# Patient Record
Sex: MALE | Race: WHITE | NOT HISPANIC OR LATINO | Employment: OTHER | URBAN - METROPOLITAN AREA
[De-identification: names, ages, dates, MRNs, and addresses within clinical notes are randomized per-mention and may not be internally consistent; named-entity substitution may affect disease eponyms.]

---

## 2017-01-09 ENCOUNTER — TRANSFERRED RECORDS (OUTPATIENT)
Dept: HEALTH INFORMATION MANAGEMENT | Facility: CLINIC | Age: 77
End: 2017-01-09

## 2017-01-09 ENCOUNTER — TELEPHONE (OUTPATIENT)
Dept: CARDIOLOGY | Facility: CLINIC | Age: 77
End: 2017-01-09

## 2017-01-09 NOTE — TELEPHONE ENCOUNTER
He is on a high dose of coreg and I think we can start him on high dose bystolic right away.  Thanks.

## 2017-01-09 NOTE — TELEPHONE ENCOUNTER
Patient has been monitoring his BP readings since 12- 1 hour after taking his BP medications. Range 140's to 130's systolic and Diastolic 40-60's and his pulse resting for a few minutes is 43-55.  He states he is extremely cold every day around 2pm. Has to wear 3 tops and polar fleece and still is cold. Denies any dizziness, but does feel drowsy all the time can sleep 10 hours and take naps during the day.  Will forward to Dr. Perera with above information.

## 2017-01-09 NOTE — TELEPHONE ENCOUNTER
Samples of Bystolic 20mg samples given and left detailed msg. Patient stop Coreg and change to Bystolic 20mg daily and call back in 1-2 weeks to report on symptoms and BP readings.    Patient states that he has noticed an increase in ankle edema and it is possible due to the amlodipine.  Patient will  samples and make an appt in 2 weeks to discuss medications.

## 2017-01-24 ENCOUNTER — OFFICE VISIT (OUTPATIENT)
Dept: CARDIOLOGY | Facility: CLINIC | Age: 77
End: 2017-01-24
Payer: COMMERCIAL

## 2017-01-24 VITALS
WEIGHT: 190.1 LBS | BODY MASS INDEX: 28.81 KG/M2 | HEIGHT: 68 IN | SYSTOLIC BLOOD PRESSURE: 130 MMHG | DIASTOLIC BLOOD PRESSURE: 66 MMHG | HEART RATE: 54 BPM

## 2017-01-24 DIAGNOSIS — Z79.899 ON AMIODARONE THERAPY: ICD-10-CM

## 2017-01-24 DIAGNOSIS — I48.0 PAROXYSMAL ATRIAL FIBRILLATION (H): ICD-10-CM

## 2017-01-24 DIAGNOSIS — I48.91 ATRIAL FIBRILLATION, UNSPECIFIED TYPE (H): Primary | ICD-10-CM

## 2017-01-24 DIAGNOSIS — Z79.899 ON AMIODARONE THERAPY: Primary | ICD-10-CM

## 2017-01-24 DIAGNOSIS — I10 ESSENTIAL HYPERTENSION: ICD-10-CM

## 2017-01-24 LAB
ALBUMIN SERPL-MCNC: 3.8 G/DL (ref 3.4–5)
ALP SERPL-CCNC: 76 U/L (ref 40–150)
ALT SERPL W P-5'-P-CCNC: 49 U/L (ref 0–70)
AST SERPL W P-5'-P-CCNC: 37 U/L (ref 0–45)
BILIRUB DIRECT SERPL-MCNC: 0.1 MG/DL (ref 0–0.2)
BILIRUB SERPL-MCNC: 0.8 MG/DL (ref 0.2–1.3)
PROT SERPL-MCNC: 6.9 G/DL (ref 6.8–8.8)
TSH SERPL DL<=0.05 MIU/L-ACNC: 0.87 MU/L (ref 0.4–4)

## 2017-01-24 PROCEDURE — 99214 OFFICE O/P EST MOD 30 MIN: CPT | Performed by: PHYSICIAN ASSISTANT

## 2017-01-24 PROCEDURE — 36415 COLL VENOUS BLD VENIPUNCTURE: CPT | Performed by: PHYSICIAN ASSISTANT

## 2017-01-24 PROCEDURE — 80076 HEPATIC FUNCTION PANEL: CPT | Performed by: PHYSICIAN ASSISTANT

## 2017-01-24 PROCEDURE — 84443 ASSAY THYROID STIM HORMONE: CPT | Performed by: PHYSICIAN ASSISTANT

## 2017-01-24 RX ORDER — NEBIVOLOL 20 MG/1
20 TABLET ORAL DAILY
Qty: 30 TABLET | Refills: 3 | Status: SHIPPED | OUTPATIENT
Start: 2017-01-24 | End: 2017-02-13

## 2017-01-24 RX ORDER — NEBIVOLOL 20 MG/1
TABLET ORAL DAILY
COMMUNITY
End: 2017-01-24

## 2017-01-24 RX ORDER — NEBIVOLOL 20 MG/1
20 TABLET ORAL DAILY
Qty: 30 TABLET | Refills: 3 | Status: SHIPPED | OUTPATIENT
Start: 2017-01-24 | End: 2017-01-24

## 2017-01-24 NOTE — TELEPHONE ENCOUNTER
rivaroxaban ANTICOAGULANT (XARELTO) 20 MG TABS tablet           Last Written Prescription Date: 7/19/2016  Last Fill Quantity: 30 tablet, # refills: 2    Last Office Visit with [facility] or [facility] prescribing provider:  11/28/2016   Future Office Visit:    Next 5 appointments (look out 90 days)     Jan 24, 2017  1:10 PM   Return Visit with Flores Grimes PA-C   [facility] ([facility])    [address]  [facility] [id number] [phone]            Feb 13, 2017  3:15 PM   Return Visit with Anam Perera MD   [facility] [place] ([facility])    [address]  [facility] [address] [phone]                   WBC     10.5   6/21/2016  RBC     3.92   6/21/2016  HGB     12.9   9/7/2016  HCT     37.8   6/21/2016  No components found with this name: mct  MCV       96   6/21/2016  MCH     29.8   6/21/2016  MCHC     31.0   6/21/2016  RDW     16.4   6/21/2016  PLT      511   6/21/2016  AST       51   5/19/2016  ALT       64   5/19/2016  CREATININE   Date Value Ref Range Status   11/18/2016 1.13 0.70 - 1.30 mg/dL Final   ]
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
## 2017-01-24 NOTE — MR AVS SNAPSHOT
After Visit Summary   1/24/2017    Reji Aguirre    MRN: [id number]           Patient Information     Date Of Birth          1940        Visit Information        Provider Department      1/24/2017 1:10 PM Flores Grimes PA-C [facility] AT [place]        Today's Diagnoses     On amiodarone therapy    -  1     Paroxysmal atrial fibrillation (H)         Essential hypertension           Care Instructions    I do not think there is significant swelling in your legs - and since you are without any symptoms, we will continue the amlodipine for now.     Regarding you feeling cold - I will check the thyroid function and will also get a Holter monitor to evaluate your heart rate.     Otherwise, your blood pressure looks better controlled now with SBP in the 120s-140s instead of mostly in the 150s. For now continue your meds as is.     Follow up scheduled with Dr. Perera is in 3 weeks.         Follow-ups after your visit        Your next 10 appointments already scheduled     Jan 24, 2017  2:15 PM   LAB with RU LAB   [facility] ([facility])    [address]  [facility] [address] [phone]           Patient must bring picture ID.  Patient should be prepared to give a urine specimen  Please do not eat 10-12 hours before your appointment if you are coming in fasting for labs on lipids, cholesterol, or glucose (sugar).  Pregnant women should follow their Care Team instructions. Water with medications is okay. Do not drink coffee or other fluids.   If you have concerns about taking  your medications, please ask at office or if scheduling via [employer], send a message by clicking on Secure Messaging, Message Your Care Team.            Jan 30, 2017  8:15 AM   Holter Monitor with [employer] [facility] [place] ([facility])    [address]  [facility]  [address]   [phone]           LOCATION - [address] [place] [address] **Please check-in at the [facility] Registration Office, [address], in the [facility] building. When you are finished registering, please go to [address] and have a seat.            Feb 13, 2017  3:15 PM   Return Visit with Anam Perera MD   [facility] AT [place] ([facility])    [address]  [facility] [address] [phone]              Future tests that were ordered for you today     Open Future Orders        Priority Expected Expires Ordered    Hepatic panel Routine 1/24/2017 1/24/2019 1/24/2017    Holter Monitor 24 hour - Adult Routine 1/31/2017 1/24/2018 1/24/2017    TSH Routine 1/24/2017 1/24/2019 1/24/2017            Who to contact     If you have questions or need follow up information about today's clinic visit or your schedule please contact [facility] [place] directly at [phone].  Normal or non-critical lab and imaging results will be communicated to you by MyChart, letter or phone within 4 business days after the clinic has received the results. If you do not hear from us within 7 days, please contact the clinic through [employer] or phone. If you have a critical or abnormal lab result, we will notify you by phone as soon as possible.  Submit refill requests through [employer] or call your pharmacy and they will forward the refill request to us. Please allow 3 business days for your refill to be completed.          Additional Information About Your Visit        MyChart Information     [employer] gives you secure access to your electronic health record. If you see a primary care provider, you can also send messages to your care team and make appointments. If you have questions, please call your primary care clinic.  If you do not have a primary care provider, please call [phone] and they will  "assist you.        Care EveryWhere ID     This is your Care EveryWhere ID. This could be used by other organizations to access your [place] medical records  [id number]        Your Vitals Were     Pulse Height BMI (Body Mass Index)             54 1.727 m (5' 8\") 28.91 kg/m2          Blood Pressure from Last 3 Encounters:   01/24/17 130/66   12/12/16 148/62   11/28/16 133/66    Weight from Last 3 Encounters:   01/24/17 86.229 kg (190 lb 1.6 oz)   12/12/16 84.823 kg (187 lb)   11/28/16 87.499 kg (192 lb 14.4 oz)                 Today's Medication Changes          These changes are accurate as of: 1/24/17  2:04 PM.  If you have any questions, ask your nurse or doctor.               These medicines have changed or have updated prescriptions.        Dose/Directions    Nebivolol HCl 20 MG Tabs   Commonly known as:  BYSTOLIC   This may have changed:  how much to take   Used for:  Essential hypertension   Changed by:  Flores Grimes PA-C        Dose:  20 mg   Take 20 mg by mouth daily   Quantity:  30 tablet   Refills:  3         Stop taking these medicines if you haven't already. Please contact your care team if you have questions.     carvedilol 25 MG tablet   Commonly known as:  COREG   Stopped by:  Flores Grimes PA-C                Where to get your medicines      These medications were sent to [facility] [place], [place] - [address] [address]  [address] [address] [address]     Phone:  [phone]    - Nebivolol HCl 20 MG Tabs      These medications were sent to [facility] [address] [place], [place] - [address] AT [facility] [address]  [address], [facility] [address]     Phone:  [phone]    - rivaroxaban ANTICOAGULANT 20 MG Tabs tablet             Primary Care Provider Office Phone # Fax #    Igor Christina [phone]       [facility] [address]  [facility] [address]        Thank you!     " Thank you for choosing [facility] AT [place]  for your care. Our goal is always to provide you with excellent care. Hearing back from our patients is one way we can continue to improve our services. Please take a few minutes to complete the written survey that you may receive in the mail after your visit with us. Thank you!             Your Updated Medication List - Protect others around you: Learn how to safely use, store and throw away your medicines at www.disposemymeds.org.          This list is accurate as of: 1/24/17  2:04 PM.  Always use your most recent med list.                   Brand Name Dispense Instructions for use    allopurinol 300 MG tablet    ZYLOPRIM    90 tablet    Take 1 tablet (300 mg) by mouth daily       amiodarone 200 MG tablet    PACERONE/CODARONE    90 tablet    Take 1 tab daily from Mon through Friday (skip Sat + Sun)       amLODIPine 5 MG tablet    NORVASC    90 tablet    Take 1 tablet (5 mg) by mouth daily       clindamycin 300 MG capsule    CLEOCIN    2 capsule    Take 2 capsules (600 mg) by mouth once as needed TAKE 600 MG ONE HOUR PRIOR TO DENTAL PROCEDURES       FINACEA 15 % gel   Generic drug:  Azelaic Acid      Apply 0.5 inches topically daily       hydrALAZINE 100 MG Tabs tablet    APRESOLINE    270 tablet    Take 1 tablet (100 mg) by mouth 3 times daily       hydrochlorothiazide 12.5 MG capsule    MICROZIDE    180 capsule    Take 2 capsules (25 mg) by mouth daily       losartan 100 MG tablet    COZAAR    90 tablet    Take 1 tablet (100 mg) by mouth daily       Nebivolol HCl 20 MG Tabs    BYSTOLIC    30 tablet    Take 20 mg by mouth daily       potassium chloride SA 20 MEQ CR tablet    K-DUR/KLOR-CON M    180 tablet    Take 1 tablet (20 mEq) by mouth 2 times daily       rivaroxaban ANTICOAGULANT 20 MG Tabs tablet    XARELTO    30 tablet    Take 1 tablet (20 mg) by mouth daily (with dinner)

## 2017-01-24 NOTE — PROGRESS NOTES
HPI and Plan:   See dictation  Conf# [id number]    Orders this Visit:  Orders Placed This Encounter   Procedures     TSH     Hepatic panel     Holter Monitor 24 hour - Adult     Orders Placed This Encounter   Medications     DISCONTD: Nebivolol HCl (BYSTOLIC) 20 MG TABS     Sig: Take by mouth daily     DISCONTD: Nebivolol HCl (BYSTOLIC) 20 MG TABS     Sig: Take 20 mg by mouth daily     Dispense:  30 tablet     Refill:  3     Nebivolol HCl (BYSTOLIC) 20 MG TABS     Sig: Take 20 mg by mouth daily     Dispense:  30 tablet     Refill:  3     Medications Discontinued During This Encounter   Medication Reason     carvedilol (COREG) 25 MG tablet Alternate therapy     order for DME Stopped by Patient     Nebivolol HCl (BYSTOLIC) 20 MG TABS Reorder     Nebivolol HCl (BYSTOLIC) 20 MG TABS Reorder         Encounter Diagnoses   Name Primary?     On amiodarone therapy Yes     Paroxysmal atrial fibrillation (H)      Essential hypertension        CURRENT MEDICATIONS:  Current Outpatient Prescriptions   Medication Sig Dispense Refill     rivaroxaban ANTICOAGULANT (XARELTO) 20 MG TABS tablet Take 1 tablet (20 mg) by mouth daily (with dinner) 30 tablet 5     Nebivolol HCl (BYSTOLIC) 20 MG TABS Take 20 mg by mouth daily 30 tablet 3     amLODIPine (NORVASC) 5 MG tablet Take 1 tablet (5 mg) by mouth daily 90 tablet 3     hydrALAZINE (APRESOLINE) 100 MG TABS Take 1 tablet (100 mg) by mouth 3 times daily 270 tablet 3     amiodarone (PACERONE/CODARONE) 200 MG tablet Take 1 tab daily from Mon through Friday (skip Sat + Sun) 90 tablet 3     potassium chloride SA (K-DUR,KLOR-CON M) 20 MEQ tablet Take 1 tablet (20 mEq) by mouth 2 times daily 180 tablet 3     hydrochlorothiazide (MICROZIDE) 12.5 MG capsule Take 2 capsules (25 mg) by mouth daily 180 capsule 3     clindamycin (CLEOCIN) 300 MG capsule Take 2 capsules (600 mg) by mouth once as needed TAKE 600 MG ONE HOUR PRIOR TO DENTAL PROCEDURES 2 capsule 0     losartan (COZAAR) 100 MG tablet Take 1  tablet (100 mg) by mouth daily 90 tablet 3     allopurinol (ZYLOPRIM) 300 MG tablet Take 1 tablet (300 mg) by mouth daily 90 tablet 3     Azelaic Acid (FINACEA) 15 % gel Apply 0.5 inches topically daily        [DISCONTINUED] Nebivolol HCl (BYSTOLIC) 20 MG TABS Take by mouth daily       [DISCONTINUED] Nebivolol HCl (BYSTOLIC) 20 MG TABS Take 20 mg by mouth daily 30 tablet 3     [DISCONTINUED] rivaroxaban ANTICOAGULANT (XARELTO) 20 MG TABS tablet Take 1 tablet (20 mg) by mouth daily (with dinner) 30 tablet 2       ALLERGIES  Allergies   Allergen Reactions     Amlodipine Besylate      edema     Ampicillin      rash     Metoprolol      Mild fatigue with Toprol; mildly decreased exercise capacity     Spironolactone Nausea and Difficulty breathing       PAST MEDICAL HISTORY:  Past Medical History   Diagnosis Date     Special screening for malignant neoplasms, colon 1995     Unspecified essential hypertension      Gout, unspecified      Rosacea      Diverticulosis of colon (without mention of hemorrhage) 11/16/07     incidental diverticuli noted at colonoscopy     Atrial fibrillation, unspecified type (H) 5/19/16     new onset A fib; nuclear stress test negative     Cardiomyopathy (H)      LBBB (left bundle branch block)      Atrial flutter (H)        PAST SURGICAL HISTORY:  Past Surgical History   Procedure Laterality Date     C nonspecific procedure  5/99     left knee arthroscopy     Hc colonoscopy thru stoma w biopsy/cautery tumor/polyp/lesion  11/16/07     normal colonoscopy except for incidental diverticuli     Orthopedic surgery  04/11     2nd toe Lt foot     Arthroscopy shoulder rotator cuff repair  7/9/08     Left shoulder arthroscopic rotator cuff repair with acromioplasty     Arthroplasty knee Left 6/6/2016     Procedure: ARTHROPLASTY KNEE;  Surgeon: Derek Varma MD;  Location:  OR     Cardioversion  07-       FAMILY HISTORY:  Family History   Problem Relation Age of Onset      "CEREBROVASCULAR DISEASE Father      d:89, dementia     Respiratory Mother      d:94     DIABETES Sister      b. 1938, IDDM since age 10     DIABETES Brother      b. 1943, type II DM       SOCIAL HISTORY:  Social History     Social History     Marital Status:      Spouse Name: N/A     Number of Children: N/A     Years of Education: N/A     Social History Main Topics     Smoking status: Former Smoker     Quit date: 07/25/1963     Smokeless tobacco: Never Used     Alcohol Use: 0.0 oz/week     0 Standard drinks or equivalent per week      Comment: 1 drinks a day     Drug Use: No     Sexual Activity:     Partners: Female     Other Topics Concern     Caffeine Concern No     1 cup daily     Special Diet No     Exercise Yes     skiing     Social History Narrative       Review of Systems:  Skin:  Negative     Eyes:  Positive for glasses  ENT:  Positive for hearing loss  Respiratory:  Negative    Cardiovascular:    Positive for;lightheadedness  Gastroenterology: Negative    Genitourinary:  Negative    Musculoskeletal:  Positive for    Neurologic:  Negative    Psychiatric:  Negative    Heme/Lymph/Imm:  Positive for chills  Endocrine:  Negative        Physical Exam:  Vitals: /66 mmHg  Pulse 54  Ht 1.727 m (5' 8\")  Wt 86.229 kg (190 lb 1.6 oz)  BMI 28.91 kg/m2    Constitutional:  cooperative, alert and oriented, well developed, well nourished, in no acute distress appears anxious      Skin:  warm and dry to the touch, no apparent skin lesions or masses noted        Head:  normocephalic, no masses or lesions        Eyes:  pupils equal and round;conjunctivae and lids unremarkable        ENT:  no pallor or cyanosis, dentition good        Neck:  no carotid bruit;JVP normal        Chest:  normal breath sounds, clear to auscultation, normal A-P diameter, normal symmetry, normal respiratory excursion, no use of accessory muscles        Cardiac: no murmurs, gallops or rubs detected bradycardic distant heart sounds       "        Abdomen:  abdomen soft;BS normoactive;non-tender        Vascular: pulses full and equal                                      Extremities and Back:  no deformities, clubbing, cyanosis, erythema observed        Neurological:  affect appropriate, oriented to time, person and place          Recent Lab Results:  LIPID RESULTS:  Lab Results   Component Value Date    CHOL 133 06/21/2016    HDL 40 06/21/2016    LDL 72 06/21/2016    TRIG 103 06/21/2016    CHOLHDLRATIO 2.6 10/09/2014       LIVER ENZYME RESULTS:  Lab Results   Component Value Date    AST 37 01/24/2017    ALT 49 01/24/2017       CBC RESULTS:  Lab Results   Component Value Date    WBC 10.5 06/21/2016    RBC 3.92* 06/21/2016    HGB 12.9* 09/07/2016    HCT 37.8* 06/21/2016    MCV 96 06/21/2016    MCH 29.8 06/21/2016    MCHC 31.0* 06/21/2016    RDW 16.4* 06/21/2016    * 06/21/2016       BMP RESULTS:  Lab Results   Component Value Date     11/18/2016    POTASSIUM 3.3* 11/18/2016    CHLORIDE 104 11/18/2016    CO2 29 11/18/2016    ANIONGAP 12.3 11/18/2016    GLC 97 11/18/2016    BUN 18 11/18/2016    CR 1.13 11/18/2016    GFRESTIMATED 63 11/18/2016    GFRESTBLACK 77 11/18/2016    HE 9.6 11/18/2016        A1C RESULTS:  No results found for: A1C    INR RESULTS:  No results found for: INR        CC  Igor Christina MD  [facility]  [address] [place] [address]

## 2017-01-24 NOTE — PATIENT INSTRUCTIONS
I do not think there is significant swelling in your legs - and since you are without any symptoms, we will continue the amlodipine for now.     Regarding you feeling cold - I will check the thyroid function and will also get a Holter monitor to evaluate your heart rate.     Otherwise, your blood pressure looks better controlled now with SBP in the 120s-140s instead of mostly in the 150s. For now continue your meds as is.     Follow up scheduled with Dr. Perera is in 3 weeks.
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
## 2017-01-26 ENCOUNTER — TRANSFERRED RECORDS (OUTPATIENT)
Dept: HEALTH INFORMATION MANAGEMENT | Facility: CLINIC | Age: 77
End: 2017-01-26

## 2017-01-30 ENCOUNTER — HOSPITAL ENCOUNTER (OUTPATIENT)
Dept: CARDIOLOGY | Facility: CLINIC | Age: 77
Discharge: HOME | End: 2017-01-30
Attending: PHYSICIAN ASSISTANT | Admitting: PHYSICIAN ASSISTANT
Payer: MEDICARE

## 2017-01-30 DIAGNOSIS — Z79.899 ON AMIODARONE THERAPY: ICD-10-CM

## 2017-01-30 DIAGNOSIS — I48.0 PAROXYSMAL ATRIAL FIBRILLATION (H): ICD-10-CM

## 2017-01-30 PROCEDURE — 93225 XTRNL ECG REC<48 HRS REC: CPT

## 2017-01-30 PROCEDURE — 93227 XTRNL ECG REC<48 HR R&I: CPT | Performed by: INTERNAL MEDICINE

## 2017-02-13 ENCOUNTER — OFFICE VISIT (OUTPATIENT)
Dept: CARDIOLOGY | Facility: CLINIC | Age: 77
End: 2017-02-13
Attending: INTERNAL MEDICINE
Payer: COMMERCIAL

## 2017-02-13 VITALS
OXYGEN SATURATION: 95 % | HEART RATE: 45 BPM | SYSTOLIC BLOOD PRESSURE: 100 MMHG | HEIGHT: 68 IN | BODY MASS INDEX: 28.95 KG/M2 | WEIGHT: 191 LBS | DIASTOLIC BLOOD PRESSURE: 50 MMHG

## 2017-02-13 DIAGNOSIS — I10 BENIGN ESSENTIAL HYPERTENSION: ICD-10-CM

## 2017-02-13 DIAGNOSIS — I48.19 PERSISTENT ATRIAL FIBRILLATION (H): ICD-10-CM

## 2017-02-13 DIAGNOSIS — I48.92 ATRIAL FLUTTER, PAROXYSMAL (H): ICD-10-CM

## 2017-02-13 PROCEDURE — 99214 OFFICE O/P EST MOD 30 MIN: CPT | Performed by: INTERNAL MEDICINE

## 2017-02-13 RX ORDER — NEBIVOLOL 5 MG/1
5 TABLET ORAL DAILY
Qty: 90 TABLET | Refills: 3 | Status: SHIPPED | OUTPATIENT
Start: 2017-02-13 | End: 2018-03-23

## 2017-02-13 NOTE — PROGRESS NOTES
HPI and Plan:   See dictation    Orders Placed This Encounter   Procedures     Follow-Up with Cardiologist       Orders Placed This Encounter   Medications     nebivolol (BYSTOLIC) 5 MG tablet     Sig: Take 1 tablet (5 mg) by mouth daily     Dispense:  90 tablet     Refill:  3       Encounter Diagnoses   Name Primary?     Persistent atrial fibrillation (H)      Atrial flutter, paroxysmal (H)      Benign essential hypertension        CURRENT MEDICATIONS:  Current Outpatient Prescriptions   Medication Sig Dispense Refill     nebivolol (BYSTOLIC) 5 MG tablet Take 1 tablet (5 mg) by mouth daily 90 tablet 3     rivaroxaban ANTICOAGULANT (XARELTO) 20 MG TABS tablet Take 1 tablet (20 mg) by mouth daily (with dinner) 30 tablet 5     amLODIPine (NORVASC) 5 MG tablet Take 1 tablet (5 mg) by mouth daily 90 tablet 3     hydrALAZINE (APRESOLINE) 100 MG TABS Take 1 tablet (100 mg) by mouth 3 times daily 270 tablet 3     amiodarone (PACERONE/CODARONE) 200 MG tablet Take 1 tab daily from Mon through Friday (skip Sat + Sun) 90 tablet 3     potassium chloride SA (K-DUR,KLOR-CON M) 20 MEQ tablet Take 1 tablet (20 mEq) by mouth 2 times daily 180 tablet 3     hydrochlorothiazide (MICROZIDE) 12.5 MG capsule Take 2 capsules (25 mg) by mouth daily 180 capsule 3     clindamycin (CLEOCIN) 300 MG capsule Take 2 capsules (600 mg) by mouth once as needed TAKE 600 MG ONE HOUR PRIOR TO DENTAL PROCEDURES 2 capsule 0     losartan (COZAAR) 100 MG tablet Take 1 tablet (100 mg) by mouth daily 90 tablet 3     allopurinol (ZYLOPRIM) 300 MG tablet Take 1 tablet (300 mg) by mouth daily 90 tablet 3     Azelaic Acid (FINACEA) 15 % gel Apply 0.5 inches topically daily        [DISCONTINUED] Nebivolol HCl (BYSTOLIC) 20 MG TABS Take 20 mg by mouth daily 30 tablet 3       ALLERGIES     Allergies   Allergen Reactions     Amlodipine Besylate      edema     Ampicillin      rash     Metoprolol      Mild fatigue with Toprol; mildly decreased exercise capacity      Spironolactone Nausea and Difficulty breathing       PAST MEDICAL HISTORY:  Past Medical History   Diagnosis Date     Atrial fibrillation, unspecified type (H) 5/19/16     new onset A fib; nuclear stress test negative     Atrial flutter (H)      Cardiomyopathy (H)      Diverticulosis of colon (without mention of hemorrhage) 11/16/07     incidental diverticuli noted at colonoscopy     Gout, unspecified      LBBB (left bundle branch block)      Rosacea      Special screening for malignant neoplasms, colon 1995     Unspecified essential hypertension        PAST SURGICAL HISTORY:  Past Surgical History   Procedure Laterality Date     C nonspecific procedure  5/99     left knee arthroscopy     Hc colonoscopy thru stoma w biopsy/cautery tumor/polyp/lesion  11/16/07     normal colonoscopy except for incidental diverticuli     Orthopedic surgery  04/11     2nd toe Lt foot     Arthroscopy shoulder rotator cuff repair  7/9/08     Left shoulder arthroscopic rotator cuff repair with acromioplasty     Arthroplasty knee Left 6/6/2016     Procedure: ARTHROPLASTY KNEE;  Surgeon: Derek Varma MD;  Location: [facility] OR     Cardioversion  07-       FAMILY HISTORY:  Family History   Problem Relation Age of Onset     CEREBROVASCULAR DISEASE Father      d:89, dementia     Respiratory Mother      d:94     DIABETES Sister      b. 1938, IDDM since age 10     DIABETES Brother      b. 1943, type II DM       SOCIAL HISTORY:  Social History     Social History     Marital status:      Spouse name: N/A     Number of children: N/A     Years of education: N/A     Social History Main Topics     Smoking status: Former Smoker     Quit date: 7/25/1963     Smokeless tobacco: Never Used     Alcohol use 0.0 oz/week     0 Standard drinks or equivalent per week      Comment: 1 drinks a day     Drug use: No     Sexual activity: Yes     Partners: Female     Other Topics Concern     Caffeine Concern No     1 cup daily     Special Diet No  "    Exercise Yes     skiing     Social History Narrative       Review of Systems:  Skin:  Positive for     Eyes:  Positive for glasses  ENT:  Negative    Respiratory:       Cardiovascular:    dizziness;syncope or near-syncope;Positive for;palpitations;edema  Gastroenterology: Negative    Genitourinary:  Positive for urinary frequency;nocturia  Musculoskeletal:  Positive for back pain;arthritis;joint pain  Neurologic:  Negative    Psychiatric:  Negative    Heme/Lymph/Imm:  Negative    Endocrine:  Negative      Physical Exam:  Vitals: /50 (BP Location: Right arm, Patient Position: Chair, Cuff Size: Adult Regular)  Pulse (!) 45  Ht 1.727 m (5' 8\")  Wt 86.6 kg (191 lb)  SpO2 95%  BMI 29.04 kg/m2    Constitutional:  cooperative, alert and oriented, well developed, well nourished, in no acute distress appears anxious      Skin:  warm and dry to the touch, no apparent skin lesions or masses noted        Head:  normocephalic, no masses or lesions        Eyes:  pupils equal and round;conjunctivae and lids unremarkable        ENT:  no pallor or cyanosis, dentition good        Neck:  no carotid bruit;JVP normal        Chest:  normal breath sounds, clear to auscultation, normal A-P diameter, normal symmetry, normal respiratory excursion, no use of accessory muscles        Cardiac: no murmurs, gallops or rubs detected bradycardic distant heart sounds              Abdomen:  abdomen soft;BS normoactive;non-tender        Vascular: pulses full and equal                                      Extremities and Back:  no deformities, clubbing, cyanosis, erythema observed        Neurological:  affect appropriate, oriented to time, person and place          Recent Lab Results:  LIPID RESULTS:  Lab Results   Component Value Date    CHOL 133 06/21/2016    HDL 40 06/21/2016    LDL 72 06/21/2016    TRIG 103 06/21/2016    CHOLHDLRATIO 2.6 10/09/2014       LIVER ENZYME RESULTS:  Lab Results   Component Value Date    AST 37 01/24/2017    ALT " 49 01/24/2017       CBC RESULTS:  Lab Results   Component Value Date    WBC 10.5 06/21/2016    RBC 3.92 (L) 06/21/2016    HGB 12.9 (L) 09/07/2016    HCT 37.8 (L) 06/21/2016    MCV 96 06/21/2016    MCH 29.8 06/21/2016    MCHC 31.0 (L) 06/21/2016    RDW 16.4 (H) 06/21/2016     (H) 06/21/2016       BMP RESULTS:  Lab Results   Component Value Date     11/18/2016    POTASSIUM 3.3 (L) 11/18/2016    CHLORIDE 104 11/18/2016    CO2 29 11/18/2016    ANIONGAP 12.3 11/18/2016    GLC 97 11/18/2016    BUN 18 11/18/2016    CR 1.13 11/18/2016    GFRESTIMATED 63 11/18/2016    GFRESTBLACK 77 11/18/2016    HE 9.6 11/18/2016        A1C RESULTS:  No results found for: A1C    INR RESULTS:  No results found for: INR        CC  Anam Perera MD   [facility]  [address]  [place] [id number]

## 2017-02-13 NOTE — LETTER
2/13/2017    Igor Christina MD  [facility]   [address] [facility] [address]    RE: Reji Aguirre       Dear Colleague,    I had the pleasure of seeing Reji Aguirre in the [facility].    Mr. Aguirre returns for followup of hypertension.  He has a history of persistent atrial fibrillation from which he has been successfully cardioverted.  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  He has been to clinic to see us almost every month since June of last year.  For full narrative, please refer to any of Dr Aj' or my notes.      He had a Holter monitor done at the end of last month.  He tells me he felt like he was in atrial fibrillation.  However, the Holter monitor showed that the rhythm was sinus bradycardia.  He has no shortness of breath.  He works as a  at [employer].  He tells me that on several occasions he felt dizzy for 5-10 seconds.  It is not necessarily related to posture or head movement.  He said he felt very tired and almost fell asleep when he was driving.  However, he had similar episodes when he was a much younger man.      PHYSICAL EXAMINATION:  Cardiovascular system examination today reveals a normal regular heart rate, but he is bradycardic.  Heart sounds are unremarkable.  He has trace bilateral ankle edema, likely from use of amlodipine.  His chest is clear.      He complains of feeling cold at times.  I do note that his blood pressure is 100/50 today in our offices.  He showed me multiple readings from home.  Aside from 1 reading of just over 160 systolic, all of the other readings are between 120-140 systolic, showing much better control.      Outpatient Encounter Prescriptions as of 2/13/2017   Medication Sig Dispense Refill     nebivolol (BYSTOLIC) 5 MG tablet Take 1 tablet (5 mg) by mouth daily 90 tablet 3     rivaroxaban ANTICOAGULANT (XARELTO) 20 MG TABS tablet Take 1  tablet (20 mg) by mouth daily (with dinner) 30 tablet 5     amLODIPine (NORVASC) 5 MG tablet Take 1 tablet (5 mg) by mouth daily 90 tablet 3     hydrALAZINE (APRESOLINE) 100 MG TABS Take 1 tablet (100 mg) by mouth 3 times daily 270 tablet 3     amiodarone (PACERONE/CODARONE) 200 MG tablet Take 1 tab daily from Mon through Friday (skip Sat + Sun) 90 tablet 3     potassium chloride SA (K-DUR,KLOR-CON M) 20 MEQ tablet Take 1 tablet (20 mEq) by mouth 2 times daily 180 tablet 3     hydrochlorothiazide (MICROZIDE) 12.5 MG capsule Take 2 capsules (25 mg) by mouth daily 180 capsule 3     clindamycin (CLEOCIN) 300 MG capsule Take 2 capsules (600 mg) by mouth once as needed TAKE 600 MG ONE HOUR PRIOR TO DENTAL PROCEDURES 2 capsule 0     losartan (COZAAR) 100 MG tablet Take 1 tablet (100 mg) by mouth daily 90 tablet 3     allopurinol (ZYLOPRIM) 300 MG tablet Take 1 tablet (300 mg) by mouth daily 90 tablet 3     Azelaic Acid (FINACEA) 15 % gel Apply 0.5 inches topically daily        [DISCONTINUED] Nebivolol HCl (BYSTOLIC) 20 MG TABS Take 20 mg by mouth daily 30 tablet 3     No facility-administered encounter medications on file as of 2/13/2017.      IMPRESSION:  I think this gentleman is extremely concerned about his cardiac health.  I am not sure what those episodes of feeling faint are, but they are probably not due to cardiac arrhythmia.  His blood pressure is a little on the low side today, though home readings are definitely acceptable.  I do wonder if his sensation of feeling cold could be a side effect of Bystolic.  He was previously on 20.  I would like to try him on 5 mg once daily.  As to his falling asleep/driving, I would like to exclude sleep apnea by requesting a sleep study.  He tells me that he is going to [place] very soon, and he will discuss it with his primary physician.      I reassured him that his cardiac status is likely stable.  He had some repeat labs for amiodarone followup recently, and they are fine.   I will see him again in 4 months' time for further followup.     Again, thank you for allowing me to participate in the care of your patient.      Sincerely,    DR BEATRICE NEWELL MD     [facility]

## 2017-02-13 NOTE — MR AVS SNAPSHOT
After Visit Summary   2/13/2017    Reji Aguirre    MRN: [id number]           Patient Information     Date Of Birth          1940        Visit Information        Provider Department      2/13/2017 3:15 PM Trever, Anam Butterfield MD [facility]        Today's Diagnoses     Persistent atrial fibrillation (H)        Atrial flutter, paroxysmal (H)        Benign essential hypertension           Follow-ups after your visit        Additional Services     Follow-Up with Cardiologist                 Your next 10 appointments already scheduled     Feb 14, 2017  9:20 AM CST   Office Visit with Igor Christina MD   [facility]    [address] [address] [phone]           Bring a current list of meds and any records pertaining to this visit.  For Physicals, please bring immunization records and any forms needing to be filled out.  Please arrive 10 minutes early to complete paperwork.              Future tests that were ordered for you today     Open Future Orders        Priority Expected Expires Ordered    Follow-Up with Cardiologist Routine 6/13/2017 2/13/2018 2/13/2017            Who to contact     If you have questions or need follow up information about today's clinic visit or your schedule please contact [facility] directly at [phone].  Normal or non-critical lab and imaging results will be communicated to you by MyChart, letter or phone within 4 business days after the clinic has received the results. If you do not hear from us within 7 days, please contact the clinic through MyChart or phone. If you have a critical or abnormal lab result, we will notify you by phone as soon as possible.  Submit refill requests through [employer] or call your pharmacy and they will forward the refill request to us. Please allow 3 business  "days for your refill to be completed.          Additional Information About Your Visit        [employer] Information     [employer] gives you secure access to your electronic health record. If you see a primary care provider, you can also send messages to your care team and make appointments. If you have questions, please call your primary care clinic.  If you do not have a primary care provider, please call [phone] and they will assist you.        Care EveryWhere ID     This is your Care EveryWhere ID. This could be used by other organizations to access your [place] medical records  [id number]        Your Vitals Were     Pulse Height Pulse Oximetry BMI (Body Mass Index)          45 1.727 m (5' 8\") 95% 29.04 kg/m2         Blood Pressure from Last 3 Encounters:   02/13/17 100/50   01/24/17 130/66   12/12/16 148/62    Weight from Last 3 Encounters:   02/13/17 86.6 kg (191 lb)   01/24/17 86.2 kg (190 lb 1.6 oz)   12/12/16 84.8 kg (187 lb)              We Performed the Following     Follow-Up with Cardiologist          Today's Medication Changes          These changes are accurate as of: 2/13/17  4:06 PM.  If you have any questions, ask your nurse or doctor.               These medicines have changed or have updated prescriptions.        Dose/Directions    nebivolol 5 MG tablet   Commonly known as:  BYSTOLIC   This may have changed:    - medication strength  - how much to take   Used for:  Benign essential hypertension, Atrial flutter, paroxysmal (H), Persistent atrial fibrillation (H)   Changed by:  Anam Perera MD        Dose:  5 mg   Take 1 tablet (5 mg) by mouth daily   Quantity:  90 tablet   Refills:  3            Where to get your medicines      These medications were sent to [facility] [id number] [place] [address], [place] - [address] AT [facility] [place]  [address], [facility] [address]     Phone:  [phone]     nebivolol 5 MG tablet                Primary Care " Provider Office Phone # Fax #    Igor Manuel Christina [phone]       [facility] [address] [facility] [address]        Thank you!     Thank you for choosing [facility]  for your care. Our goal is always to provide you with excellent care. Hearing back from our patients is one way we can continue to improve our services. Please take a few minutes to complete the written survey that you may receive in the mail after your visit with us. Thank you!             Your Updated Medication List - Protect others around you: Learn how to safely use, store and throw away your medicines at www.disposemymeds.org.          This list is accurate as of: 2/13/17  4:06 PM.  Always use your most recent med list.                   Brand Name Dispense Instructions for use    allopurinol 300 MG tablet    ZYLOPRIM    90 tablet    Take 1 tablet (300 mg) by mouth daily       amiodarone 200 MG tablet    PACERONE/CODARONE    90 tablet    Take 1 tab daily from Mon through Friday (skip Sat + Sun)       amLODIPine 5 MG tablet    NORVASC    90 tablet    Take 1 tablet (5 mg) by mouth daily       clindamycin 300 MG capsule    CLEOCIN    2 capsule    Take 2 capsules (600 mg) by mouth once as needed TAKE 600 MG ONE HOUR PRIOR TO DENTAL PROCEDURES       FINACEA 15 % gel   Generic drug:  Azelaic Acid      Apply 0.5 inches topically daily       hydrALAZINE 100 MG Tabs tablet    APRESOLINE    270 tablet    Take 1 tablet (100 mg) by mouth 3 times daily       hydrochlorothiazide 12.5 MG capsule    MICROZIDE    180 capsule    Take 2 capsules (25 mg) by mouth daily       losartan 100 MG tablet    COZAAR    90 tablet    Take 1 tablet (100 mg) by mouth daily       nebivolol 5 MG tablet    BYSTOLIC    90 tablet    Take 1 tablet (5 mg) by mouth daily       potassium chloride SA 20 MEQ CR tablet    K-DUR/KLOR-CON M    180 tablet    Take 1 tablet (20 mEq) by mouth 2 times daily        rivaroxaban ANTICOAGULANT 20 MG Tabs tablet    XARELTO    30 tablet    Take 1 tablet (20 mg) by mouth daily (with dinner)

## 2017-02-14 ENCOUNTER — OFFICE VISIT (OUTPATIENT)
Dept: INTERNAL MEDICINE | Facility: CLINIC | Age: 77
End: 2017-02-14
Payer: COMMERCIAL

## 2017-02-14 VITALS
DIASTOLIC BLOOD PRESSURE: 50 MMHG | BODY MASS INDEX: 29.57 KG/M2 | OXYGEN SATURATION: 96 % | HEART RATE: 40 BPM | WEIGHT: 194.5 LBS | SYSTOLIC BLOOD PRESSURE: 148 MMHG | TEMPERATURE: 98 F

## 2017-02-14 DIAGNOSIS — I42.9 CARDIOMYOPATHY (H): ICD-10-CM

## 2017-02-14 DIAGNOSIS — I10 BENIGN ESSENTIAL HYPERTENSION: ICD-10-CM

## 2017-02-14 DIAGNOSIS — M62.89 TENSOR FASCIA LATA SYNDROME: Primary | ICD-10-CM

## 2017-02-14 DIAGNOSIS — I48.91 ATRIAL FIBRILLATION, UNSPECIFIED TYPE (H): ICD-10-CM

## 2017-02-14 PROCEDURE — 99214 OFFICE O/P EST MOD 30 MIN: CPT | Performed by: INTERNAL MEDICINE

## 2017-02-14 NOTE — MR AVS SNAPSHOT
After Visit Summary   2/14/2017    Reji Aguirre    MRN: [id number]           Patient Information     Date Of Birth          1940        Visit Information        Provider Department      2/14/2017 9:20 AM Igor Christina MD [facility]        Today's Diagnoses     Tensor fascia elijah syndrome    -  1    Atrial fibrillation, unspecified type (H)        Benign essential hypertension        Cardiomyopathy (H)          Care Instructions    *  Stretching of the connective tissues of the hip and buttock area    *  Hamstring stretches  *  Tensor fascia Burke stretches  *  Piriformis stretches    *  [email or web address]    *  Massage balls    * foam rollers            Follow-ups after your visit        Future tests that were ordered for you today     Open Future Orders        Priority Expected Expires Ordered    Follow-Up with Cardiologist Routine 6/13/2017 2/13/2018 2/13/2017            Who to contact     If you have questions or need follow up information about today's clinic visit or your schedule please contact [facility] directly at [phone].  Normal or non-critical lab and imaging results will be communicated to you by redealizehart, letter or phone within 4 business days after the clinic has received the results. If you do not hear from us within 7 days, please contact the clinic through [employer] or phone. If you have a critical or abnormal lab result, we will notify you by phone as soon as possible.  Submit refill requests through [employer] or call your pharmacy and they will forward the refill request to us. Please allow 3 business days for your refill to be completed.          Additional Information About Your Visit        redealizehart Information     [employer] gives you secure access to your electronic health record. If you see a primary care provider, you can also send messages to your care team and make appointments. If you have questions, please  call your primary care clinic.  If you do not have a primary care provider, please call [phone] and they will assist you.        Care EveryWhere ID     This is your Care EveryWhere ID. This could be used by other organizations to access your [facility] medical records  [id number]        Your Vitals Were     Pulse Temperature Pulse Oximetry BMI (Body Mass Index)          40 98  F (36.7  C) (Oral) 96% 29.57 kg/m2         Blood Pressure from Last 3 Encounters:   02/14/17 148/50   02/13/17 100/50   01/24/17 130/66    Weight from Last 3 Encounters:   02/14/17 194 lb 8 oz (88.2 kg)   02/13/17 191 lb (86.6 kg)   01/24/17 190 lb 1.6 oz (86.2 kg)              Today, you had the following     No orders found for display       Primary Care Provider Office Phone # Fax #    Igor Christina [phone]       [facility] [address] [place] [id number]        Thank you!     Thank you for choosing [facility]  for your care. Our goal is always to provide you with excellent care. Hearing back from our patients is one way we can continue to improve our services. Please take a few minutes to complete the written survey that you may receive in the mail after your visit with us. Thank you!             Your Updated Medication List - Protect others around you: Learn how to safely use, store and throw away your medicines at www.disposemymeds.org.          This list is accurate as of: 2/14/17 10:24 AM.  Always use your most recent med list.                   Brand Name Dispense Instructions for use    allopurinol 300 MG tablet    ZYLOPRIM    90 tablet    Take 1 tablet (300 mg) by mouth daily       amiodarone 200 MG tablet    PACERONE/CODARONE    90 tablet    Take 1 tab daily from Mon through Friday (skip Sat + Sun)       amLODIPine 5 MG tablet    NORVASC    90 tablet    Take 1 tablet (5 mg) by mouth daily       clindamycin 300 MG capsule    CLEOCIN    2 capsule    Take 2  capsules (600 mg) by mouth once as needed TAKE 600 MG ONE HOUR PRIOR TO DENTAL PROCEDURES       FINACEA 15 % gel   Generic drug:  Azelaic Acid      Apply 0.5 inches topically daily       hydrALAZINE 100 MG Tabs tablet    APRESOLINE    270 tablet    Take 1 tablet (100 mg) by mouth 3 times daily       hydrochlorothiazide 12.5 MG capsule    MICROZIDE    180 capsule    Take 2 capsules (25 mg) by mouth daily       losartan 100 MG tablet    COZAAR    90 tablet    Take 1 tablet (100 mg) by mouth daily       nebivolol 5 MG tablet    BYSTOLIC    90 tablet    Take 1 tablet (5 mg) by mouth daily       potassium chloride SA 20 MEQ CR tablet    K-DUR/KLOR-CON M    180 tablet    Take 1 tablet (20 mEq) by mouth 2 times daily       rivaroxaban ANTICOAGULANT 20 MG Tabs tablet    XARELTO    30 tablet    Take 1 tablet (20 mg) by mouth daily (with dinner)

## 2017-02-14 NOTE — PATIENT INSTRUCTIONS
*  Stretching of the connective tissues of the hip and buttock area    *  Hamstring stretches  *  Tensor fascia Manoj stretches  *  Piriformis stretches    *  [email or web address]    *  Massage balls    * foam rollers

## 2017-02-14 NOTE — PROGRESS NOTES
"  SUBJECTIVE:                                                    Reji Aguirre is a 76 year old male who presents to clinic today for the following health issues:      Joint Pain     Onset: \"on and off\" for a few years    Description:   Location: right hip  Character: Sharp and Stabbing    Intensity: severe    Progression of Symptoms: intermittent    Accompanying Signs & Symptoms:  Other symptoms: none   History:   Previous similar pain: YES      Precipitating factors:   Trauma or overuse: YES- pt does not have any accidents but occurs while he is skiing, over use    Alleviating factors:  Improved by: acetaminophen and NSAID - aleve w/ some relief       Therapies Tried and outcome: acetaminophen and NSAID - aleve w/ some relief          Problem list and histories reviewed & adjusted, as indicated.  Additional history: as documented        Past Medical History:  ---------------------------  Past Medical History   Diagnosis Date     Atrial fibrillation, unspecified type (H) 5/19/16     new onset A fib; nuclear stress test negative     Atrial flutter (H)      Cardiomyopathy (H)      Diverticulosis of colon (without mention of hemorrhage) 11/16/07     incidental diverticuli noted at colonoscopy     Gout, unspecified      LBBB (left bundle branch block)      Rosacea      Special screening for malignant neoplasms, colon 1995     Unspecified essential hypertension        Past Surgical History:  ---------------------------  Past Surgical History   Procedure Laterality Date     C nonspecific procedure  5/99     left knee arthroscopy     Hc colonoscopy thru stoma w biopsy/cautery tumor/polyp/lesion  11/16/07     normal colonoscopy except for incidental diverticuli     Orthopedic surgery  04/11     2nd toe Lt foot     Arthroscopy shoulder rotator cuff repair  7/9/08     Left shoulder arthroscopic rotator cuff repair with acromioplasty     Arthroplasty knee Left 6/6/2016     Procedure: ARTHROPLASTY KNEE;  Surgeon: Avani, " Derek Jha MD;  Location: [facility] OR     Cardioversion  07-       Current Medications:  ---------------------------  Current Outpatient Prescriptions   Medication Sig Dispense Refill     nebivolol (BYSTOLIC) 5 MG tablet Take 1 tablet (5 mg) by mouth daily 90 tablet 3     rivaroxaban ANTICOAGULANT (XARELTO) 20 MG TABS tablet Take 1 tablet (20 mg) by mouth daily (with dinner) 30 tablet 5     amLODIPine (NORVASC) 5 MG tablet Take 1 tablet (5 mg) by mouth daily 90 tablet 3     hydrALAZINE (APRESOLINE) 100 MG TABS Take 1 tablet (100 mg) by mouth 3 times daily 270 tablet 3     amiodarone (PACERONE/CODARONE) 200 MG tablet Take 1 tab daily from Mon through Friday (skip Sat + Sun) 90 tablet 3     potassium chloride SA (K-DUR,KLOR-CON M) 20 MEQ tablet Take 1 tablet (20 mEq) by mouth 2 times daily 180 tablet 3     hydrochlorothiazide (MICROZIDE) 12.5 MG capsule Take 2 capsules (25 mg) by mouth daily 180 capsule 3     clindamycin (CLEOCIN) 300 MG capsule Take 2 capsules (600 mg) by mouth once as needed TAKE 600 MG ONE HOUR PRIOR TO DENTAL PROCEDURES 2 capsule 0     losartan (COZAAR) 100 MG tablet Take 1 tablet (100 mg) by mouth daily 90 tablet 3     allopurinol (ZYLOPRIM) 300 MG tablet Take 1 tablet (300 mg) by mouth daily 90 tablet 3     Azelaic Acid (FINACEA) 15 % gel Apply 0.5 inches topically daily          Allergies:  -------------  Allergies   Allergen Reactions     Amlodipine Besylate      edema     Ampicillin      rash     Metoprolol      Mild fatigue with Toprol; mildly decreased exercise capacity     Spironolactone Nausea and Difficulty breathing       Social History:  -------------------  Social History     Social History     Marital status:      Spouse name: N/A     Number of children: N/A     Years of education: N/A     Occupational History     Not on file.     Social History Main Topics     Smoking status: Former Smoker     Quit date: 7/25/1963     Smokeless tobacco: Never Used     Alcohol use 0.0  oz/week     0 Standard drinks or equivalent per week      Comment: 1 drinks a day     Drug use: No     Sexual activity: Yes     Partners: Female     Other Topics Concern     Caffeine Concern No     1 cup daily     Special Diet No     Exercise Yes     skiing     Social History Narrative       Family Medical History:  ------------------------------  Family History   Problem Relation Age of Onset     CEREBROVASCULAR DISEASE Father      d:89, dementia     Respiratory Mother      d:94     DIABETES Sister      b. 1938, IDDM since age 10     DIABETES Brother      b. 1943, type II DM         ROS:  REVIEW OF SYSTEMS:    RESP: negative for cough, dyspnea, wheezing, hemoptysis  CV: negative for chest pain, palpitations, PND, MAGAÑA, orthopnea; reports no changes in their ability to perform physical activity (from cardiovascular standpoint)  GI: negative for dysphagia, N/V, pain, melena, diarrhea and constipation  NEURO: negative for numbness/tingling, paralysis, incoordination, or focal weakness     OBJECTIVE:                                                    /50  Pulse (!) 40  Temp 98  F (36.7  C) (Oral)  Wt 194 lb 8 oz (88.2 kg)  SpO2 96%  BMI 29.57 kg/m2     GENERAL alert and no distress  EYES:  Normal sclera,conjunctiva, EOMI  HENT: oral and posterior pharynx without lesions or erythema, facies symmetric  NECK: Neck supple. No LAD, without thyroidmegaly or JVD., Carotids without bruits.  RESP: Clear to ausculation bilaterally without wheezes or crackles. Normal BS in all fields.  CV: Irregular rhythm ( consistent with known atrial fibrillation), regular rate; normal S1S2 without murmurs, rubs or gallops, normal S1S2 without murmurs, rubs or gallops. PMI normal  LYMPH: no cervical lymph adenopathy appreciated  MS: extremities- no gross deformities of the visible extremities noted, no edema  PSYCH: Alert and oriented times 3; speech- coherent  SKIN:  No obvious significant skin lesions on visible portions of face   HIP:   FROM in hip joint, tendnres in the upper IT band insertion and tensor fascia lattae area.    No pain with hip flexor muscle use.          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 Work with PT at [facility]  Discussed mechanical aspects of this condition.   This is undoubtedly due to his extensive skiing, it has gotten better with laying off skiing, fortunately we are at the end of the season.  With home PT (gave exercises and web sites to check out), then will refer to PT at [facility].   Plan:     (I48.91) Atrial fibrillation, unspecified type (H)  Comment: This condition is currently controlled on the current medical regimen.  Continue current therapy.   Plan:     (I10) Benign essential hypertension  Comment: This condition is currently controlled on the current medical regimen.  Continue current therapy.   Discussed hypertension in detail including JNC VIII guidelines for blood pressure goals.  Discussed indication for treatment and treatment options.  Discussed the importance for aggressive management of HTN to prevent vascular complications later.  Recommended lower fat, lower carbohydrate, and lower sodium (<2000 mg)diet.  Discussed required intervals for follow up on HTN, lab studies, and the need to aggresive management of other cardiac disease risk factors.  Recommened pt. follow their blood pressures outside the clinic to ensure that BPs are remaining within guidelines, and to contact me if the readings are not within guidelines so we can adjust treatment as needed.   Plan:     (I42.9) Cardiomyopathy (H)  Comment: This condition is currently controlled on the current medical regimen.  Continue current therapy.   No signs and symptoms of congestive heart  failure at this time.   Plan:       See Patient Instructions    BAR SALEH M.D., MD  [facility]

## 2017-02-14 NOTE — PROGRESS NOTES
HISTORY OF PRESENT ILLNESS:  Mr. Aguirre returns for followup of hypertension.  He has a history of persistent atrial fibrillation from which he has been successfully cardioverted.  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  He has been to clinic to see us almost every month since June of last year.  For full narrative, please refer to any of Dr Aj' or my notes.      He had a Holter monitor done at the end of last month.  He tells me he felt like he was in atrial fibrillation.  However, the Holter monitor showed that the rhythm was sinus bradycardia.  He has no shortness of breath.  He works as a  at [employer].  He tells me that on several occasions he felt dizzy for 5-10 seconds.  It is not necessarily related to posture or head movement.  He said he felt very tired and almost fell asleep when he was driving.  However, he had similar episodes when he was a much younger man.      PHYSICAL EXAMINATION:  Cardiovascular system examination today reveals a normal regular heart rate, but he is bradycardic.  Heart sounds are unremarkable.  He has trace bilateral ankle edema, likely from use of amlodipine.  His chest is clear.      He complains of feeling cold at times.  I do note that his blood pressure is 100/50 today in our offices.  He showed me multiple readings from home.  Aside from 1 reading of just over 160 systolic, all of the other readings are between 120-140 systolic, showing much better control.      IMPRESSION:  I think this gentleman is extremely concerned about his cardiac health.  I am not sure what those episodes of feeling faint are, but they are probably not due to cardiac arrhythmia.  His blood pressure is a little on the low side today, though home readings are definitely acceptable.  I do wonder if his sensation of feeling cold could be a side effect of Bystolic.  He was previously on 20.  I would like to try him on 5 mg once daily.   As to his falling asleep/driving, I would like to exclude sleep apnea by requesting a sleep study.  He tells me that he is going to [place] very soon, and he will discuss it with his primary physician.      I reassured him that his cardiac status is likely stable.  He had some repeat labs for amiodarone followup recently, and they are fine.  I will see him again in 4 months' time for further followup.         BEATRICE NEWELL MD, [facility]             D: 2017 16:06   T: 2017 23:11   MT: [id number]      Name:     MARCIE CABALLERO   MRN:      [id number]        Account:      [id number]   :      1940           Service Date: 2017      Document: [id number]

## 2017-02-21 DIAGNOSIS — Z79.899 ON AMIODARONE THERAPY: Primary | ICD-10-CM

## 2017-02-23 DIAGNOSIS — I48.91 ATRIAL FIBRILLATION (H): Primary | ICD-10-CM

## 2017-06-03 DIAGNOSIS — M10.9 GOUT, UNSPECIFIED: ICD-10-CM

## 2017-06-05 RX ORDER — ALLOPURINOL 300 MG/1
TABLET ORAL
Qty: 90 TABLET | Refills: 0 | Status: SHIPPED | OUTPATIENT
Start: 2017-06-05 | End: 2017-09-23

## 2017-06-05 NOTE — TELEPHONE ENCOUNTER
allopurinol       Last Written Prescription Date: 6/16/16  Last Fill Quantity: 90, # refills: 3  Last Office Visit with [facility], [facility] or [facility] prescribing provider:  2/14/17   Next 5 appointments (look out 90 days)     Jun 28, 2017 10:45 AM CDT   Return Visit with Anam Perera MD   [facility] ([facility]    [address]  [facility] [id number] [phone]            Jun 30, 2017 10:00 AM CDT   Office Visit with Igor Christina MD   [facility]    [address] [address] [phone]                   Uric Acid   Date Value Ref Range Status   03/18/2016 3.5 3.5 - 7.2 mg/dL Final   ]  Creatinine   Date Value Ref Range Status   11/18/2016 1.13 0.70 - 1.30 mg/dL Final   ]  Lab Results   Component Value Date    WBC 10.5 06/21/2016     Lab Results   Component Value Date    RBC 3.92 06/21/2016     Lab Results   Component Value Date    HGB 12.9 09/07/2016     Lab Results   Component Value Date    HCT 37.8 06/21/2016     No components found for: MCT  Lab Results   Component Value Date    MCV 96 06/21/2016     Lab Results   Component Value Date    MCH 29.8 06/21/2016     Lab Results   Component Value Date    MCHC 31.0 06/21/2016     Lab Results   Component Value Date    RDW 16.4 06/21/2016     Lab Results   Component Value Date     06/21/2016     Lab Results   Component Value Date    AST 37 01/24/2017     Lab Results   Component Value Date    ALT 49 01/24/2017     Prescription approved per [facility] Refill Protocol.  Pt has upcoming appt.

## 2017-06-26 ENCOUNTER — HOSPITAL ENCOUNTER (OUTPATIENT)
Dept: GENERAL RADIOLOGY | Facility: CLINIC | Age: 77
Discharge: HOME | End: 2017-06-26
Attending: INTERNAL MEDICINE | Admitting: INTERNAL MEDICINE
Payer: MEDICARE

## 2017-06-26 DIAGNOSIS — I48.91 ATRIAL FIBRILLATION (H): ICD-10-CM

## 2017-06-26 LAB
ALBUMIN SERPL-MCNC: 3.6 G/DL (ref 3.4–5)
ALP SERPL-CCNC: 70 U/L (ref 40–150)
ALT SERPL W P-5'-P-CCNC: 29 U/L (ref 0–70)
ANION GAP SERPL CALCULATED.3IONS-SCNC: 5 MMOL/L (ref 3–14)
AST SERPL W P-5'-P-CCNC: 20 U/L (ref 0–45)
BILIRUB SERPL-MCNC: 0.8 MG/DL (ref 0.2–1.3)
BUN SERPL-MCNC: 20 MG/DL (ref 7–30)
CALCIUM SERPL-MCNC: 8.7 MG/DL (ref 8.5–10.1)
CHLORIDE SERPL-SCNC: 108 MMOL/L (ref 94–109)
CO2 SERPL-SCNC: 31 MMOL/L (ref 20–32)
CREAT SERPL-MCNC: 0.97 MG/DL (ref 0.66–1.25)
GFR SERPL CREATININE-BSD FRML MDRD: 75 ML/MIN/1.7M2
GLUCOSE SERPL-MCNC: 110 MG/DL (ref 70–99)
POTASSIUM SERPL-SCNC: 3.3 MMOL/L (ref 3.4–5.3)
PROT SERPL-MCNC: 6.6 G/DL (ref 6.8–8.8)
SODIUM SERPL-SCNC: 144 MMOL/L (ref 133–144)
TSH SERPL DL<=0.05 MIU/L-ACNC: 0.81 MU/L (ref 0.4–4)

## 2017-06-26 PROCEDURE — 84443 ASSAY THYROID STIM HORMONE: CPT | Performed by: INTERNAL MEDICINE

## 2017-06-26 PROCEDURE — 71020 XR CHEST 2 VW: CPT

## 2017-06-26 PROCEDURE — 80053 COMPREHEN METABOLIC PANEL: CPT | Performed by: INTERNAL MEDICINE

## 2017-06-26 PROCEDURE — 36415 COLL VENOUS BLD VENIPUNCTURE: CPT | Performed by: INTERNAL MEDICINE

## 2017-06-27 ENCOUNTER — TELEPHONE (OUTPATIENT)
Dept: CARDIOLOGY | Facility: CLINIC | Age: 77
End: 2017-06-27

## 2017-06-27 DIAGNOSIS — E87.6 HYPOKALEMIA: Primary | ICD-10-CM

## 2017-06-27 DIAGNOSIS — I10 BENIGN ESSENTIAL HYPERTENSION: ICD-10-CM

## 2017-06-27 NOTE — TELEPHONE ENCOUNTER
Contacted patient with lab results and Dr Aj recommendation. Noted that he was already on Potassium. I confirmed he was indeed taking this. Yes he is take 20 Meq twice daily faithfully. I will re address with Dr Aj. ANU Mendieta    Amio labs: TSH and LFTs OK.  But he is (chronically hypokalemic.  Please add K-dur 20 mEq po daily.     Can you please call him and send in a script?   Repeat BMP in 2 weeks.   LETICIA Harris

## 2017-06-28 ENCOUNTER — OFFICE VISIT (OUTPATIENT)
Dept: CARDIOLOGY | Facility: CLINIC | Age: 77
End: 2017-06-28
Attending: INTERNAL MEDICINE
Payer: COMMERCIAL

## 2017-06-28 ENCOUNTER — OFFICE VISIT (OUTPATIENT)
Dept: INTERNAL MEDICINE | Facility: CLINIC | Age: 77
End: 2017-06-28
Payer: COMMERCIAL

## 2017-06-28 VITALS
WEIGHT: 193.8 LBS | BODY MASS INDEX: 29.37 KG/M2 | HEART RATE: 48 BPM | DIASTOLIC BLOOD PRESSURE: 76 MMHG | HEIGHT: 68 IN | SYSTOLIC BLOOD PRESSURE: 132 MMHG

## 2017-06-28 VITALS
OXYGEN SATURATION: 94 % | DIASTOLIC BLOOD PRESSURE: 64 MMHG | SYSTOLIC BLOOD PRESSURE: 126 MMHG | BODY MASS INDEX: 29.19 KG/M2 | TEMPERATURE: 98.4 F | WEIGHT: 192 LBS | HEART RATE: 43 BPM

## 2017-06-28 DIAGNOSIS — I48.91 ATRIAL FIBRILLATION, UNSPECIFIED TYPE (H): Primary | ICD-10-CM

## 2017-06-28 DIAGNOSIS — I10 BENIGN ESSENTIAL HYPERTENSION: ICD-10-CM

## 2017-06-28 DIAGNOSIS — I48.19 PERSISTENT ATRIAL FIBRILLATION (H): ICD-10-CM

## 2017-06-28 DIAGNOSIS — I48.92 ATRIAL FLUTTER, PAROXYSMAL (H): ICD-10-CM

## 2017-06-28 DIAGNOSIS — Z79.2 PROPHYLACTIC ANTIBIOTIC: ICD-10-CM

## 2017-06-28 DIAGNOSIS — Z96.652 STATUS POST TOTAL LEFT KNEE REPLACEMENT: ICD-10-CM

## 2017-06-28 DIAGNOSIS — Z23 NEED FOR VACCINATION: ICD-10-CM

## 2017-06-28 DIAGNOSIS — I42.9 CARDIOMYOPATHY, UNSPECIFIED TYPE (H): ICD-10-CM

## 2017-06-28 PROCEDURE — G0009 ADMIN PNEUMOCOCCAL VACCINE: HCPCS | Performed by: INTERNAL MEDICINE

## 2017-06-28 PROCEDURE — 99214 OFFICE O/P EST MOD 30 MIN: CPT | Performed by: INTERNAL MEDICINE

## 2017-06-28 PROCEDURE — 99214 OFFICE O/P EST MOD 30 MIN: CPT | Mod: 25 | Performed by: INTERNAL MEDICINE

## 2017-06-28 PROCEDURE — 90670 PCV13 VACCINE IM: CPT | Performed by: INTERNAL MEDICINE

## 2017-06-28 RX ORDER — CLINDAMYCIN HCL 300 MG
600 CAPSULE ORAL
Qty: 2 CAPSULE | Refills: 0 | Status: CANCELLED | OUTPATIENT
Start: 2017-06-28

## 2017-06-28 RX ORDER — POTASSIUM CHLORIDE 1500 MG/1
TABLET, EXTENDED RELEASE ORAL
Qty: 270 TABLET | Refills: 2 | Status: ON HOLD | OUTPATIENT
Start: 2017-06-28 | End: 2017-08-17

## 2017-06-28 NOTE — NURSING NOTE
"Chief Complaint   Patient presents with     Hypertension     f/u       Initial /64  Pulse (!) 43  Temp 98.4  F (36.9  C) (Oral)  Wt 192 lb (87.1 kg)  SpO2 94%  BMI 29.19 kg/m2 Estimated body mass index is 29.19 kg/(m^2) as calculated from the following:    Height as of an earlier encounter on 6/28/17: 5' 8\" (1.727 m).    Weight as of this encounter: 192 lb (87.1 kg).  Medication Reconciliation: complete   Amy Todd CMA    Screening Questionnaire for Adult Immunization    Are you sick today?   No   Do you have allergies to medications, food, a vaccine component or latex?   Yes   Have you ever had a serious reaction after receiving a vaccination?   No   Do you have a long-term health problem with heart disease, lung disease, asthma, kidney disease, metabolic disease (e.g. diabetes), anemia, or other blood disorder?   YES   Do you have cancer, leukemia, HIV/AIDS, or any other immune system problem?   Yes basil cell carcinoma   In the past 3 months, have you taken medications that affect  your immune system, such as prednisone, other steroids, or anticancer drugs; drugs for the treatment of rheumatoid arthritis, Crohn s disease, or psoriasis; or have you had radiation treatments?   No   Have you had a seizure, or a brain or other nervous system problem?   No   During the past year, have you received a transfusion of blood or blood     products, or been given immune (gamma) globulin or antiviral drug?   No   For women: Are you pregnant or is there a chance you could become        pregnant during the next month?   No   Have you received any vaccinations in the past 4 weeks?   No     Immunization questionnaire was positive for at least one answer.  Notified smith.      MNVFC doesn't apply on this patient    Per orders of Dr. lorenzo, injection of tdap given by Amy Todd. Patient instructed to remain in clinic for 20 minutes afterwards, and to report any adverse reaction to me immediately.     "   Screening performed by Amy Todd on 6/28/2017 at 2:22 PM.

## 2017-06-28 NOTE — PATIENT INSTRUCTIONS
"*  OK to stop the Xarelto for 2 doses before the dermatology skin procedure, then resume afterwards.     *  Continue all medications at the same doses.  Contact your usual pharmacy if you need refills.     *  Return to see me in 1 year, sooner if needed.  Call [phone] to schedule this appointment.         5 GOALS TO PREVENT VASCULAR DISEASE:     1.  Aggressive blood pressure control, under 130/80 ideally.  Using medications if needed.    Your blood pressure is under good control    BP Readings from Last 4 Encounters:   06/28/17 126/64   06/28/17 132/76   02/14/17 148/50   02/13/17 100/50       2.  Aggressive LDL cholesterol (\"bad cholesterol\") lowering as indicated.    Your goal is an LDL under 130 for sure, preferably under 100.  (If you have diabetes or previous vascular disease, the the LDL goals would be under 100 for sure, preferably under 70.)    New guidelines identify four high-risk groups who could benefit from statins:   *people with pre-existing heart disease, such as those who have had a heart attack;   *people ages 40 to 75 who have diabetes of any type  *patients ages 40 to 75 with at least a 7.5% risk of developing cardiovascular disease over the next decade, according to a formula described in the guidelines  *patients with the sort of super-high cholesterol that sometimes runs in families, as evidenced by an LDL of 190 milligrams per deciliter or higher    Your cholesterol levels are well controlled.    Recent Labs   Lab Test  06/21/16   0837  10/09/14   1548  06/30/09   0734   CHOL  133  158  188   HDL  40  60  50   LDL  72  70  114   TRIG  103  140  119   CHOLHDLRATIO   --   2.6  3.7       3.  Aggressive diabetic prevention, screening and/or management.      You do not have diabetes as of the most recent blood tests.     4.  No smoking    5.  Consider taking low dose aspirin (81 mg) tablet once per day over the age of 50, every day unless there is a specific reason that you cannot take " "aspirin (such as side effect, allergy, or you are on another \"blood thinner\").        --Based on your current cardiac risk factors, you should NOT take Aspirin due to the Xarelto.            "

## 2017-06-28 NOTE — LETTER
"6/28/2017    Igor Christina MD  [facility]   [address] [facility] [address]    RE: Reji Aguirre       Dear Colleague,    I had the pleasure of seeing Reji Aguirre in the [facility].    HPI and Plan:     Delightful 76 year old pt, follow up for HTN.  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  Feels well with no sob/pnd/orthopnoea/cp.  No more \"dizzy\" spells, continues to feel cold, probably not due to cardiac condition.  Has chronic ankle swelling:  Due to amlodipine.  Reviewed BP readings from home:  Appears adequate.  Cardiac PE unremarkable except for 1+ bilateral ankle swelling.    Impression:  1  Resistant HTN:  Under control, 4 meds.  Tolerable side effects of meds.  2.  PAF:  On Xarelto, well tolerated, as is amiodarone.   Recent amio labs are fine, except for low K  3.  Hypokalemia:  Likely from HCTZ:  Replace.  4.  CM:  Resolved.        Orders Placed This Encounter   Procedures     XR Chest 1 View     XR Chest 1 View     Hepatic panel     TSH     Basic metabolic panel     Hepatic panel     TSH     Basic metabolic panel     Follow-Up with Cardiologist     Pulmonary function test     Pulmonary function test       No orders of the defined types were placed in this encounter.      Encounter Diagnoses   Name Primary?     Persistent atrial fibrillation (H)      Atrial flutter, paroxysmal (H)      Benign essential hypertension        CURRENT MEDICATIONS:  Current Outpatient Prescriptions   Medication Sig Dispense Refill     potassium chloride SA (K-DUR/KLOR-CON M) 20 MEQ CR tablet Take 2 tablets my mouth in AM (40 Meq) and 1 tablet by mouth in the PM (20 Meq) 270 tablet 2     allopurinol (ZYLOPRIM) 300 MG tablet TAKE 1 TABLET(300 MG) BY MOUTH DAILY 90 tablet 0     nebivolol (BYSTOLIC) 5 MG tablet Take 1 tablet (5 mg) by mouth daily 90 tablet 3     rivaroxaban ANTICOAGULANT (XARELTO) 20 MG TABS tablet Take 1 tablet (20 " mg) by mouth daily (with dinner) 30 tablet 5     amLODIPine (NORVASC) 5 MG tablet Take 1 tablet (5 mg) by mouth daily 90 tablet 3     hydrALAZINE (APRESOLINE) 100 MG TABS Take 1 tablet (100 mg) by mouth 3 times daily 270 tablet 3     amiodarone (PACERONE/CODARONE) 200 MG tablet Take 1 tab daily from Mon through Friday (skip Sat + Sun) 90 tablet 3     hydrochlorothiazide (MICROZIDE) 12.5 MG capsule Take 2 capsules (25 mg) by mouth daily 180 capsule 3     clindamycin (CLEOCIN) 300 MG capsule Take 2 capsules (600 mg) by mouth once as needed TAKE 600 MG ONE HOUR PRIOR TO DENTAL PROCEDURES 2 capsule 0     losartan (COZAAR) 100 MG tablet Take 1 tablet (100 mg) by mouth daily 90 tablet 3     Azelaic Acid (FINACEA) 15 % gel Apply 0.5 inches topically as needed          ALLERGIES     Allergies   Allergen Reactions     Amlodipine Besylate      edema     Ampicillin      rash     Metoprolol      Mild fatigue with Toprol; mildly decreased exercise capacity     Spironolactone Nausea and Difficulty breathing       PAST MEDICAL HISTORY:  Past Medical History:   Diagnosis Date     Atrial fibrillation, unspecified type (H) 5/19/16    new onset A fib; nuclear stress test negative     Atrial flutter (H)      Cardiomyopathy (H)      Diverticulosis of colon (without mention of hemorrhage) 11/16/07    incidental diverticuli noted at colonoscopy     Gout, unspecified      LBBB (left bundle branch block)      Rosacea      Special screening for malignant neoplasms, colon 1995     Unspecified essential hypertension        PAST SURGICAL HISTORY:  Past Surgical History:   Procedure Laterality Date     ARTHROPLASTY KNEE Left 6/6/2016    Procedure: ARTHROPLASTY KNEE;  Surgeon: Derek Varma MD;  Location: [facility] OR     ARTHROSCOPY SHOULDER ROTATOR CUFF REPAIR  7/9/08    Left shoulder arthroscopic rotator cuff repair with acromioplasty     C NONSPECIFIC PROCEDURE  5/99    left knee arthroscopy     CARDIOVERSION  07-       "COLONOSCOPY THRU STOMA W BIOPSY/CAUTERY TUMOR/POLYP/LESION  11/16/07    normal colonoscopy except for incidental diverticuli     ORTHOPEDIC SURGERY  04/11    2nd toe Lt foot       FAMILY HISTORY:  Family History   Problem Relation Age of Onset     CEREBROVASCULAR DISEASE Father      d:89, dementia     Respiratory Mother      d:94     DIABETES Sister      b. 1938, IDDM since age 10     DIABETES Brother      b. 1943, type II DM       SOCIAL HISTORY:  Social History     Social History     Marital status:      Spouse name: N/A     Number of children: N/A     Years of education: N/A     Social History Main Topics     Smoking status: Former Smoker     Quit date: 7/25/1963     Smokeless tobacco: Never Used     Alcohol use 0.0 oz/week     0 Standard drinks or equivalent per week      Comment: 1 drink a day     Drug use: No     Sexual activity: Yes     Partners: Female     Other Topics Concern     Caffeine Concern No     1 cup daily     Special Diet No     Exercise Yes     skiing     Social History Narrative       Review of Systems:  Skin:  Positive for     Eyes:  Positive for glasses  ENT:  Negative    Respiratory:  Negative    Cardiovascular:    Positive for;dizziness;lightheadedness;edema  Gastroenterology: Negative    Genitourinary:  Positive for urinary frequency;dysuria  Musculoskeletal:  Positive for    Neurologic:  Positive for numbness or tingling of hands  Psychiatric:  Negative    Heme/Lymph/Imm:  Positive for chills  Endocrine:  Negative      Physical Exam:  Vitals: /76 (BP Location: Right arm, Patient Position: Chair, Cuff Size: Adult Regular)  Pulse (!) 48  Ht 1.727 m (5' 8\")  Wt 87.9 kg (193 lb 12.8 oz)  BMI 29.47 kg/m2    Constitutional:           Skin:           Head:           Eyes:           ENT:           Neck:           Chest:           Cardiac:                    Abdomen:           Vascular:                                        Extremities and Back:           Neurological:       "       Recent Lab Results:  LIPID RESULTS:  Lab Results   Component Value Date    CHOL 133 06/21/2016    HDL 40 06/21/2016    LDL 72 06/21/2016    TRIG 103 06/21/2016    CHOLHDLRATIO 2.6 10/09/2014       LIVER ENZYME RESULTS:  Lab Results   Component Value Date    AST 20 06/26/2017    ALT 29 06/26/2017       CBC RESULTS:  Lab Results   Component Value Date    WBC 10.5 06/21/2016    RBC 3.92 (L) 06/21/2016    HGB 12.9 (L) 09/07/2016    HCT 37.8 (L) 06/21/2016    MCV 96 06/21/2016    MCH 29.8 06/21/2016    MCHC 31.0 (L) 06/21/2016    RDW 16.4 (H) 06/21/2016     (H) 06/21/2016       BMP RESULTS:  Lab Results   Component Value Date     06/26/2017    POTASSIUM 3.3 (L) 06/26/2017    CHLORIDE 108 06/26/2017    CO2 31 06/26/2017    ANIONGAP 5 06/26/2017     (H) 06/26/2017    BUN 20 06/26/2017    CR 0.97 06/26/2017    GFRESTIMATED 75 06/26/2017    GFRESTBLACK >90   GFR Calc   06/26/2017    HE 8.7 06/26/2017        A1C RESULTS:  No results found for: A1C    INR RESULTS:  No results found for: INR    Thank you for allowing me to participate in the care of your patient.    Sincerely,     DR BEATRICE NEWELL MD     [facility]

## 2017-06-28 NOTE — MR AVS SNAPSHOT
"              After Visit Summary   6/28/2017    Reji Aguirre    MRN: [id number]           Patient Information     Date Of Birth          1940        Visit Information        Provider Department      6/28/2017 2:00 PM Igor Christina MD [facility]        Today's Diagnoses     Atrial fibrillation, unspecified type (H)    -  1    Cardiomyopathy, unspecified type (H)        Benign essential hypertension        Prophylactic antibiotic        Status post total left knee replacement        Need for vaccination          Care Instructions    *  OK to stop the Xarelto for 2 doses before the dermatology skin procedure, then resume afterwards.     *  Continue all medications at the same doses.  Contact your usual pharmacy if you need refills.     *  Return to see me in 1 year, sooner if needed.  Call [phone] to schedule this appointment.         5 GOALS TO PREVENT VASCULAR DISEASE:     1.  Aggressive blood pressure control, under 130/80 ideally.  Using medications if needed.    Your blood pressure is under good control    BP Readings from Last 4 Encounters:   06/28/17 126/64   06/28/17 132/76   02/14/17 148/50   02/13/17 100/50       2.  Aggressive LDL cholesterol (\"bad cholesterol\") lowering as indicated.    Your goal is an LDL under 130 for sure, preferably under 100.  (If you have diabetes or previous vascular disease, the the LDL goals would be under 100 for sure, preferably under 70.)    New guidelines identify four high-risk groups who could benefit from statins:   *people with pre-existing heart disease, such as those who have had a heart attack;   *people ages 40 to 75 who have diabetes of any type  *patients ages 40 to 75 with at least a 7.5% risk of developing cardiovascular disease over the next decade, according to a formula described in the guidelines  *patients with the sort of super-high cholesterol that sometimes runs in families, as evidenced by an LDL of 190 " "milligrams per deciliter or higher    Your cholesterol levels are well controlled.    Recent Labs   Lab Test  06/21/16   0837  10/09/14   1548  06/30/09   0734   CHOL  133  158  188   HDL  40  60  50   LDL  72  70  114   TRIG  103  140  119   CHOLHDLRATIO   --   2.6  3.7       3.  Aggressive diabetic prevention, screening and/or management.      You do not have diabetes as of the most recent blood tests.     4.  No smoking    5.  Consider taking low dose aspirin (81 mg) tablet once per day over the age of 50, every day unless there is a specific reason that you cannot take aspirin (such as side effect, allergy, or you are on another \"blood thinner\").        --Based on your current cardiac risk factors, you should NOT take Aspirin due to the Xarelto.                    Follow-ups after your visit        Your next 10 appointments already scheduled     Jul 12, 2017  2:00 PM CDT   LAB with RU LAB   [facility] PHYSICIANS [facility] AT [place] ([facility])    [address]  [facility] [address] [phone]           Patient must bring picture ID.  Patient should be prepared to give a urine specimen  Please do not eat 10-12 hours before your appointment if you are coming in fasting for labs on lipids, cholesterol, or glucose (sugar).  Pregnant women should follow their Care Team instructions. Water with medications is okay. Do not drink coffee or other fluids.   If you have concerns about taking  your medications, please ask at office or if scheduling via Likeliihart, send a message by clicking on Secure Messaging, Message Your Care Team.              Future tests that were ordered for you today     Open Future Orders        Priority Expected Expires Ordered    Follow-Up with Cardiologist Routine 12/25/2017 6/28/2018 6/28/2017    Hepatic panel Routine 12/25/2017 6/28/2018 6/28/2017    TSH Routine 12/25/2017 6/28/2018 6/28/2017    Basic metabolic panel Routine 12/25/2017 6/28/2018 6/28/2017 "    Pulmonary function test Routine 12/25/2017 6/28/2018 6/28/2017    XR Chest 1 View Routine 12/25/2017 6/28/2018 6/28/2017    Hepatic panel Routine 12/25/2017 6/28/2018 6/28/2017    TSH Routine 12/25/2017 6/28/2018 6/28/2017    Basic metabolic panel Routine 12/25/2017 6/28/2018 6/28/2017    Pulmonary function test Routine 12/25/2017 6/28/2018 6/28/2017    XR Chest 1 View Routine 12/25/2017 6/28/2018 6/28/2017    Follow-Up with Cardiologist Routine 12/25/2017 6/28/2018 6/28/2017    Hepatic panel Routine 6/28/2018 6/28/2018 6/28/2017    TSH Routine 6/28/2018 6/28/2018 6/28/2017    Basic metabolic panel Routine 6/28/2018 6/28/2018 6/28/2017    Pulmonary function test Routine 6/28/2018 6/28/2018 6/28/2017    XR Chest 1 View Routine 6/28/2018 6/28/2018 6/28/2017    Potassium Routine 7/12/2017 6/28/2018 6/28/2017            Who to contact     If you have questions or need follow up information about today's clinic visit or your schedule please contact [facility] directly at [phone].  Normal or non-critical lab and imaging results will be communicated to you by Shomptonhart, letter or phone within 4 business days after the clinic has received the results. If you do not hear from us within 7 days, please contact the clinic through Tripeeset or phone. If you have a critical or abnormal lab result, we will notify you by phone as soon as possible.  Submit refill requests through Orate or call your pharmacy and they will forward the refill request to us. Please allow 3 business days for your refill to be completed.          Additional Information About Your Visit        Orate Information     Orate gives you secure access to your electronic health record. If you see a primary care provider, you can also send messages to your care team and make appointments. If you have questions, please call your primary care clinic.  If you do not have a primary care provider, please call [phone] and they will  assist you.        Care EveryWhere ID     This is your Care EveryWhere ID. This could be used by other organizations to access your [facility] medical records  [id number]        Your Vitals Were     Pulse Temperature Pulse Oximetry BMI (Body Mass Index)          43 98.4  F (36.9  C) (Oral) 94% 29.19 kg/m2         Blood Pressure from Last 3 Encounters:   06/28/17 126/64   06/28/17 132/76   02/14/17 148/50    Weight from Last 3 Encounters:   06/28/17 192 lb (87.1 kg)   06/28/17 193 lb 12.8 oz (87.9 kg)   02/14/17 194 lb 8 oz (88.2 kg)              We Performed the Following     PNEUMOCOCCAL CONJ VACCINE 13 VALENT IM        Primary Care Provider Office Phone # Fax #    Igor Christina [phone]       [facility] [address] [facility] [address]        Equal Access to Services     FLORENTIN NOLASCO : Hadii aad ku hadasho Soomaali, waaxda luqadaha, qaybta kaalmada adeegyada, waxay jenniferin haywinsomen eliza benavides . So [facility] [phone].    ATENCIÓN: Si habla español, tiene a noel disposición servicios gratuitos de asistencia lingüística. Llame al [phone].    We comply with applicable federal civil rights laws and [place] laws. We do not discriminate on the basis of race, color, national origin, age, disability sex, sexual orientation or gender identity.            Thank you!     Thank you for choosing [facility]  for your care. Our goal is always to provide you with excellent care. Hearing back from our patients is one way we can continue to improve our services. Please take a few minutes to complete the written survey that you may receive in the mail after your visit with us. Thank you!             Your Updated Medication List - Protect others around you: Learn how to safely use, store and throw away your medicines at www.disposemymeds.org.          This list is accurate as of: 6/28/17  3:33 PM.  Always use your most recent med list.                    Brand Name Dispense Instructions for use Diagnosis    allopurinol 300 MG tablet    ZYLOPRIM    90 tablet    TAKE 1 TABLET(300 MG) BY MOUTH DAILY    Gout, unspecified       amiodarone 200 MG tablet    PACERONE/CODARONE    90 tablet    Take 1 tab daily from Mon through Friday (skip Sat + Sun)    Atrial fibrillation, unspecified type (H)       amLODIPine 5 MG tablet    NORVASC    90 tablet    Take 1 tablet (5 mg) by mouth daily    Benign essential hypertension       FINACEA 15 % gel   Generic drug:  Azelaic Acid      Apply 0.5 inches topically as needed        hydrALAZINE 100 MG Tabs tablet    APRESOLINE    270 tablet    Take 1 tablet (100 mg) by mouth 3 times daily    Benign essential hypertension       hydrochlorothiazide 12.5 MG capsule    MICROZIDE    180 capsule    Take 2 capsules (25 mg) by mouth daily    Persistent atrial fibrillation (H)       losartan 100 MG tablet    COZAAR    90 tablet    Take 1 tablet (100 mg) by mouth daily    Benign essential hypertension       nebivolol 5 MG tablet    BYSTOLIC    90 tablet    Take 1 tablet (5 mg) by mouth daily    Benign essential hypertension, Atrial flutter, paroxysmal (H), Persistent atrial fibrillation (H)       potassium chloride SA 20 MEQ CR tablet    K-DUR/KLOR-CON M    270 tablet    Take 2 tablets my mouth in AM (40 Meq) and 1 tablet by mouth in the PM (20 Meq)    Benign essential hypertension       rivaroxaban ANTICOAGULANT 20 MG Tabs tablet    XARELTO    30 tablet    Take 1 tablet (20 mg) by mouth daily (with dinner)    Atrial fibrillation, unspecified type (H)

## 2017-06-28 NOTE — PROGRESS NOTES
SUBJECTIVE:                                                    Reji Aguirre is a 76 year old male who presents to clinic today for the following health issues:      Hypertension Follow-up      Outpatient blood pressures are being checked at home.  Results are 120-150/50-60 with some occasionally 180's/70's.    Low Salt Diet: no added salt    Blood presure remains well controlled at home  Readings outside clinic are within normal limits.  Reviewed last 6 BP readings in chart:  BP Readings from Last 6 Encounters:   06/28/17 126/64   06/28/17 132/76   02/14/17 148/50   02/13/17 100/50   01/24/17 130/66   12/12/16 148/62     He has not experienced any significant side effects from medicaiotns for hypertension.    NO active cardiac complaints or symptoms with exercise.     2.  Has history of atrial fibrillation.    No palpitations, no dizziness, no dyspnea on exertion, no shortness of breath.  No racing heart, no fast heart rates.    Taking medications as ordered, no side effects reported.   Patient is taking anticoagulation (Xarelto) according to direction, with no reported side effects.       Amount of exercise or physical activity: 1 day/week for an average of greater than 60 minutes yard work    Problems taking medications regularly: No    Medication side effects: discuss possible side effects from medications    Diet: low salt          Problem list and histories reviewed & adjusted, as indicated.  Additional history: as documented        Reviewed and updated as needed this visit by clinical staff  Tobacco  Allergies       Reviewed and updated as needed this visit by Provider             Past Medical History:  ---------------------------  Past Medical History:   Diagnosis Date     Atrial fibrillation, unspecified type (H) 5/19/16    new onset A fib; nuclear stress test negative     Atrial flutter (H)      Cardiomyopathy (H)      Diverticulosis of colon (without mention of hemorrhage) 11/16/07    incidental  diverticuli noted at colonoscopy     Gout, unspecified      LBBB (left bundle branch block)      Rosacea      Special screening for malignant neoplasms, colon 1995     Unspecified essential hypertension        Past Surgical History:  ---------------------------  Past Surgical History:   Procedure Laterality Date     ARTHROPLASTY KNEE Left 6/6/2016    Procedure: ARTHROPLASTY KNEE;  Surgeon: Derek Varma MD;  Location: [facility] OR     ARTHROSCOPY SHOULDER ROTATOR CUFF REPAIR  7/9/08    Left shoulder arthroscopic rotator cuff repair with acromioplasty     C NONSPECIFIC PROCEDURE  5/99    left knee arthroscopy     CARDIOVERSION  07-      COLONOSCOPY THRU STOMA W BIOPSY/CAUTERY TUMOR/POLYP/LESION  11/16/07    normal colonoscopy except for incidental diverticuli     ORTHOPEDIC SURGERY  04/11    2nd toe Lt foot       Current Medications:  ---------------------------  Current Outpatient Prescriptions   Medication Sig Dispense Refill     potassium chloride SA (K-DUR/KLOR-CON M) 20 MEQ CR tablet Take 2 tablets my mouth in AM (40 Meq) and 1 tablet by mouth in the PM (20 Meq) 270 tablet 2     allopurinol (ZYLOPRIM) 300 MG tablet TAKE 1 TABLET(300 MG) BY MOUTH DAILY 90 tablet 0     nebivolol (BYSTOLIC) 5 MG tablet Take 1 tablet (5 mg) by mouth daily 90 tablet 3     rivaroxaban ANTICOAGULANT (XARELTO) 20 MG TABS tablet Take 1 tablet (20 mg) by mouth daily (with dinner) 30 tablet 5     amLODIPine (NORVASC) 5 MG tablet Take 1 tablet (5 mg) by mouth daily 90 tablet 3     hydrALAZINE (APRESOLINE) 100 MG TABS Take 1 tablet (100 mg) by mouth 3 times daily 270 tablet 3     amiodarone (PACERONE/CODARONE) 200 MG tablet Take 1 tab daily from Mon through Friday (skip Sat + Sun) 90 tablet 3     hydrochlorothiazide (MICROZIDE) 12.5 MG capsule Take 2 capsules (25 mg) by mouth daily 180 capsule 3     losartan (COZAAR) 100 MG tablet Take 1 tablet (100 mg) by mouth daily 90 tablet 3     Azelaic Acid (FINACEA) 15 % gel Apply 0.5  inches topically as needed          Allergies:  -------------  Allergies   Allergen Reactions     Ampicillin      rash     Metoprolol      Mild fatigue with Toprol; mildly decreased exercise capacity     Spironolactone Nausea and Difficulty breathing       Social History:  -------------------  Social History     Social History     Marital status:      Spouse name: N/A     Number of children: N/A     Years of education: N/A     Occupational History     Not on file.     Social History Main Topics     Smoking status: Former Smoker     Quit date: 7/25/1963     Smokeless tobacco: Never Used     Alcohol use 0.0 oz/week     0 Standard drinks or equivalent per week      Comment: 1 drink a day     Drug use: No     Sexual activity: Yes     Partners: Female     Other Topics Concern     Caffeine Concern No     1 cup daily     Special Diet No     Exercise Yes     skiing     Social History Narrative       Family Medical History:  ------------------------------  Family History   Problem Relation Age of Onset     CEREBROVASCULAR DISEASE Father      d:89, dementia     Respiratory Mother      d:94     DIABETES Sister      b. 1938, IDDM since age 10     DIABETES Brother      b. 1943, type II DM         ROS:  REVIEW OF SYSTEMS:    RESP: negative for cough, dyspnea, wheezing, hemoptysis  CV: negative for chest pain, palpitations, PND, MAGAÑA, orthopnea; reports no changes in their ability to perform physical activity (from cardiovascular standpoint)  GI: negative for dysphagia, N/V, pain, melena, diarrhea and constipation  NEURO: negative for numbness/tingling, paralysis, incoordination, or focal weakness     OBJECTIVE:                                                    /64  Pulse (!) 43  Temp 98.4  F (36.9  C) (Oral)  Wt 192 lb (87.1 kg)  SpO2 94%  BMI 29.19 kg/m2     GENERAL alert and no distress  EYES:  Normal sclera,conjunctiva, EOMI  HENT: oral and posterior pharynx without lesions or erythema, facies  symmetric  NECK: Neck supple. No LAD, without thyroidmegaly or JVD., Carotids without bruits.  RESP: Clear to ausculation bilaterally without wheezes or crackles. Normal BS in all fields.  CV: Irregular rhythm ( consistent with known atrial fibrillation), regular rate; normal S1S2 without murmurs, rubs or gallops   LYMPH: no cervical lymph adenopathy appreciated  MS: extremities- no gross deformities of the visible extremities noted, no edema  PSYCH: Alert and oriented times 3; speech- coherent  SKIN:  No obvious significant skin lesions on visible portions of face          ASSESSMENT/PLAN:                                                      (I48.91) Atrial fibrillation, unspecified type (H)  (primary encounter diagnosis)  Comment: This condition is currently controlled on the current medical regimen.  Continue current therapy.   Discussed atrial fibrillation at length including brief discussion of the pathophysiology, possible causes,  treatment issues (rate control & anticoagulation), and also discussed rationale for anticoagulation (including risks of Coumadin therapy) to lower the risk of thromboembolic events (5% per year).   Plan:     (I42.9) Cardiomyopathy, unspecified type (H)  Comment: This condition is currently controlled on the current medical regimen.  Continue current therapy.   No signs and symptoms of CHF at this time.   Plan:     (I10) Benign essential hypertension  Comment: This condition is currently controlled on the current medical regimen.  Continue current therapy.   Discussed hypertension in detail including JNC VIII guidelines for blood pressure goals.  Discussed indication for treatment and treatment options.  Discussed the importance for aggressive management of HTN to prevent vascular complications later.  Recommended lower fat, lower carbohydrate, and lower sodium (<2000 mg)diet.  Discussed required intervals for follow up on HTN, lab studies, and the need to aggresive management of  other cardiac disease risk factors.  Recommened pt. follow their blood pressures outside the clinic to ensure that BPs are remaining within guidelines, and to contact me if the readings are not within guidelines so we can adjust treatment as needed.   Plan:     (Z79.2) Prophylactic antibiotic  Comment: no longer needed for dental procedures.   Plan:     (Z96.652) Status post total left knee replacement  Comment: doing well.   Plan:     (Z23) Need for vaccination  Comment:   Plan: PNEUMOCOCCAL CONJ VACCINE 13 VALENT IM             *  OK to stop the Xarelto for 2 doses before the dermatology skin procedure, then resume afterwards.     *  Continue all medications at the same doses.  Contact your usual pharmacy if you need refills.     *  Return to see me in 1 year, sooner if needed.  Call [phone] to schedule this appointment.           See Patient Instructions    BAR SALEH M.D., MD  [facility]

## 2017-06-28 NOTE — TELEPHONE ENCOUNTER
Called patient back with new dosing instructions on Potassium tablets. He requested I fax in a new Rx to [facility] which I have. I then transferred him to scheduling to set up lab in 2 weeks. ANU Mendieta

## 2017-06-28 NOTE — MR AVS SNAPSHOT
After Visit Summary   6/28/2017    Reji Aguirre    MRN: [id number]           Patient Information     Date Of Birth          1940        Visit Information        Provider Department      6/28/2017 10:45 AM Trever, Anam Butterfield MD [facility]        Today's Diagnoses     Persistent atrial fibrillation (H)        Atrial flutter, paroxysmal (H)        Benign essential hypertension           Follow-ups after your visit        Additional Services     Follow-Up with Cardiologist                 Your next 10 appointments already scheduled     Jun 28, 2017  2:00 PM CDT   Office Visit with Igor Christina MD   [facility]    [address] [id number] [phone]           Bring a current list of meds and any records pertaining to this visit.  For Physicals, please bring immunization records and any forms needing to be filled out.  Please arrive 10 minutes early to complete paperwork.            Jul 12, 2017  2:00 PM CDT   LAB with RU LAB   [facility] ([facility])    [address] [facility] [address] [place] [id number] [phone]           Patient must bring picture ID.  Patient should be prepared to give a urine specimen  Please do not eat 10-12 hours before your appointment if you are coming in fasting for labs on lipids, cholesterol, or glucose (sugar).  Pregnant women should follow their Care Team instructions. Water with medications is okay. Do not drink coffee or other fluids.   If you have concerns about taking  your medications, please ask at office or if scheduling via [employer], send a message by clicking on Secure Messaging, Message Your Care Team.              Future tests that were ordered for you today     Open Future Orders        Priority Expected Expires Ordered    Hepatic panel Routine 12/25/2017  6/28/2018 6/28/2017    TSH Routine 12/25/2017 6/28/2018 6/28/2017    Basic metabolic panel Routine 12/25/2017 6/28/2018 6/28/2017    Pulmonary function test Routine 12/25/2017 6/28/2018 6/28/2017    XR Chest 1 View Routine 12/25/2017 6/28/2018 6/28/2017    Follow-Up with Cardiologist Routine 12/25/2017 6/28/2018 6/28/2017    Hepatic panel Routine 6/28/2018 6/28/2018 6/28/2017    TSH Routine 6/28/2018 6/28/2018 6/28/2017    Basic metabolic panel Routine 6/28/2018 6/28/2018 6/28/2017    Pulmonary function test Routine 6/28/2018 6/28/2018 6/28/2017    XR Chest 1 View Routine 6/28/2018 6/28/2018 6/28/2017    Potassium Routine 7/12/2017 6/28/2018 6/28/2017            Who to contact     If you have questions or need follow up information about today's clinic visit or your schedule please contact [facility] directly at [phone].  Normal or non-critical lab and imaging results will be communicated to you by [employer], letter or phone within 4 business days after the clinic has received the results. If you do not hear from us within 7 days, please contact the clinic through [employer] or phone. If you have a critical or abnormal lab result, we will notify you by phone as soon as possible.  Submit refill requests through [employer] or call your pharmacy and they will forward the refill request to us. Please allow 3 business days for your refill to be completed.          Additional Information About Your Visit        [employer] Information     [employer] gives you secure access to your electronic health record. If you see a primary care provider, you can also send messages to your care team and make appointments. If you have questions, please call your primary care clinic.  If you do not have a primary care provider, please call [phone] and they will assist you.        Care EveryWhere ID     This is your Care EveryWhere ID. This could be used by other organizations to access your [place]  "medical records  [id number]        Your Vitals Were     Pulse Height BMI (Body Mass Index)             48 1.727 m (5' 8\") 29.47 kg/m2          Blood Pressure from Last 3 Encounters:   06/28/17 132/76   02/14/17 148/50   02/13/17 100/50    Weight from Last 3 Encounters:   06/28/17 87.9 kg (193 lb 12.8 oz)   02/14/17 88.2 kg (194 lb 8 oz)   02/13/17 86.6 kg (191 lb)              We Performed the Following     Follow-Up with Cardiologist        Primary Care Provider Office Phone # Fax #    Igor Christina [phone]       [facility] [address] [facility] [address]        Equal Access to Services     FLORENTIN NOLASCO : Hadii hang ansari hadasho Sosandoval, waaxda luqadaha, qaybta kaalmada adeegyada, waxtiffanie sheets haytang benavides . So [facility] [phone].    ATENCIÓN: Si habla español, tiene a noel disposición servicios gratuitos de asistencia lingüística. Carolyn al [phone].    We comply with applicable federal civil rights laws and [place] laws. We do not discriminate on the basis of race, color, national origin, age, disability sex, sexual orientation or gender identity.            Thank you!     Thank you for choosing [facility]  for your care. Our goal is always to provide you with excellent care. Hearing back from our patients is one way we can continue to improve our services. Please take a few minutes to complete the written survey that you may receive in the mail after your visit with us. Thank you!             Your Updated Medication List - Protect others around you: Learn how to safely use, store and throw away your medicines at www.disposemymeds.org.          This list is accurate as of: 6/28/17 11:14 AM.  Always use your most recent med list.                   Brand Name Dispense Instructions for use Diagnosis    allopurinol 300 MG tablet    ZYLOPRIM    90 tablet    TAKE 1 TABLET(300 MG) BY MOUTH DAILY    Gout, unspecified "       amiodarone 200 MG tablet    PACERONE/CODARONE    90 tablet    Take 1 tab daily from Mon through Friday (skip Sat + Sun)    Atrial fibrillation, unspecified type (H)       amLODIPine 5 MG tablet    NORVASC    90 tablet    Take 1 tablet (5 mg) by mouth daily    Benign essential hypertension       clindamycin 300 MG capsule    CLEOCIN    2 capsule    Take 2 capsules (600 mg) by mouth once as needed TAKE 600 MG ONE HOUR PRIOR TO DENTAL PROCEDURES    Prophylactic antibiotic, Status post total left knee replacement       FINACEA 15 % gel   Generic drug:  Azelaic Acid      Apply 0.5 inches topically as needed        hydrALAZINE 100 MG Tabs tablet    APRESOLINE    270 tablet    Take 1 tablet (100 mg) by mouth 3 times daily    Benign essential hypertension       hydrochlorothiazide 12.5 MG capsule    MICROZIDE    180 capsule    Take 2 capsules (25 mg) by mouth daily    Persistent atrial fibrillation (H)       losartan 100 MG tablet    COZAAR    90 tablet    Take 1 tablet (100 mg) by mouth daily    Benign essential hypertension       nebivolol 5 MG tablet    BYSTOLIC    90 tablet    Take 1 tablet (5 mg) by mouth daily    Benign essential hypertension, Atrial flutter, paroxysmal (H), Persistent atrial fibrillation (H)       potassium chloride SA 20 MEQ CR tablet    K-DUR/KLOR-CON M    270 tablet    Take 2 tablets my mouth in AM (40 Meq) and 1 tablet by mouth in the PM (20 Meq)    Benign essential hypertension       rivaroxaban ANTICOAGULANT 20 MG Tabs tablet    XARELTO    30 tablet    Take 1 tablet (20 mg) by mouth daily (with dinner)    Atrial fibrillation, unspecified type (H)

## 2017-06-28 NOTE — TELEPHONE ENCOUNTER
Given this info- please have him increase KCl to 40 mEq every am and 20 mEq every pm.  Recheck BMP in 2 weeks.  LETICIA Harris

## 2017-06-28 NOTE — PROGRESS NOTES
"HPI and Plan:     Delightful 76 year old pt, follow up for HTN.  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  Feels well with no sob/pnd/orthopnoea/cp.  No more \"dizzy\" spells, continues to feel cold, probably not due to cardiac condition.  Has chronic ankle swelling:  Due to amlodipine.  Reviewed BP readings from home:  Appears adequate.  Cardiac PE unremarkable except for 1+ bilateral ankle swelling.    Impression:  1  Resistant HTN:  Under control, 4 meds.  Tolerable side effects of meds.  2.  PAF:  On Xarelto, well tolerated, as is amiodarone.   Recent amio labs are fine, except for low K  3.  Hypokalemia:  Likely from HCTZ:  Replace.  4.  CM:  Resolved.        Orders Placed This Encounter   Procedures     XR Chest 1 View     XR Chest 1 View     Hepatic panel     TSH     Basic metabolic panel     Hepatic panel     TSH     Basic metabolic panel     Follow-Up with Cardiologist     Pulmonary function test     Pulmonary function test       No orders of the defined types were placed in this encounter.      Encounter Diagnoses   Name Primary?     Persistent atrial fibrillation (H)      Atrial flutter, paroxysmal (H)      Benign essential hypertension        CURRENT MEDICATIONS:  Current Outpatient Prescriptions   Medication Sig Dispense Refill     potassium chloride SA (K-DUR/KLOR-CON M) 20 MEQ CR tablet Take 2 tablets my mouth in AM (40 Meq) and 1 tablet by mouth in the PM (20 Meq) 270 tablet 2     allopurinol (ZYLOPRIM) 300 MG tablet TAKE 1 TABLET(300 MG) BY MOUTH DAILY 90 tablet 0     nebivolol (BYSTOLIC) 5 MG tablet Take 1 tablet (5 mg) by mouth daily 90 tablet 3     rivaroxaban ANTICOAGULANT (XARELTO) 20 MG TABS tablet Take 1 tablet (20 mg) by mouth daily (with dinner) 30 tablet 5     amLODIPine (NORVASC) 5 MG tablet Take 1 tablet (5 mg) by mouth daily 90 tablet 3     hydrALAZINE (APRESOLINE) 100 MG TABS Take 1 tablet (100 mg) by mouth 3 times daily 270 tablet 3     amiodarone " (PACERONE/CODARONE) 200 MG tablet Take 1 tab daily from Mon through Friday (skip Sat + Sun) 90 tablet 3     hydrochlorothiazide (MICROZIDE) 12.5 MG capsule Take 2 capsules (25 mg) by mouth daily 180 capsule 3     clindamycin (CLEOCIN) 300 MG capsule Take 2 capsules (600 mg) by mouth once as needed TAKE 600 MG ONE HOUR PRIOR TO DENTAL PROCEDURES 2 capsule 0     losartan (COZAAR) 100 MG tablet Take 1 tablet (100 mg) by mouth daily 90 tablet 3     Azelaic Acid (FINACEA) 15 % gel Apply 0.5 inches topically as needed          ALLERGIES     Allergies   Allergen Reactions     Amlodipine Besylate      edema     Ampicillin      rash     Metoprolol      Mild fatigue with Toprol; mildly decreased exercise capacity     Spironolactone Nausea and Difficulty breathing       PAST MEDICAL HISTORY:  Past Medical History:   Diagnosis Date     Atrial fibrillation, unspecified type (H) 5/19/16    new onset A fib; nuclear stress test negative     Atrial flutter (H)      Cardiomyopathy (H)      Diverticulosis of colon (without mention of hemorrhage) 11/16/07    incidental diverticuli noted at colonoscopy     Gout, unspecified      LBBB (left bundle branch block)      Rosacea      Special screening for malignant neoplasms, colon 1995     Unspecified essential hypertension        PAST SURGICAL HISTORY:  Past Surgical History:   Procedure Laterality Date     ARTHROPLASTY KNEE Left 6/6/2016    Procedure: ARTHROPLASTY KNEE;  Surgeon: Derek Varma MD;  Location: [facility] OR     ARTHROSCOPY SHOULDER ROTATOR CUFF REPAIR  7/9/08    Left shoulder arthroscopic rotator cuff repair with acromioplasty     C NONSPECIFIC PROCEDURE  5/99    left knee arthroscopy     CARDIOVERSION  07-     [facility] COLONOSCOPY THRU STOMA W BIOPSY/CAUTERY TUMOR/POLYP/LESION  11/16/07    normal colonoscopy except for incidental diverticuli     ORTHOPEDIC SURGERY  04/11    2nd toe Lt foot       FAMILY HISTORY:  Family History   Problem Relation Age of Onset      "CEREBROVASCULAR DISEASE Father      d:89, dementia     Respiratory Mother      d:94     DIABETES Sister      b. 1938, IDDM since age 10     DIABETES Brother      b. 1943, type II DM       SOCIAL HISTORY:  Social History     Social History     Marital status:      Spouse name: N/A     Number of children: N/A     Years of education: N/A     Social History Main Topics     Smoking status: Former Smoker     Quit date: 7/25/1963     Smokeless tobacco: Never Used     Alcohol use 0.0 oz/week     0 Standard drinks or equivalent per week      Comment: 1 drink a day     Drug use: No     Sexual activity: Yes     Partners: Female     Other Topics Concern     Caffeine Concern No     1 cup daily     Special Diet No     Exercise Yes     skiing     Social History Narrative       Review of Systems:  Skin:  Positive for     Eyes:  Positive for glasses  ENT:  Negative    Respiratory:  Negative    Cardiovascular:    Positive for;dizziness;lightheadedness;edema  Gastroenterology: Negative    Genitourinary:  Positive for urinary frequency;dysuria  Musculoskeletal:  Positive for    Neurologic:  Positive for numbness or tingling of hands  Psychiatric:  Negative    Heme/Lymph/Imm:  Positive for chills  Endocrine:  Negative      Physical Exam:  Vitals: /76 (BP Location: Right arm, Patient Position: Chair, Cuff Size: Adult Regular)  Pulse (!) 48  Ht 1.727 m (5' 8\")  Wt 87.9 kg (193 lb 12.8 oz)  BMI 29.47 kg/m2    Constitutional:           Skin:           Head:           Eyes:           ENT:           Neck:           Chest:           Cardiac:                    Abdomen:           Vascular:                                        Extremities and Back:           Neurological:             Recent Lab Results:  LIPID RESULTS:  Lab Results   Component Value Date    CHOL 133 06/21/2016    HDL 40 06/21/2016    LDL 72 06/21/2016    TRIG 103 06/21/2016    CHOLHDLRATIO 2.6 10/09/2014       LIVER ENZYME RESULTS:  Lab Results   Component " Value Date    AST 20 06/26/2017    ALT 29 06/26/2017       CBC RESULTS:  Lab Results   Component Value Date    WBC 10.5 06/21/2016    RBC 3.92 (L) 06/21/2016    HGB 12.9 (L) 09/07/2016    HCT 37.8 (L) 06/21/2016    MCV 96 06/21/2016    MCH 29.8 06/21/2016    MCHC 31.0 (L) 06/21/2016    RDW 16.4 (H) 06/21/2016     (H) 06/21/2016       BMP RESULTS:  Lab Results   Component Value Date     06/26/2017    POTASSIUM 3.3 (L) 06/26/2017    CHLORIDE 108 06/26/2017    CO2 31 06/26/2017    ANIONGAP 5 06/26/2017     (H) 06/26/2017    BUN 20 06/26/2017    CR 0.97 06/26/2017    GFRESTIMATED 75 06/26/2017    GFRESTBLACK >90   GFR Calc   06/26/2017    HE 8.7 06/26/2017        A1C RESULTS:  No results found for: A1C    INR RESULTS:  No results found for: INR        CC  Anam Perera MD   [facility]  [address]  [place] [address]

## 2017-07-03 ENCOUNTER — TRANSFERRED RECORDS (OUTPATIENT)
Dept: HEALTH INFORMATION MANAGEMENT | Facility: CLINIC | Age: 77
End: 2017-07-03

## 2017-07-13 ENCOUNTER — TRANSFERRED RECORDS (OUTPATIENT)
Dept: HEALTH INFORMATION MANAGEMENT | Facility: CLINIC | Age: 77
End: 2017-07-13

## 2017-07-14 DIAGNOSIS — I10 BENIGN ESSENTIAL HYPERTENSION: ICD-10-CM

## 2017-07-14 RX ORDER — LOSARTAN POTASSIUM 100 MG/1
TABLET ORAL
Qty: 90 TABLET | Refills: 0 | Status: SHIPPED | OUTPATIENT
Start: 2017-07-14 | End: 2017-10-11

## 2017-07-14 NOTE — TELEPHONE ENCOUNTER
losartan      Last Written Prescription Date: 06/16/16  Last Fill Quantity: 90, # refills: 3  Last Office Visit with [facility] or [facility] prescribing provider: 06/28/17       Potassium   Date Value Ref Range Status   06/26/2017 3.3 (L) 3.4 - 5.3 mmol/L Final     Creatinine   Date Value Ref Range Status   06/26/2017 0.97 0.66 - 1.25 mg/dL Final     BP Readings from Last 3 Encounters:   06/28/17 126/64   06/28/17 132/76   02/14/17 148/50

## 2017-07-14 NOTE — TELEPHONE ENCOUNTER
Routing refill request to provider for review/approval because:  Labs out of range:  potassium

## 2017-07-20 ENCOUNTER — TRANSFERRED RECORDS (OUTPATIENT)
Dept: HEALTH INFORMATION MANAGEMENT | Facility: CLINIC | Age: 77
End: 2017-07-20

## 2017-08-03 ENCOUNTER — TRANSFERRED RECORDS (OUTPATIENT)
Dept: HEALTH INFORMATION MANAGEMENT | Facility: CLINIC | Age: 77
End: 2017-08-03

## 2017-08-04 ENCOUNTER — TRANSFERRED RECORDS (OUTPATIENT)
Dept: HEALTH INFORMATION MANAGEMENT | Facility: CLINIC | Age: 77
End: 2017-08-04

## 2017-08-15 ENCOUNTER — TELEPHONE (OUTPATIENT)
Dept: NURSING | Facility: CLINIC | Age: 77
End: 2017-08-15

## 2017-08-15 DIAGNOSIS — I48.91 ATRIAL FIBRILLATION, UNSPECIFIED TYPE (H): ICD-10-CM

## 2017-08-15 RX ORDER — RIVAROXABAN 20 MG/1
TABLET, FILM COATED ORAL
Qty: 30 TABLET | Refills: 0 | Status: ON HOLD | OUTPATIENT
Start: 2017-08-15 | End: 2017-08-17

## 2017-08-15 NOTE — TELEPHONE ENCOUNTER
Xarelto 20 mg           Last Written Prescription Date: 1/24/17  Last Fill Quantity: 30, # refills: 5    [place] [facility] or [facility] prescribing provider:  6/28/17   Future Office Visit:       Lab Results   Component Value Date    WBC 10.5 06/21/2016     Lab Results   Component Value Date    RBC 3.92 06/21/2016     Lab Results   Component Value Date    HGB 12.9 09/07/2016     Lab Results   Component Value Date    HCT 37.8 06/21/2016     No components found for: MCT  Lab Results   Component Value Date    MCV 96 06/21/2016     Lab Results   Component Value Date    MCH 29.8 06/21/2016     Lab Results   Component Value Date    MCHC 31.0 06/21/2016     Lab Results   Component Value Date    RDW 16.4 06/21/2016     Lab Results   Component Value Date     06/21/2016     Lab Results   Component Value Date    AST 20 06/26/2017     Lab Results   Component Value Date    ALT 29 06/26/2017     Creatinine   Date Value Ref Range Status   06/26/2017 0.97 0.66 - 1.25 mg/dL Final   ]    Labs showing if normal/abnormal  Lab Results   Component Value Date    WBC 10.5 06/21/2016    RBC 3.92 (L) 06/21/2016    HGB 12.9 (L) 09/07/2016    HCT 37.8 (L) 06/21/2016    MCV 96 06/21/2016    MCH 29.8 06/21/2016    MCHC 31.0 (L) 06/21/2016    RDW 16.4 (H) 06/21/2016     (H) 06/21/2016    DTYP Automated Method 06/21/2016    NEUTROPHIL 75.5 06/21/2016    LYMPH 13.8 06/21/2016    MONOCYTE 9.1 06/21/2016    EOSINOPHIL 1.1 06/21/2016    BASOPHIL 0.5 06/21/2016    ANEU 7.9 06/21/2016    ALYM 1.5 06/21/2016    GILBERTO 1.0 06/21/2016    AEOS 0.1 06/21/2016    ABAS 0.1 06/21/2016      Lab Results   Component Value Date    AST 20 06/26/2017    ALT 29 06/26/2017     Medication is being filled for 1 time refill only due to:  Future labs ordered needs to complete prior to next refill.

## 2017-08-15 NOTE — TELEPHONE ENCOUNTER
Received refill request for Xarelto from [facility]. Just sent to [email or web address] today. Called patient he wanted it sent to [facility] pharmacy. Called [facility] and informed them not to fill. Was sent to them for 30 tablets only due to patient needing labs.     Dr. Christina, please advise. Patient was told to follow up in one year at last appointment. There are future labs from cardiology and from PCP but PCP's orders are . Does he need labs? Should he get more than 30 tablets? Unsure when cardiology wants their labs drawn. Potassium not rechecked after last low level.    Routing refill request to provider for review/approval because:  Labs out of range:  CBC  Labs not current:  CBC

## 2017-08-16 ENCOUNTER — HOSPITAL ENCOUNTER (OUTPATIENT)
Facility: CLINIC | Age: 77
Setting detail: OBSERVATION
Discharge: HOME | End: 2017-08-17
Attending: EMERGENCY MEDICINE | Admitting: HOSPITALIST
Payer: MEDICARE

## 2017-08-16 DIAGNOSIS — Z79.01 LONG TERM CURRENT USE OF ANTICOAGULANT THERAPY: ICD-10-CM

## 2017-08-16 DIAGNOSIS — I48.19 PERSISTENT ATRIAL FIBRILLATION (H): ICD-10-CM

## 2017-08-16 DIAGNOSIS — E87.6 HYPOKALEMIA: ICD-10-CM

## 2017-08-16 DIAGNOSIS — R33.8 ACUTE URINARY RETENTION: ICD-10-CM

## 2017-08-16 DIAGNOSIS — I48.91 ATRIAL FIBRILLATION, UNSPECIFIED TYPE (H): ICD-10-CM

## 2017-08-16 DIAGNOSIS — N32.89 BLADDER HEMORRHAGE: ICD-10-CM

## 2017-08-16 DIAGNOSIS — I10 BENIGN ESSENTIAL HYPERTENSION: ICD-10-CM

## 2017-08-16 PROBLEM — R31.9 HEMATURIA: Status: ACTIVE | Noted: 2017-08-16

## 2017-08-16 LAB
ALBUMIN UR-MCNC: 100 MG/DL
ANION GAP SERPL CALCULATED.3IONS-SCNC: 15 MMOL/L (ref 3–14)
APPEARANCE UR: ABNORMAL
APTT PPP: 53 SEC (ref 22–37)
BACTERIA #/AREA URNS HPF: ABNORMAL /HPF
BASOPHILS # BLD AUTO: 0.1 10E9/L (ref 0–0.2)
BASOPHILS NFR BLD AUTO: 0.4 %
BILIRUB UR QL STRIP: NEGATIVE
BUN SERPL-MCNC: 11 MG/DL (ref 7–30)
CALCIUM SERPL-MCNC: 8.7 MG/DL (ref 8.5–10.1)
CHLORIDE SERPL-SCNC: 100 MMOL/L (ref 94–109)
CO2 SERPL-SCNC: 29 MMOL/L (ref 20–32)
COLOR UR AUTO: ABNORMAL
CREAT SERPL-MCNC: 0.84 MG/DL (ref 0.66–1.25)
DIFFERENTIAL METHOD BLD: ABNORMAL
EOSINOPHIL # BLD AUTO: 0 10E9/L (ref 0–0.7)
EOSINOPHIL NFR BLD AUTO: 0.1 %
ERYTHROCYTE [DISTWIDTH] IN BLOOD BY AUTOMATED COUNT: 13.9 % (ref 10–15)
GFR SERPL CREATININE-BSD FRML MDRD: 88 ML/MIN/1.7M2
GLUCOSE SERPL-MCNC: 104 MG/DL (ref 70–99)
GLUCOSE UR STRIP-MCNC: NEGATIVE MG/DL
HCT VFR BLD AUTO: 39.7 % (ref 40–53)
HGB BLD-MCNC: 13.5 G/DL (ref 13.3–17.7)
HGB UR QL STRIP: ABNORMAL
IMM GRANULOCYTES # BLD: 0.1 10E9/L (ref 0–0.4)
IMM GRANULOCYTES NFR BLD: 1 %
INR PPP: 1.94 (ref 0.86–1.14)
KETONES UR STRIP-MCNC: 5 MG/DL
LEUKOCYTE ESTERASE UR QL STRIP: ABNORMAL
LYMPHOCYTES # BLD AUTO: 0.8 10E9/L (ref 0.8–5.3)
LYMPHOCYTES NFR BLD AUTO: 5.4 %
MAGNESIUM SERPL-MCNC: 2.2 MG/DL (ref 1.6–2.3)
MCH RBC QN AUTO: 31.3 PG (ref 26.5–33)
MCHC RBC AUTO-ENTMCNC: 34 G/DL (ref 31.5–36.5)
MCV RBC AUTO: 92 FL (ref 78–100)
MONOCYTES # BLD AUTO: 1.2 10E9/L (ref 0–1.3)
MONOCYTES NFR BLD AUTO: 8.7 %
NEUTROPHILS # BLD AUTO: 11.8 10E9/L (ref 1.6–8.3)
NEUTROPHILS NFR BLD AUTO: 84.4 %
NITRATE UR QL: NEGATIVE
NRBC # BLD AUTO: 0 10*3/UL
NRBC BLD AUTO-RTO: 0 /100
PH UR STRIP: 7 PH (ref 5–7)
PLATELET # BLD AUTO: 209 10E9/L (ref 150–450)
POTASSIUM SERPL-SCNC: 2.8 MMOL/L (ref 3.4–5.3)
POTASSIUM SERPL-SCNC: 4 MMOL/L (ref 3.4–5.3)
RBC # BLD AUTO: 4.32 10E12/L (ref 4.4–5.9)
RBC #/AREA URNS AUTO: >182 /HPF (ref 0–2)
SODIUM SERPL-SCNC: 144 MMOL/L (ref 133–144)
SOURCE: ABNORMAL
SP GR UR STRIP: 1 (ref 1–1.03)
UROBILINOGEN UR STRIP-MCNC: 0 MG/DL (ref 0–2)
WBC # BLD AUTO: 14 10E9/L (ref 4–11)
WBC #/AREA URNS AUTO: >182 /HPF (ref 0–2)

## 2017-08-16 PROCEDURE — 87088 URINE BACTERIA CULTURE: CPT | Performed by: EMERGENCY MEDICINE

## 2017-08-16 PROCEDURE — 99285 EMERGENCY DEPT VISIT HI MDM: CPT

## 2017-08-16 PROCEDURE — 25000132 ZZH RX MED GY IP 250 OP 250 PS 637: Mod: GY | Performed by: PHYSICIAN ASSISTANT

## 2017-08-16 PROCEDURE — 84132 ASSAY OF SERUM POTASSIUM: CPT | Performed by: UROLOGY

## 2017-08-16 PROCEDURE — 80048 BASIC METABOLIC PNL TOTAL CA: CPT | Performed by: EMERGENCY MEDICINE

## 2017-08-16 PROCEDURE — 87186 SC STD MICRODIL/AGAR DIL: CPT | Performed by: EMERGENCY MEDICINE

## 2017-08-16 PROCEDURE — 83735 ASSAY OF MAGNESIUM: CPT | Performed by: EMERGENCY MEDICINE

## 2017-08-16 PROCEDURE — 99207 ZZC CDG-CHARGE REQUIRED MANUAL ENTRY: CPT | Performed by: PHYSICIAN ASSISTANT

## 2017-08-16 PROCEDURE — A9270 NON-COVERED ITEM OR SERVICE: HCPCS | Mod: GY | Performed by: EMERGENCY MEDICINE

## 2017-08-16 PROCEDURE — 85025 COMPLETE CBC W/AUTO DIFF WBC: CPT | Performed by: EMERGENCY MEDICINE

## 2017-08-16 PROCEDURE — 25000132 ZZH RX MED GY IP 250 OP 250 PS 637: Mod: GY | Performed by: EMERGENCY MEDICINE

## 2017-08-16 PROCEDURE — 87086 URINE CULTURE/COLONY COUNT: CPT | Performed by: EMERGENCY MEDICINE

## 2017-08-16 PROCEDURE — 51702 INSERT TEMP BLADDER CATH: CPT

## 2017-08-16 PROCEDURE — A9270 NON-COVERED ITEM OR SERVICE: HCPCS | Mod: GY | Performed by: PHYSICIAN ASSISTANT

## 2017-08-16 PROCEDURE — 36415 COLL VENOUS BLD VENIPUNCTURE: CPT | Performed by: UROLOGY

## 2017-08-16 PROCEDURE — 99219 ZZC INITIAL OBSERVATION CARE,LEVL II: CPT | Performed by: PHYSICIAN ASSISTANT

## 2017-08-16 PROCEDURE — 85610 PROTHROMBIN TIME: CPT | Performed by: EMERGENCY MEDICINE

## 2017-08-16 PROCEDURE — 85730 THROMBOPLASTIN TIME PARTIAL: CPT | Performed by: EMERGENCY MEDICINE

## 2017-08-16 PROCEDURE — 99221 1ST HOSP IP/OBS SF/LOW 40: CPT | Performed by: UROLOGY

## 2017-08-16 PROCEDURE — G0378 HOSPITAL OBSERVATION PER HR: HCPCS

## 2017-08-16 PROCEDURE — 81001 URINALYSIS AUTO W/SCOPE: CPT | Performed by: EMERGENCY MEDICINE

## 2017-08-16 RX ORDER — POTASSIUM CL/LIDO/0.9 % NACL 10MEQ/0.1L
10 INTRAVENOUS SOLUTION, PIGGYBACK (ML) INTRAVENOUS
Status: DISCONTINUED | OUTPATIENT
Start: 2017-08-16 | End: 2017-08-17 | Stop reason: HOSPADM

## 2017-08-16 RX ORDER — POTASSIUM CHLORIDE 7.45 MG/ML
10 INJECTION INTRAVENOUS
Status: DISCONTINUED | OUTPATIENT
Start: 2017-08-16 | End: 2017-08-17 | Stop reason: HOSPADM

## 2017-08-16 RX ORDER — OXYBUTYNIN CHLORIDE 5 MG/1
5 TABLET ORAL 3 TIMES DAILY PRN
Status: DISCONTINUED | OUTPATIENT
Start: 2017-08-16 | End: 2017-08-17 | Stop reason: HOSPADM

## 2017-08-16 RX ORDER — AMOXICILLIN 250 MG
1-2 CAPSULE ORAL 2 TIMES DAILY PRN
Status: DISCONTINUED | OUTPATIENT
Start: 2017-08-16 | End: 2017-08-17 | Stop reason: HOSPADM

## 2017-08-16 RX ORDER — DIPHENHYDRAMINE HCL 25 MG
25 CAPSULE ORAL
Status: DISCONTINUED | OUTPATIENT
Start: 2017-08-16 | End: 2017-08-17 | Stop reason: HOSPADM

## 2017-08-16 RX ORDER — BISACODYL 10 MG
10 SUPPOSITORY, RECTAL RECTAL DAILY PRN
Status: DISCONTINUED | OUTPATIENT
Start: 2017-08-16 | End: 2017-08-17 | Stop reason: HOSPADM

## 2017-08-16 RX ORDER — OXYCODONE HYDROCHLORIDE 5 MG/1
5-10 TABLET ORAL
Status: DISCONTINUED | OUTPATIENT
Start: 2017-08-16 | End: 2017-08-17 | Stop reason: HOSPADM

## 2017-08-16 RX ORDER — LIDOCAINE 40 MG/G
CREAM TOPICAL
Status: DISCONTINUED | OUTPATIENT
Start: 2017-08-16 | End: 2017-08-16

## 2017-08-16 RX ORDER — AMLODIPINE BESYLATE 5 MG/1
5 TABLET ORAL DAILY
Status: DISCONTINUED | OUTPATIENT
Start: 2017-08-17 | End: 2017-08-17 | Stop reason: HOSPADM

## 2017-08-16 RX ORDER — POTASSIUM CHLORIDE 29.8 MG/ML
20 INJECTION INTRAVENOUS
Status: DISCONTINUED | OUTPATIENT
Start: 2017-08-16 | End: 2017-08-17 | Stop reason: HOSPADM

## 2017-08-16 RX ORDER — HYDROCHLOROTHIAZIDE 12.5 MG/1
25 CAPSULE ORAL DAILY
Qty: 180 CAPSULE | Refills: 3 | Status: SHIPPED | OUTPATIENT
Start: 2017-08-16 | End: 2018-02-28

## 2017-08-16 RX ORDER — TAMSULOSIN HYDROCHLORIDE 0.4 MG/1
0.4 CAPSULE ORAL ONCE
Status: COMPLETED | OUTPATIENT
Start: 2017-08-16 | End: 2017-08-16

## 2017-08-16 RX ORDER — POTASSIUM CHLORIDE 1500 MG/1
20-40 TABLET, EXTENDED RELEASE ORAL
Status: DISCONTINUED | OUTPATIENT
Start: 2017-08-16 | End: 2017-08-17 | Stop reason: HOSPADM

## 2017-08-16 RX ORDER — LOSARTAN POTASSIUM 100 MG/1
100 TABLET ORAL DAILY
Status: DISCONTINUED | OUTPATIENT
Start: 2017-08-17 | End: 2017-08-17 | Stop reason: HOSPADM

## 2017-08-16 RX ORDER — ACETAMINOPHEN 325 MG/1
650 TABLET ORAL EVERY 4 HOURS PRN
Status: DISCONTINUED | OUTPATIENT
Start: 2017-08-16 | End: 2017-08-17 | Stop reason: HOSPADM

## 2017-08-16 RX ORDER — POTASSIUM CHLORIDE 1.5 G/1.58G
60 POWDER, FOR SOLUTION ORAL ONCE
Status: COMPLETED | OUTPATIENT
Start: 2017-08-16 | End: 2017-08-16

## 2017-08-16 RX ORDER — AMIODARONE HYDROCHLORIDE 200 MG/1
200 TABLET ORAL DAILY
Status: DISCONTINUED | OUTPATIENT
Start: 2017-08-17 | End: 2017-08-17 | Stop reason: HOSPADM

## 2017-08-16 RX ORDER — NEBIVOLOL 2.5 MG/1
5 TABLET ORAL DAILY
Status: DISCONTINUED | OUTPATIENT
Start: 2017-08-17 | End: 2017-08-17 | Stop reason: HOSPADM

## 2017-08-16 RX ORDER — NALOXONE HYDROCHLORIDE 0.4 MG/ML
.1-.4 INJECTION, SOLUTION INTRAMUSCULAR; INTRAVENOUS; SUBCUTANEOUS
Status: DISCONTINUED | OUTPATIENT
Start: 2017-08-16 | End: 2017-08-17 | Stop reason: HOSPADM

## 2017-08-16 RX ORDER — POLYETHYLENE GLYCOL 3350 17 G/17G
17 POWDER, FOR SOLUTION ORAL DAILY PRN
Status: DISCONTINUED | OUTPATIENT
Start: 2017-08-16 | End: 2017-08-17 | Stop reason: HOSPADM

## 2017-08-16 RX ORDER — HYDRALAZINE HYDROCHLORIDE 50 MG/1
100 TABLET, FILM COATED ORAL 3 TIMES DAILY
Status: DISCONTINUED | OUTPATIENT
Start: 2017-08-16 | End: 2017-08-17 | Stop reason: HOSPADM

## 2017-08-16 RX ORDER — POTASSIUM CHLORIDE 1.5 G/1.58G
20-40 POWDER, FOR SOLUTION ORAL
Status: DISCONTINUED | OUTPATIENT
Start: 2017-08-16 | End: 2017-08-17 | Stop reason: HOSPADM

## 2017-08-16 RX ORDER — SODIUM CHLORIDE 9 MG/ML
1000 INJECTION, SOLUTION INTRAVENOUS CONTINUOUS
Status: DISCONTINUED | OUTPATIENT
Start: 2017-08-16 | End: 2017-08-16

## 2017-08-16 RX ADMIN — POTASSIUM CHLORIDE 20 MEQ: 1500 TABLET, EXTENDED RELEASE ORAL at 16:42

## 2017-08-16 RX ADMIN — DIPHENHYDRAMINE HYDROCHLORIDE 25 MG: 25 CAPSULE ORAL at 22:23

## 2017-08-16 RX ADMIN — POTASSIUM CHLORIDE 60 MEQ: 1.5 POWDER, FOR SOLUTION ORAL at 14:12

## 2017-08-16 RX ADMIN — ACETAMINOPHEN 650 MG: 325 TABLET, FILM COATED ORAL at 20:57

## 2017-08-16 RX ADMIN — HYDRALAZINE HYDROCHLORIDE 100 MG: 50 TABLET ORAL at 16:41

## 2017-08-16 RX ADMIN — TAMSULOSIN HYDROCHLORIDE 0.4 MG: 0.4 CAPSULE ORAL at 14:12

## 2017-08-16 RX ADMIN — HYDRALAZINE HYDROCHLORIDE 100 MG: 50 TABLET ORAL at 22:23

## 2017-08-16 ASSESSMENT — ENCOUNTER SYMPTOMS
DYSURIA: 1
DIFFICULTY URINATING: 1
CHILLS: 1
HEMATURIA: 1

## 2017-08-16 NOTE — PHARMACY-ADMISSION MEDICATION HISTORY
Pharmacy Admission Medication History    Admission medication history interview status for the 8/16/2017 admission is complete.   See EPIC admission navigator for allergy information, prior to admission medications and immunization status.     Medication history interview source(s): Patient    Medication history resources (including written lists, pill bottles, clinic record): Written list    Medication history source reliability: Good    Actions taken by pharmacist (provider contacted, medication changes, etc):None     Changes made to medication history:None    Additional medication history information: None    Medication reconciliation/reorder completed by provider prior to medication history? No    Time spent in this activity: 20 minutes    Prior to Admission medications    Medication Sig Last Dose Taking? Auth Provider   hydrochlorothiazide (MICROZIDE) 12.5 MG capsule Take 2 capsules (25 mg) by mouth daily 8/16/2017 at Unknown time Yes Anam Perera MD   XARELTO 20 MG TABS tablet TAKE 1 TABLET(20 MG) BY MOUTH DAILY WITH DINNER 8/15/2017 at Unknown time Yes Igor Christina MD   losartan (COZAAR) 100 MG tablet TAKE 1 TABLET(100 MG) BY MOUTH DAILY 8/16/2017 at Unknown time Yes Igor Christina MD   potassium chloride SA (K-DUR/KLOR-CON M) 20 MEQ CR tablet Take 2 tablets my mouth in AM (40 Meq) and 1 tablet by mouth in the PM (20 Meq) 8/16/2017 at Unknown time Yes Leonor Aj MD   allopurinol (ZYLOPRIM) 300 MG tablet TAKE 1 TABLET(300 MG) BY MOUTH DAILY 8/16/2017 at Unknown time Yes Igor Christina MD   nebivolol (BYSTOLIC) 5 MG tablet Take 1 tablet (5 mg) by mouth daily 8/16/2017 at Unknown time Yes Anam Perera MD   amLODIPine (NORVASC) 5 MG tablet Take 1 tablet (5 mg) by mouth daily 8/15/2017 at Unknown time Yes Anam Perera MD   hydrALAZINE (APRESOLINE) 100 MG TABS Take 1 tablet (100 mg) by mouth 3 times daily 8/15/2017 at Unknown time Yes Jean Marie  Leonor Weir MD   amiodarone (PACERONE/CODARONE) 200 MG tablet Take 1 tab daily from Mon through Friday (skip Sat + Sun) 8/15/2017 at Unknown time Yes Leonor Aj MD

## 2017-08-16 NOTE — ED PROVIDER NOTES
History   Chief Complaint:  Post-op Problem    HPI   Reji Aguirre is a 76 year old male on blood thinners who comes to the ED with his wife for evaluations of hematuria after having bladder surgery 12 days ago and the catheter was placed for 10 days and removed 2 days ago. The patient states he noticed his urine was getting more red and now has clots with severe bladder pressure. He notes the clots began appearing around 2200 last night. He states he has dysuria and was chilly yesterday, but is unsure of objective fever. He denies taking his Xarelto today or being on prostate medication.     The wife notes the patients bladder surgery was for growths on the bladder.     Allergies:  Amlodipine  Ampicillin  metoprolol  Spironolactone    Medications:    XARELTO 20 MG TABS tablet   losartan (COZAAR) 100 MG tablet   potassium chloride SA (K-DUR/KLOR-CON M) 20 MEQ CR tablet   allopurinol (ZYLOPRIM) 300 MG tablet   nebivolol (BYSTOLIC) 5 MG tablet   amLODIPine (NORVASC) 5 MG tablet   hydrALAZINE (APRESOLINE) 100 MG TABS   amiodarone (PACERONE/CODARONE) 200 MG tablet   hydrochlorothiazide (MICROZIDE) 12.5 MG capsule     Past Medical History:    Atrial fibrillation, unspecified type (H)   Atrial flutter (H)   Cardiomyopathy (H)   Diverticulosis of colon (without mention of hemorrhage)   Gout, unspecified   LBBB (left bundle branch block)   Rosacea   Special screening for malignant neoplasms, colon   Unspecified essential hypertension     Past Surgical History:    Arthroplasty knee  Arthroscopy shoulder Rolator cuff repair  Orthopedic surgery    Family History:    Dementia  Respiratory  Diabetes    Social History:  Marital Status:   [2]  Smoking status: former  Alcohol use: yes  Arrived to ED with wife    Review of Systems   Constitutional: Positive for chills.   Genitourinary: Positive for difficulty urinating, dysuria and hematuria.   All other systems reviewed and are negative.    Physical Exam   Patient Vitals  "for the past 24 hrs:   BP Temp Temp src Pulse Heart Rate Resp SpO2 Height Weight   08/16/17 0752 179/74 99.7  F (37.6  C) Oral 52 52 16 97 % 1.727 m (5' 8\") 86.2 kg (190 lb)     Physical Exam  General: Patient is alert and interactive when I enter the room  Head:  The scalp, face, and head appear normal  Eyes:  The pupils are equal, round, and reactive to light    Conjunctivae and sclerae are normal  ENT:    External acoustic canals are normal    The oropharynx is normal without erythema.     Uvula is in the midline  Neck:  Normal range of motion  CV:  Regular rate. S1/S2. No murmurs.   Resp:  Lungs are clear without wheezes or rales. No distress  GI:  Abdomen is soft, no rigidity, guarding, or rebound    No distension. No tenderness to palpation in any quadrant.    :   Full suprapubic area. Blood in santoro catheter after placement - see procedure note above.    MS:  Normal tone. Joints grossly normal without effusions.     No asymmetric leg swelling, calf or thigh tenderness.      Normal motor assessment of all extremities.  Skin:  No rash or lesions noted. Normal capillary refill noted  Neuro: Speech is normal and fluent. Face is symmetric.     Moving all extremities well.   Psych:  Awake. Alert.  Normal affect.  Appropriate interactions.  Lymph: No anterior cervical lymphadenopathy noted    Emergency Department Course   Laboratory:  CBC: WBC 14.9(H) o/w WNL (HGB 13.5, )   INR: 1.94(H)  PTT: 53(H)  BMP: glucose 104(H), potassium 2.8(L), anion gap 15(H) o/w WNL (Creatinine 0.84)  UA: specific gravity 1.000(L), blood large(A), ketone 5(A), protein albumin 100(A), leukocyte esterase moderate(A), WBC >182(H), RBC >182(H), bacteria few(A) o/w WNL    Urine culture: in process    PROCEDURE NOTE:    PROCEDURE:  Santoro catheter urinary retention drainage  INDICATION:  Hematuria  PHYSICIAN:  Willy Abraham MD  DESCRIPTION OF PROCEDURE:    Informed verbal consent. Site was correctly identified. Santoro catheter was " placed using sterile techinque after RN unable to place. Thebladder was drained until blood and urine flowed. Tolerated well, no complications.     Emergency Department Course:  Past medical records, nursing notes, and vitals reviewed.  0900: I performed an exam of the patient and obtained history, as documented above.  Santoro catheter was placed and the bladder was drained - see procedure note above.   0906 IV inserted and blood drawn.  1131 I talked to [facility].  1136 I updated the patient and family.   Findings and plan explained to the Patient and spouse who consents to admission.   1200: Discussed the patient with Dr. Lynn, who will admit the patient to a OBS bed for further monitoring, evaluation, and treatment.   1220 I talked to Dr. Beasley  Impression & Plan    Medical Decision Making:  Reji Aguirre is a 76 year old male who presents for evaluation of abdominal pain and decreased urinary output.  I considered a broad differential including diverticulitis, aneurysm, urinary retention, ureterolithiasis, UTI, pyelonephritis, neurologic causes (MS, cauda equina,etc), colitis, etc.  The history and exam are consistent with acute urinary retention and this is confirmed after santoro catheter placement. The cause of the acute urinary retention is unclear at this point and considered that this may be caused by opiate medication, other medications, constipation, prostate issues, bladder or urethral tumor, ureterolithaisis, etc   Likely secondary to prostatic enlargement and hematuria.  Culture urine as UA not helpful with this degree of hematuria. No infectious symptoms. Patient will be admitted for continuous irrigation. Replace potassium.  Urology on board in case questions arise.     Diagnosis:    ICD-10-CM    1. Bladder hemorrhage N32.89 UA with Microscopic   2. Long term current use of anticoagulant therapy Z79.01    3. Hypokalemia E87.6      Disposition:  Admitted to OBS bed under the care of   Shane Good  8/16/2017   [facility] EMERGENCY DEPARTMENT    I, Francisca Good, am serving as a scribe at 8:55 AM on 8/16/2017 to document services personally performed by Willy Abraham MD based on my observations and the provider's statements to me.        Willy Abraham MD  08/16/17 0800

## 2017-08-16 NOTE — ED NOTES
[facility] [place]  ED Nurse Handoff Report    Reji Aguirre is a 76 year old male   ED Chief complaint: Post-op Problem  . ED Diagnosis:   Final diagnoses:   Bladder hemorrhage   Long term current use of anticoagulant therapy   Hypokalemia   Acute urinary retention     Allergies:   Allergies   Allergen Reactions     Amlodipine      edema     Ampicillin      rash     Metoprolol      Mild fatigue with Toprol; mildly decreased exercise capacity     Spironolactone Nausea and Difficulty breathing     Vicodin [Hydrocodone-Acetaminophen] Nausea       Code Status: Full Code  Activity level - Baseline/Home:  Independent. Activity Level - Current:   Stand with Assist. Lift room needed: No. Bariatric: No   Needed: No   Isolation: No. Infection: Not Applicable.     Vital Signs:   Vitals:    08/16/17 1030 08/16/17 1045 08/16/17 1100 08/16/17 1115   BP: 167/74 154/72 163/71 151/69   Pulse:       Resp:       Temp:       TempSrc:       SpO2: 93% 93% 93% 91%   Weight:       Height:           Cardiac Rhythm:  ,      Pain level: 0-10 Pain Scale: 8  Patient confused: No. Patient Falls Risk: Yes.   Elimination Status: Urethral catheter (santoro) in place; refer to orders to discontinue as per protocol    Patient Report - Initial Complaint: anuria and blood/clotting in urine. Focused Assessment: Genitourinary - Genitourinary WDL:  WDL except; voiding ability/characteristics Voiding Characteristics: anuria (bleeding and clotting, bladder spasms)  Tests Performed: labs. Abnormal Results:   Labs Ordered and Resulted from Time of ED Arrival Up to the Time of Departure from the ED   CBC WITH PLATELETS DIFFERENTIAL - Abnormal; Notable for the following:        Result Value    WBC 14.0 (*)     RBC Count 4.32 (*)     Hematocrit 39.7 (*)     Absolute Neutrophil 11.8 (*)     All other components within normal limits   INR - Abnormal; Notable for the following:     INR 1.94 (*)     All other components within normal limits    PARTIAL THROMBOPLASTIN TIME - Abnormal; Notable for the following:     PTT 53 (*)     All other components within normal limits   BASIC METABOLIC PANEL - Abnormal; Notable for the following:     Potassium 2.8 (*)     Anion Gap 15 (*)     Glucose 104 (*)     All other components within normal limits   ROUTINE UA WITH MICROSCOPIC - Abnormal; Notable for the following:     Ketones Urine 5 (*)     Specific Gravity Urine 1.000 (*)     Blood Urine Large (*)     Protein Albumin Urine 100 (*)     Leukocyte Esterase Urine Moderate (*)     WBC Urine >182 (*)     RBC Urine >182 (*)     Bacteria Urine Few (*)     All other components within normal limits   PERIPHERAL IV CATHETER   URINE CULTURE AEROBIC BACTERIAL   .   Treatments provided: see above  Family Comments: wife present and supportive  OBS brochure/video discussed/provided to patient:  Yes  ED Medications:   Medications   lidocaine (XYLOCAINE) 2 % topical gel (not administered)   lidocaine 1 % 1 mL (not administered)   lidocaine (LMX4) kit (not administered)   sodium chloride (PF) 0.9% PF flush 3 mL (not administered)   sodium chloride (PF) 0.9% PF flush 3 mL (not administered)   0.9% sodium chloride BOLUS (not administered)     Followed by   0.9% sodium chloride infusion (not administered)   potassium chloride (KLOR-CON) Packet 60 mEq (not administered)   tamsulosin (FLOMAX) capsule 0.4 mg (not administered)     Drips infusing:  No  For the majority of the shift this patient was Green. Interventions performed were bladder irrg, and IVF bolus.     Severe Sepsis OR Septic Shock Diagnosis Present: No      ED Nurse Name/Phone Number: Sneha Steve,   1:45 PM    RECEIVING UNIT ED HANDOFF REVIEW    Above ED Nurse Handoff Report was reviewed: Yes  Reviewed by: Ethel Cristina on August 16, 2017 at 1:52 PM

## 2017-08-16 NOTE — IP AVS SNAPSHOT
[facility] Observation Department    [address]    [facility] [address]    Phone:  [phone]                                       After Visit Summary   8/16/2017    Reji Aguirre    MRN: [id number]           After Visit Summary Signature Page     I have received my discharge instructions, and my questions have been answered. I have discussed any challenges I see with this plan with the nurse or doctor.    ..........................................................................................................................................  Patient/Patient Representative Signature      ..........................................................................................................................................  Patient Representative Print Name and Relationship to Patient    ..................................................               ................................................  Date                                            Time    ..........................................................................................................................................  Reviewed by Signature/Title    ...................................................              ..............................................  Date                                                            Time

## 2017-08-16 NOTE — PLAN OF CARE
Problem: Discharge Planning  Goal: Discharge Planning (Adult, OB, Behavioral, Peds)  Outcome: No Change  ROOM # 227     Living Situation (if not independent, order SW consult): indep with wife  Facility name: NA  : Yessica-wife     Activity level at baseline: sergei  Activity level on admit: SBA        Patient registered to observation; given Patient Bill of Rights; given the opportunity to ask questions about observation status and their plan of care.  Patient has been oriented to the observation room, bathroom and call light is in place.     Discussed discharge goals and expectations with patient/family.

## 2017-08-16 NOTE — PLAN OF CARE
"Problem: Discharge Planning  Goal: Discharge Planning (Adult, OB, Behavioral, Peds)  Outcome: Improving  PRIMARY DIAGNOSIS: Hematuria/clots/CBI     OUTPATIENT/OBSERVATION GOALS TO BE MET BEFORE DISCHARGE  1. Orthostatic performed: NA      2. Stable Hgb: Yes  Recent Labs   Lab Test  08/16/17   1000  09/07/16   0932  06/21/16   0837   HGB  13.5  12.9*  11.7*          3. Resolved or declined bleeding/clots: light pink in color. No clots present.      4. Appropriate testing complete: potassium re-check scheduled for this evening and hgb re-check in the am      5. Cleared for discharge by consultants (if involved): Urology consulted      6. Safe discharge environment identified: lives with wife, indep     Discharge Planner Nurse   Safe discharge environment identified: Yes   Barriers to discharge: urology consult, hematuria, CBI       Entered by: Sena Altman 08/16/2017 3:23 PM     Vitals stable. Pt oriented x4 but sleepy. SBA for increased safety. CBI in place. Urine pink. No clots present. Pt reports just \"a little discomfort\" from the santoro catheter. Denies bladder pain. Potassium 2.8, replacing. Potassium will be re-checked this evening. Pt resting comfortably with wife at bedside. Plan for urology consult. Will continue to monitor and provide supportive cares.      Please review provider order for any additional goals.   Nurse to notify provider when observation goals have been met and patient is ready for discharge.      "

## 2017-08-16 NOTE — ED NOTES
Bladder surgery on 08/04/2017 with catheter in place for 10days postop. Catheter removed on Monday 08/14/2017.  Pt. Noticed urine getting more red.  Now with clots and severe bladder pressure.  Patient alert and oriented x3.  Airway, breathing and circulation intact.

## 2017-08-16 NOTE — IP AVS SNAPSHOT
MRN:[id number]                      After Visit Summary   8/16/2017    Reji Aguirre    MRN: [id number]           Thank you!     Thank you for choosing [facility] for your care. Our goal is always to provide you with excellent care. Hearing back from our patients is one way we can continue to improve our services. Please take a few minutes to complete the written survey that you may receive in the mail after you visit. If you would like to speak to someone directly about your visit please contact Patient Relations at [phone]. Thank you!          Patient Information     Date Of Birth          1940        About your hospital stay     You were admitted on:  August 16, 2017 You last received care in the:  [facility] Observation Department    You were discharged on:  August 17, 2017        Reason for your hospital stay       You were evaluated in the hospital for blood in your urine and urinary retention. You were placed on continuous bladder irrigation and your Xarelto was held.  Your urine color improved overnight. You were seen by Dr. Chinchilla with urology in consultation. It is reasonable to hold your Xarelto for the next 48 hours, then restart it. You should seek immediate medical evaluation if your symptoms worsen, you develop pain or fever, or if you are unable to void. Schedule an appointment with your primary care provider in the next week. Your potassium was also noted to be low when you got here. We will recommend increasing your potassium dosing from 2 tablets in the AM and 1 tablet at night to 2 tablets in the AM and 2 tablets at night to make sure it is being adequately replaced. You may follow-up with urology at [facility] or in [facility] clinic if needed with ongoing questions or concerns.                  Who to Call     For medical emergencies, please call 911.  For non-urgent questions about your medical care, please call your  primary care provider or clinic, [phone]          Attending Provider     Provider Specialty    Willy Abraham MD Emergency Medicine    Sulaiman Lynn MD Internal Medicine       Primary Care Provider Office Phone # Fax #    Igor Christina [phone]       When to contact your care team       Call your primary doctor if you have any of the following: temperature greater than 101, increased shortness of breath, increased pain, or urinary retention.                  After Care Instructions     Activity       Your activity upon discharge: activity as tolerated            Diet       Follow this diet upon discharge: Orders Placed This Encounter      Regular Diet Adult                  Follow-up Appointments     Follow-up and recommended labs and tests        Follow up with primary care provider, Igor Christina, within 7 days to evaluate medication change and for hospital follow- up.  No follow up labs or test are needed.                  Your next 10 appointments already scheduled     Oct 11, 2017  1:15 PM CDT   [facility] EP RETURN with Leonor Aj MD   [facility] ([facility])    [id number] [place] [id number] [phone]                         Pending Results     Date and Time Order Name Status Description    8/16/2017 0906 Urine Culture Preliminary             Statement of Approval     Ordered          08/17/17 1195  I have reviewed and agree with all the recommendations and orders detailed in this document.  EFFECTIVE NOW     Approved and electronically signed by:  Yeny Gonzalez PA-C             Admission Information     Date & Time Provider Department Dept. Phone    8/16/2017 Sulaiman Lynn MD [facility] Observation Department [phone]      Your Vitals Were     Blood Pressure Pulse Temperature Respirations Height Weight    135/61 54 97.6  F (36.4  C) (Oral)  "20 1.727 m (5' 8\") 86.2 kg (190 lb)    Pulse Oximetry BMI (Body Mass Index)                94% 28.89 kg/m2          [facility] Information     [facility] gives you secure access to your electronic health record. If you see a primary care provider, you can also send messages to your care team and make appointments. If you have questions, please call your primary care clinic.  If you do not have a primary care provider, please call [phone] and they will assist you.        Care EveryWhere ID     This is your Care EveryWhere ID. This could be used by other organizations to access your [facility] medical records  [id number]        Equal Access to Services     FLORENTIN NOLASCO : Luma Reddy, tulio donahue, yordy park, andres ascencio. [facility] [phone].    ATENCIÓN: Si habla español, tiene a noel disposición servicios gratuitos de asistencia lingüística. [facility] [phone].    We comply with applicable federal civil rights laws and [place] laws. We do not discriminate on the basis of race, color, national origin, age, disability sex, sexual orientation or gender identity.               Review of your medicines      CONTINUE these medicines which may have CHANGED, or have new prescriptions. If we are uncertain of the size of tablets/capsules you have at home, strength may be listed as something that might have changed.        Dose / Directions    potassium chloride SA 20 MEQ CR tablet   Commonly known as:  K-DUR/KLOR-CON M   This may have changed:  additional instructions   Used for:  Benign essential hypertension        Take 2 tablets my mouth in AM (40 Meq) and 2 tablets by mouth in the PM (40 Meq)   Quantity:  270 tablet   Refills:  2         CONTINUE these medicines which have NOT CHANGED        Dose / Directions    allopurinol 300 MG tablet   Commonly known as:  ZYLOPRIM   Used for:  Gout, unspecified        TAKE 1 TABLET(300 MG) BY MOUTH DAILY   Quantity:  " 90 tablet   Refills:  0       amiodarone 200 MG tablet   Commonly known as:  PACERONE/CODARONE   Used for:  Atrial fibrillation, unspecified type (H)        Take 1 tab daily from Mon through Friday (skip Sat + Sun)   Quantity:  90 tablet   Refills:  3       amLODIPine 5 MG tablet   Commonly known as:  NORVASC   Used for:  Benign essential hypertension        Dose:  5 mg   Take 1 tablet (5 mg) by mouth daily   Quantity:  90 tablet   Refills:  3       hydrALAZINE 100 MG Tabs tablet   Commonly known as:  APRESOLINE   Used for:  Benign essential hypertension        Dose:  100 mg   Take 1 tablet (100 mg) by mouth 3 times daily   Quantity:  270 tablet   Refills:  3       hydrochlorothiazide 12.5 MG capsule   Commonly known as:  MICROZIDE   Used for:  Persistent atrial fibrillation (H)        Dose:  25 mg   Take 2 capsules (25 mg) by mouth daily   Quantity:  180 capsule   Refills:  3       losartan 100 MG tablet   Commonly known as:  COZAAR   Used for:  Benign essential hypertension        TAKE 1 TABLET(100 MG) BY MOUTH DAILY   Quantity:  90 tablet   Refills:  0       nebivolol 5 MG tablet   Commonly known as:  BYSTOLIC   Used for:  Benign essential hypertension, Atrial flutter, paroxysmal (H), Persistent atrial fibrillation (H)        Dose:  5 mg   Take 1 tablet (5 mg) by mouth daily   Quantity:  90 tablet   Refills:  3         STOP taking     XARELTO 20 MG Tabs tablet   Generic drug:  rivaroxaban ANTICOAGULANT                Where to get your medicines      These medications were sent to [facility] [address], [place] - [address] AT [facility] [place]  [address], [facility] [address]     Phone:  [phone]     hydrochlorothiazide 12.5 MG capsule         Some of these will need a paper prescription and others can be bought over the counter. Ask your nurse if you have questions.     You don't need a prescription for these medications     potassium chloride SA 20 MEQ  CR tablet                Protect others around you: Learn how to safely use, store and throw away your medicines at www.disposemymeds.org.             Medication List: This is a list of all your medications and when to take them. Check marks below indicate your daily home schedule. Keep this list as a reference.      Medications           Morning Afternoon Evening Bedtime As Needed    allopurinol 300 MG tablet   Commonly known as:  ZYLOPRIM   TAKE 1 TABLET(300 MG) BY MOUTH DAILY   Next Dose Due:  Resume home regimen                                amiodarone 200 MG tablet   Commonly known as:  PACERONE/CODARONE   Take 1 tab daily from Mon through Friday (skip Sat + Sun)   Last time this was given:  200 mg on 8/17/2017  8:01 AM   Next Dose Due:  Take next dose tomorrow morning                                   amLODIPine 5 MG tablet   Commonly known as:  NORVASC   Take 1 tablet (5 mg) by mouth daily   Last time this was given:  5 mg on 8/17/2017  8:01 AM   Next Dose Due:  Take next dose tomorrow morning                                   hydrALAZINE 100 MG Tabs tablet   Commonly known as:  APRESOLINE   Take 1 tablet (100 mg) by mouth 3 times daily   Last time this was given:  100 mg on 8/17/2017 11:25 AM   Next Dose Due:  Take next dose tonight before bed                                   hydrochlorothiazide 12.5 MG capsule   Commonly known as:  MICROZIDE   Take 2 capsules (25 mg) by mouth daily   Next Dose Due:  Resume home regimen                                losartan 100 MG tablet   Commonly known as:  COZAAR   TAKE 1 TABLET(100 MG) BY MOUTH DAILY   Last time this was given:  100 mg on 8/17/2017  8:02 AM   Next Dose Due:  Take next dose tomorrow morning                                   nebivolol 5 MG tablet   Commonly known as:  BYSTOLIC   Take 1 tablet (5 mg) by mouth daily   Next Dose Due:  Resume home regimen                                potassium chloride SA 20 MEQ CR tablet   Commonly known as:   TIAGO/KLOR-CON M   Take 2 tablets my mouth in AM (40 Meq) and 2 tablets by mouth in the PM (40 Meq)   Last time this was given:  20 mEq on 8/16/2017  4:42 PM   Next Dose Due:  Take 2 tablets this evening

## 2017-08-16 NOTE — CONSULTS
"[facility] Urology Consult     Name: Reji Agiurre    MRN: [id number]   YOB: 1940       We were asked to see Reji Aguirre at the request of ABHI Higgins for evaluation and treatment of the following chief complaint.          Chief Complaint:   Urinary retention  History is obtained from the patient and EMR.          History of Present Illness:   Reji Aguirre is a 76 year old male with urinary retention. He recently underwent \"bladder surgery\" at the [facility] for \"nonmalignant inflammatory areas\". He had a Carmona 10 days post op and it was removed 2 days ago. He started to notice gross hematuria and clots developed with significant bladder pressure.  Took two doses of Xarelto (mon and Tues).     No fevers, chills, nausea or vomiting. Feels much better since Carmona placed.           Past Medical History:     Past Medical History:   Diagnosis Date     Atrial fibrillation, unspecified type (H) 5/19/16    new onset A fib; nuclear stress test negative     Atrial flutter (H)      Cardiomyopathy (H)      Diverticulosis of colon (without mention of hemorrhage) 11/16/07    incidental diverticuli noted at colonoscopy     Gout, unspecified      LBBB (left bundle branch block)      Rosacea      Special screening for malignant neoplasms, colon 1995     Unspecified essential hypertension             Past Surgical History:     Past Surgical History:   Procedure Laterality Date     ARTHROPLASTY KNEE Left 6/6/2016    Procedure: ARTHROPLASTY KNEE;  Surgeon: Derek Varma MD;  Location: [facility] OR     ARTHROSCOPY SHOULDER ROTATOR CUFF REPAIR  7/9/08    Left shoulder arthroscopic rotator cuff repair with acromioplasty     C NONSPECIFIC PROCEDURE  5/99    left knee arthroscopy     CARDIOVERSION  07-      COLONOSCOPY THRU STOMA W BIOPSY/CAUTERY TUMOR/POLYP/LESION  11/16/07    normal colonoscopy except for incidental diverticuli     ORTHOPEDIC SURGERY  04/11    2nd toe Lt foot            Social " History:     Social History   Substance Use Topics     Smoking status: Former Smoker     Quit date: 7/25/1963     Smokeless tobacco: Never Used     Alcohol use 0.0 oz/week     0 Standard drinks or equivalent per week      Comment: 1 drink a day            Family History:     Family History   Problem Relation Age of Onset     CEREBROVASCULAR DISEASE Father      d:89, dementia     Respiratory Mother      d:94     DIABETES Sister      b. 1938, IDDM since age 10     DIABETES Brother      b. 1943, type II DM            Allergies:     Allergies   Allergen Reactions     Amlodipine      edema     Ampicillin      rash     Metoprolol      Mild fatigue with Toprol; mildly decreased exercise capacity     Spironolactone Nausea and Difficulty breathing     Vicodin [Hydrocodone-Acetaminophen] Nausea            Medications:     Current Facility-Administered Medications   Medication     [START ON 8/17/2017] amiodarone (PACERONE/CODARONE) tablet 200 mg     [START ON 8/17/2017] amLODIPine (NORVASC) tablet 5 mg     hydrALAZINE (APRESOLINE) tablet 100 mg     [START ON 8/17/2017] losartan (COZAAR) tablet 100 mg     [START ON 8/17/2017] nebivolol (BYSTOLIC) tablet 5 mg     naloxone (NARCAN) injection 0.1-0.4 mg     acetaminophen (TYLENOL) tablet 650 mg     oxyCODONE (ROXICODONE) IR tablet 5-10 mg     senna-docusate (SENOKOT-S;PERICOLACE) 8.6-50 MG per tablet 1-2 tablet     polyethylene glycol (MIRALAX/GLYCOLAX) Packet 17 g     bisacodyl (DULCOLAX) Suppository 10 mg     oxybutynin (DITROPAN) tablet 5 mg     potassium chloride SA (K-DUR/KLOR-CON M) CR tablet 20-40 mEq     potassium chloride (KLOR-CON) Packet 20-40 mEq     potassium chloride 10 mEq in 100 mL intermittent infusion     potassium chloride 20 mEq in 50 mL intermittent infusion     potassium chloride 10 mEq in 100 mL intermittent infusion with 10 mg lidocaine             Review of Systems:      ROS: 10 point ROS neg other than the symptoms noted above in the HPI.           "Physical Exam:     VS:  T: 98.5    HR: 52    BP: 166/72    RR: 16   GEN:  AOx3.  NAD.  Pleasant.  HEENT:  Sclerae anicteric.  Conjunctivae pink.  MMM.  NECK:  Supple.  No LAD.  CV:  RRR  LUNGS: Non-labored breathing.  BACK:  No midline or CVA tenderness.  ABD:  Soft.  NT.  ND.  No rebound or guarding.   :  CBI runnning.   EXT:  Warm, well perfused.  No edema.  SKIN:  Warm.  Dry.  No rashes.  NEURO:  CN grossly intact.           Data:   All laboratory data reviewed:      Recent Labs  Lab 08/16/17  1000   WBC 14.0*   HGB 13.5          Recent Labs  Lab 08/16/17  1000      POTASSIUM 2.8*   CHLORIDE 100   CO2 29   BUN 11   CR 0.84   *   HE 8.7   MAG 2.2       Recent Labs  Lab 08/16/17  1128   COLOR Red   APPEARANCE Cloudy   URINEGLC Negative   URINEBILI Negative   URINEKETONE 5*   SG 1.000*   URINEPH 7.0   PROTEIN 100*   NITRITE Negative   LEUKEST Moderate*   RBCU >182*   WBCU >182*                  Impression and Plan:   Impression:   Reji Aguirre is a 76 year old male with acute urinary retention s/p \"bladder surgery\"      Plan:   - manage urinary retention with indwelling catheter and CBI. Will reassess in AM.   -on flomax 0.4mg QHS   - Continue to hold Xarelto  -May need cysto if continues to have active bleeding when CBI turned off.   -NPO after midnight.      Discussed with Dr. Beasley.    Belinda Vázquez PA-C  [facility]  Cell: [phone] (text or call, Mon-Wed & Fri until 5pm)         "

## 2017-08-16 NOTE — H&P
[facility]    History and Physical  Hospitalist       Date of Admission:  8/16/2017  Date of Service (when I saw the patient): 08/16/17    Assessment & Plan   Reji Aguirre is a 76 year old male with history of atrial fibrillation/flutter, hypertension, and recent urologic procedure admitted for hematuria, urinary retention due to clots.     Acute urinary retention in setting of retained clots from hematuria  Cystoscopy with biopsy 8/4/2017 ([facility])  --progressive hematuria with clots and subsequent urinary retention prompting evaluation today after removal of post-procedure catheter 8/14/2017.  Reportedly outside pathology negative for malignancy.    PLAN:  1. CBI (3-way cath placed in ED)  2. Urology consult for management of CBI  3. Oxybutynin PRN bladder spasms  4. Oxycodone PRN pain  5. Hold xarelto as below  6. Repeat hemoglobin in AM    Permanent atrial fibrillation, on chronic anticoagulation  Hypertension  --hold rivaroxaban in setting of hemturia.    --continue PTA nebivolol, amlodipine, hydralazine, amiodarone.    --hold HCTZ d/t hypokalemia    Hypokalemia   --replaced with 60 mEq in ED.  Re-check and replace PRN. Check magnesium.  Chronically on 60 mEq daily and potassium still low.  May need to consider increasing supplement at DC or alternate therapy to HCTZ.    Gout: PTA allopurinol    DVT Prophylaxis: VTE Prophylaxis contraindicated due to hematuria  Code Status: Full Code    Disposition: Expected discharge likely 1-2 days (likely 8/17) pending discontinuation of CBI.    Claudia Higgins PA-C    Primary Care Physician   Igor Christina    Chief Complaint   Hematuria, clots    History is obtained from the patient    History of Present Illness   Reji Aguirre is a 76 year old male with resistant hypertension, permanent atrial fibrillation, and recent cystoscopy with biopsy admitted for hematuria and urinary retention.  Reports he had a cystoscopy with biopsy 8/4/2017 at [facility] (pathology reportedly benign) for further investigation of microscopic hematuria.  Held rivaroxaban for 5 days thereafter and then resumed.  Post-procedurally a catheter was left in place until 8/14/2017 when it was removed in clinic and he passed his trial of void.  He had slight pink tinge to urine at time of catheter removal.  Over the course of the next 24 hours, urine became progressively more pink and then red and then also included passing clots.  This morning he was having difficulty passing urine prompting his visit to the ER.  Last took rivaroxaban 8/15/17 at 7 PM.    Upon my evaluation, Mr. Aguirre reports pain 2/10 while CBI running.  He denies bladder spasms but admits to feeling uncomfortable at catheter insertion site.  He denies dyspnea.      Past Medical History    I have reviewed this patient's medical history and updated it with pertinent information if needed.   Past Medical History:   Diagnosis Date     Atrial fibrillation, unspecified type (H) 5/19/16    new onset A fib; nuclear stress test negative     Atrial flutter (H)      Cardiomyopathy (H)      Diverticulosis of colon (without mention of hemorrhage) 11/16/07    incidental diverticuli noted at colonoscopy     Gout, unspecified      LBBB (left bundle branch block)      Rosacea      Special screening for malignant neoplasms, colon 1995     Unspecified essential hypertension        Past Surgical History   I have reviewed this patient's surgical history and updated it with pertinent information if needed.  Past Surgical History:   Procedure Laterality Date     ARTHROPLASTY KNEE Left 6/6/2016    Procedure: ARTHROPLASTY KNEE;  Surgeon: Derek Varma MD;  Location: [facility] OR     ARTHROSCOPY SHOULDER ROTATOR CUFF REPAIR  7/9/08    Left shoulder arthroscopic rotator cuff repair with acromioplasty     C NONSPECIFIC PROCEDURE  5/99    left knee arthroscopy     CARDIOVERSION  07-     [facility] COLONOSCOPY THRU STOMA W BIOPSY/CAUTERY  TUMOR/POLYP/LESION  11/16/07    normal colonoscopy except for incidental diverticuli     ORTHOPEDIC SURGERY  04/11    2nd toe Lt foot       Prior to Admission Medications   Prior to Admission Medications   Prescriptions Last Dose Informant Patient Reported? Taking?   Azelaic Acid (FINACEA) 15 % gel  Self Yes Yes   Sig: Apply 0.5 inches topically as needed    XARELTO 20 MG TABS tablet   No Yes   Sig: TAKE 1 TABLET(20 MG) BY MOUTH DAILY WITH DINNER   allopurinol (ZYLOPRIM) 300 MG tablet   No Yes   Sig: TAKE 1 TABLET(300 MG) BY MOUTH DAILY   amLODIPine (NORVASC) 5 MG tablet   No Yes   Sig: Take 1 tablet (5 mg) by mouth daily   amiodarone (PACERONE/CODARONE) 200 MG tablet   Yes Yes   Sig: Take 1 tab daily from Mon through Friday (skip Sat + Sun)   hydrALAZINE (APRESOLINE) 100 MG TABS   No Yes   Sig: Take 1 tablet (100 mg) by mouth 3 times daily   hydrochlorothiazide (MICROZIDE) 12.5 MG capsule   No No   Sig: Take 2 capsules (25 mg) by mouth daily   losartan (COZAAR) 100 MG tablet   No Yes   Sig: TAKE 1 TABLET(100 MG) BY MOUTH DAILY   nebivolol (BYSTOLIC) 5 MG tablet   No Yes   Sig: Take 1 tablet (5 mg) by mouth daily   potassium chloride SA (K-DUR/KLOR-CON M) 20 MEQ CR tablet   No Yes   Sig: Take 2 tablets my mouth in AM (40 Meq) and 1 tablet by mouth in the PM (20 Meq)      Facility-Administered Medications: None     Allergies   Allergies   Allergen Reactions     Amlodipine      edema     Ampicillin      rash     Metoprolol      Mild fatigue with Toprol; mildly decreased exercise capacity     Spironolactone Nausea and Difficulty breathing       Social History   I have reviewed this patient's social history and updated it with pertinent information if needed. Reji Aguirre  reports that he quit smoking about 54 years ago. He has never used smokeless tobacco. He reports that he drinks alcohol. He reports that he does not use illicit drugs.    Family History   I have reviewed this patient's family history and updated it  with pertinent information if needed.   Family History   Problem Relation Age of Onset     CEREBROVASCULAR DISEASE Father      d:89, dementia     Respiratory Mother      d:94     DIABETES Sister      b. 1938, IDDM since age 10     DIABETES Brother      b. 1943, type II DM       Review of Systems   The 10 point Review of Systems is negative other than noted in the HPI or here.     Physical Exam   Temp: 99.7  F (37.6  C) Temp src: Oral BP: 151/69 Pulse: 52 Heart Rate: 52 Resp: 16 SpO2: 91 % O2 Device: None (Room air)    Vital Signs with Ranges  Temp:  [99.7  F (37.6  C)] 99.7  F (37.6  C)  Pulse:  [52] 52  Heart Rate:  [52] 52  Resp:  [16] 16  BP: (151-181)/(69-84) 151/69  SpO2:  [88 %-97 %] 91 %  190 lbs 0 oz    Constitutional: Fatigued, moderately uncomfortable appearing man resting on ER cart.   Eyes: PERRLA  HEENT: mucous membranes moist, no thrush  Respiratory: Clear to auscultation bilaterally  Cardiovascular: RRR, no murmurs.   GI: normoactive bowel sounds, soft, nontender, no suprapubic fullness or tenderness  Lymph/Hematologic: no ecchymosis or abnormal bruising or bleeding  Genitourinary: Carmona catheter in place, CBI at high drip rate, catheter draining watermelon colored urine without clots  Skin: warm and dry  Musculoskeletal: normal muscle bulk and tone.   Neurologic: grossly nonfocal  Psychiatric: alert and oriented to person, place, time, situation    Data   Data reviewed today:  I personally reviewed no images or EKG's today.    Recent Labs  Lab 08/16/17  1000   WBC 14.0*   HGB 13.5   MCV 92      INR 1.94*      POTASSIUM 2.8*   CHLORIDE 100   CO2 29   BUN 11   CR 0.84   ANIONGAP 15*   HE 8.7   *       Imaging:  No results found for this or any previous visit (from the past 24 hour(s)).

## 2017-08-17 VITALS
HEART RATE: 54 BPM | TEMPERATURE: 98.1 F | BODY MASS INDEX: 28.79 KG/M2 | RESPIRATION RATE: 12 BRPM | SYSTOLIC BLOOD PRESSURE: 130 MMHG | WEIGHT: 190 LBS | OXYGEN SATURATION: 95 % | HEIGHT: 68 IN | DIASTOLIC BLOOD PRESSURE: 57 MMHG

## 2017-08-17 LAB
ANION GAP SERPL CALCULATED.3IONS-SCNC: 8 MMOL/L (ref 3–14)
BUN SERPL-MCNC: 16 MG/DL (ref 7–30)
CALCIUM SERPL-MCNC: 8.7 MG/DL (ref 8.5–10.1)
CHLORIDE SERPL-SCNC: 107 MMOL/L (ref 94–109)
CO2 SERPL-SCNC: 28 MMOL/L (ref 20–32)
CREAT SERPL-MCNC: 0.9 MG/DL (ref 0.66–1.25)
ERYTHROCYTE [DISTWIDTH] IN BLOOD BY AUTOMATED COUNT: 14.2 % (ref 10–15)
GFR SERPL CREATININE-BSD FRML MDRD: 83 ML/MIN/1.7M2
GLUCOSE SERPL-MCNC: 89 MG/DL (ref 70–99)
HCT VFR BLD AUTO: 39 % (ref 40–53)
HGB BLD-MCNC: 12.8 G/DL (ref 13.3–17.7)
MCH RBC QN AUTO: 30.6 PG (ref 26.5–33)
MCHC RBC AUTO-ENTMCNC: 32.8 G/DL (ref 31.5–36.5)
MCV RBC AUTO: 93 FL (ref 78–100)
PLATELET # BLD AUTO: 214 10E9/L (ref 150–450)
POTASSIUM SERPL-SCNC: 3.7 MMOL/L (ref 3.4–5.3)
RBC # BLD AUTO: 4.18 10E12/L (ref 4.4–5.9)
SODIUM SERPL-SCNC: 143 MMOL/L (ref 133–144)
WBC # BLD AUTO: 11.3 10E9/L (ref 4–11)

## 2017-08-17 PROCEDURE — 80048 BASIC METABOLIC PNL TOTAL CA: CPT | Performed by: PHYSICIAN ASSISTANT

## 2017-08-17 PROCEDURE — G0378 HOSPITAL OBSERVATION PER HR: HCPCS

## 2017-08-17 PROCEDURE — 99217 ZZC OBSERVATION CARE DISCHARGE: CPT | Performed by: PHYSICIAN ASSISTANT

## 2017-08-17 PROCEDURE — 36415 COLL VENOUS BLD VENIPUNCTURE: CPT | Performed by: PHYSICIAN ASSISTANT

## 2017-08-17 PROCEDURE — A9270 NON-COVERED ITEM OR SERVICE: HCPCS | Mod: GY | Performed by: PHYSICIAN ASSISTANT

## 2017-08-17 PROCEDURE — 25000132 ZZH RX MED GY IP 250 OP 250 PS 637: Mod: GY | Performed by: PHYSICIAN ASSISTANT

## 2017-08-17 PROCEDURE — 85027 COMPLETE CBC AUTOMATED: CPT | Performed by: PHYSICIAN ASSISTANT

## 2017-08-17 RX ORDER — POTASSIUM CHLORIDE 1500 MG/1
TABLET, EXTENDED RELEASE ORAL
Qty: 270 TABLET | Refills: 2
Start: 2017-08-17 | End: 2017-10-11

## 2017-08-17 RX ADMIN — LOSARTAN POTASSIUM 100 MG: 100 TABLET ORAL at 08:02

## 2017-08-17 RX ADMIN — HYDRALAZINE HYDROCHLORIDE 100 MG: 50 TABLET ORAL at 17:13

## 2017-08-17 RX ADMIN — AMIODARONE HYDROCHLORIDE 200 MG: 200 TABLET ORAL at 08:01

## 2017-08-17 RX ADMIN — HYDRALAZINE HYDROCHLORIDE 100 MG: 50 TABLET ORAL at 11:25

## 2017-08-17 RX ADMIN — AMLODIPINE BESYLATE 5 MG: 5 TABLET ORAL at 08:01

## 2017-08-17 NOTE — PLAN OF CARE
Problem: Discharge Planning  Goal: Discharge Planning (Adult, OB, Behavioral, Peds)  Outcome: Improving  PRIMARY DIAGNOSIS: Hematuria     OUTPATIENT/OBSERVATION GOALS TO BE MET BEFORE DISCHARGE  1. Orthostatic performed: N/A      2. Stable Hgb Yes.  Recent Labs   Lab Test  08/17/17   0703  08/16/17   1000  09/07/16   0932   HGB  12.8*  13.5  12.9*          3. Resolved or declined bleeding episodes: Yes, urine is brown with some clots noted      4. Appropriate testing complete: Yes      5. Cleared for discharge by consultants (if involved): Yes      6. Safe discharge environment identified: Yes     Discharge Planner Nurse   Safe discharge environment identified: Yes  Barriers to discharge: Yes, pending post void residual       Entered by: Ada Ware 08/17/2017 4:15 PM     Please review provider order for any additional goals.   Nurse to notify provider when observation goals have been met and patient is ready for discharge.    Vitals stable, pt denies pain. Pt voiding, urine is brown with some clots noted. Post void residual is 115cc after 200cc void, will attempt to void again and rescan. If passes trial, will discharge to home. Will continue to monitor.

## 2017-08-17 NOTE — PROGRESS NOTES
Urology   No acute events overnight  Urine clear with CBI turned off    AVSS    NAD  Soft.  NT.  ND.  Carmona draining clear, yellow urine    A/P 76 year old male s/p bladder biopsy at [facility] who was admitted for observation with hematuria an urinary retention. Urine now clear with CBI turned off.  - OK for regular diet - no plans for operative intervention.  - OK for trial of void as patient states he has no trouble urinating at baseline  - Would consider holding Xarelto for another day or two. Recommend follow-up with primary care to discuss timing to restart Xarelto. While it should be safe to restart at any time from a bleeding/urological perspective, would be reasonable to hold another 24-48 hours.  - If fails TOV, will need to go home with indwelling Carmona  - Patient can follow-up with urology at [facility] or in our clinic if needed with ongoing questions or concerns.    Jamar Chinchilla MD  Urology  [facility]  Pager [phone]

## 2017-08-17 NOTE — PLAN OF CARE
Problem: Discharge Planning  Goal: Discharge Planning (Adult, OB, Behavioral, Peds)  Outcome: Improving  Problem: Discharge Planning  Goal: Discharge Planning (Adult, OB, Behavioral, Peds)  Outcome: No Change  PRIMARY DIAGNOSIS: GI BLEED      OUTPATIENT/OBSERVATION GOALS TO BE MET BEFORE DISCHARGE  1. Orthostatic performed: NA      2. Stable Hgb: Yes            Recent Labs   Lab Test  08/17/17   0703  08/16/17   1000  09/07/16   0932   HGB  12.8*  13.5  12.9*           3. Resolved or declined bleeding episodes: Hematuria still present, no clots      4. Appropriate testing complete: NA      5. Cleared for discharge by consultants (if involved): No, urology following      6. Safe discharge environment identified: yes      Discharge Planner Nurse   Safe discharge environment identified: Yes  Barriers to discharge: Yes       Entered by: Sena Altman 08/17/2017  1216      Vitals stable. Pt alert and oriented x4. SBA for increased safety. CBI stopped. Tolerating well. Urine clear, pink. Denies pain. Urology following. Will continue to monitor and provide supportive cares.       Please review provider order for any additional goals.   Nurse to notify provider when observation goals have been met and patient is ready for discharge.

## 2017-08-17 NOTE — PLAN OF CARE
Problem: Discharge Planning  Goal: Discharge Planning (Adult, OB, Behavioral, Peds)  Outcome: Improving  PRIMARY DIAGNOSIS: Hematuria/clots/CBI     OUTPATIENT/OBSERVATION GOALS TO BE MET BEFORE DISCHARGE  1. Orthostatic performed: N/A      2. Stable Hgb Yes.  Recent Labs   Lab Test  08/16/17   1000  09/07/16   0932  06/21/16   0837   HGB  13.5  12.9*  11.7*          3. Resolved or declined bleeding episodes: CBI running, urine watermelon color      4. Appropriate testing complete: K+ replaced,      5. Cleared for discharge by consultants (if involved): Urology will see in AM      6. Safe discharge environment identified: Yes, lives with wife.     Discharge Planner Nurse   Safe discharge environment identified: Yes  Barriers to discharge: Not once medically cleared       Entered by: Maryann Mcnamara 08/17/2017 4:31 AM     Please review provider order for any additional goals.   Nurse to notify provider when observation goals have been met and patient is ready for discharge.    Patient is alert and oriented x4, VSS. Denies pain. CBI running, urine watermelon color. No clots noted. Will clamp at 0600. Urology to see patient this morning. Will continue to monitor, assess, and offer supportive cares.

## 2017-08-17 NOTE — PLAN OF CARE
Problem: Discharge Planning  Goal: Discharge Planning (Adult, OB, Behavioral, Peds)  Outcome: Improving  PRIMARY DIAGNOSIS: Hematuria/clots/CBI     OUTPATIENT/OBSERVATION GOALS TO BE MET BEFORE DISCHARGE  1. Orthostatic performed: N/A      2. Stable Hgb Yes.  Recent Labs   Lab Test  08/16/17   1000  09/07/16   0932  06/21/16   0837   HGB  13.5  12.9*  11.7*          3. Resolved or declined bleeding episodes: CBI running, urine watermelon color      4. Appropriate testing complete: K+ replaced,      5. Cleared for discharge by consultants (if involved): Urology will see in AM      6. Safe discharge environment identified: Yes, lives with wife.     Discharge Planner Nurse   Safe discharge environment identified: Yes  Barriers to discharge: Not once medically cleared       Entered by: Maryann Mcnamara 08/17/2017 2:12 AM     Please review provider order for any additional goals.   Nurse to notify provider when observation goals have been met and patient is ready for discharge.

## 2017-08-17 NOTE — PLAN OF CARE
Problem: Discharge Planning  Goal: Discharge Planning (Adult, OB, Behavioral, Peds)  Outcome: No Change  PRIMARY DIAGNOSIS: GI BLEED     OUTPATIENT/OBSERVATION GOALS TO BE MET BEFORE DISCHARGE  1. Orthostatic performed: NA      2. Stable Hgb: Yes  Recent Labs   Lab Test  08/17/17   0703  08/16/17   1000  09/07/16   0932   HGB  12.8*  13.5  12.9*          3. Resolved or declined bleeding episodes: Hematuria still present, no clots      4. Appropriate testing complete: NA      5. Cleared for discharge by consultants (if involved): No, urology following      6. Safe discharge environment identified: yes     Discharge Planner Nurse   Safe discharge environment identified: Yes  Barriers to discharge: Yes       Entered by: Sena Altman 08/17/2017  0800     Vitals stable. Pt alert and oriented x4. SBA for increased safety. CBI running slow, due to increased bleeding when CBI was turned off. Denies pain. Urology following. Will continue to monitor and provide supportive cares.      Please review provider order for any additional goals.   Nurse to notify provider when observation goals have been met and patient is ready for discharge.

## 2017-08-17 NOTE — PLAN OF CARE
Problem: Discharge Planning  Goal: Discharge Planning (Adult, OB, Behavioral, Peds)  Outcome: Adequate for Discharge Date Met:  08/17/17  Pt to D/C to home. Pt provided with d/c instructions, including new medications, when medications were last given, and when to take them again. Pt also informed to f/u with PCP in 7 days Pt verbalized understanding of all d/c and f/u instructions. All questions were answered at this time. Copy of paperwork sent with pt.  Wife to provide transport.

## 2017-08-17 NOTE — PLAN OF CARE
Problem: Discharge Planning  Goal: Discharge Planning (Adult, OB, Behavioral, Peds)  Outcome: Improving  PRIMARY DIAGNOSIS: Hematuria/clots/CBI      OUTPATIENT/OBSERVATION GOALS TO BE MET BEFORE DISCHARGE  1. Orthostatic performed: NA      2. Stable Hgb: Yes            Recent Labs   Lab Test  08/16/17   1000  09/07/16   0932  06/21/16   0837   HGB  13.5  12.9*  11.7*           3. Resolved or declined bleeding/clots: Bag ran dry and output was cherry for a few minutes w/ tiny clots present.  Once it ran wide open for a bit, output was clear and CBI rate slowed.               4.   Appropriate testing complete: K replaced and up to 4.0      5. Cleared for discharge by consultants (if involved): Urology to see in a.m.      6. Safe discharge environment identified: Yes, lives w/ wife      Discharge Planner Nurse   Safe discharge environment identified: Yes   Barriers to discharge: Not once medically cleared             Low grade temp this evening, pt using bear hugger which he has now turned off.  Vitals otherwise stable.  No pain.  CBI running at moderate rate.  Plan to clamp CBI in a.m. As long as patient is doing well this evening.

## 2017-08-18 ENCOUNTER — TELEPHONE (OUTPATIENT)
Dept: INTERNAL MEDICINE | Facility: CLINIC | Age: 77
End: 2017-08-18

## 2017-08-18 NOTE — TELEPHONE ENCOUNTER
"Hospital/TCU/ED for chronic condition Discharge Protocol    \"Hi, my name is Paris Lutz, a registered nurse, and I am calling from [facility].  I am calling to follow up and see how things are going for you after your recent emergency visit/hospital/TCU stay.\"    Tell me how you are doing now that you are home?\" ok      Discharge Instructions    \"Let's review your discharge instructions.  What is/are the follow-up recommendations?  Pt. Response: waiting to hear back from Tracy regarding xaralto    \"Has an appointment with your primary care provider been scheduled?\"   Yes. (confirm)    \"When you see the provider, I would recommend that you bring your medications with you.\"    Medications    \"Tell me what changed about your medicines when you discharged?\"    Changes to chronic meds?    0-1    \"What questions do you have about your medications?\"    None     New diagnoses of heart failure, COPD, diabetes, or MI?    No                  Medication reconciliation completed? Yes  Was MTM referral placed (*Make sure to put transitions as reason for referral)?   No    Call Summary    \"What questions or concerns do you have about your recent visit and your follow-up care?\"     none    \"If you have questions or things don't continue to improve, we encourage you contact us through the main clinic number (give number).  Even if the clinic is not open, triage nurses are available 24/7 to help you.     We would like you to know that our clinic has extended hours (provide information).  We also have urgent care (provide details on closest location and hours/contact info)\"      \"Thank you for your time and take care!\"         "

## 2017-08-18 NOTE — DISCHARGE SUMMARY
[facility]  Outpatient/Observation Unit  Discharge Summary        Patient ID:  Reji Aguirre  [id number]  76 year old  1940    Admit date: 8/16/2017    Discharge date and time: 8/17/2017     Admitting Provider: Sulaiman Lynn MD    Discharge Provider: Yeny Gonzalez PA-C    Admission Diagnoses:  Hypokalemia [E87.6]  Bladder hemorrhage [N32.89]  Acute urinary retention [R33.8]  Long term current use of anticoagulant therapy [Z79.01]    Discharge Diagnoses: Acute urinary retention    Admission Condition: fair    Discharged Condition: improved     Hospital Course: Reji Aguirre is a pleasant 77 y/o male w/ a hx of atrial fib/flutter s/p bladder biopsy at [facility] on 8/4/17. Patient presented with hematuria and subsequent urinary retention. His Xarelto was held. He was placed on CBI. Urology was consulted. He was placed NPO after midnight in the event that his bleeding would not improve and he would need cystoscopy. The patient's urine cleared overnight and CBI was turned off. He was advanced to a regular diet and underwent a voiding trial. His PVR was 115 cc after a 200 cc void. He underwent another trial and this time voided with a PVR of 80. He was able to discharge home without a catheter. He was instructed to seek immediate evaluation if he developed urinary retention, increased pain, or fever. He was instructed to hold his Xarelto for 2 more days and then to resume. His K was low at 2.6. His discharge K replacement was increased from 40 mEq in AM and 20 mEq in PM to 40 mEq BID. He was instructed to follow up with his primary care provider in the next week.     Reason for your hospital stay       You were evaluated in the hospital for blood in your urine and urinary   retention. You were placed on continuous bladder irrigation and your   Xarelto was held.  Your urine color improved overnight. You were seen by   Dr. Chinchilla with urology in consultation. It is reasonable to hold your   Xarelto  for the next 48 hours, then restart it. You should seek immediate   medical evaluation if your symptoms worsen, you develop pain or fever, or   if you are unable to void. Schedule an appointment with your primary care   provider in the next week. Your potassium was also noted to be low when   you got here. We will recommend increasing your potassium dosing from 2   tablets in the AM and 1 tablet at night to 2 tablets in the AM and 2   tablets at night to make sure it is being adequately replaced. You may   follow-up with urology at [facility] or in [facility]   clinic if needed with ongoing questions or concerns.                  Consults: urology    Significant Diagnostic Studies:   Results for orders placed or performed during the hospital encounter of 08/16/17   CBC with platelets differential   Result Value Ref Range    WBC 14.0 (H) 4.0 - 11.0 10e9/L    RBC Count 4.32 (L) 4.4 - 5.9 10e12/L    Hemoglobin 13.5 13.3 - 17.7 g/dL    Hematocrit 39.7 (L) 40.0 - 53.0 %    MCV 92 78 - 100 fl    MCH 31.3 26.5 - 33.0 pg    MCHC 34.0 31.5 - 36.5 g/dL    RDW 13.9 10.0 - 15.0 %    Platelet Count 209 150 - 450 10e9/L    Diff Method Automated Method     % Neutrophils 84.4 %    % Lymphocytes 5.4 %    % Monocytes 8.7 %    % Eosinophils 0.1 %    % Basophils 0.4 %    % Immature Granulocytes 1.0 %    Nucleated RBCs 0 0 /100    Absolute Neutrophil 11.8 (H) 1.6 - 8.3 10e9/L    Absolute Lymphocytes 0.8 0.8 - 5.3 10e9/L    Absolute Monocytes 1.2 0.0 - 1.3 10e9/L    Absolute Eosinophils 0.0 0.0 - 0.7 10e9/L    Absolute Basophils 0.1 0.0 - 0.2 10e9/L    Abs Immature Granulocytes 0.1 0 - 0.4 10e9/L    Absolute Nucleated RBC 0.0    INR   Result Value Ref Range    INR 1.94 (H) 0.86 - 1.14   Partial thromboplastin time   Result Value Ref Range    PTT 53 (H) 22 - 37 sec   Basic metabolic panel   Result Value Ref Range    Sodium 144 133 - 144 mmol/L    Potassium 2.8 (L) 3.4 - 5.3 mmol/L    Chloride 100 94 - 109 mmol/L     Carbon Dioxide 29 20 - 32 mmol/L    Anion Gap 15 (H) 3 - 14 mmol/L    Glucose 104 (H) 70 - 99 mg/dL    Urea Nitrogen 11 7 - 30 mg/dL    Creatinine 0.84 0.66 - 1.25 mg/dL    GFR Estimate 88 >60 mL/min/1.7m2    GFR Estimate If Black >90 >60 mL/min/1.7m2    Calcium 8.7 8.5 - 10.1 mg/dL   UA with Microscopic   Result Value Ref Range    Color Urine Red     Appearance Urine Cloudy     Glucose Urine Negative NEG^Negative mg/dL    Bilirubin Urine Negative NEG^Negative    Ketones Urine 5 (A) NEG^Negative mg/dL    Specific Gravity Urine 1.000 (L) 1.003 - 1.035    Blood Urine Large (A) NEG^Negative    pH Urine 7.0 5.0 - 7.0 pH    Protein Albumin Urine 100 (A) NEG^Negative mg/dL    Urobilinogen mg/dL 0.0 0.0 - 2.0 mg/dL    Nitrite Urine Negative NEG^Negative    Leukocyte Esterase Urine Moderate (A) NEG^Negative    Source Catheterized Urine     WBC Urine >182 (H) 0 - 2 /HPF    RBC Urine >182 (H) 0 - 2 /HPF    Bacteria Urine Few (A) NEG^Negative /HPF   Magnesium   Result Value Ref Range    Magnesium 2.2 1.6 - 2.3 mg/dL   Potassium   Result Value Ref Range    Potassium 4.0 3.4 - 5.3 mmol/L   Basic metabolic panel   Result Value Ref Range    Sodium 143 133 - 144 mmol/L    Potassium 3.7 3.4 - 5.3 mmol/L    Chloride 107 94 - 109 mmol/L    Carbon Dioxide 28 20 - 32 mmol/L    Anion Gap 8 3 - 14 mmol/L    Glucose 89 70 - 99 mg/dL    Urea Nitrogen 16 7 - 30 mg/dL    Creatinine 0.90 0.66 - 1.25 mg/dL    GFR Estimate 83 >60 mL/min/1.7m2    GFR Estimate If Black >90 >60 mL/min/1.7m2    Calcium 8.7 8.5 - 10.1 mg/dL   CBC with platelets   Result Value Ref Range    WBC 11.3 (H) 4.0 - 11.0 10e9/L    RBC Count 4.18 (L) 4.4 - 5.9 10e12/L    Hemoglobin 12.8 (L) 13.3 - 17.7 g/dL    Hematocrit 39.0 (L) 40.0 - 53.0 %    MCV 93 78 - 100 fl    MCH 30.6 26.5 - 33.0 pg    MCHC 32.8 31.5 - 36.5 g/dL    RDW 14.2 10.0 - 15.0 %    Platelet Count 214 150 - 450 10e9/L   Urology IP Consult: hematuria, urinary retention requiring CBI; Consultant may enter  "orders: Yes; Patient to be seen: Routine - within 24 hours    Narrative    Belinda Vázquez PA-C     8/16/2017  4:29 PM  [facility] Urology Consult     Name: Reji Aguirre    MRN: [id number]   YOB: 1940       We were asked to see Reji Aguirre at the request of ABHI Higgins for evaluation and treatment of the following chief   complaint.          Chief Complaint:   Urinary retention  History is obtained from the patient and EMR.          History of Present Illness:   Reji Aguirre is a 76 year old male with urinary retention. He   recently underwent \"bladder surgery\" at the [facility] for   \"nonmalignant inflammatory areas\". He had a Carmona 10 days post op   and it was removed 2 days ago. He started to notice gross   hematuria and clots developed with significant bladder pressure.    Took two doses of Xarelto (mon and Tues).     No fevers, chills, nausea or vomiting. Feels much better since   Carmona placed.           Past Medical History:     Past Medical History:   Diagnosis Date     Atrial fibrillation, unspecified type (H) 5/19/16    new onset A fib; nuclear stress test negative     Atrial flutter (H)      Cardiomyopathy (H)      Diverticulosis of colon (without mention of hemorrhage)   11/16/07    incidental diverticuli noted at colonoscopy     Gout, unspecified      LBBB (left bundle branch block)      Rosacea      Special screening for malignant neoplasms, colon 1995     Unspecified essential hypertension             Past Surgical History:     Past Surgical History:   Procedure Laterality Date     ARTHROPLASTY KNEE Left 6/6/2016    Procedure: ARTHROPLASTY KNEE;  Surgeon: Derek Varma MD;  Location: [facility] OR     ARTHROSCOPY SHOULDER ROTATOR CUFF REPAIR  7/9/08    Left shoulder arthroscopic rotator cuff repair with   acromioplasty     C NONSPECIFIC PROCEDURE  5/99    left knee arthroscopy     CARDIOVERSION  07-      COLONOSCOPY THRU STOMA W BIOPSY/CAUTERY " TUMOR/POLYP/LESION    11/16/07    normal colonoscopy except for incidental diverticuli     ORTHOPEDIC SURGERY  04/11    2nd toe Lt foot            Social History:     Social History   Substance Use Topics     Smoking status: Former Smoker     Quit date: 7/25/1963     Smokeless tobacco: Never Used     Alcohol use 0.0 oz/week     0 Standard drinks or equivalent per week      Comment: 1 drink a day            Family History:     Family History   Problem Relation Age of Onset     CEREBROVASCULAR DISEASE Father      d:89, dementia     Respiratory Mother      d:94     DIABETES Sister      b. 1938, IDDM since age 10     DIABETES Brother      b. 1943, type II DM            Allergies:     Allergies   Allergen Reactions     Amlodipine      edema     Ampicillin      rash     Metoprolol      Mild fatigue with Toprol; mildly decreased exercise capacity     Spironolactone Nausea and Difficulty breathing     Vicodin [Hydrocodone-Acetaminophen] Nausea            Medications:     Current Facility-Administered Medications   Medication     [START ON 8/17/2017] amiodarone (PACERONE/CODARONE) tablet 200   mg     [START ON 8/17/2017] amLODIPine (NORVASC) tablet 5 mg     hydrALAZINE (APRESOLINE) tablet 100 mg     [START ON 8/17/2017] losartan (COZAAR) tablet 100 mg     [START ON 8/17/2017] nebivolol (BYSTOLIC) tablet 5 mg     naloxone (NARCAN) injection 0.1-0.4 mg     acetaminophen (TYLENOL) tablet 650 mg     oxyCODONE (ROXICODONE) IR tablet 5-10 mg     senna-docusate (SENOKOT-S;PERICOLACE) 8.6-50 MG per tablet 1-2   tablet     polyethylene glycol (MIRALAX/GLYCOLAX) Packet 17 g     bisacodyl (DULCOLAX) Suppository 10 mg     oxybutynin (DITROPAN) tablet 5 mg     potassium chloride SA (K-DUR/KLOR-CON M) CR tablet 20-40 mEq     potassium chloride (KLOR-CON) Packet 20-40 mEq     potassium chloride 10 mEq in 100 mL intermittent infusion     potassium chloride 20 mEq in 50 mL intermittent infusion     potassium chloride 10 mEq in 100 mL  "intermittent infusion with   10 mg lidocaine             Review of Systems:      ROS: 10 point ROS neg other than the symptoms noted above in the   HPI.          Physical Exam:     VS:  T: 98.5    HR: 52    BP: 166/72    RR: 16   GEN:  AOx3.  NAD.  Pleasant.  HEENT:  Sclerae anicteric.  Conjunctivae pink.  MMM.  NECK:  Supple.  No LAD.  CV:  RRR  LUNGS: Non-labored breathing.  BACK:  No midline or CVA tenderness.  ABD:  Soft.  NT.  ND.  No rebound or guarding.   :  CBI runnning.   EXT:  Warm, well perfused.  No edema.  SKIN:  Warm.  Dry.  No rashes.  NEURO:  CN grossly intact.           Data:   All laboratory data reviewed:      Recent Labs  Lab 08/16/17  1000   WBC 14.0*   HGB 13.5          Recent Labs  Lab 08/16/17  1000      POTASSIUM 2.8*   CHLORIDE 100   CO2 29   BUN 11   CR 0.84   *   HE 8.7   MAG 2.2       Recent Labs  Lab 08/16/17  1128   COLOR Red   APPEARANCE Cloudy   URINEGLC Negative   URINEBILI Negative   URINEKETONE 5*   SG 1.000*   URINEPH 7.0   PROTEIN 100*   NITRITE Negative   LEUKEST Moderate*   RBCU >182*   WBCU >182*                  Impression and Plan:   Impression:   Reji Aguirre is a 76 year old male with acute urinary   retention s/p \"bladder surgery\"      Plan:   - manage urinary retention with indwelling catheter and CBI.   Will reassess in AM.   -on flomax 0.4mg QHS   - Continue to hold Xarelto  -May need cysto if continues to have active bleeding when CBI   turned off.   -NPO after midnight.      Discussed with Dr. Beasley.    Belinda Vázquez PA-C  [facility]  Cell: [phone] (text or call, Mon-Wed & Fri until 5pm)          Urine Culture   Result Value Ref Range    Specimen Description Catheterized Urine     Culture Micro Culture in progress      Treatments: CBI, held apixaban    Discharge Exam:    B/P: 130/57, T: 98.1, P: 54, R: 12    GENERAL:  Comfortable.  PSYCH: pleasant, oriented, No acute distress.  HEENT:  Atraumatic, normocephalic. PERRLA. " Normal conjunctiva, normal hearing, and oropharynx is normal.  NECK:  Supple, no neck vein distention, adenopathy or bruits, normal thyroid.  HEART:  Normal S1, S2 with no murmur, no pericardial rub, gallops or S3 or S4.  LUNGS:  Clear to auscultation, normal respiratory effort. No wheezing, rales or ronchi.  GI:  Soft, no hepatosplenomegaly, normal bowel sounds. Non-tender, non distended.   EXTREMITIES:  No pedal edema, +2 pulses bilateral and equal.  SKIN:  Dry to touch, No rash, wound or ulcerations.  NEUROLOGIC:  CN 2-12 intact, BL 5/5 symmetric upper and lower extremity strength, sensation is intact with no focal deficits.     Pending Studies:    Unresulted Labs Ordered in the Past 30 Days of this Admission     Date and Time Order Name Status Description    8/16/2017 0906 Urine Culture Preliminary          Disposition: home    Patient Instructions:        Review of your medicines      CONTINUE these medicines which may have CHANGED, or have new prescriptions. If we are uncertain of the size of tablets/capsules you have at home, strength may be listed as something that might have changed.       Dose / Directions    potassium chloride SA 20 MEQ CR tablet   Commonly known as:  K-DUR/KLOR-CON M   This may have changed:  additional instructions   Used for:  Benign essential hypertension        Take 2 tablets my mouth in AM (40 Meq) and 2 tablets by mouth in the PM (40 Meq)   Quantity:  270 tablet   Refills:  2         CONTINUE these medicines which have NOT CHANGED       Dose / Directions    allopurinol 300 MG tablet   Commonly known as:  ZYLOPRIM   Used for:  Gout, unspecified        TAKE 1 TABLET(300 MG) BY MOUTH DAILY   Quantity:  90 tablet   Refills:  0       amiodarone 200 MG tablet   Commonly known as:  PACERONE/CODARONE   Used for:  Atrial fibrillation, unspecified type (H)        Take 1 tab daily from Mon through Friday (skip Sat + Sun)   Quantity:  90 tablet   Refills:  3       amLODIPine 5 MG tablet    Commonly known as:  NORVASC   Used for:  Benign essential hypertension        Dose:  5 mg   Take 1 tablet (5 mg) by mouth daily   Quantity:  90 tablet   Refills:  3       hydrALAZINE 100 MG Tabs tablet   Commonly known as:  APRESOLINE   Used for:  Benign essential hypertension        Dose:  100 mg   Take 1 tablet (100 mg) by mouth 3 times daily   Quantity:  270 tablet   Refills:  3       hydrochlorothiazide 12.5 MG capsule   Commonly known as:  MICROZIDE   Used for:  Persistent atrial fibrillation (H)        Dose:  25 mg   Take 2 capsules (25 mg) by mouth daily   Quantity:  180 capsule   Refills:  3       losartan 100 MG tablet   Commonly known as:  COZAAR   Used for:  Benign essential hypertension        TAKE 1 TABLET(100 MG) BY MOUTH DAILY   Quantity:  90 tablet   Refills:  0       nebivolol 5 MG tablet   Commonly known as:  BYSTOLIC   Used for:  Benign essential hypertension, Atrial flutter, paroxysmal (H), Persistent atrial fibrillation (H)        Dose:  5 mg   Take 1 tablet (5 mg) by mouth daily   Quantity:  90 tablet   Refills:  3         STOP taking          XARELTO 20 MG Tabs tablet   Generic drug:  rivaroxaban ANTICOAGULANT                Where to get your medicines      These medications were sent to [facility] [id number] [place] [address] - [address] AT [facility] [place]  [address], [facility] [address]     Phone:  [phone]      hydrochlorothiazide 12.5 MG capsule         Some of these will need a paper prescription and others can be bought over the counter. Ask your nurse if you have questions.     You don't need a prescription for these medications      potassium chloride SA 20 MEQ CR tablet           Activity: activity as tolerated    Active Diet Order      Regular Diet Adult      Diet  Wound Care: none needed    Follow-up with PCP in 7 days.    Signed:  Yeny Gonzalez PA-C

## 2017-08-19 LAB
BACTERIA SPEC CULT: ABNORMAL
BACTERIA SPEC CULT: ABNORMAL
SPECIMEN SOURCE: ABNORMAL

## 2017-08-21 ENCOUNTER — TELEPHONE (OUTPATIENT)
Dept: CARDIOLOGY | Facility: CLINIC | Age: 77
End: 2017-08-21

## 2017-08-21 ENCOUNTER — TELEPHONE (OUTPATIENT)
Dept: INTERNAL MEDICINE | Facility: CLINIC | Age: 77
End: 2017-08-21

## 2017-08-21 NOTE — TELEPHONE ENCOUNTER
Reviewed with Dr. Perera who recommended that patient review this with Urology at the [facility] who performed the surgery. Patient stated that he spoke with their team and they deferred to the prescribing provider. Patient advised per discharge summary from 8/17/17 that he was seen by Dr. Chinchilla with Urology in consultation and the team in the hospital recommended that it would be reasonable for him to hold your Xarelto for the next 48 hours, then restart it. Advised that he should seek immediate medical evaluation or call his Urologist if his symptoms worsen, he develops pain or fever, or if he is unable to void. He stated understanding and that he planned to restart the Xarelto today. He noted that he has a follow up visit with his PMD 9/5/17. Also reviewed that he can discuss his options for anticoagulation further at follow up with Dr. Aj on 10/11/17. He stated understanding and agreement with this plan and appreciated the call back. ANDREA Joya RN

## 2017-08-21 NOTE — TELEPHONE ENCOUNTER
I spoke with the patient for quite a long time.   [place] for [facility] and is worried about restarting Xarelto.  Reviewed his recent history of cystoscopy and removal of bladder inflammatory tissue at [facility] 10 days ago and his stay at [facility] last week for hematuria after resuming Xarelto after the [facility] surgery.   He reports that he is feeling OK, but still has some irritaition with urination (as he was told to expect by [facility]), but no visible hematuria.   Eating and drinking well.     I also reviewed the message from Dr. Aj that was sent at 4:39 pm while as I was talking with the patient and relayed that message stating Ok to stop the Xarelto for 2-3 weeks until the bladder issue competely resolves.  The patient was relieved by this.     Leonor Aj MD     8/21/17 4:39 PM   Note      Hold Xarelto until all urinary blood clots clear and he is able to void.  I understand this may take 2-3 weeks.   DI            He states that he will be seeing a hypertension specialist at [facility] in early September to review his medications and possibly consolidate and change to a simpler regimen.      I told him to continue his current regimen until he is seen.    He promised to call with any questions or concerns.      I advised him to go to the clinic in [place] if he develops any concerning urinary troubles, or signs and symptoms of infection.

## 2017-08-21 NOTE — TELEPHONE ENCOUNTER
Would ask the Urologists when they feel he can restart it due to the bleeding from the bladder, it is probably getting close enough to restarting.    If there is active bleeding, the risks from taking Xarelto will outweigh any benefits.    I would say restart it once the blood in the urine has resolved it.  If there is any increased blood in the urine once restarting, then stop it again.   Make sure to drink enough fluids.

## 2017-08-21 NOTE — TELEPHONE ENCOUNTER
Call from patient with question regarding restarting his Xarelto. Patient stated that he underwent bladder surgery at the [facility] on 8/4/17 and he was instructed to hold his Xarelto for 5 days after the surgery. He did this and stated that he restarted the Xarelto on 8/9/17 as instructed. He then had his catheter removed on 8/14/17, but began having issues with hematuria so he went to the ER on 8/16/17. He was admitted for CBI and his urine cleared. Xarelto was stopped and he was instructed to hold it for 2 more days and then resume on 8/19/17. He has not restarted the Xarelto yet and he is requesting to review this with Dr. Perera for further recommendations on if it is okay or necessary for him to restart it. He has been taking this for a hx of paroxsymal A.Fib. Will route to Dr. Perera to review and advise. ANDREA Joya RN

## 2017-08-21 NOTE — TELEPHONE ENCOUNTER
Spoke with patient and informed him of Dr. Christina's note.  Patient then transferred to Dr. Christina with further questions.

## 2017-08-21 NOTE — TELEPHONE ENCOUNTER
Hold Xarelto until all urinary blood clots clear and he is able to void.  I understand this may take 2-3 weeks.   DI

## 2017-08-21 NOTE — TELEPHONE ENCOUNTER
Call from pt to report recent events re: urology surgery and Xarelto.  Pt had surgery and was OFF Xarelto beginning 8-4-17 with instructions to HOLD 5 days. He resumed on 8-11-17.  On 8-14-17 he had his urinary catheter removed, which also showed clots of old blood.  On 8-16-17 he was again unable to void and a catheter was placed again. He also had hematuria.  His urologist asked him to hold the Xarelto and consult Dr Aj. He continues to HOLD. He will hold tomorrow AMs dose until reviewed by Dr Aj. Note forwarded to him He will RTC tomorrow. Pt is agreeable to this plan. [email or web address]

## 2017-08-21 NOTE — TELEPHONE ENCOUNTER
Pt calling wanting to know if he can restart his Xarelto . Pt had bladder surgery at [facility] 8/4/17. HE then was seen at [facility] on the 8/10 /17 for a clot that blocked his catherter. His xarelto was restarted 5 days following his surgery as advised on 8/10 /17 . Post catheter removal on 8/14/17 he had melon colored and then blood colored urine and as advised to hold his XArelto on 8/16/17 . Pt is now inquiring if he is ok to restart the Xarelto ?

## 2017-08-24 ENCOUNTER — TELEPHONE (OUTPATIENT)
Dept: CARDIOLOGY | Facility: CLINIC | Age: 77
End: 2017-08-24

## 2017-08-24 NOTE — TELEPHONE ENCOUNTER
Pt called and message left and states he received message regarding holding Xarelto and provided secondary phone number to reach [place] the cabin. Listed in comments section on demographics page. CARLOS Evangelista RN.

## 2017-09-05 ENCOUNTER — OFFICE VISIT (OUTPATIENT)
Dept: INTERNAL MEDICINE | Facility: CLINIC | Age: 77
End: 2017-09-05
Payer: COMMERCIAL

## 2017-09-05 VITALS
TEMPERATURE: 98 F | DIASTOLIC BLOOD PRESSURE: 56 MMHG | SYSTOLIC BLOOD PRESSURE: 122 MMHG | HEART RATE: 50 BPM | WEIGHT: 186.3 LBS | BODY MASS INDEX: 28.33 KG/M2 | OXYGEN SATURATION: 95 %

## 2017-09-05 DIAGNOSIS — R31.9 HEMATURIA: ICD-10-CM

## 2017-09-05 DIAGNOSIS — I48.91 ATRIAL FIBRILLATION, UNSPECIFIED TYPE (H): Primary | ICD-10-CM

## 2017-09-05 DIAGNOSIS — I10 BENIGN ESSENTIAL HYPERTENSION: ICD-10-CM

## 2017-09-05 DIAGNOSIS — I42.9 CARDIOMYOPATHY, UNSPECIFIED TYPE (H): ICD-10-CM

## 2017-09-05 DIAGNOSIS — Z23 NEED FOR PROPHYLACTIC VACCINATION AND INOCULATION AGAINST INFLUENZA: ICD-10-CM

## 2017-09-05 PROCEDURE — G0008 ADMIN INFLUENZA VIRUS VAC: HCPCS | Performed by: INTERNAL MEDICINE

## 2017-09-05 PROCEDURE — 99214 OFFICE O/P EST MOD 30 MIN: CPT | Mod: 25 | Performed by: INTERNAL MEDICINE

## 2017-09-05 PROCEDURE — 90662 IIV NO PRSV INCREASED AG IM: CPT | Performed by: INTERNAL MEDICINE

## 2017-09-05 NOTE — PROGRESS NOTES
SUBJECTIVE:   Reji Aguirre is a 77 year old male who presents to clinic today for the following health issues:      Hospital Follow-up Visit:    Hospital/Nursing Home/IP Rehab Facility: [facility]  Date of Admission: 8/16/17  Date of Discharge: 8/17/17  Reason(s) for Admission: bladder hemorrhage            Problems taking medications regularly:  None       Medication changes since discharge: None       Problems adhering to non-medication therapy:  None    Summary of hospitalization:  [facility] discharge summary reviewed  Diagnostic Tests/Treatments reviewed.  Follow up needed: none  Other Healthcare Providers Involved in Patient s Care:         None  Update since discharge: improved.     Post Discharge Medication Reconciliation: discharge medications reconciled, continue medications without change.  Plan of care communicated with patient     Coding guidelines for this visit:  Type of Medical   Decision Making Face-to-Face Visit       within 7 Days of discharge Face-to-Face Visit        within 14 days of discharge   Moderate Complexity [id number]   High Complexity 53045 [id number]     Doing well since discahrge from [facility], no further bleeding.   No specific follow up planned for [facility].     Will be visiting HTN specialist at [facility] in second week Sept for re-evaluation of his medications for HTN.     Has not yet started Xarelto.              Problem list and histories reviewed & adjusted, as indicated.  Additional history: as documented        Reviewed and updated as needed this visit by clinical staff       Reviewed and updated as needed this visit by Provider           Past Medical History:  ---------------------------  Past Medical History:   Diagnosis Date     Atrial fibrillation, unspecified type (H) 5/19/16    new onset A fib; nuclear stress test negative     Atrial flutter (H)      Cardiomyopathy (H)      Diverticulosis of colon (without mention of hemorrhage)  11/16/07    incidental diverticuli noted at colonoscopy     Gout, unspecified      LBBB (left bundle branch block)      Rosacea      Special screening for malignant neoplasms, colon 1995     Unspecified essential hypertension        Past Surgical History:  ---------------------------  Past Surgical History:   Procedure Laterality Date     ARTHROPLASTY KNEE Left 6/6/2016    Procedure: ARTHROPLASTY KNEE;  Surgeon: Derek Varma MD;  Location: [facility] OR     ARTHROSCOPY SHOULDER ROTATOR CUFF REPAIR  7/9/08    Left shoulder arthroscopic rotator cuff repair with acromioplasty     C NONSPECIFIC PROCEDURE  5/99    left knee arthroscopy     CARDIOVERSION  07-      COLONOSCOPY THRU STOMA W BIOPSY/CAUTERY TUMOR/POLYP/LESION  11/16/07    normal colonoscopy except for incidental diverticuli     ORTHOPEDIC SURGERY  04/11    2nd toe Lt foot       Current Medications:  ---------------------------  Current Outpatient Prescriptions   Medication Sig Dispense Refill     potassium chloride SA (K-DUR/KLOR-CON M) 20 MEQ CR tablet Take 2 tablets my mouth in AM (40 Meq) and 2 tablets by mouth in the PM (40 Meq) 270 tablet 2     hydrochlorothiazide (MICROZIDE) 12.5 MG capsule Take 2 capsules (25 mg) by mouth daily 180 capsule 3     losartan (COZAAR) 100 MG tablet TAKE 1 TABLET(100 MG) BY MOUTH DAILY 90 tablet 0     allopurinol (ZYLOPRIM) 300 MG tablet TAKE 1 TABLET(300 MG) BY MOUTH DAILY 90 tablet 0     nebivolol (BYSTOLIC) 5 MG tablet Take 1 tablet (5 mg) by mouth daily 90 tablet 3     amLODIPine (NORVASC) 5 MG tablet Take 1 tablet (5 mg) by mouth daily 90 tablet 3     hydrALAZINE (APRESOLINE) 100 MG TABS Take 1 tablet (100 mg) by mouth 3 times daily 270 tablet 3     amiodarone (PACERONE/CODARONE) 200 MG tablet Take 1 tab daily from Mon through Friday (skip Sat + Sun) 90 tablet 3       Allergies:  -------------  Allergies   Allergen Reactions     Amlodipine      edema     Ampicillin      rash     Metoprolol      Mild  fatigue with Toprol; mildly decreased exercise capacity     Spironolactone Nausea and Difficulty breathing     Vicodin [Hydrocodone-Acetaminophen] Nausea       Social History:  -------------------  Social History     Social History     Marital status:      Spouse name: N/A     Number of children: N/A     Years of education: N/A     Occupational History     Not on file.     Social History Main Topics     Smoking status: Former Smoker     Quit date: 7/25/1963     Smokeless tobacco: Never Used     Alcohol use 0.0 oz/week     0 Standard drinks or equivalent per week      Comment: 1 drink a day     Drug use: No     Sexual activity: Yes     Partners: Female     Other Topics Concern     Caffeine Concern No     1 cup daily     Special Diet No     Exercise Yes     skiing     Social History Narrative       Family Medical History:  ------------------------------  Family History   Problem Relation Age of Onset     CEREBROVASCULAR DISEASE Father      d:89, dementia     Respiratory Mother      d:94     DIABETES Sister      b. 1938, IDDM since age 10     DIABETES Brother      b. 1943, type II DM         ROS:  REVIEW OF SYSTEMS:    RESP: negative for cough, dyspnea, wheezing, hemoptysis  CV: negative for chest pain, palpitations, PND, MAGAÑA, orthopnea; reports no changes in their ability to perform physical activity (from cardiovascular standpoint)  GI: negative for dysphagia, N/V, pain, melena, diarrhea and constipation  NEURO: negative for numbness/tingling, paralysis, incoordination, or focal weakness     OBJECTIVE:                                                    /56  Pulse 50  Temp 98  F (36.7  C) (Oral)  Wt 186 lb 4.8 oz (84.5 kg)  SpO2 95%  BMI 28.33 kg/m2     GENERAL alert and no distress  EYES:  Normal sclera,conjunctiva, EOMI  HENT: oral and posterior pharynx without lesions or erythema, facies symmetric  NECK: Neck supple. No LAD, without thyroidmegaly or JVD., Carotids without bruits.  RESP: Clear to  ausculation bilaterally without wheezes or crackles. Normal BS in all fields.  CV: Irregular rhythm ( consistent with known atrial fibrillation), regular rate; normal S1S2 without murmurs, rubs or gallops   LYMPH: no cervical lymph adenopathy appreciated  MS: extremities- no gross deformities of the visible extremities noted, no edema  PSYCH: Alert and oriented times 3; speech- coherent  SKIN:  No obvious significant skin lesions on visible portions of face          ASSESSMENT/PLAN:                                                      (I48.91) Atrial fibrillation, unspecified type (H)  (primary encounter diagnosis)  Comment: This condition is currently controlled on the current medical regimen.  Continue current therapy.   Discussed atrial fibrillation at length including brief discussion of the pathophysiology, possible causes,  treatment issues (rate control & anticoagulation), and also discussed rationale for anticoagulation (including risks of Coumadin therapy) to lower the risk of thromboembolic events (5% per year).   OK to resume the Xarelto in the nxt week or two now that the hematuira after the urology procedure has cleared and not recurrned.   Plan:     (I10) Benign essential hypertension  Comment: This condition is currently controlled on the current medical regimen.  Continue current therapy.   Discussed hypertension in detail including JNC VIII guidelines for blood pressure goals.  Discussed indication for treatment and treatment options.  Discussed the importance for aggressive management of HTN to prevent vascular complications later.  Recommended lower fat, lower carbohydrate, and lower sodium (<2000 mg)diet.  Discussed required intervals for follow up on HTN, lab studies, and the need to aggresive management of other cardiac disease risk factors.  Recommened pt. follow their blood pressures outside the clinic to ensure that BPs are remaining within guidelines, and to contact me if the readings are  not within guidelines so we can adjust treatment as needed.     He has appointment set up to visit HTN specialist at [facility] next week to re-evaluate his HTN medication regimen.    Plan:     (R31.9) Hematuria  Comment: resovled.   Plan:     (I42.9) Cardiomyopathy, unspecified type (H)  Comment: This condition is currently controlled on the current medical regimen.  Continue current therapy.  No signs and symptoms of CHF at this time.   Plan:     (Z23) Need for prophylactic vaccination and inoculation against influenza  Comment:   Plan: FLU VACCINE, INCREASED ANTIGEN, PRESV FREE, AGE        65+ [21781], ADMIN INFLUENZA (For MEDICARE         Patients ONLY) []                 See Patient Instructions    BAR SALEH M.D., MD  [facility]       Injectable Influenza Immunization Documentation    1.  Is the person to be vaccinated sick today?  No    2. Does the person to be vaccinated have an allergy to eggs or to a component of the vaccine?  No    3. Has the person to be vaccinated today ever had a serious reaction to influenza vaccine in the past?  No    4. Has the person to be vaccinated ever had Guillain-Karval syndrome?  No     Form completed by Amy Todd [facility]

## 2017-09-05 NOTE — PATIENT INSTRUCTIONS
*  OK to start the Xarelto in one week, after the visit with the Hypertension specialist at [facility] on 9/12/17.     *  Follow up with Dr. Aj as planned in October 11.     *  If you decide the get Xarelto from [facility], then let me know.      *  Continue all medications at the same doses.  Contact your usual pharmacy if you need refills.

## 2017-09-05 NOTE — NURSING NOTE
"Chief Complaint   Patient presents with     Hospital F/U       Initial /56  Pulse 50  Temp 98  F (36.7  C) (Oral)  Wt 186 lb 4.8 oz (84.5 kg)  SpO2 95%  BMI 28.33 kg/m2 Estimated body mass index is 28.33 kg/(m^2) as calculated from the following:    Height as of 8/16/17: 5' 8\" (1.727 m).    Weight as of this encounter: 186 lb 4.8 oz (84.5 kg).  Medication Reconciliation: incomplete     Amy Todd CMA      "

## 2017-09-05 NOTE — MR AVS SNAPSHOT
After Visit Summary   9/5/2017    Reji Aguirre    MRN: [id number]           Patient Information     Date Of Birth          1940        Visit Information        Provider Department      9/5/2017 2:00 PM Igor Christina MD [facility]        Today's Diagnoses     Atrial fibrillation, unspecified type (H)    -  1    Benign essential hypertension        Hematuria        Cardiomyopathy, unspecified type (H)        Need for prophylactic vaccination and inoculation against influenza          Care Instructions    *  OK to start the Xarelto in one week, after the visit with the Hypertension specialist at [facility] on 9/12/17.     *  Follow up with Dr. Aj as planned in October 11.     *  If you decide the get Xarelto from [facility], then let me know.      *  Continue all medications at the same doses.  Contact your usual pharmacy if you need refills.           Follow-ups after your visit        Your next 10 appointments already scheduled     Oct 11, 2017  1:15 PM CDT   [facility] EP RETURN with Leonor Aj MD   [facility])    [address] [address] [address] [phone]              Who to contact     If you have questions or need follow up information about today's clinic visit or your schedule please contact [facility] directly at [phone].  Normal or non-critical lab and imaging results will be communicated to you by MyChart, letter or phone within 4 business days after the clinic has received the results. If you do not hear from us within 7 days, please contact the clinic through MyChart or phone. If you have a critical or abnormal lab result, we will notify you by phone as soon as possible.  Submit refill requests through AVST or call your pharmacy and they will forward the refill request to us. Please allow 3 business days for your refill  to be completed.          Additional Information About Your Visit        uSamphart Information     exoro system gives you secure access to your electronic health record. If you see a primary care provider, you can also send messages to your care team and make appointments. If you have questions, please call your primary care clinic.  If you do not have a primary care provider, please call [phone] and they will assist you.        Care EveryWhere ID     This is your Care EveryWhere ID. This could be used by other organizations to access your [facility] medical records  [id number]        Your Vitals Were     Pulse Temperature Pulse Oximetry BMI (Body Mass Index)          50 98  F (36.7  C) (Oral) 95% 28.33 kg/m2         Blood Pressure from Last 3 Encounters:   09/05/17 122/56   08/17/17 130/57   06/28/17 126/64    Weight from Last 3 Encounters:   09/05/17 186 lb 4.8 oz (84.5 kg)   08/16/17 190 lb (86.2 kg)   06/28/17 192 lb (87.1 kg)              We Performed the Following     ADMIN INFLUENZA (For MEDICARE Patients ONLY) []     FLU VACCINE, INCREASED ANTIGEN, PRESV FREE, AGE 65+ [54972]        Primary Care Provider Office Phone # Fax #    Igor Christina [phone]       [address] [place] [address]        Equal Access to Services     FLORENTIN NOLASCO : Hadii aad ku hadasho Soomaali, waaxda luqadaha, qaybta kaalmada adeegyada, andres ascencio. So [facility] [phone].    ATENCIÓN: Si habla español, tiene a noel disposición servicios gratuitos de asistencia lingüística. Llame al [phone].    We comply with applicable federal civil rights laws and [place] laws. We do not discriminate on the basis of race, color, national origin, age, disability sex, sexual orientation or gender identity.            Thank you!     Thank you for choosing [facility]  for your care. Our goal is always to provide you with excellent care. Hearing back from our  patients is one way we can continue to improve our services. Please take a few minutes to complete the written survey that you may receive in the mail after your visit with us. Thank you!             Your Updated Medication List - Protect others around you: Learn how to safely use, store and throw away your medicines at www.disposemymeds.org.          This list is accurate as of: 9/5/17  3:03 PM.  Always use your most recent med list.                   Brand Name Dispense Instructions for use Diagnosis    allopurinol 300 MG tablet    ZYLOPRIM    90 tablet    TAKE 1 TABLET(300 MG) BY MOUTH DAILY    Gout, unspecified       amiodarone 200 MG tablet    PACERONE/CODARONE    90 tablet    Take 1 tab daily from Mon through Friday (skip Sat + Sun)    Atrial fibrillation, unspecified type (H)       amLODIPine 5 MG tablet    NORVASC    90 tablet    Take 1 tablet (5 mg) by mouth daily    Benign essential hypertension       hydrALAZINE 100 MG Tabs tablet    APRESOLINE    270 tablet    Take 1 tablet (100 mg) by mouth 3 times daily    Benign essential hypertension       hydrochlorothiazide 12.5 MG capsule    MICROZIDE    180 capsule    Take 2 capsules (25 mg) by mouth daily    Persistent atrial fibrillation (H)       losartan 100 MG tablet    COZAAR    90 tablet    TAKE 1 TABLET(100 MG) BY MOUTH DAILY    Benign essential hypertension       nebivolol 5 MG tablet    BYSTOLIC    90 tablet    Take 1 tablet (5 mg) by mouth daily    Benign essential hypertension, Atrial flutter, paroxysmal (H), Persistent atrial fibrillation (H)       potassium chloride SA 20 MEQ CR tablet    K-DUR/KLOR-CON M    270 tablet    Take 2 tablets my mouth in AM (40 Meq) and 2 tablets by mouth in the PM (40 Meq)    Benign essential hypertension

## 2017-09-11 ENCOUNTER — TRANSFERRED RECORDS (OUTPATIENT)
Dept: HEALTH INFORMATION MANAGEMENT | Facility: CLINIC | Age: 77
End: 2017-09-11

## 2017-09-23 DIAGNOSIS — M10.9 GOUT, UNSPECIFIED: ICD-10-CM

## 2017-09-24 NOTE — TELEPHONE ENCOUNTER
allopurinol (ZYLOPRIM) 300 MG tablet       Last Written Prescription Date: 06/05/17  Last Fill Quantity: 90, # refills: 0  Last Office Visit with G, P or [facility] prescribing provider:  09/05/17        Uric Acid   Date Value Ref Range Status   03/18/2016 3.5 3.5 - 7.2 mg/dL Final   ]  Creatinine   Date Value Ref Range Status   08/17/2017 0.90 0.66 - 1.25 mg/dL Final   ]  Lab Results   Component Value Date    WBC 11.3 08/17/2017     Lab Results   Component Value Date    RBC 4.18 08/17/2017     Lab Results   Component Value Date    HGB 12.8 08/17/2017     Lab Results   Component Value Date    HCT 39.0 08/17/2017     No components found for: MCT  Lab Results   Component Value Date    MCV 93 08/17/2017     Lab Results   Component Value Date    MCH 30.6 08/17/2017     Lab Results   Component Value Date    MCHC 32.8 08/17/2017     Lab Results   Component Value Date    RDW 14.2 08/17/2017     Lab Results   Component Value Date     08/17/2017     Lab Results   Component Value Date    AST 20 06/26/2017     Lab Results   Component Value Date    ALT 29 06/26/2017

## 2017-09-26 RX ORDER — ALLOPURINOL 300 MG/1
TABLET ORAL
Qty: 90 TABLET | Refills: 0 | Status: SHIPPED | OUTPATIENT
Start: 2017-09-26 | End: 2018-02-28

## 2017-09-26 NOTE — TELEPHONE ENCOUNTER
Routing refill request to provider for review/approval because:  Labs not current:  Uric acid

## 2017-10-11 ENCOUNTER — OFFICE VISIT (OUTPATIENT)
Dept: CARDIOLOGY | Facility: CLINIC | Age: 77
End: 2017-10-11
Attending: INTERNAL MEDICINE
Payer: COMMERCIAL

## 2017-10-11 ENCOUNTER — TELEPHONE (OUTPATIENT)
Dept: CARDIOLOGY | Facility: CLINIC | Age: 77
End: 2017-10-11

## 2017-10-11 VITALS
WEIGHT: 185.7 LBS | DIASTOLIC BLOOD PRESSURE: 58 MMHG | SYSTOLIC BLOOD PRESSURE: 112 MMHG | BODY MASS INDEX: 28.14 KG/M2 | HEIGHT: 68 IN | HEART RATE: 52 BPM

## 2017-10-11 DIAGNOSIS — I48.20 CHRONIC ATRIAL FIBRILLATION (H): Primary | ICD-10-CM

## 2017-10-11 DIAGNOSIS — I48.91 ATRIAL FIBRILLATION, UNSPECIFIED TYPE (H): ICD-10-CM

## 2017-10-11 DIAGNOSIS — I42.9 CARDIOMYOPATHY, UNSPECIFIED TYPE (H): ICD-10-CM

## 2017-10-11 PROCEDURE — 99214 OFFICE O/P EST MOD 30 MIN: CPT | Performed by: INTERNAL MEDICINE

## 2017-10-11 RX ORDER — DIPHENHYDRAMINE HCL 25 MG
25 CAPSULE ORAL
COMMUNITY

## 2017-10-11 RX ORDER — EPLERENONE 25 MG/1
25 TABLET, FILM COATED ORAL 2 TIMES DAILY
COMMUNITY
End: 2019-04-09

## 2017-10-11 NOTE — PROGRESS NOTES
HISTORY OF PRESENT ILLNESS:    It was my pleasure seeing Mr. 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  Please see previous cardiology notes for details.  Following cardioversion in 07/2016 and treatment with amiodarone, the patient has maintained sinus rhythm.  He has felt better.  His EF has normalized.      Elijah had been having issues with high blood pressure.  He was having urologic issues and was seen at [facility].  He underwent some type of invasive bladder procedure and developed hematuria.  Xarelto was held for several days.  He has been back on Xarelto now for at least 1 month.  He has not had recurrent hematuria recently.      While at [facility], he was seen by Nephrology.  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 They rearranged his antihypertensives.  He is no longer on carvedilol, but rather on Bystolic 5 mg daily.  He is now on eplerenone 25 mg b.i.d.  He has remained on hydrochlorothiazide 12.5 mg daily.  They stopped the hydralazine and started amlodipine 2.5 mg daily.  I note that Elijah previously had lower extremity edema on amlodipine 5 mg daily and the medication had been discontinued.      His blood pressure has improved.  Elijah was happy to show me his list of home measurements that have recently been quite good.      He has been feeling well.  He has had no chest pain, orthopnea, PND or lower extremity edema.      PHYSICAL EXAMINATION:   VITAL SIGNS:  Blood pressure 112/58, pulse 52 and regular, weight 84 kg, height 173 cm.   HEENT:  Sclerae nonicteric.  He wears glasses.   NECK:  Supple.   ENDOCRINE:  No thyromegaly.   LUNGS:  Mildly decreased breath sounds, but without crackles or wheezes.   CARDIOVASCULAR:  Regular rhythm, somewhat bradycardic.  No S3 or S4.   ABDOMEN:  Soft, nontender.   EXTREMITIES:  No edema.   SKIN:  No rashes.      DIAGNOSTIC STUDIES:  Most recent electrocardiogram from  2017 showed sinus rhythm.      IMPRESSION:     1.  Persistent atrial fibrillation.  Elijah has done well on low-dose amiodarone 200 mg 5 days per week.  He had thyroid and liver function tests recently which were normal.  Chest x-ray this summer was fine.  He is due for PFTs with DLCO, which I have ordered.     2.  Cardiomyopathy, resistant hypertension.  Nephrology at [facility] rearranged all cardiac medications, including stopping losartan and carvedilol, medications that we generally consider a mainstay for the treatment of cardiomyopathy.  The patient is now on eplerenone (Inspra), hydrochlorothiazide, Bystolic and amlodipine.  This regimen has been effective so far for his hypertension.  Reji seems to be quite happy with his recent blood pressures, so I did not make changes.  We need to keep an eye on his heart function and make sure it does not slip again.   3.  Recent hematuria.  He is back on Xarelto without apparent hematuria.      RECOMMENDATIONS:   A.  PFTs with DLCO have been ordered.   B.  Continue routine followup with his general cardiologist, Dr. Anam Marshall   C.  The EP Service will see him again in 1 year.      Time spent was 25 minutes.  More than 50% of time was discussion and counseling.    Thank you for the opportunity to be part of his care.        RICKI WATERS MD, FACC          cc:   Igor [place] [address]          D: 10/11/2017 14:05   T: 10/11/2017 15:39   MT: LR      Name:     REJI CABALLERO   MRN:      [id number]        Account:      [id number]   :      1940           Service Date: 10/11/2017      Document: [id number]

## 2017-10-11 NOTE — MR AVS SNAPSHOT
After Visit Summary   10/11/2017    Reji Aguirre    MRN: [id number]           Patient Information     Date Of Birth          1940        Visit Information        Provider Department      10/11/2017 1:15 PM Leonor Aj MD [facility] [place]        Today's Diagnoses     Atrial fibrillation, unspecified type (H)        Cardiomyopathy, unspecified type (H)           Follow-ups after your visit        Additional Services     Follow-Up with Cardiac Advanced Practice Provider                 Your next 10 appointments already scheduled     Oct 26, 2017 10:30 AM CDT   Pulmonary Function with RH PULMONARY FUNCTION   [facility] ([facility])    [address]  [facility] [address]   [phone]           No Inhalers for 6 hours prior to test No Smoking 2 hours prior to test              Future tests that were ordered for you today     Open Future Orders        Priority Expected Expires Ordered    General PFT Lab (Please always keep checked) Routine  10/11/2018 10/11/2017    Pulmonary Function Test Routine  12/28/2026 10/11/2017    Follow-Up with Cardiac Advanced Practice Provider Routine 10/11/2018 10/12/2018 10/11/2017    Pulmonary Function Test Routine 10/24/2017 12/28/2026 10/11/2017            Who to contact     If you have questions or need follow up information about today's clinic visit or your schedule please contact [facility] [place] directly at [phone].  Normal or non-critical lab and imaging results will be communicated to you by MyChart, letter or phone within 4 business days after the clinic has received the results. If you do not hear from us within 7 days, please contact the clinic through MyChart or phone. If you have a critical or abnormal lab result, we will notify you by phone as soon as possible.  Submit refill requests through [employer] or call your  "pharmacy and they will forward the refill request to us. Please allow 3 business days for your refill to be completed.          Additional Information About Your Visit        [employer] Information     [employer] gives you secure access to your electronic health record. If you see a primary care provider, you can also send messages to your care team and make appointments. If you have questions, please call your primary care clinic.  If you do not have a primary care provider, please call [phone] and they will assist you.        Care EveryWhere ID     This is your Care EveryWhere ID. This could be used by other organizations to access your [facility] medical records  [id number]        Your Vitals Were     Pulse Height BMI (Body Mass Index)             52 1.727 m (5' 8\") 28.24 kg/m2          Blood Pressure from Last 3 Encounters:   10/11/17 112/58   09/05/17 122/56   08/17/17 130/57    Weight from Last 3 Encounters:   10/11/17 84.2 kg (185 lb 11.2 oz)   09/05/17 84.5 kg (186 lb 4.8 oz)   08/16/17 86.2 kg (190 lb)              We Performed the Following     Follow-Up with Electrophysiologist          Today's Medication Changes          These changes are accurate as of: 10/11/17  2:00 PM.  If you have any questions, ask your nurse or doctor.               These medicines have changed or have updated prescriptions.        Dose/Directions    amLODIPine 5 MG tablet   Commonly known as:  NORVASC   This may have changed:  how much to take   Used for:  Benign essential hypertension        Dose:  5 mg   Take 1 tablet (5 mg) by mouth daily   Quantity:  90 tablet   Refills:  3       hydrochlorothiazide 12.5 MG capsule   Commonly known as:  MICROZIDE   This may have changed:    - how much to take  - when to take this   Used for:  Persistent atrial fibrillation (H)        Dose:  25 mg   Take 2 capsules (25 mg) by mouth daily   Quantity:  180 capsule   Refills:  3                Primary Care Provider Office Phone # Fax #    " Igor Christina [phone]       [address] [facility] [address]        Equal Access to Services     FLORENTIN NOLASCO : Luma hang ansari debbie Reddy, wagraceda luqneeta, qacathrynta kasamirda vivian, andres thakurtatyana silva. So [facility] [phone].    ATENCIÓN: Si habla español, tiene a noel disposición servicios gratuitos de asistencia lingüística. Llame al [phone].    We comply with applicable federal civil rights laws and [place] laws. We do not discriminate on the basis of race, color, national origin, age, disability, sex, sexual orientation, or gender identity.            Thank you!     Thank you for choosing [facility] [place]  for your care. Our goal is always to provide you with excellent care. Hearing back from our patients is one way we can continue to improve our services. Please take a few minutes to complete the written survey that you may receive in the mail after your visit with us. Thank you!             Your Updated Medication List - Protect others around you: Learn how to safely use, store and throw away your medicines at www.disposemymeds.org.          This list is accurate as of: 10/11/17  2:00 PM.  Always use your most recent med list.                   Brand Name Dispense Instructions for use Diagnosis    allopurinol 300 MG tablet    ZYLOPRIM    90 tablet    TAKE 1 TABLET(300 MG) BY MOUTH DAILY    Gout, unspecified       amiodarone 200 MG tablet    PACERONE/CODARONE    90 tablet    Take 1 tab daily from Mon through Friday (skip Sat + Sun)    Atrial fibrillation, unspecified type (H)       amLODIPine 5 MG tablet    NORVASC    90 tablet    Take 1 tablet (5 mg) by mouth daily    Benign essential hypertension       BENADRYL 25 MG capsule   Generic drug:  diphenhydrAMINE      Take 25 mg by mouth nightly as needed for itching or allergies        hydrochlorothiazide 12.5 MG capsule    MICROZIDE    180 capsule    Take 2  capsules (25 mg) by mouth daily    Persistent atrial fibrillation (H)       INSPRA 25 MG tablet   Generic drug:  eplerenone      Take 25 mg by mouth 2 times daily        nebivolol 5 MG tablet    BYSTOLIC    90 tablet    Take 1 tablet (5 mg) by mouth daily    Benign essential hypertension, Atrial flutter, paroxysmal (H), Persistent atrial fibrillation (H)       TYLENOL PO      Take 500 mg by mouth every 8 hours as needed for mild pain or fever        XARELTO 20 MG Tabs tablet   Generic drug:  rivaroxaban ANTICOAGULANT      Take 20 mg by mouth daily (with dinner)

## 2017-10-11 NOTE — PROGRESS NOTES
HPI and Plan:   See dictation    Orders Placed This Encounter   Procedures     Follow-Up with Cardiac Advanced Practice Provider     Pulmonary Function Test       Orders Placed This Encounter   Medications     diphenhydrAMINE (BENADRYL) 25 MG capsule     Sig: Take 25 mg by mouth nightly as needed for itching or allergies     eplerenone (INSPRA) 25 MG tablet     Sig: Take 25 mg by mouth 2 times daily     Acetaminophen (TYLENOL PO)     Sig: Take 500 mg by mouth every 8 hours as needed for mild pain or fever     rivaroxaban ANTICOAGULANT (XARELTO) 20 MG TABS tablet     Sig: Take 20 mg by mouth daily (with dinner)       Medications Discontinued During This Encounter   Medication Reason     hydrALAZINE (APRESOLINE) 100 MG TABS Discontinued by another Health Care Provider     losartan (COZAAR) 100 MG tablet Discontinued by another Health Care Provider     potassium chloride SA (K-DUR/KLOR-CON M) 20 MEQ CR tablet Discontinued by another Health Care Provider         Encounter Diagnoses   Name Primary?     Atrial fibrillation, unspecified type (H)      Cardiomyopathy, unspecified type (H)        CURRENT MEDICATIONS:  Current Outpatient Prescriptions   Medication Sig Dispense Refill     diphenhydrAMINE (BENADRYL) 25 MG capsule Take 25 mg by mouth nightly as needed for itching or allergies       eplerenone (INSPRA) 25 MG tablet Take 25 mg by mouth 2 times daily       Acetaminophen (TYLENOL PO) Take 500 mg by mouth every 8 hours as needed for mild pain or fever       rivaroxaban ANTICOAGULANT (XARELTO) 20 MG TABS tablet Take 20 mg by mouth daily (with dinner)       allopurinol (ZYLOPRIM) 300 MG tablet TAKE 1 TABLET(300 MG) BY MOUTH DAILY 90 tablet 0     hydrochlorothiazide (MICROZIDE) 12.5 MG capsule Take 2 capsules (25 mg) by mouth daily (Patient taking differently: Take 12.5 mg by mouth 2 times daily ) 180 capsule 3     nebivolol (BYSTOLIC) 5 MG tablet Take 1 tablet (5 mg) by mouth daily 90 tablet 3     amLODIPine (NORVASC) 5  MG tablet Take 1 tablet (5 mg) by mouth daily (Patient taking differently: Take 2.5 mg by mouth daily ) 90 tablet 3     amiodarone (PACERONE/CODARONE) 200 MG tablet Take 1 tab daily from Mon through Friday (skip Sat + Sun) 90 tablet 3       ALLERGIES     Allergies   Allergen Reactions     Amlodipine      edema     Ampicillin      rash     Metoprolol      Mild fatigue with Toprol; mildly decreased exercise capacity     Spironolactone Nausea and Difficulty breathing     Vicodin [Hydrocodone-Acetaminophen] Nausea       PAST MEDICAL HISTORY:  Past Medical History:   Diagnosis Date     Atrial fibrillation, unspecified type (H) 5/19/16    new onset A fib; nuclear stress test negative     Atrial flutter (H)      Cardiomyopathy (H)      Diverticulosis of colon (without mention of hemorrhage) 11/16/07    incidental diverticuli noted at colonoscopy     Gout, unspecified      LBBB (left bundle branch block)      Rosacea      Special screening for malignant neoplasms, colon 1995     Unspecified essential hypertension        PAST SURGICAL HISTORY:  Past Surgical History:   Procedure Laterality Date     ARTHROPLASTY KNEE Left 6/6/2016    Procedure: ARTHROPLASTY KNEE;  Surgeon: Derek Varma MD;  Location: [facility] OR     ARTHROSCOPY SHOULDER ROTATOR CUFF REPAIR  7/9/08    Left shoulder arthroscopic rotator cuff repair with acromioplasty     C NONSPECIFIC PROCEDURE  5/99    left knee arthroscopy     CARDIOVERSION  07-     [facility] COLONOSCOPY THRU STOMA W BIOPSY/CAUTERY TUMOR/POLYP/LESION  11/16/07    normal colonoscopy except for incidental diverticuli     ORTHOPEDIC SURGERY  04/11    2nd toe Lt foot       FAMILY HISTORY:  Family History   Problem Relation Age of Onset     CEREBROVASCULAR DISEASE Father      d:89, dementia     Respiratory Mother      d:94     DIABETES Sister      b. 1938, IDDM since age 10     DIABETES Brother      b. 1943, type II DM       SOCIAL HISTORY:  Social History     Social History     Marital  "status:      Spouse name: N/A     Number of children: N/A     Years of education: N/A     Social History Main Topics     Smoking status: Former Smoker     Quit date: 7/25/1963     Smokeless tobacco: Never Used     Alcohol use 0.0 oz/week     0 Standard drinks or equivalent per week      Comment: 1 drink a day     Drug use: No     Sexual activity: Yes     Partners: Female     Other Topics Concern     Caffeine Concern No     1 cup daily     Special Diet No     Exercise Yes     skiing     Social History Narrative       Review of Systems:  Skin:  Positive for   actively seeing Derm. hx of Mohs procedure   Eyes:  Positive for glasses    ENT:  Positive for hearing loss    Respiratory:  Negative       Cardiovascular:    Positive for feels \"woozy\" once in a while  Gastroenterology: Negative      Genitourinary:  Positive for urinary frequency;nocturia 1x times a night  Musculoskeletal:  Positive for back pain;arthritis;joint pain TKR - left  Neurologic:  Negative      Psychiatric:  not assessed      Heme/Lymph/Imm:  Negative      Endocrine:  Negative        [id number]            "

## 2017-10-11 NOTE — LETTER
10/11/2017    Igor Christina MD  [address] [facility] [address]    RE: Reji Aguirre     Dear Colleague,    It was my pleasure seeing . 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  Please see previous cardiology notes for details.  Following cardioversion in 07/2016 and treatment with amiodarone, the patient has maintained sinus rhythm.  He has felt better.  His EF has normalized.      Elijah had been having issues with high blood pressure.  He was having urologic issues and was seen at [facility].  He underwent some type of invasive bladder procedure and developed hematuria.  Xarelto was held for several days.  He has been back on Xarelto now for at least 1 month.  He has not had recurrent hematuria recently.      While at [facility], he was seen by Nephrology.  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 They rearranged his antihypertensives.  He is no longer on carvedilol, but rather on Bystolic 5 mg daily.  He is now on eplerenone 25 mg b.i.d.  He has remained on hydrochlorothiazide 12.5 mg daily.  They stopped the hydralazine and started amlodipine 2.5 mg daily.  I note that Elijah previously had lower extremity edema on amlodipine 5 mg daily and the medication had been discontinued.      His blood pressure has improved.  Elijah was happy to show me his list of home measurements that have recently been quite good.      He has been feeling well.  He has had no chest pain, orthopnea, PND or lower extremity edema.      PHYSICAL EXAMINATION:   VITAL SIGNS:  Blood pressure 112/58, pulse 52 and regular, weight 84 kg, height 173 cm.   HEENT:  Sclerae nonicteric.  He wears glasses.   NECK:  Supple.   ENDOCRINE:  No thyromegaly.   LUNGS:  Mildly decreased breath sounds, but without crackles or wheezes.   CARDIOVASCULAR:  Regular rhythm, somewhat bradycardic.  No S3 or S4.   ABDOMEN:  Soft, nontender.   EXTREMITIES:  No  edema.   SKIN:  No rashes.  DIAGNOSTIC STUDIES:  Most recent electrocardiogram from 07/03/2017 showed sinus rhythm.      Outpatient Encounter Prescriptions as of 10/11/2017   Medication Sig Dispense Refill     diphenhydrAMINE (BENADRYL) 25 MG capsule Take 25 mg by mouth nightly as needed for itching or allergies       eplerenone (INSPRA) 25 MG tablet Take 25 mg by mouth 2 times daily       Acetaminophen (TYLENOL PO) Take 500 mg by mouth every 8 hours as needed for mild pain or fever       rivaroxaban ANTICOAGULANT (XARELTO) 20 MG TABS tablet Take 20 mg by mouth daily (with dinner)       allopurinol (ZYLOPRIM) 300 MG tablet TAKE 1 TABLET(300 MG) BY MOUTH DAILY 90 tablet 0     hydrochlorothiazide (MICROZIDE) 12.5 MG capsule Take 2 capsules (25 mg) by mouth daily (Patient taking differently: Take 12.5 mg by mouth 2 times daily ) 180 capsule 3     nebivolol (BYSTOLIC) 5 MG tablet Take 1 tablet (5 mg) by mouth daily 90 tablet 3     amLODIPine (NORVASC) 5 MG tablet Take 1 tablet (5 mg) by mouth daily (Patient taking differently: Take 2.5 mg by mouth daily ) 90 tablet 3     amiodarone (PACERONE/CODARONE) 200 MG tablet Take 1 tab daily from Mon through Friday (skip Sat + Sun) 90 tablet 3     [DISCONTINUED] potassium chloride SA (K-DUR/KLOR-CON M) 20 MEQ CR tablet Take 2 tablets my mouth in AM (40 Meq) and 2 tablets by mouth in the PM (40 Meq) (Patient not taking: Reported on 10/11/2017) 270 tablet 2     [DISCONTINUED] losartan (COZAAR) 100 MG tablet TAKE 1 TABLET(100 MG) BY MOUTH DAILY (Patient not taking: Reported on 10/11/2017) 90 tablet 0     [DISCONTINUED] hydrALAZINE (APRESOLINE) 100 MG TABS Take 1 tablet (100 mg) by mouth 3 times daily (Patient not taking: Reported on 10/11/2017) 270 tablet 3     No facility-administered encounter medications on file as of 10/11/2017.      IMPRESSION:     1.  Persistent atrial fibrillation.  Elijah has done well on low-dose amiodarone 200 mg 5 days per week.  He had thyroid and liver  function tests recently which were normal.  Chest x-ray this summer was fine.  He is due for PFTs with DLCO, which I have ordered.     2.  Cardiomyopathy, resistant hypertension.  Nephrology at [facility] rearranged all cardiac medications, including stopping losartan and carvedilol, medications that we generally consider a mainstay for the treatment of cardiomyopathy.  The patient is now on eplerenone (Inspra), hydrochlorothiazide, Bystolic and amlodipine.  This regimen has been effective so far for his hypertension.  Reji seems to be quite happy with his recent blood pressures, so I did not make changes.    We need to keep an eye on his heart function and make sure it does not slip again.   3.  Recent hematuria.  He is back on Xarelto without apparent hematuria.      RECOMMENDATIONS:   A.  PFTs with DLCO have been ordered.   B.  Continue routine followup with his general cardiologist, Dr. Anam Perera.   C.  The EP Service will see him again in 1 year.      Time spent was 25 minutes.  More than 50% of time was discussion and counseling.      Thank you for the opportunity to be part of his care.     Sincerely,    Leonor Aj MD     [facility]

## 2017-10-11 NOTE — TELEPHONE ENCOUNTER
Pt is wondering if he needs to see Dr Perera in December as he just saw Jean Marie?   thx raza Perera,  Patient is scheduled to see you in December?  Saw Dr. Aj 10-?

## 2017-10-26 ENCOUNTER — HOSPITAL ENCOUNTER (OUTPATIENT)
Dept: RESPIRATORY THERAPY | Facility: CLINIC | Age: 77
Discharge: HOME | End: 2017-10-26
Attending: INTERNAL MEDICINE | Admitting: INTERNAL MEDICINE
Payer: MEDICARE

## 2017-10-26 DIAGNOSIS — I48.20 CHRONIC ATRIAL FIBRILLATION (H): ICD-10-CM

## 2017-10-26 DIAGNOSIS — I48.91 ATRIAL FIBRILLATION, UNSPECIFIED TYPE (H): ICD-10-CM

## 2017-10-26 LAB
DLCOCOR-%PRED-PRE: 85 %
DLCOCOR-PRE: 20.13 ML/MIN/MMHG
DLCOUNC-%PRED-PRE: 80 %
DLCOUNC-PRE: 19.03 ML/MIN/MMHG
DLCOUNC-PRED: 23.68 ML/MIN/MMHG
ERV-%PRED-PRE: 89 %
ERV-PRE: 0.67 L
ERV-PRED: 0.75 L
EXPTIME-PRE: 7.71 SEC
FEF2575-%PRED-PRE: 77 %
FEF2575-PRE: 1.6 L/SEC
FEF2575-PRED: 2.05 L/SEC
FEFMAX-%PRED-PRE: 96 %
FEFMAX-PRE: 6.92 L/SEC
FEFMAX-PRED: 7.14 L/SEC
FEV1-%PRED-PRE: 82 %
FEV1-PRE: 2.27 L
FEV1FEV6-PRE: 74 %
FEV1FEV6-PRED: 77 %
FEV1FVC-PRE: 73 %
FEV1FVC-PRED: 76 %
FEV1SVC-PRE: 75 %
FEV1SVC-PRED: 66 %
FIFMAX-PRE: 2.79 L/SEC
FRCPLETH-%PRED-PRE: 84 %
FRCPLETH-PRE: 3.03 L
FRCPLETH-PRED: 3.61 L
FVC-%PRED-PRE: 84 %
FVC-PRE: 3.11 L
FVC-PRED: 3.68 L
GAW-%PRED-PRE: 63 %
GAW-PRE: 0.65 L/S/CMH2O
GAW-PRED: 1.03 L/S/CMH2O
IC-%PRED-PRE: 69 %
IC-PRE: 2.38 L
IC-PRED: 3.43 L
RVPLETH-%PRED-PRE: 87 %
RVPLETH-PRE: 2.36 L
RVPLETH-PRED: 2.69 L
SGAW-%PRED-PRE: 268 %
SGAW-PRE: 0.21 1/CMH2O*S
SGAW-PRED: 0.08 1/CMH2O*S
SRAW-%PRED-PRE: 97 %
SRAW-PRE: 4.66 CMH2O*S
SRAW-PRED: 4.76 CMH2O*S
TLCPLETH-%PRED-PRE: 81 %
TLCPLETH-PRE: 5.41 L
TLCPLETH-PRED: 6.62 L
VA-%PRED-PRE: 75 %
VA-PRE: 4.79 L
VC-%PRED-PRE: 72 %
VC-PRE: 3.05 L
VC-PRED: 4.18 L

## 2017-10-26 PROCEDURE — 94729 DIFFUSING CAPACITY: CPT

## 2017-10-26 PROCEDURE — 94010 BREATHING CAPACITY TEST: CPT

## 2017-10-26 PROCEDURE — 94726 PLETHYSMOGRAPHY LUNG VOLUMES: CPT

## 2017-10-26 NOTE — PROGRESS NOTES
PFT Note: Patient completed pulmonary function testing with spirometry, lung volumes and diffusion. Good patient effort and cooperation. The results of this test met the ATS standards for acceptability and repeatability. Hgb result of 12.8 used from 8/1/17 for DLCO.

## 2017-10-27 NOTE — PROCEDURES
Please see medical chart for graphs and statistics related to this report.       REFERRING PHYSICIAN:   Leonor Aj                  TECHNICIAN:   Latoya Singer   DIAGNOSIS:   Amiodarone Therapy   HEIGHT:   67.50 inches                  WEIGHT:   183.00 Lbs.       DYSPNEA:  No dyspnea          COUGH:  No cough          WHEEZE:  No wheeze      TOBACCO PROD:   Cigarette   YEARS SMOKED:   6.0   PKS/DAY:   0.5   YEARS QUIT:    59.0                                                              MEDICATIONS:           POST-TEST COMMENTS:   Good patient effort and cooperation.  This test meets ATS criteria for acceptability and repeatability.  Hgb. result of 12.8 used from 2017 for DLCO.          INTERPRETATION:   1.  Spirometry - minimal obstruction   2.  Normal lung volumes   3.  Normal diffusing capacity         MATTHEW ODOM MD             D: 10/27/2017 13:20   T: 10/27/2017 13:29   MT: JONAS      Name:     MARCIE CABALLERO   MRN:      [id number]        Account:        [id number]   :      1940           Procedure Date: 10/26/2017      Document: [id number]       cc: Igor Aj MD

## 2017-11-06 ENCOUNTER — TELEPHONE (OUTPATIENT)
Dept: CARDIOLOGY | Facility: CLINIC | Age: 77
End: 2017-11-06

## 2017-11-06 NOTE — TELEPHONE ENCOUNTER
"Stable PFTs, good news. Please let him know. LETICIA    1125: pt notified of PFT results. He says he's doing \"really good\". SueLangenbrunnerRN  "

## 2017-11-09 DIAGNOSIS — I48.91 ATRIAL FIBRILLATION, UNSPECIFIED TYPE (H): Primary | ICD-10-CM

## 2017-11-21 ENCOUNTER — TELEPHONE (OUTPATIENT)
Dept: INTERNAL MEDICINE | Facility: CLINIC | Age: 77
End: 2017-11-21

## 2017-11-21 DIAGNOSIS — Z13.6 CARDIOVASCULAR SCREENING; LDL GOAL LESS THAN 130: Primary | ICD-10-CM

## 2017-11-22 NOTE — TELEPHONE ENCOUNTER
I was thinking about 6 months after his last appointment, so Fabruary/March would be great.     Please get fasting labs done in the [facility] lab 1-2 days before this appointment at any [facility] lab.  Eat nothing for at least 8 hours prior to having these labs drawn.   We will then review the results when I see him.   We can order the colonoscopy when we meet.     Future orders placed.

## 2017-11-22 NOTE — TELEPHONE ENCOUNTER
Pt calling to see when  would like to see him next. Pt also wondering when PCP would like health maintenance items done (lipid check and colonoscopy)? Ideally he would like to wait until February if possible to do HM items. Please advise.

## 2017-11-27 DIAGNOSIS — I48.91 ATRIAL FIBRILLATION, UNSPECIFIED TYPE (H): ICD-10-CM

## 2017-11-27 RX ORDER — AMIODARONE HYDROCHLORIDE 200 MG/1
TABLET ORAL
Qty: 90 TABLET | Refills: 3 | Status: SHIPPED | OUTPATIENT
Start: 2017-11-27 | End: 2018-01-30

## 2017-12-19 ENCOUNTER — TRANSFERRED RECORDS (OUTPATIENT)
Dept: HEALTH INFORMATION MANAGEMENT | Facility: CLINIC | Age: 77
End: 2017-12-19

## 2018-01-11 ENCOUNTER — HOSPITAL ENCOUNTER (OUTPATIENT)
Dept: GENERAL RADIOLOGY | Facility: CLINIC | Age: 78
Discharge: HOME | End: 2018-01-11
Attending: INTERNAL MEDICINE | Admitting: INTERNAL MEDICINE
Payer: MEDICARE

## 2018-01-11 ENCOUNTER — OFFICE VISIT (OUTPATIENT)
Dept: CARDIOLOGY | Facility: CLINIC | Age: 78
End: 2018-01-11
Attending: INTERNAL MEDICINE
Payer: COMMERCIAL

## 2018-01-11 VITALS
BODY MASS INDEX: 29.8 KG/M2 | WEIGHT: 196.6 LBS | HEART RATE: 48 BPM | HEIGHT: 68 IN | DIASTOLIC BLOOD PRESSURE: 79 MMHG | SYSTOLIC BLOOD PRESSURE: 139 MMHG

## 2018-01-11 DIAGNOSIS — I48.19 PERSISTENT ATRIAL FIBRILLATION (H): ICD-10-CM

## 2018-01-11 DIAGNOSIS — I48.92 ATRIAL FLUTTER, PAROXYSMAL (H): ICD-10-CM

## 2018-01-11 DIAGNOSIS — I10 BENIGN ESSENTIAL HYPERTENSION: ICD-10-CM

## 2018-01-11 LAB
ALBUMIN SERPL-MCNC: 3.6 G/DL (ref 3.4–5)
ALP SERPL-CCNC: 80 U/L (ref 40–150)
ALT SERPL W P-5'-P-CCNC: 31 U/L (ref 0–70)
ANION GAP SERPL CALCULATED.3IONS-SCNC: 10 MMOL/L (ref 6–17)
AST SERPL W P-5'-P-CCNC: 22 U/L (ref 0–45)
BILIRUB DIRECT SERPL-MCNC: 0.1 MG/DL (ref 0–0.2)
BILIRUB SERPL-MCNC: 0.6 MG/DL (ref 0.2–1.3)
BUN SERPL-MCNC: 29 MG/DL (ref 7–30)
CALCIUM SERPL-MCNC: 9.8 MG/DL (ref 8.5–10.5)
CHLORIDE SERPL-SCNC: 103 MMOL/L (ref 98–107)
CO2 SERPL-SCNC: 31 MMOL/L (ref 23–29)
CREAT SERPL-MCNC: 1.43 MG/DL (ref 0.7–1.3)
GFR SERPL CREATININE-BSD FRML MDRD: 48 ML/MIN/1.7M2
GLUCOSE SERPL-MCNC: 85 MG/DL (ref 70–105)
POTASSIUM SERPL-SCNC: 4 MMOL/L (ref 3.5–5.1)
PROT SERPL-MCNC: 7.3 G/DL (ref 6.8–8.8)
SODIUM SERPL-SCNC: 140 MMOL/L (ref 136–145)
TSH SERPL DL<=0.005 MIU/L-ACNC: 0.78 MU/L (ref 0.4–4)

## 2018-01-11 PROCEDURE — 71045 X-RAY EXAM CHEST 1 VIEW: CPT

## 2018-01-11 PROCEDURE — 80048 BASIC METABOLIC PNL TOTAL CA: CPT | Performed by: INTERNAL MEDICINE

## 2018-01-11 PROCEDURE — 84443 ASSAY THYROID STIM HORMONE: CPT | Performed by: INTERNAL MEDICINE

## 2018-01-11 PROCEDURE — 80076 HEPATIC FUNCTION PANEL: CPT | Performed by: INTERNAL MEDICINE

## 2018-01-11 PROCEDURE — 36415 COLL VENOUS BLD VENIPUNCTURE: CPT | Performed by: INTERNAL MEDICINE

## 2018-01-11 PROCEDURE — 99214 OFFICE O/P EST MOD 30 MIN: CPT | Performed by: INTERNAL MEDICINE

## 2018-01-11 NOTE — MR AVS SNAPSHOT
"              After Visit Summary   1/11/2018    Reji Aguirre    MRN: [id number]           Patient Information     Date Of Birth          1940        Visit Information        Provider Department      1/11/2018 3:15 PM Trever, Anam Butterfield MD [facility]        Today's Diagnoses     Persistent atrial fibrillation (H)        Atrial flutter, paroxysmal (H)        Benign essential hypertension           Follow-ups after your visit        Who to contact     If you have questions or need follow up information about today's clinic visit or your schedule please contact [facility] directly at [phone].  Normal or non-critical lab and imaging results will be communicated to you by [employer], letter or phone within 4 business days after the clinic has received the results. If you do not hear from us within 7 days, please contact the clinic through [employer] or phone. If you have a critical or abnormal lab result, we will notify you by phone as soon as possible.  Submit refill requests through [facility] or call your pharmacy and they will forward the refill request to us. Please allow 3 business days for your refill to be completed.          Additional Information About Your Visit        MyChart Information     [facility] gives you secure access to your electronic health record. If you see a primary care provider, you can also send messages to your care team and make appointments. If you have questions, please call your primary care clinic.  If you do not have a primary care provider, please call [phone] and they will assist you.        Care EveryWhere ID     This is your Care EveryWhere ID. This could be used by other organizations to access your [facility] medical records  [id number]        Your Vitals Were     Pulse Height BMI (Body Mass Index)             48 1.727 m (5' 8\") 29.89 kg/m2          Blood Pressure from Last 3 Encounters: "   01/11/18 139/79   10/11/17 112/58   09/05/17 122/56    Weight from Last 3 Encounters:   01/11/18 89.2 kg (196 lb 9.6 oz)   10/11/17 84.2 kg (185 lb 11.2 oz)   09/05/17 84.5 kg (186 lb 4.8 oz)              We Performed the Following     Follow-Up with Cardiologist          Today's Medication Changes          These changes are accurate as of: 1/11/18  3:44 PM.  If you have any questions, ask your nurse or doctor.               These medicines have changed or have updated prescriptions.        Dose/Directions    amLODIPine 5 MG tablet   Commonly known as:  NORVASC   This may have changed:  how much to take   Used for:  Benign essential hypertension        Dose:  5 mg   Take 1 tablet (5 mg) by mouth daily   Quantity:  90 tablet   Refills:  3       hydrochlorothiazide 12.5 MG capsule   Commonly known as:  MICROZIDE   This may have changed:    - how much to take  - when to take this   Used for:  Persistent atrial fibrillation (H)        Dose:  25 mg   Take 2 capsules (25 mg) by mouth daily   Quantity:  180 capsule   Refills:  3                Primary Care Provider Office Phone # Fax #    Igor Christina [phone]       [address] [place] [id number]        Equal Access to Services     FLORENTIN NOLASCO AH: Hadii hang ansari hadasho Sosharleneali, waaxda luqadaha, qaybta kaalmada adeegyada, waxay kortney ascencio. So [facility] [phone].    ATENCIÓN: Si habla español, tiene a noel disposición servicios gratuitos de asistencia lingüística. Llame al [phone].    We comply with applicable federal civil rights laws and [place] laws. We do not discriminate on the basis of race, color, national origin, age, disability, sex, sexual orientation, or gender identity.            Thank you!     Thank you for choosing [facility]  for your care. Our goal is always to provide you with excellent care. Hearing back from our patients is one way we can continue to  improve our services. Please take a few minutes to complete the written survey that you may receive in the mail after your visit with us. Thank you!             Your Updated Medication List - Protect others around you: Learn how to safely use, store and throw away your medicines at www.disposemymeds.org.          This list is accurate as of: 1/11/18  3:44 PM.  Always use your most recent med list.                   Brand Name Dispense Instructions for use Diagnosis    allopurinol 300 MG tablet    ZYLOPRIM    90 tablet    TAKE 1 TABLET(300 MG) BY MOUTH DAILY    Gout, unspecified       amiodarone 200 MG tablet    PACERONE/CODARONE    90 tablet    Take 1 tab daily from Mon through Friday (skip Sat + Sun)    Atrial fibrillation, unspecified type (H)       amLODIPine 5 MG tablet    NORVASC    90 tablet    Take 1 tablet (5 mg) by mouth daily    Benign essential hypertension       BENADRYL 25 MG capsule   Generic drug:  diphenhydrAMINE      Take 25 mg by mouth nightly as needed for itching or allergies        hydrochlorothiazide 12.5 MG capsule    MICROZIDE    180 capsule    Take 2 capsules (25 mg) by mouth daily    Persistent atrial fibrillation (H)       INSPRA 25 MG tablet   Generic drug:  eplerenone      Take 25 mg by mouth 2 times daily        nebivolol 5 MG tablet    BYSTOLIC    90 tablet    Take 1 tablet (5 mg) by mouth daily    Benign essential hypertension, Atrial flutter, paroxysmal (H), Persistent atrial fibrillation (H)       rivaroxaban ANTICOAGULANT 20 MG Tabs tablet    XARELTO    90 tablet    Take 1 tablet (20 mg) by mouth daily (with dinner)    Atrial fibrillation, unspecified type (H)       TYLENOL PO      Take 500 mg by mouth every 8 hours as needed for mild pain or fever

## 2018-01-11 NOTE — LETTER
1/11/2018      Igor Christina MD  [address] [facility] [address]      RE: Reji Aguirre       Dear Colleague,    I had the pleasure of seeing Reji Aguirre in the [facility].    HISTORY OF PRESENT ILLNESS:  It is a pleasure for me to follow up with Mr. Aguirre, who has a history of persistent atrial fibrillation, tachycardia-induced cardiomyopathy and resistant hypertension.  He has been doing well.  He is completely asymptomatic from the cardiac point of view.  As outlined in Dr. Aj' recent excellent note, he had some medication changes after visiting the [facility].  He is on eplerenone as well as hydrochlorothiazide, Bystolic and amlodipine for his resistant hypertension.        I am happy to see that he does not have much peripheral edema.  Cardiac examination is normal.  I am most happy to see that his amiodarone labs are fine.  He reports no bleeding.  However, his creatinine has gone up to 1.43.  He was previously 0.9.  I have asked him to follow up with his nephrologist at the [facility].  I do wonder if it is from the eplerenone.  Blood pressure does appear to be under reasonable control.  I have kept his medications unchanged for now.  I will see him again in 6 months' time for further followup.     Outpatient Encounter Prescriptions as of 1/11/2018   Medication Sig Dispense Refill     amiodarone (PACERONE/CODARONE) 200 MG tablet Take 1 tab daily from Mon through Friday (skip Sat + Sun) 90 tablet 3     rivaroxaban ANTICOAGULANT (XARELTO) 20 MG TABS tablet Take 1 tablet (20 mg) by mouth daily (with dinner) 90 tablet 1     diphenhydrAMINE (BENADRYL) 25 MG capsule Take 25 mg by mouth nightly as needed for itching or allergies       eplerenone (INSPRA) 25 MG tablet Take 25 mg by mouth 2 times daily       Acetaminophen (TYLENOL PO) Take 500 mg by mouth every 8 hours as needed for mild pain or fever       allopurinol (ZYLOPRIM) 300 MG tablet TAKE 1 TABLET(300  MG) BY MOUTH DAILY 90 tablet 0     hydrochlorothiazide (MICROZIDE) 12.5 MG capsule Take 2 capsules (25 mg) by mouth daily (Patient taking differently: Take 12.5 mg by mouth 2 times daily ) 180 capsule 3     nebivolol (BYSTOLIC) 5 MG tablet Take 1 tablet (5 mg) by mouth daily 90 tablet 3     amLODIPine (NORVASC) 5 MG tablet Take 1 tablet (5 mg) by mouth daily (Patient taking differently: Take 2.5 mg by mouth daily ) 90 tablet 3     No facility-administered encounter medications on file as of 1/11/2018.      Again, thank you for allowing me to participate in the care of your patient.      Sincerely,    DR BEATRICE NEWELL MD     [facility] [place]

## 2018-01-11 NOTE — PROGRESS NOTES
HPI and Plan:   See dictation    Orders Placed This Encounter   Procedures     Follow-Up with Cardiologist       No orders of the defined types were placed in this encounter.      Encounter Diagnoses   Name Primary?     Persistent atrial fibrillation (H)      Atrial flutter, paroxysmal (H)      Benign essential hypertension        CURRENT MEDICATIONS:  Current Outpatient Prescriptions   Medication Sig Dispense Refill     amiodarone (PACERONE/CODARONE) 200 MG tablet Take 1 tab daily from Mon through Friday (skip Sat + Sun) 90 tablet 3     rivaroxaban ANTICOAGULANT (XARELTO) 20 MG TABS tablet Take 1 tablet (20 mg) by mouth daily (with dinner) 90 tablet 1     diphenhydrAMINE (BENADRYL) 25 MG capsule Take 25 mg by mouth nightly as needed for itching or allergies       eplerenone (INSPRA) 25 MG tablet Take 25 mg by mouth 2 times daily       Acetaminophen (TYLENOL PO) Take 500 mg by mouth every 8 hours as needed for mild pain or fever       allopurinol (ZYLOPRIM) 300 MG tablet TAKE 1 TABLET(300 MG) BY MOUTH DAILY 90 tablet 0     hydrochlorothiazide (MICROZIDE) 12.5 MG capsule Take 2 capsules (25 mg) by mouth daily (Patient taking differently: Take 12.5 mg by mouth 2 times daily ) 180 capsule 3     nebivolol (BYSTOLIC) 5 MG tablet Take 1 tablet (5 mg) by mouth daily 90 tablet 3     amLODIPine (NORVASC) 5 MG tablet Take 1 tablet (5 mg) by mouth daily (Patient taking differently: Take 2.5 mg by mouth daily ) 90 tablet 3       ALLERGIES     Allergies   Allergen Reactions     Amlodipine      edema     Ampicillin      rash     Metoprolol      Mild fatigue with Toprol; mildly decreased exercise capacity     Spironolactone Nausea and Difficulty breathing     Vicodin [Hydrocodone-Acetaminophen] Nausea       PAST MEDICAL HISTORY:  Past Medical History:   Diagnosis Date     Atrial fibrillation, unspecified type (H) 5/19/16    new onset A fib; nuclear stress test negative     Atrial flutter (H)      Cardiomyopathy (H)       Diverticulosis of colon (without mention of hemorrhage) 11/16/07    incidental diverticuli noted at colonoscopy     Gout, unspecified      LBBB (left bundle branch block)      Rosacea      Special screening for malignant neoplasms, colon 1995     Unspecified essential hypertension        PAST SURGICAL HISTORY:  Past Surgical History:   Procedure Laterality Date     ARTHROPLASTY KNEE Left 6/6/2016    Procedure: ARTHROPLASTY KNEE;  Surgeon: Derek Varma MD;  Location: [facility] OR     ARTHROSCOPY SHOULDER ROTATOR CUFF REPAIR  7/9/08    Left shoulder arthroscopic rotator cuff repair with acromioplasty     C NONSPECIFIC PROCEDURE  5/99    left knee arthroscopy     CARDIOVERSION  07-     [facility] COLONOSCOPY THRU STOMA W BIOPSY/CAUTERY TUMOR/POLYP/LESION  11/16/07    normal colonoscopy except for incidental diverticuli     ORTHOPEDIC SURGERY  04/11    2nd toe Lt foot       FAMILY HISTORY:  Family History   Problem Relation Age of Onset     CEREBROVASCULAR DISEASE Father      d:89, dementia     Respiratory Mother      d:94     DIABETES Sister      b. 1938, IDDM since age 10     DIABETES Brother      b. 1943, type II DM       SOCIAL HISTORY:  Social History     Social History     Marital status:      Spouse name: N/A     Number of children: N/A     Years of education: N/A     Social History Main Topics     Smoking status: Former Smoker     Quit date: 7/25/1963     Smokeless tobacco: Never Used     Alcohol use 0.0 oz/week     0 Standard drinks or equivalent per week      Comment: 1 drink a day     Drug use: No     Sexual activity: Yes     Partners: Female     Other Topics Concern     Caffeine Concern No     1 cup daily     Special Diet No     Exercise Yes     skiing     Social History Narrative       Review of Systems:  Skin:        Eyes:  Positive for glasses  ENT:  Positive for hearing loss  Respiratory:  Negative    Cardiovascular:  Negative    Gastroenterology: Negative    Genitourinary:  Negative   "  Musculoskeletal:  Positive for back pain;arthritis;joint pain  Neurologic:  Negative    Psychiatric:  Negative    Heme/Lymph/Imm:  Negative    Endocrine:  Negative      Physical Exam:  Vitals: /79  Pulse (!) 48  Ht 1.727 m (5' 8\")  Wt 89.2 kg (196 lb 9.6 oz)  BMI 29.89 kg/m2    Constitutional:           Skin:             Head:           Eyes:           Lymph:      ENT:           Neck:           Respiratory:            Cardiac:                                                           GI:           Extremities and Muscular Skeletal:                 Neurological:           Psych:           Recent Lab Results:  LIPID RESULTS:  Lab Results   Component Value Date    CHOL 133 06/21/2016    HDL 40 06/21/2016    LDL 72 06/21/2016    TRIG 103 06/21/2016    CHOLHDLRATIO 2.6 10/09/2014       LIVER ENZYME RESULTS:  Lab Results   Component Value Date    AST 22 01/11/2018    ALT 31 01/11/2018       CBC RESULTS:  Lab Results   Component Value Date    WBC 11.3 (H) 08/17/2017    RBC 4.18 (L) 08/17/2017    HGB 12.8 (L) 08/17/2017    HCT 39.0 (L) 08/17/2017    MCV 93 08/17/2017    MCH 30.6 08/17/2017    MCHC 32.8 08/17/2017    RDW 14.2 08/17/2017     08/17/2017       BMP RESULTS:  Lab Results   Component Value Date     01/11/2018    POTASSIUM 4.0 01/11/2018    CHLORIDE 103 01/11/2018    CO2 31 (H) 01/11/2018    ANIONGAP 10 01/11/2018    GLC 85 01/11/2018    BUN 29 01/11/2018    CR 1.43 (H) 01/11/2018    GFRESTIMATED 48 (L) 01/11/2018    GFRESTBLACK 58 (L) 01/11/2018    HE 9.8 01/11/2018        A1C RESULTS:  No results found for: A1C    INR RESULTS:  Lab Results   Component Value Date    INR 1.94 (H) 08/16/2017           CC  Anam Perera MD  [address]  [place] [id number]                  "

## 2018-01-12 NOTE — PROGRESS NOTES
HISTORY OF PRESENT ILLNESS:  It is a pleasure for me to follow up with Mr. Aguirre, who has a history of persistent atrial fibrillation, tachycardia-induced cardiomyopathy and resistant hypertension.  He has been doing well.  He is completely asymptomatic from the cardiac point of view.  As outlined in Dr. Aj' recent excellent note, he had some medication changes after visiting the [facility].  He is on eplerenone as well as hydrochlorothiazide, Bystolic and amlodipine for his resistant hypertension.        I am happy to see that he does not have much peripheral edema.  Cardiac examination is normal.  I am most happy to see that his amiodarone labs are fine.  He reports no bleeding.  However, his creatinine has gone up to 1.43.  He was previously 0.9.  I have asked him to follow up with his nephrologist at the [facility].  I do wonder if it is from the eplerenone.  Blood pressure does appear to be under reasonable control.  I have kept his medications unchanged for now.  I will see him again in 6 months' time for further followup.         BEAT[facility]ICE NEWELL MD, [facility]             D: 2018 15:59   T: 2018 21:50   MT: [id number]      Name:     MARCIE AGUIRRE   MRN:      [id number]        Account:      [id number]   :      1940           Service Date: 2018      Document: [id number]

## 2018-01-20 ENCOUNTER — HEALTH MAINTENANCE LETTER (OUTPATIENT)
Age: 78
End: 2018-01-20

## 2018-01-30 DIAGNOSIS — I48.91 ATRIAL FIBRILLATION, UNSPECIFIED TYPE (H): ICD-10-CM

## 2018-01-30 RX ORDER — AMIODARONE HYDROCHLORIDE 200 MG/1
TABLET ORAL
Qty: 90 TABLET | Refills: 1 | Status: SHIPPED | OUTPATIENT
Start: 2018-01-30 | End: 2018-10-10

## 2018-01-30 NOTE — TELEPHONE ENCOUNTER
Received refill request for:  Amiodarone  Last OV was: 1/11/2018 with Dr. Perera  Labs/EKG: Amiodarone Protocol up to date  F/U scheduled: orders in Epic for 7/2018  New script sent to: [facility]

## 2018-02-05 DIAGNOSIS — I48.91 ATRIAL FIBRILLATION, UNSPECIFIED TYPE (H): ICD-10-CM

## 2018-02-05 NOTE — TELEPHONE ENCOUNTER
Pt is requesting a rx for Xarelto. 20mg once daily with dinner was originally written by [facility]. Please send new rx. Thanks!

## 2018-02-05 NOTE — TELEPHONE ENCOUNTER
"Requested Prescriptions   Pending Prescriptions Disp Refills     rivaroxaban ANTICOAGULANT (XARELTO) 20 MG TABS tablet  Last Written Prescription Date:  11/9/2017  Last Fill Quantity: 90 tablet,  # refills: 1   Last Office Visit: 9/5/2017   Future Office Visit:    90 tablet 1     Sig: Take 1 tablet (20 mg) by mouth daily (with dinner)    Platelet Inhibitors Failed    2/5/2018  9:58 AM       Failed - Normal serum creatinine on file in past 12 months    Recent Labs   Lab Test  01/11/18   1346   CR  1.43*            Passed - Normal ALT on file in past 12 months    Recent Labs   Lab Test  01/11/18   1346   ALT  31            Passed - Normal HGB on file in past 12 months    Recent Labs   Lab Test  08/17/17   0703   HGB  12.8*              Passed - Normal AST on file in past 12 months    Recent Labs   Lab Test  01/11/18   1346   AST  22            Passed - Normal Platelets on file in past 12 months    Recent Labs   Lab Test  08/17/17   0703   PLT  214              Passed - Recent or future visit with authorizing provider's specialty    Patient had office visit in the last year or has a visit in the next 30 days with authorizing provider.  See \"Patient Info\" tab in inbasket, or \"Choose Columns\" in Meds & Orders section of the refill encounter.            Passed - Patient is age 18 or older          "

## 2018-02-06 NOTE — TELEPHONE ENCOUNTER
Routing refill request to provider for review/approval because:  Labs out of range:  HGB and Creatinine

## 2018-02-28 DIAGNOSIS — M10.9 GOUT, UNSPECIFIED CAUSE, UNSPECIFIED CHRONICITY, UNSPECIFIED SITE: Primary | ICD-10-CM

## 2018-02-28 DIAGNOSIS — I48.19 PERSISTENT ATRIAL FIBRILLATION (H): ICD-10-CM

## 2018-02-28 DIAGNOSIS — I10 BENIGN ESSENTIAL HYPERTENSION: ICD-10-CM

## 2018-02-28 RX ORDER — ALLOPURINOL 300 MG/1
TABLET ORAL
Qty: 90 TABLET | Refills: 0 | Status: SHIPPED | OUTPATIENT
Start: 2018-02-28 | End: 2018-08-31

## 2018-02-28 RX ORDER — HYDROCHLOROTHIAZIDE 12.5 MG/1
25 CAPSULE ORAL EVERY MORNING
Qty: 180 CAPSULE | Refills: 1 | Status: SHIPPED | OUTPATIENT
Start: 2018-02-28 | End: 2018-09-04

## 2018-02-28 RX ORDER — HYDROCHLOROTHIAZIDE 12.5 MG/1
25 CAPSULE ORAL DAILY
Qty: 180 CAPSULE | Refills: 3 | Status: CANCELLED | OUTPATIENT
Start: 2018-02-28

## 2018-02-28 NOTE — TELEPHONE ENCOUNTER
Last seen 9/5/17.     Allopurinol Routing refill request to provider for review/approval because:  Labs out of range:  WBC, RBC, hgb, hematocrit, Cr  Labs not current:  Uric Acid  (Last done 3/2016)    Hydrochlorothiazide Routing refill request to provider for review/approval because:  Labs out of range:  Cr

## 2018-03-23 ENCOUNTER — TELEPHONE (OUTPATIENT)
Dept: CARDIOLOGY | Facility: CLINIC | Age: 78
End: 2018-03-23

## 2018-03-23 DIAGNOSIS — I48.92 ATRIAL FLUTTER, PAROXYSMAL (H): ICD-10-CM

## 2018-03-23 DIAGNOSIS — I48.19 PERSISTENT ATRIAL FIBRILLATION (H): ICD-10-CM

## 2018-03-23 DIAGNOSIS — I10 BENIGN ESSENTIAL HYPERTENSION: ICD-10-CM

## 2018-03-23 RX ORDER — NEBIVOLOL 5 MG/1
5 TABLET ORAL DAILY
Qty: 90 TABLET | Refills: 3 | Status: SHIPPED | OUTPATIENT
Start: 2018-03-23 | End: 2018-10-23

## 2018-04-13 DIAGNOSIS — I10 BENIGN ESSENTIAL HYPERTENSION: ICD-10-CM

## 2018-04-13 NOTE — TELEPHONE ENCOUNTER
PATIENT HAS STATED THAT HE WILL BE LEAVING OUT OF [place] ON 05-06-18. WE HAVE ACTIVE REFILLS FOR OTHER MEDS THAT PATIENT IS IN NEED OF BEFORE LEAVING, HOWEVER CURRENT RX FOR AMLODIPINE 2.5MG NO LONGER HAS REFILLS. PLEASE SEND NEW RX TO [facility]    Thank You,  [facility]  [facility]  Ant MENDEZ / [facility] Pharm[facility] Technician

## 2018-04-20 DIAGNOSIS — I10 BENIGN ESSENTIAL HYPERTENSION: Primary | ICD-10-CM

## 2018-04-20 RX ORDER — AMLODIPINE BESYLATE 2.5 MG/1
2.5 TABLET ORAL DAILY
Qty: 90 TABLET | Refills: 3 | Status: SHIPPED | OUTPATIENT
Start: 2018-04-20 | End: 2019-05-03

## 2018-05-31 RX ORDER — AMLODIPINE BESYLATE 5 MG/1
2.5 TABLET ORAL DAILY
Start: 2018-05-31 | End: 2018-09-18

## 2018-06-07 ENCOUNTER — OFFICE VISIT (OUTPATIENT)
Dept: INTERNAL MEDICINE | Facility: CLINIC | Age: 78
End: 2018-06-07
Payer: COMMERCIAL

## 2018-06-07 VITALS
DIASTOLIC BLOOD PRESSURE: 64 MMHG | TEMPERATURE: 98.3 F | WEIGHT: 198.6 LBS | SYSTOLIC BLOOD PRESSURE: 134 MMHG | HEART RATE: 45 BPM | BODY MASS INDEX: 30.2 KG/M2 | OXYGEN SATURATION: 94 %

## 2018-06-07 DIAGNOSIS — I48.91 ATRIAL FIBRILLATION, UNSPECIFIED TYPE (H): ICD-10-CM

## 2018-06-07 DIAGNOSIS — Z23 NEED FOR SHINGLES VACCINE: ICD-10-CM

## 2018-06-07 DIAGNOSIS — I48.92 ATRIAL FLUTTER, PAROXYSMAL (H): ICD-10-CM

## 2018-06-07 DIAGNOSIS — Z13.6 CARDIOVASCULAR SCREENING; LDL GOAL LESS THAN 130: ICD-10-CM

## 2018-06-07 DIAGNOSIS — I42.9 CARDIOMYOPATHY, UNSPECIFIED TYPE (H): ICD-10-CM

## 2018-06-07 DIAGNOSIS — I10 BENIGN ESSENTIAL HYPERTENSION: Primary | ICD-10-CM

## 2018-06-07 DIAGNOSIS — M10.9 GOUT, UNSPECIFIED CAUSE, UNSPECIFIED CHRONICITY, UNSPECIFIED SITE: ICD-10-CM

## 2018-06-07 DIAGNOSIS — I48.19 PERSISTENT ATRIAL FIBRILLATION (H): ICD-10-CM

## 2018-06-07 LAB
ALBUMIN SERPL-MCNC: 4 G/DL (ref 3.4–5)
ALP SERPL-CCNC: 65 U/L (ref 40–150)
ALT SERPL W P-5'-P-CCNC: 58 U/L (ref 0–70)
ANION GAP SERPL CALCULATED.3IONS-SCNC: 6 MMOL/L (ref 3–14)
AST SERPL W P-5'-P-CCNC: 36 U/L (ref 0–45)
BILIRUB SERPL-MCNC: 0.8 MG/DL (ref 0.2–1.3)
BUN SERPL-MCNC: 23 MG/DL (ref 7–30)
CALCIUM SERPL-MCNC: 9.4 MG/DL (ref 8.5–10.1)
CHLORIDE SERPL-SCNC: 105 MMOL/L (ref 94–109)
CHOLEST SERPL-MCNC: 217 MG/DL
CO2 SERPL-SCNC: 29 MMOL/L (ref 20–32)
CREAT SERPL-MCNC: 1.16 MG/DL (ref 0.66–1.25)
ERYTHROCYTE [DISTWIDTH] IN BLOOD BY AUTOMATED COUNT: 15 % (ref 10–15)
GFR SERPL CREATININE-BSD FRML MDRD: 61 ML/MIN/1.7M2
GLUCOSE SERPL-MCNC: 90 MG/DL (ref 70–99)
HCT VFR BLD AUTO: 49.8 % (ref 40–53)
HDLC SERPL-MCNC: 62 MG/DL
HGB BLD-MCNC: 16.8 G/DL (ref 13.3–17.7)
LDLC SERPL CALC-MCNC: 134 MG/DL
MCH RBC QN AUTO: 32.6 PG (ref 26.5–33)
MCHC RBC AUTO-ENTMCNC: 33.7 G/DL (ref 31.5–36.5)
MCV RBC AUTO: 97 FL (ref 78–100)
NONHDLC SERPL-MCNC: 155 MG/DL
PLATELET # BLD AUTO: 162 10E9/L (ref 150–450)
POTASSIUM SERPL-SCNC: 4.4 MMOL/L (ref 3.4–5.3)
PROT SERPL-MCNC: 7.6 G/DL (ref 6.8–8.8)
RBC # BLD AUTO: 5.16 10E12/L (ref 4.4–5.9)
SODIUM SERPL-SCNC: 140 MMOL/L (ref 133–144)
TRIGL SERPL-MCNC: 103 MG/DL
URATE SERPL-MCNC: 4.3 MG/DL (ref 3.5–7.2)
WBC # BLD AUTO: 8 10E9/L (ref 4–11)

## 2018-06-07 PROCEDURE — 80053 COMPREHEN METABOLIC PANEL: CPT | Performed by: INTERNAL MEDICINE

## 2018-06-07 PROCEDURE — 85027 COMPLETE CBC AUTOMATED: CPT | Performed by: INTERNAL MEDICINE

## 2018-06-07 PROCEDURE — 80061 LIPID PANEL: CPT | Performed by: INTERNAL MEDICINE

## 2018-06-07 PROCEDURE — 84550 ASSAY OF BLOOD/URIC ACID: CPT | Performed by: INTERNAL MEDICINE

## 2018-06-07 PROCEDURE — 99214 OFFICE O/P EST MOD 30 MIN: CPT | Performed by: INTERNAL MEDICINE

## 2018-06-07 PROCEDURE — 36415 COLL VENOUS BLD VENIPUNCTURE: CPT | Performed by: INTERNAL MEDICINE

## 2018-06-07 NOTE — MR AVS SNAPSHOT
"              After Visit Summary   6/7/2018    Reji gAuirre    MRN: [id number]           Patient Information     Date Of Birth          1940        Visit Information        Provider Department      6/7/2018 8:00 AM Igor Christina MD [facility]        Today's Diagnoses     Benign essential hypertension    -  1    Persistent atrial fibrillation (H)        Atrial flutter, paroxysmal (H)        Atrial fibrillation, unspecified type (H)        Gout, unspecified cause, unspecified chronicity, unspecified site        Cardiomyopathy, unspecified type (H)        CARDIOVASCULAR SCREENING; LDL GOAL LESS THAN 130        Need for shingles vaccine          Care Instructions    *  Continue all medications at the same doses.  Contact your usual pharmacy if you need refills.     *  Return to see me in 6 months, sooner if needed.  Call [phone] to schedule this appointment.       SHINGLES VACCINE:     I would recommend that you consider getting a \"shingles vaccine\".  The shingles vaccine does not stop you from getting shingles, but it decreases the intensity of the event, the duration of the event, and decreases the painful nerve condition that results     There are two options available:     --Shingrix (available starting early 2018), 2 shots, 2-6 months apart  **recommended**   OR   --Zostavax, one shot    --Based on the available data, the Shingrix vaccine has superior benefit and should be considered even if you have had the Zostavax vaccine before.      --Contact your insurance provider and ask them if either shingles vaccine is covered and is so, how much it will cost you.  Usually this will be cheaper to get in a pharmacy given by the pharmacist.    --Regardless of the coverage, I would recommend that you consider the vaccine since shingles is very painful, just ask anyone who has ever had it.           5 GOALS TO PREVENT VASCULAR DISEASE:     1.  Aggressive blood pressure " "control, under 130/80 ideally.  Using medications if needed.    Your blood pressure is under good control    BP Readings from Last 4 Encounters:   06/07/18 134/64   01/11/18 139/79   10/11/17 112/58   09/05/17 122/56       2.  Aggressive LDL cholesterol (\"bad cholesterol\") lowering as indicated.    Your goal is an LDL under 130 for sure, preferably under 100.  (If you have diabetes or previous vascular disease, the the LDL goals would be under 100 for sure, preferably under 70.)    New guidelines identify four high-risk groups who could benefit from statins:   *people with pre-existing heart disease, such as those who have had a heart attack;   *people ages 40 to 75 who have diabetes of any type  *patients ages 40 to 75 with at least a 7.5% risk of developing cardiovascular disease over the next decade, according to a formula described in the guidelines  *patients with the sort of super-high cholesterol that sometimes runs in families, as evidenced by an LDL of 190 milligrams per deciliter or higher    Your cholesterol levels are well controlled.    Recent Labs   Lab Test  06/21/16   0837  10/09/14   1548   CHOL  133  158   HDL  40  60   LDL  72  70   TRIG  103  140   CHOLHDLRATIO   --   2.6       3.  Aggressive diabetic prevention, screening and/or management.      You do not have diabetes as of the most recent blood tests.     4.  No smoking    5.  Consider taking low dose aspirin (81 mg) tablet once per day over the age of 50, every day unless there is a specific reason that you cannot take aspirin (such as side effect, allergy, or you are on another \"blood thinner\").        --You should NOT take Aspirin due to the Xarelto.           Follow-ups after your visit        Who to contact     If you have questions or need follow up information about today's clinic visit or your schedule please contact [facility] directly at [phone].  Normal or non-critical lab and imaging results will be " communicated to you by Spinlisterhart, letter or phone within 4 business days after the clinic has received the results. If you do not hear from us within 7 days, please contact the clinic through MyRefers or phone. If you have a critical or abnormal lab result, we will notify you by phone as soon as possible.  Submit refill requests through MyRefers or call your pharmacy and they will forward the refill request to us. Please allow 3 business days for your refill to be completed.          Additional Information About Your Visit        MyRefers Information     MyRefers gives you secure access to your electronic health record. If you see a primary care provider, you can also send messages to your care team and make appointments. If you have questions, please call your primary care clinic.  If you do not have a primary care provider, please call [phone] and they will assist you.        Care EveryWhere ID     This is your Care EveryWhere ID. This could be used by other organizations to access your [place] medical records  [id number]        Your Vitals Were     Pulse Temperature Pulse Oximetry BMI (Body Mass Index)          45 98.3  F (36.8  C) (Oral) 94% 30.2 kg/m2         Blood Pressure from Last 3 Encounters:   06/07/18 134/64   01/11/18 139/79   10/11/17 112/58    Weight from Last 3 Encounters:   06/07/18 198 lb 9.6 oz (90.1 kg)   01/11/18 196 lb 9.6 oz (89.2 kg)   10/11/17 185 lb 11.2 oz (84.2 kg)              We Performed the Following     CBC with platelets     Comprehensive metabolic panel     Lipid panel reflex to direct LDL Fasting     Uric acid          Today's Medication Changes          These changes are accurate as of 6/7/18  8:53 AM.  If you have any questions, ask your nurse or doctor.               Start taking these medicines.        Dose/Directions    zoster vaccine recombinant adjuvanted injection   Commonly known as:  SHINGRIX   Used for:  Need for shingles vaccine   Started by:  Igor Christina  MD Manuel        Dose:  1 each   Inject 0.5 mLs into the muscle once for 1 dose REPEAT SECOND DOSE IN 2-6 MONTHS   Quantity:  0.5 mL   Refills:  1            Where to get your medicines      These medications were sent to [facility] [address]  [address] [address]     Phone:  [phone]     zoster vaccine recombinant adjuvanted injection                Primary Care Provider Office Phone # Fax #    Igor Manuel Christina [phone]       [address] [facility] [address]        Equal Access to Services     [facility]: Hadii aad ku hadasho Soomaali, waaxda luqadaha, qaybta kaalmada adeegyada, waxtiffanie idiin hayaan eliza kharakera benavides . So [facility] [phone].    ATENCIÓN: Si habla español, tiene a noel disposición servicios gratuitos de asistencia lingüística. Ll[facility] [phone].    We comply with applicable federal civil rights laws and [place] laws. We do not discriminate on the basis of race, color, national origin, age, disability, sex, sexual orientation, or gender identity.            Thank you!     Thank you for choosing [facility]  for your care. Our goal is always to provide you with excellent care. Hearing back from our patients is one way we can continue to improve our services. Please take a few minutes to complete the written survey that you may receive in the mail after your visit with us. Thank you!             Your Updated Medication List - Protect others around you: Learn how to safely use, store and throw away your medicines at www.disposemymeds.org.          This list is accurate as of 6/7/18  8:53 AM.  Always use your most recent med list.                   Brand Name Dispense Instructions for use Diagnosis    allopurinol 300 MG tablet    ZYLOPRIM    90 tablet    TAKE 1 TABLET(300 MG) BY MOUTH DAILY    Gout, unspecified cause, unspecified chronicity, unspecified site       amiodarone 200  MG tablet    PACERONE/CODARONE    90 tablet    Take 1 tab daily from Mon through Friday (skip Sat + Sun)    Atrial fibrillation, unspecified type (H)       * amLODIPine 2.5 MG tablet    NORVASC    90 tablet    Take 1 tablet (2.5 mg) by mouth daily    Benign essential hypertension       * amLODIPine 5 MG tablet    NORVASC     Take 0.5 tablets (2.5 mg) by mouth daily    Benign essential hypertension       ASPIRIN NOT PRESCRIBED    INTENTIONAL    0 each    Please choose reason not prescribed, below    Benign essential hypertension       BENADRYL 25 MG capsule   Generic drug:  diphenhydrAMINE      Take 25 mg by mouth nightly as needed for itching or allergies        hydrochlorothiazide 12.5 MG capsule    MICROZIDE    180 capsule    Take 2 capsules (25 mg) by mouth every morning    Persistent atrial fibrillation (H)       INSPRA 25 MG tablet   Generic drug:  eplerenone      Take 25 mg by mouth 2 times daily        nebivolol 5 MG tablet    BYSTOLIC    90 tablet    Take 1 tablet (5 mg) by mouth daily    Benign essential hypertension, Atrial flutter, paroxysmal (H), Persistent atrial fibrillation (H)       rivaroxaban ANTICOAGULANT 20 MG Tabs tablet    XARELTO    90 tablet    Take 1 tablet (20 mg) by mouth daily (with dinner)    Atrial fibrillation, unspecified type (H)       TYLENOL PO      Take 500 mg by mouth every 8 hours as needed for mild pain or fever        zoster vaccine recombinant adjuvanted injection    SHINGRIX    0.5 mL    Inject 0.5 mLs into the muscle once for 1 dose REPEAT SECOND DOSE IN 2-6 MONTHS    Need for shingles vaccine       * Notice:  This list has 2 medication(s) that are the same as other medications prescribed for you. Read the directions carefully, and ask your doctor or other care provider to review them with you.

## 2018-06-07 NOTE — PATIENT INSTRUCTIONS
"*  Continue all medications at the same doses.  Contact your usual pharmacy if you need refills.     *  Return to see me in 6 months, sooner if needed.  Call [phone] to schedule this appointment.       SHINGLES VACCINE:     I would recommend that you consider getting a \"shingles vaccine\".  The shingles vaccine does not stop you from getting shingles, but it decreases the intensity of the event, the duration of the event, and decreases the painful nerve condition that results     There are two options available:     --Shingrix (available starting early 2018), 2 shots, 2-6 months apart  **recommended**   OR   --Zostavax, one shot    --Based on the available data, the Shingrix vaccine has superior benefit and should be considered even if you have had the Zostavax vaccine before.      --Contact your insurance provider and ask them if either shingles vaccine is covered and is so, how much it will cost you.  Usually this will be cheaper to get in a pharmacy given by the pharmacist.    --Regardless of the coverage, I would recommend that you consider the vaccine since shingles is very painful, just ask anyone who has ever had it.           5 GOALS TO PREVENT VASCULAR DISEASE:     1.  Aggressive blood pressure control, under 130/80 ideally.  Using medications if needed.    Your blood pressure is under good control    BP Readings from Last 4 Encounters:   06/07/18 134/64   01/11/18 139/79   10/11/17 112/58   09/05/17 122/56       2.  Aggressive LDL cholesterol (\"bad cholesterol\") lowering as indicated.    Your goal is an LDL under 130 for sure, preferably under 100.  (If you have diabetes or previous vascular disease, the the LDL goals would be under 100 for sure, preferably under 70.)    New guidelines identify four high-risk groups who could benefit from statins:   *people with pre-existing heart disease, such as those who have had a heart attack;   *people ages 40 to 75 who have diabetes of any type  *patients ages " "40 to 75 with at least a 7.5% risk of developing cardiovascular disease over the next decade, according to a formula described in the guidelines  *patients with the sort of super-high cholesterol that sometimes runs in families, as evidenced by an LDL of 190 milligrams per deciliter or higher    Your cholesterol levels are well controlled.    Recent Labs   Lab Test  06/21/16   0837  10/09/14   1548   CHOL  133  158   HDL  40  60   LDL  72  70   TRIG  103  140   CHOLHDLRATIO   --   2.6       3.  Aggressive diabetic prevention, screening and/or management.      You do not have diabetes as of the most recent blood tests.     4.  No smoking    5.  Consider taking low dose aspirin (81 mg) tablet once per day over the age of 50, every day unless there is a specific reason that you cannot take aspirin (such as side effect, allergy, or you are on another \"blood thinner\").        --You should NOT take Aspirin due to the Xarelto.   "

## 2018-06-07 NOTE — PROGRESS NOTES
SUBJECTIVE:   Reji Aguirre is a 77 year old male who presents to clinic today for the following health issues:    Pt is taking Amlodipine 2.5 mg daily, not 5 mg. PLease advise. Pt is taking HCTZ 12.5mg in AM and 12.5 in PM, sig says 25mg in AM. Please advise    Fax labs from today to Dr. Almonte [phone]    Hypertension Follow-up      Outpatient blood pressures are being checked at home.  Results are 110-130/60-70.    Low Salt Diet: no added salt    Advised by Nephrologist to get electrolytes checked. Please fax results to  # [phone]      Amount of exercise or physical activity: Occ    Problems taking medications regularly: No    Medication side effects: none    Diet: low salt            Problem list and histories reviewed & adjusted, as indicated.  Additional history: as documented        Reviewed and updated as needed this visit by clinical staff  Tobacco  Allergies  Meds       Reviewed and updated as needed this visit by Provider  Tobacco           Past Medical History:  ---------------------------  Past Medical History:   Diagnosis Date     Atrial fibrillation, unspecified type (H) 5/19/16    new onset A fib; nuclear stress test negative     Atrial flutter (H)      Cardiomyopathy (H)      Diverticulosis of colon (without mention of hemorrhage) 11/16/07    incidental diverticuli noted at colonoscopy     Gout, unspecified      LBBB (left bundle branch block)      Rosacea      Special screening for malignant neoplasms, colon 1995     Unspecified essential hypertension        Past Surgical History:  ---------------------------  Past Surgical History:   Procedure Laterality Date     ARTHROPLASTY KNEE Left 6/6/2016    Procedure: ARTHROPLASTY KNEE;  Surgeon: Derek Varma MD;  Location: [facility] OR     ARTHROSCOPY SHOULDER ROTATOR CUFF REPAIR  7/9/08    Left shoulder arthroscopic rotator cuff repair with acromioplasty     C NONSPECIFIC PROCEDURE  5/99    left knee arthroscopy      CARDIOVERSION  07-      COLONOSCOPY THRU STOMA W BIOPSY/CAUTERY TUMOR/POLYP/LESION  11/16/07    normal colonoscopy except for incidental diverticuli     ORTHOPEDIC SURGERY  04/11    2nd toe Lt foot       Current Medications:  ---------------------------  Current Outpatient Prescriptions   Medication Sig Dispense Refill     Acetaminophen (TYLENOL PO) Take 500 mg by mouth every 8 hours as needed for mild pain or fever       allopurinol (ZYLOPRIM) 300 MG tablet TAKE 1 TABLET(300 MG) BY MOUTH DAILY 90 tablet 0     amiodarone (PACERONE/CODARONE) 200 MG tablet Take 1 tab daily from Mon through Friday (skip Sat + Sun) 90 tablet 1     amLODIPine (NORVASC) 2.5 MG tablet Take 1 tablet (2.5 mg) by mouth daily 90 tablet 3     diphenhydrAMINE (BENADRYL) 25 MG capsule Take 25 mg by mouth nightly as needed for itching or allergies       eplerenone (INSPRA) 25 MG tablet Take 25 mg by mouth 2 times daily       hydrochlorothiazide (MICROZIDE) 12.5 MG capsule Take 2 capsules (25 mg) by mouth every morning 180 capsule 1     nebivolol (BYSTOLIC) 5 MG tablet Take 1 tablet (5 mg) by mouth daily 90 tablet 3     rivaroxaban ANTICOAGULANT (XARELTO) 20 MG TABS tablet Take 1 tablet (20 mg) by mouth daily (with dinner) 90 tablet 1     amLODIPine (NORVASC) 5 MG tablet Take 0.5 tablets (2.5 mg) by mouth daily (Patient not taking: Reported on 6/7/2018)         Allergies:  -------------  Allergies   Allergen Reactions     Ampicillin      rash     Metoprolol      Mild fatigue with Toprol; mildly decreased exercise capacity     Spironolactone Nausea and Difficulty breathing     Vicodin [Hydrocodone-Acetaminophen] Nausea       Social History:  -------------------  Social History     Social History     Marital status:      Spouse name: N/A     Number of children: N/A     Years of education: N/A     Occupational History     Not on file.     Social History Main Topics     Smoking status: Former Smoker     Quit date: 7/25/1963      Smokeless tobacco: Never Used     Alcohol use 0.0 oz/week     0 Standard drinks or equivalent per week      Comment: 1 drink a day     Drug use: No     Sexual activity: Yes     Partners: Female     Other Topics Concern     Caffeine Concern No     1 cup daily     Special Diet No     Exercise Yes     skiing     Social History Narrative       Family Medical History:  ------------------------------  Family History   Problem Relation Age of Onset     CEREBROVASCULAR DISEASE Father      d:89, dementia     Respiratory Mother      d:94     DIABETES Sister      b. 1938, IDDM since age 10     DIABETES Brother      b. 1943, type II DM         ROS:  REVIEW OF SYSTEMS:    RESP: negative for cough, dyspnea, wheezing, hemoptysis  CV: negative for chest pain, palpitations, PND, MAGAÑA, orthopnea  GI: negative for dysphagia, N/V, pain, melena, diarrhea and constipation  NEURO: negative for numbness/tingling, paralysis, incoordination, or focal weakness     OBJECTIVE:                                                    /64  Pulse (!) 45  Temp 98.3  F (36.8  C) (Oral)  Wt 198 lb 9.6 oz (90.1 kg)  SpO2 94%  BMI 30.2 kg/m2     GENERAL alert and no distress  EYES:  Normal sclera,conjunctiva, EOMI  HENT: oral and posterior pharynx without lesions or erythema, facies symmetric  NECK: Neck supple. No LAD, without thyroidmegaly or JVD., Carotids without bruits.  RESP: Clear to ausculation bilaterally without wheezes or crackles. Normal BS in all fields.  CV: Irregular rhythm ( consistent with known atrial fibrillation), rate 45-55; normal S1S2 without murmurs, rubs or gallops   LYMPH: no cervical lymph adenopathy appreciated  MS: extremities- no gross deformities of the visible extremities noted, no edema  PSYCH: Alert and oriented times 3; speech- coherent  SKIN:  No obvious significant skin lesions on visible portions of face          ASSESSMENT/PLAN:                                                      (I10) Benign essential  hypertension  (primary encounter diagnosis)  Comment: This condition is currently controlled on the current medical regimen.  Continue current therapy.  Discussed current hypertension treatment guidelines, including indications for treatment and treatment options.  Discussed the importance for aggressive management of HTN to prevent vascular complications later.  Recommended lower fat, lower carbohydrate, and lower sodium (<2000 mg)diet.  Discussed required intervals for follow up on HTN, lab studies.  Recommened pt. follow their blood pressures outside the clinic to ensure that BPs are remaining within guidelines, and to contact me if the readings are not within guidelines on a regular basis so we can adjust treatment as needed.   Plan: CBC with platelets, Comprehensive metabolic         panel, Lipid panel reflex to direct LDL         Fasting, ASPIRIN NOT PRESCRIBED (INTENTIONAL)            (I48.1) Persistent atrial fibrillation (H)  Comment: This condition is currently controlled on the current medical regimen.  Continue current therapy.   Plan: CBC with platelets, Comprehensive metabolic         panel, Lipid panel reflex to direct LDL Fasting            (I48.92) Atrial flutter, paroxysmal (H)  Comment: The pathophysiology of atrial fibrillation, various causes, possibility of lone atrial fibrillation, risk of thrombus and embolic stroke, need for anticoagulation depending on etiology and Chads Score was discussed with patient and/or relatives. Rate Vs rhythm control approach was discussed including no difference in two strategies in patients enrolled in Affirm and Race trials. In some patients, afib ablation may be an option and was reviewed.  The indication for anticoagulation was discussed with the patient and/or their family in detail. The pros and cons of using warfarin versus newer anticoagulants was discussed including the need for INR monitoring with warfarin, dietary restrictions with warfarin and low  cost of warfarin versus high co-pay for newer anticoagulants and lack of reversal agent with agents like Eliquis and Xarelto. Agents like Pradaxa has a reversal agent although the reversal agent can cause serious adverse events. The risk of bleeding including major bleeding and intracranial bleeding was discussed with all these agents and the data of efficacy and safety of these agents versus warfarin was communicated.  After discussion, the patient decided to take anticoagulation with Xarelto.   Plan:     (I48.91) Atrial fibrillation, unspecified type (H)  Comment:   Plan:     (M10.9) Gout, unspecified cause, unspecified chronicity, unspecified site  Comment:   Plan: CBC with platelets, Comprehensive metabolic         panel, Uric acid            (I42.9) Cardiomyopathy, unspecified type (H)  Comment:   Plan: CBC with platelets, Comprehensive metabolic         panel, Lipid panel reflex to direct LDL Fasting            (Z13.6) CARDIOVASCULAR SCREENING; LDL GOAL LESS THAN 130  Comment: Discussed cardiac disease risk factors and cardiac disease risk factor modification.   Plan: Lipid panel reflex to direct LDL Fasting            (Z23) Need for shingles vaccine  Comment:   Plan: zoster vaccine recombinant adjuvanted         (SHINGRIX) injection               See Patient Instructions    BAR SALEH M.D., MD  [facility]

## 2018-06-19 ENCOUNTER — TELEPHONE (OUTPATIENT)
Dept: CARDIOLOGY | Facility: CLINIC | Age: 78
End: 2018-06-19

## 2018-06-19 NOTE — TELEPHONE ENCOUNTER
Received a voice message earlier today requesting a call back to confirm his upcoming OV which was changed to 9/28/18. This is the correct upcoming date of follow up.  Attempted multiple times this morning, however no answer, and mail box is full. Can't leave a message.   Will try again later today.

## 2018-08-31 DIAGNOSIS — M10.9 GOUT, UNSPECIFIED CAUSE, UNSPECIFIED CHRONICITY, UNSPECIFIED SITE: ICD-10-CM

## 2018-08-31 NOTE — TELEPHONE ENCOUNTER
"Requested Prescriptions   Pending Prescriptions Disp Refills     allopurinol (ZYLOPRIM) 300 MG tablet [Pharmacy Med Name: ALLOPURINOL 300MG TABS] 90 tablet 0     Sig: TAKE ONE TABLET BY MOUTH EVERY DAY    Gout Agents Protocol Passed    8/31/2018 11:14 AM       Passed - CBC on file in past 12 months    Recent Labs   Lab Test  06/07/18   0907   WBC  8.0   RBC  5.16   HGB  16.8   HCT  49.8   PLT  162       For [facility] ONLY: [id number] = WBC, [id number] = RBC         Passed - ALT on file in past 12 months    Recent Labs   Lab Test  06/07/18   0907   ALT  58            Passed - Has Uric Acid on file in past 12 months and value is less than 6    Recent Labs   Lab Test  06/07/18   0907   URIC  4.3     If level is 6mg/dL or greater, ok to refill one time and refer to provider.          Passed - Recent (12 mo) or future (30 days) visit within the authorizing provider's specialty    Patient had office visit in the last 12 months or has a visit in the next 30 days with authorizing provider or within the authorizing provider's specialty.  See \"Patient Info\" tab in inbasket, or \"Choose Columns\" in Meds & Orders section of the refill encounter.           Passed - Patient is age 18 or older       Passed - Normal serum creatinine on file in the past 12 months    Recent Labs   Lab Test  06/07/18   0907   CR  1.16             Last Written Prescription Date:  2/28/2018  Last Fill Quantity: 90,  # refills: 0   Last office visit: 6/7/2018 with prescribing provider:  Dr. Christina    Future Office Visit:   Next 5 appointments (look out 90 days)     Sep 28, 2018 10:45 AM CDT   Return Visit with Anam Perera MD   [facility])    [address] [address] [address] [phone]                   "

## 2018-09-04 DIAGNOSIS — I48.19 PERSISTENT ATRIAL FIBRILLATION (H): ICD-10-CM

## 2018-09-04 RX ORDER — ALLOPURINOL 300 MG/1
TABLET ORAL
Qty: 90 TABLET | Refills: 0 | Status: SHIPPED | OUTPATIENT
Start: 2018-09-04 | End: 2018-10-23

## 2018-09-04 NOTE — TELEPHONE ENCOUNTER
"Requested Prescriptions   Pending Prescriptions Disp Refills     hydrochlorothiazide (MICROZIDE) 12.5 MG capsule [Pharmacy Med Name: HYDROCHLOROTHIAZIDE 12.5MG CAPS] 180 capsule 0     Sig: TAKE TWO CAPSULES BY MOUTH EVERY MORNING    Diuretics (Including Combos) Protocol Passed    9/4/2018 12:06 PM       Passed - Blood pressure under 140/90 in past 12 months    BP Readings from Last 3 Encounters:   06/07/18 134/64   01/11/18 139/79   10/11/17 112/58                Passed - Recent (12 mo) or future (30 days) visit within the authorizing provider's specialty    Patient had office visit in the last 12 months or has a visit in the next 30 days with authorizing provider or within the authorizing provider's specialty.  See \"Patient Info\" tab in inbasket, or \"Choose Columns\" in Meds & Orders section of the refill encounter.           Passed - Patient is age 18 or older       Passed - Normal serum creatinine on file in past 12 months    Recent Labs   Lab Test  06/07/18   0907   CR  1.16             Passed - Normal serum potassium on file in past 12 months    Recent Labs   Lab Test  06/07/18   0907   POTASSIUM  4.4                   Passed - Normal serum sodium on file in past 12 months    Recent Labs   Lab Test  06/07/18   0907   NA  140              Last Written Prescription Date:  2/28/2018  Last Fill Quantity: 180,  # refills: 1   Last office visit: 6/7/2018 with prescribing provider:  Dr. Christina    Future Office Visit:   Next 5 appointments (look out 90 days)     Sep 28, 2018 10:45 AM CDT   Return Visit with Anam Perera MD   [facility] ([facility])    [address] [address] [address] [phone]                   "

## 2018-09-05 RX ORDER — HYDROCHLOROTHIAZIDE 12.5 MG/1
CAPSULE ORAL
Qty: 180 CAPSULE | Refills: 2 | Status: SHIPPED | OUTPATIENT
Start: 2018-09-05 | End: 2019-06-04

## 2018-09-18 ENCOUNTER — RADIANT APPOINTMENT (OUTPATIENT)
Dept: GENERAL RADIOLOGY | Facility: CLINIC | Age: 78
End: 2018-09-18
Attending: INTERNAL MEDICINE
Payer: COMMERCIAL

## 2018-09-18 ENCOUNTER — OFFICE VISIT (OUTPATIENT)
Dept: INTERNAL MEDICINE | Facility: CLINIC | Age: 78
End: 2018-09-18
Payer: COMMERCIAL

## 2018-09-18 VITALS
TEMPERATURE: 98.3 F | HEART RATE: 42 BPM | WEIGHT: 209.6 LBS | SYSTOLIC BLOOD PRESSURE: 132 MMHG | RESPIRATION RATE: 12 BRPM | OXYGEN SATURATION: 95 % | BODY MASS INDEX: 31.87 KG/M2 | DIASTOLIC BLOOD PRESSURE: 74 MMHG

## 2018-09-18 DIAGNOSIS — M25.561 CHRONIC PAIN OF RIGHT KNEE: ICD-10-CM

## 2018-09-18 DIAGNOSIS — I48.91 ATRIAL FIBRILLATION, UNSPECIFIED TYPE (H): ICD-10-CM

## 2018-09-18 DIAGNOSIS — G89.29 CHRONIC PAIN OF RIGHT KNEE: Primary | ICD-10-CM

## 2018-09-18 DIAGNOSIS — Z23 NEED FOR PROPHYLACTIC VACCINATION AND INOCULATION AGAINST INFLUENZA: ICD-10-CM

## 2018-09-18 DIAGNOSIS — G89.29 CHRONIC PAIN OF RIGHT KNEE: ICD-10-CM

## 2018-09-18 DIAGNOSIS — M10.9 GOUT, UNSPECIFIED CAUSE, UNSPECIFIED CHRONICITY, UNSPECIFIED SITE: ICD-10-CM

## 2018-09-18 DIAGNOSIS — I48.19 PERSISTENT ATRIAL FIBRILLATION (H): ICD-10-CM

## 2018-09-18 DIAGNOSIS — Z23 NEED FOR SHINGLES VACCINE: ICD-10-CM

## 2018-09-18 DIAGNOSIS — M25.561 CHRONIC PAIN OF RIGHT KNEE: Primary | ICD-10-CM

## 2018-09-18 DIAGNOSIS — I48.92 ATRIAL FLUTTER, PAROXYSMAL (H): ICD-10-CM

## 2018-09-18 DIAGNOSIS — I10 BENIGN ESSENTIAL HYPERTENSION: ICD-10-CM

## 2018-09-18 PROCEDURE — G0008 ADMIN INFLUENZA VIRUS VAC: HCPCS | Performed by: INTERNAL MEDICINE

## 2018-09-18 PROCEDURE — 90662 IIV NO PRSV INCREASED AG IM: CPT | Performed by: INTERNAL MEDICINE

## 2018-09-18 PROCEDURE — 99214 OFFICE O/P EST MOD 30 MIN: CPT | Mod: 25 | Performed by: INTERNAL MEDICINE

## 2018-09-18 PROCEDURE — 73560 X-RAY EXAM OF KNEE 1 OR 2: CPT | Mod: RT

## 2018-09-18 NOTE — PATIENT INSTRUCTIONS
*  There is some degenerative joint disease in the right knee, but this is probably not causing all of your troubles since you are getting better.     *  The most likely cause for the knee pain is most likely strain of the medial collateral ligament along the inside margin of the knee joint.     *  Slow return to activity     *  Do the therapy exercises that you did after the knee joint placement.      *  If you pain increased or continues, then we will have to reconsider the diagnosis and have you return to see the orthopedists.     *  Second Shingrix vaccine due any time between now and September.      *-  Continue all medications at the same doses.  Contact your usual pharmacy if you need refills.

## 2018-09-18 NOTE — PROGRESS NOTES
"  SUBJECTIVE:   Reji Aguirre is a 78 year old male who presents to clinic today for the following health issues:      Musculoskeletal problem/pain      Duration: 5 months    Description  Location: R knee, dull aching pain. Intermittent    Pain in right knee since \"folding it\" while using computer during trip to [facility] this spring.   Hurts intermittently, usually only after wlalking, overuse, squatting.    Took fishing trip to [place] 2 weeks ago, lot sof walking over uneven surfaces.  Knee was very sore, but slowly improving since back   Cannot take NSAIDs due to Xarelto.     Pain specifically in the medial joint line.   Some swelling.   No locking, no instabili ty no giving out.   Some pain with pivoting twisting motions.   Up staris OK, down stairs worse.       Intensity:  mild, moderate, sx come and go. 1-6/10 pain is worse with activity    Accompanying signs and symptoms: none    History  Previous similar problem: no   Previous evaluation:  none    Precipitating or alleviating factors:  Trauma or overuse: no   Aggravating factors include: going down stairs, lifting, carrying things    Therapies tried and outcome: acetaminophen and Ibuprofen PRN with relief      2.    Blood presure remains well controlled at home  Readings outside clinic are within normal limits.  Reviewed last 6 BP readings in chart:  BP Readings from Last 6 Encounters:   09/18/18 132/74   06/07/18 134/64   01/11/18 139/79   10/11/17 112/58   09/05/17 122/56   08/17/17 130/57     He has not experienced any significant side effects from medicaiotns for hypertension.    NO active cardiac complaints or symptoms with exercise.     3.  Has history of atrial fibrillation.    No palpitations, no dizziness, no dyspnea on exertion, no shortness of breath.  No racing heart, no fast heart rates.    Taking medications as ordered, no side effects reported.   Patient is taking anticoagulation (Xarelto) according to direction, with no reported side effects. "       Problem list and histories reviewed & adjusted, as indicated.  Additional history: as documented        Reviewed and updated as needed this visit by clinical staff  Tobacco  Allergies  Meds       Reviewed and updated as needed this visit by Provider           Past Medical History:  ---------------------------  Past Medical History:   Diagnosis Date     Atrial fibrillation, unspecified type (H) 5/19/16    new onset A fib; nuclear stress test negative     Atrial flutter (H)      Cardiomyopathy (H)      Diverticulosis of colon (without mention of hemorrhage) 11/16/07    incidental diverticuli noted at colonoscopy     Gout, unspecified      LBBB (left bundle branch block)      Rosacea      Special screening for malignant neoplasms, colon 1995     Unspecified essential hypertension        Past Surgical History:  ---------------------------  Past Surgical History:   Procedure Laterality Date     ARTHROPLASTY KNEE Left 6/6/2016    Procedure: ARTHROPLASTY KNEE;  Surgeon: Derek Varma MD;  Location: [facility] OR     ARTHROSCOPY SHOULDER ROTATOR CUFF REPAIR  7/9/08    Left shoulder arthroscopic rotator cuff repair with acromioplasty     C NONSPECIFIC PROCEDURE  5/99    left knee arthroscopy     CARDIOVERSION  07-      COLONOSCOPY THRU STOMA W BIOPSY/CAUTERY TUMOR/POLYP/LESION  11/16/07    normal colonoscopy except for incidental diverticuli     ORTHOPEDIC SURGERY  04/11    2nd toe Lt foot       Current Medications:  ---------------------------  Current Outpatient Prescriptions   Medication Sig Dispense Refill     Acetaminophen (TYLENOL PO) Take 500 mg by mouth every 8 hours as needed for mild pain or fever       allopurinol (ZYLOPRIM) 300 MG tablet TAKE ONE TABLET BY MOUTH EVERY DAY 90 tablet 0     amiodarone (PACERONE/CODARONE) 200 MG tablet Take 1 tab daily from Mon through Friday (skip Sat + Sun) 90 tablet 1     amLODIPine (NORVASC) 2.5 MG tablet Take 1 tablet (2.5 mg) by mouth daily 90 tablet 3      ASPIRIN NOT PRESCRIBED (INTENTIONAL) Please choose reason not prescribed, below 0 each      diphenhydrAMINE (BENADRYL) 25 MG capsule Take 25 mg by mouth nightly as needed for itching or allergies       eplerenone (INSPRA) 25 MG tablet Take 25 mg by mouth 2 times daily       hydrochlorothiazide (MICROZIDE) 12.5 MG capsule TAKE TWO CAPSULES BY MOUTH EVERY MORNING 180 capsule 2     nebivolol (BYSTOLIC) 5 MG tablet Take 1 tablet (5 mg) by mouth daily 90 tablet 3     rivaroxaban ANTICOAGULANT (XARELTO) 20 MG TABS tablet Take 1 tablet (20 mg) by mouth daily (with dinner) 90 tablet 1     [DISCONTINUED] amLODIPine (NORVASC) 5 MG tablet Take 0.5 tablets (2.5 mg) by mouth daily (Patient not taking: Reported on 6/7/2018)         Allergies:  -------------  Allergies   Allergen Reactions     Ampicillin      rash     Metoprolol      Mild fatigue with Toprol; mildly decreased exercise capacity     Spironolactone Nausea and Difficulty breathing     Vicodin [Hydrocodone-Acetaminophen] Nausea       Social History:  -------------------  Social History     Social History     Marital status:      Spouse name: N/A     Number of children: N/A     Years of education: N/A     Occupational History     Not on file.     Social History Main Topics     Smoking status: Former Smoker     Quit date: 7/25/1963     Smokeless tobacco: Never Used     Alcohol use 0.0 oz/week     0 Standard drinks or equivalent per week      Comment: 1 drink a day     Drug use: No     Sexual activity: Yes     Partners: Female     Other Topics Concern     Caffeine Concern No     1 cup daily     Special Diet No     Exercise Yes     skiing     Social History Narrative       Family Medical History:  ------------------------------  Family History   Problem Relation Age of Onset     Cerebrovascular Disease Father      d:89, dementia     Respiratory Mother      d:94     Diabetes Sister      b. 1938, IDDM since age 10     Diabetes Brother      b. 1943, type II DM          ROS:  REVIEW OF SYSTEMS:    RESP: negative for cough, dyspnea, wheezing, hemoptysis  CV: negative for chest pain, palpitations, PND, MAGAÑA, orthopnea; reports no changes in their ability to perform physical activity (from cardiovascular standpoint)  GI: negative for dysphagia, N/V, pain, melena, diarrhea and constipation  NEURO: negative for numbness/tingling, paralysis, incoordination, or focal weakness     OBJECTIVE:                                                    /74  Pulse (!) 42  Temp 98.3  F (36.8  C) (Oral)  Resp 12  Wt 209 lb 9.6 oz (95.1 kg)  SpO2 95%  BMI 31.87 kg/m2     GENERAL alert and no distress  EYES:  Normal sclera,conjunctiva, EOMI  HENT: oral and posterior pharynx without lesions or erythema, facies symmetric  NECK: Neck supple. No LAD, without thyroidmegaly or JVD., Carotids without bruits.  RESP: Clear to ausculation bilaterally without wheezes or crackles. Normal BS in all fields.  CV: Irregular rhythm ( consistent with known atrial fibrillation), regular rate; normal S1S2 without murmurs, rubs or gallops   LYMPH: no cervical lymph adenopathy appreciated  MS: extremities- no gross deformities of the visible extremities noted, no edema  PSYCH: Alert and oriented times 3; speech- coherent  SKIN:  No obvious significant skin lesions on visible portions of face   KNEE:  Mild swelling, pain along medial joint line and with stressing of medical collateral ligament.   No locking, no crepitus.   Mildly positive MacMurray sign.   Negative drawer test.     KNEE XRAY:  Mild.moderate DJD (,y prelim review)     ASSESSMENT/PLAN:                                                        (M25.561,  G89.29) Chronic pain of right knee  (primary encounter diagnosis)  Comment: mild DJD.   See orthop when the sx are limiting.   Plan: XR Knee Standing Right 2 Views            (I48.91) Atrial fibrillation, unspecified type (H)  Comment: This condition is currently controlled on the current medical  regimen.  Continue current therapy.   Plan:     (I48.1) Persistent atrial fibrillation (H)  Comment:   Plan:     (I48.92) Atrial flutter, paroxysmal (H)  Comment:   Plan:     (M10.9) Gout, unspecified cause, unspecified chronicity, unspecified site  Comment: This condition is currently controlled on the current medical regimen.  Continue current therapy.   Recheck labs,.   Plan:     (I10) Benign essential hypertension  Comment: This condition is currently controlled on the current medical regimen.  Continue current therapy.   Discussed current hypertension treatment guidelines, including indications for treatment and treatment options.  Discussed the importance for aggressive management of HTN to prevent vascular complications later.  Recommended lower fat, lower carbohydrate, and lower sodium (<2000 mg)diet.  Discussed required intervals for follow up on HTN, lab studies.  Recommened pt. follow their blood pressures outside the clinic to ensure that BPs are remaining within guidelines, and to contact me if the readings are not within guidelines on a regular basis so we can adjust treatment as needed.   Plan:     (Z23) Need for prophylactic vaccination and inoculation against influenza  Comment:   Plan: FLU VACCINE, INCREASED ANTIGEN, PRESV FREE, AGE        65+ [[address]273], ADMIN INFLUENZA (For MEDICARE         Patients ONLY) []            (Z23) Need for shingles vaccine  Comment: will get at pharmacy.   Plan: zoster vaccine recombinant adjuvanted         (SHINGRIX) injection               See Patient Instructions    BAR SALEH M.D., MD  [facility]     Injectable Influenza Immunization Documentation    1.  Is the person to be vaccinated sick today?   No    2. Does the person to be vaccinated have an allergy to a component   of the vaccine?   No  Egg Allergy Algorithm Link    3. Has the person to be vaccinated ever had a serious reaction   to influenza vaccine in the past?   No    4. Has the  person to be vaccinated ever had Guillain-Barré syndrome?   No    Form completed by Amy Todd, [facility]

## 2018-09-18 NOTE — MR AVS SNAPSHOT
After Visit Summary   9/18/2018    Reji Aguirre    MRN: [id number]           Patient Information     Date Of Birth          1940        Visit Information        Provider Department      9/18/2018 9:20 AM Igor Christina MD [facility]        Today's Diagnoses     Chronic pain of right knee    -  1    Need for prophylactic vaccination and inoculation against influenza        Need for shingles vaccine          Care Instructions    *  There is some degenerative joint disease in the right knee, but this is probably not causing all of your troubles since you are getting better.     *  The most likely cause for the knee pain is most likely strain of the medial collateral ligament along the inside margin of the knee joint.     *  Slow return to activity    *  Do the therapy exercises that you did after the knee joint placement.      *  If you pain increased or continues, then we will have to reconsider the diagnosis and have you return to see the orthopedists.     *  Second Shingrix vaccine due any time between now and September.      *-  Continue all medications at the same doses.  Contact your usual pharmacy if you need refills.             Follow-ups after your visit        Your next 10 appointments already scheduled     Sep 28, 2018  9:45 AM CDT   LAB with RU LAB   [facility] ([facility])    [address] [address] [address] [phone]           Please do not eat 10-12 hours before your appointment if you are coming in fasting for labs on lipids, cholesterol, or glucose (sugar). This does not apply to pregnant women. Water, hot tea and black coffee (with nothing added) are okay. Do not drink other fluids, diet soda or chew gum.            Sep 28, 2018 10:45 AM CDT   Return Visit with Anam Perera MD   [facility] ([facility])    [address]  [address]  [place] [facility] [address] [phone]            Oct 10, 2018  8:50 AM CDT   [facility] EP RETURN with ERIKA Arreguin CNP   [facility] ([facility])    [address] [address]  [place] [facility] [address] [phone]              Who to contact     If you have questions or need follow up information about today's clinic visit or your schedule please contact [facility] directly at [phone].  Normal or non-critical lab and imaging results will be communicated to you by [employer], letter or phone within 4 business days after the clinic has received the results. If you do not hear from us within 7 days, please contact the clinic through [employer] or phone. If you have a critical or abnormal lab result, we will notify you by phone as soon as possible.  Submit refill requests through [employer] or call your pharmacy and they will forward the refill request to us. Please allow 3 business days for your refill to be completed.          Additional Information About Your Visit        MyChart Information     [employer] gives you secure access to your electronic health record. If you see a primary care provider, you can also send messages to your care team and make appointments. If you have questions, please call your primary care clinic.  If you do not have a primary care provider, please call [phone] and they will assist you.        Care EveryWhere ID     This is your Care EveryWhere ID. This could be used by other organizations to access your [facility] medical records  [id number]        Your Vitals Were     Pulse Temperature Respirations Pulse Oximetry BMI (Body Mass Index)       42 98.3  F (36.8  C) (Oral) 12 95% 31.87 kg/m2        Blood Pressure from Last 3 Encounters:   09/18/18 132/74   06/07/18 134/64   01/11/18 139/79    Weight from Last 3 Encounters:   09/18/18 209 lb 9.6 oz (95.1 kg)   06/07/18 198 lb  9.6 oz (90.1 kg)   01/11/18 196 lb 9.6 oz (89.2 kg)              We Performed the Following     ADMIN INFLUENZA (For MEDICARE Patients ONLY) []     FLU VACCINE, INCREASED ANTIGEN, PRESV FREE, AGE 65+ [83308]          Today's Medication Changes          These changes are accurate as of 9/18/18 10:30 AM.  If you have any questions, ask your nurse or doctor.               Start taking these medicines.        Dose/Directions    zoster vaccine recombinant adjuvanted injection   Commonly known as:  SHINGRIX   Used for:  Need for shingles vaccine   Started by:  Igor Christina MD        Dose:  1 each   Inject 0.5 mLs into the muscle once for 1 dose   Quantity:  0.5 mL   Refills:  0            Where to get your medicines      These medications were sent to [facility] [address] [address]     Phone:  [phone]     zoster vaccine recombinant adjuvanted injection                Primary Care Provider Office Phone # Fax #    Igor Christina [phone]       [address] [place] [address]        Equal Access to Services     [facility] : Hadii hang ku hadasho Soomaali, waaxda luqadaha, qaybta kaalmada adeegyada, andres sheets haytang benavides . So [facility] [phone].    ATENCIÓN: Si habla español, tiene a noel disposición servicios gratuitos de asistencia lingüística. Llame al [phone].    We comply with applicable federal civil rights laws and [place] laws. We do not discriminate on the basis of race, color, national origin, age, disability, sex, sexual orientation, or gender identity.            Thank you!     Thank you for choosing [facility]  for your care. Our goal is always to provide you with excellent care. Hearing back from our patients is one way we can continue to improve our services. Please take a few minutes to complete the written survey that you may receive  in the mail after your visit with us. Thank you!             Your Updated Medication List - Protect others around you: Learn how to safely use, store and throw away your medicines at www.disposemymeds.org.          This list is accurate as of 9/18/18 10:30 AM.  Always use your most recent med list.                   Brand Name Dispense Instructions for use Diagnosis    allopurinol 300 MG tablet    ZYLOPRIM    90 tablet    TAKE ONE TABLET BY MOUTH EVERY DAY    Gout, unspecified cause, unspecified chronicity, unspecified site       amiodarone 200 MG tablet    PACERONE/CODARONE    90 tablet    Take 1 tab daily from Mon through Friday (skip Sat + Sun)    Atrial fibrillation, unspecified type (H)       amLODIPine 2.5 MG tablet    NORVASC    90 tablet    Take 1 tablet (2.5 mg) by mouth daily    Benign essential hypertension       ASPIRIN NOT PRESCRIBED    INTENTIONAL    0 each    Please choose reason not prescribed, below    Benign essential hypertension       BENADRYL 25 MG capsule   Generic drug:  diphenhydrAMINE      Take 25 mg by mouth nightly as needed for itching or allergies        hydrochlorothiazide 12.5 MG capsule    MICROZIDE    180 capsule    TAKE TWO CAPSULES BY MOUTH EVERY MORNING    Persistent atrial fibrillation (H)       INSPRA 25 MG tablet   Generic drug:  eplerenone      Take 25 mg by mouth 2 times daily        nebivolol 5 MG tablet    BYSTOLIC    90 tablet    Take 1 tablet (5 mg) by mouth daily    Benign essential hypertension, Atrial flutter, paroxysmal (H), Persistent atrial fibrillation (H)       rivaroxaban ANTICOAGULANT 20 MG Tabs tablet    XARELTO    90 tablet    Take 1 tablet (20 mg) by mouth daily (with dinner)    Atrial fibrillation, unspecified type (H)       TYLENOL PO      Take 500 mg by mouth every 8 hours as needed for mild pain or fever        zoster vaccine recombinant adjuvanted injection    SHINGRIX    0.5 mL    Inject 0.5 mLs into the muscle once for 1 dose    Need for shingles  vaccine

## 2018-09-28 ENCOUNTER — OFFICE VISIT (OUTPATIENT)
Dept: CARDIOLOGY | Facility: CLINIC | Age: 78
End: 2018-09-28
Attending: INTERNAL MEDICINE
Payer: COMMERCIAL

## 2018-09-28 VITALS
HEIGHT: 68 IN | HEART RATE: 43 BPM | SYSTOLIC BLOOD PRESSURE: 138 MMHG | BODY MASS INDEX: 31.52 KG/M2 | DIASTOLIC BLOOD PRESSURE: 90 MMHG | OXYGEN SATURATION: 96 % | WEIGHT: 208 LBS

## 2018-09-28 DIAGNOSIS — I10 BENIGN ESSENTIAL HYPERTENSION: ICD-10-CM

## 2018-09-28 DIAGNOSIS — I48.19 PERSISTENT ATRIAL FIBRILLATION (H): ICD-10-CM

## 2018-09-28 DIAGNOSIS — I48.92 ATRIAL FLUTTER, PAROXYSMAL (H): ICD-10-CM

## 2018-09-28 LAB
ALBUMIN SERPL-MCNC: 3.7 G/DL (ref 3.4–5)
ALP SERPL-CCNC: 74 U/L (ref 40–150)
ALT SERPL W P-5'-P-CCNC: 38 U/L (ref 0–70)
AST SERPL W P-5'-P-CCNC: 33 U/L (ref 0–45)
BILIRUB DIRECT SERPL-MCNC: 0.2 MG/DL (ref 0–0.2)
BILIRUB SERPL-MCNC: 0.7 MG/DL (ref 0.2–1.3)
PROT SERPL-MCNC: 7.3 G/DL (ref 6.8–8.8)
TSH SERPL DL<=0.005 MIU/L-ACNC: 0.86 MU/L (ref 0.4–4)

## 2018-09-28 PROCEDURE — 80076 HEPATIC FUNCTION PANEL: CPT | Performed by: INTERNAL MEDICINE

## 2018-09-28 PROCEDURE — 84443 ASSAY THYROID STIM HORMONE: CPT | Performed by: INTERNAL MEDICINE

## 2018-09-28 PROCEDURE — 99214 OFFICE O/P EST MOD 30 MIN: CPT | Performed by: INTERNAL MEDICINE

## 2018-09-28 PROCEDURE — 36415 COLL VENOUS BLD VENIPUNCTURE: CPT | Performed by: INTERNAL MEDICINE

## 2018-09-28 NOTE — PROGRESS NOTES
HPI and Plan:   See dictation    Orders Placed This Encounter   Procedures     XR Chest 2 Views     Hepatic panel     TSH with free T4 reflex     Basic metabolic panel     Follow-Up with Cardiologist     Pulmonary function test       No orders of the defined types were placed in this encounter.      Encounter Diagnoses   Name Primary?     Persistent atrial fibrillation (H)      Atrial flutter, paroxysmal (H)      Benign essential hypertension        CURRENT MEDICATIONS:  Current Outpatient Prescriptions   Medication Sig Dispense Refill     Acetaminophen (TYLENOL PO) Take 500 mg by mouth every 8 hours as needed for mild pain or fever       allopurinol (ZYLOPRIM) 300 MG tablet TAKE ONE TABLET BY MOUTH EVERY DAY 90 tablet 0     amiodarone (PACERONE/CODARONE) 200 MG tablet Take 1 tab daily from Mon through Friday (skip Sat + Sun) 90 tablet 1     amLODIPine (NORVASC) 2.5 MG tablet Take 1 tablet (2.5 mg) by mouth daily 90 tablet 3     ASPIRIN NOT PRESCRIBED (INTENTIONAL) Please choose reason not prescribed, below 0 each      diphenhydrAMINE (BENADRYL) 25 MG capsule Take 25 mg by mouth nightly as needed for itching or allergies       eplerenone (INSPRA) 25 MG tablet Take 25 mg by mouth 2 times daily       hydrochlorothiazide (MICROZIDE) 12.5 MG capsule TAKE TWO CAPSULES BY MOUTH EVERY MORNING 180 capsule 2     nebivolol (BYSTOLIC) 5 MG tablet Take 1 tablet (5 mg) by mouth daily 90 tablet 3     rivaroxaban ANTICOAGULANT (XARELTO) 20 MG TABS tablet Take 1 tablet (20 mg) by mouth daily (with dinner) 90 tablet 1       ALLERGIES     Allergies   Allergen Reactions     Ampicillin      rash     Metoprolol      Mild fatigue with Toprol; mildly decreased exercise capacity     Spironolactone Nausea and Difficulty breathing     Vicodin [Hydrocodone-Acetaminophen] Nausea       PAST MEDICAL HISTORY:  Past Medical History:   Diagnosis Date     Atrial fibrillation, unspecified type (H) 5/19/16    new onset A fib; nuclear stress test  negative     Atrial flutter (H)      Benign essential hypertension 2/2/2004     Cardiomyopathy (H)      Diverticulosis of colon (without mention of hemorrhage) 11/16/07    incidental diverticuli noted at colonoscopy     Gout, unspecified      LBBB (left bundle branch block)      Rosacea      Special screening for malignant neoplasms, colon 1995       PAST SURGICAL HISTORY:  Past Surgical History:   Procedure Laterality Date     ARTHROPLASTY KNEE Left 6/6/2016    Procedure: ARTHROPLASTY KNEE;  Surgeon: Derek Varma MD;  Location: [facility] OR     ARTHROSCOPY SHOULDER ROTATOR CUFF REPAIR  7/9/08    Left shoulder arthroscopic rotator cuff repair with acromioplasty     C NONSPECIFIC PROCEDURE  5/99    left knee arthroscopy     CARDIOVERSION  07-     HC COLONOSCOPY THRU STOMA W BIOPSY/CAUTERY TUMOR/POLYP/LESION  11/16/07    normal colonoscopy except for incidental diverticuli     ORTHOPEDIC SURGERY  04/11    2nd toe Lt foot       FAMILY HISTORY:  Family History   Problem Relation Age of Onset     Cerebrovascular Disease Father      d:89, dementia     Respiratory Mother      d:94     Diabetes Sister      b. 1938, IDDM since age 10     Diabetes Brother      b. 1943, type II DM       SOCIAL HISTORY:  Social History     Social History     Marital status:      Spouse name: N/A     Number of children: N/A     Years of education: N/A     Social History Main Topics     Smoking status: Former Smoker     Quit date: 7/25/1963     Smokeless tobacco: Never Used     Alcohol use 0.0 oz/week     0 Standard drinks or equivalent per week      Comment: 1 drink a day     Drug use: No     Sexual activity: Yes     Partners: Female     Other Topics Concern     Caffeine Concern No     1 cup daily     Special Diet No     Exercise Yes     skiing     Social History Narrative       Review of Systems:  Skin:  Positive for     Eyes:  Positive for glasses  ENT:  Negative    Respiratory:  Negative    Cardiovascular:     "edema;Positive for  Gastroenterology: Negative    Genitourinary:  Negative    Musculoskeletal:  Positive for back pain;arthritis;joint pain  Neurologic:  Negative    Psychiatric:  Negative    Heme/Lymph/Imm:  Positive for easy bruising  Endocrine:  Negative      Physical Exam:  Vitals: /90 (BP Location: Right arm, Patient Position: Sitting, Cuff Size: Adult Large)  Pulse (!) 43  Ht 1.727 m (5' 8\")  Wt 94.3 kg (208 lb)  SpO2 96%  BMI 31.63 kg/m2    Constitutional:  cooperative, alert and oriented, well developed, well nourished, in no acute distress appears anxious      Skin:  warm and dry to the touch, no apparent skin lesions or masses noted          Head:  normocephalic, no masses or lesions        Eyes:  pupils equal and round;conjunctivae and lids unremarkable        Lymph:      ENT:  no pallor or cyanosis, dentition good        Neck:  no carotid bruit;JVP normal        Respiratory:  normal breath sounds, clear to auscultation, normal A-P diameter, normal symmetry, normal respiratory excursion, no use of accessory muscles         Cardiac: no murmurs, gallops or rubs detected bradycardic distant heart sounds            pulses full and equal                                        GI:  abdomen soft;BS normoactive;non-tender        Extremities and Muscular Skeletal:  no deformities, clubbing, cyanosis, erythema observed   trace;bilateral LE edema          Neurological:           Psych:           Recent Lab Results:  LIPID RESULTS:  Lab Results   Component Value Date    CHOL 217 (H) 06/07/2018    HDL 62 06/07/2018     (H) 06/07/2018    TRIG 103 06/07/2018    CHOLHDLRATIO 2.6 10/09/2014       LIVER ENZYME RESULTS:  Lab Results   Component Value Date    AST 33 09/28/2018    ALT 38 09/28/2018       CBC RESULTS:  Lab Results   Component Value Date    WBC 8.0 06/07/2018    RBC 5.16 06/07/2018    HGB 16.8 06/07/2018    HCT 49.8 06/07/2018    MCV 97 06/07/2018    MCH 32.6 06/07/2018    MCHC 33.7 06/07/2018 "    RDW 15.0 06/07/2018     06/07/2018       BMP RESULTS:  Lab Results   Component Value Date     06/07/2018    POTASSIUM 4.4 06/07/2018    CHLORIDE 105 06/07/2018    CO2 29 06/07/2018    ANIONGAP 6 06/07/2018    GLC 90 06/07/2018    BUN 23 06/07/2018    CR 1.16 06/07/2018    GFRESTIMATED 61 06/07/2018    GFRESTBLACK 74 06/07/2018    HE 9.4 06/07/2018        A1C RESULTS:  No results found for: A1C    INR RESULTS:  Lab Results   Component Value Date    INR 1.94 (H) 08/16/2017           CC  Anam Perera MD  [id number]  [place] [id number]

## 2018-09-28 NOTE — PROGRESS NOTES
Service Date: 09/28/2018      HISTORY OF PRESENT ILLNESS:  It is a pleasure for me to see Mr. Aguirre again for followup of paroxysmal atrial fibrillation, paroxysmal atrial flutter, tachycardia-induced hypertension, and resistant hypertension.        I had the pleasure of meeting this gentleman for the first time in the summer of 2016 shortly after he had knee surgery.  At that time, he had paroxysmal atrial flutter and fibrillation and an EF of 30%-40%.  The stress testing showed no evidence of ischemia.  He was placed on amiodarone.  This resulted in adequate suppression of his atrial arrhythmias and his ejection fraction had normalized.  At one point, his blood pressure was difficult to control and he actually went down to the [facility].  They put him on eplerenone as they thought he may have hyperaldosteronism.      He returns today telling me that he feels very well and he plans to live forever.  He is not troubled by shortness of breath and he has no heart failure symptoms.  He denies chest pains or palpitations.  He does have a resting heart rate in the 40s and 50s.  He is in sinus bradycardia.  He is tolerating all medications well.        Physical examination is notable only for the presence of the bradycardia.  His liver function tests and thyroid function tests are normal.  Last year's pulmonary function tests are adequate.        IMPRESSION:     1.  Paroxysmal persistent atrial fibrillation.  Maintained on low-dose amiodarone 5 days a week.  Normal liver, pulmonary, and thyroid function tests.  Continue current medications.     2.  Resistant hypertension.  Continue eplerenone, hydrochlorothiazide, Bystolic, and amlodipine.  No side effects.  Blood pressure at home appears adequate.     3.  Sinus bradycardia.  Asymptomatic.        I will see him again in 6 months' time.  He will have pulmonary function tests as well as a chest x-ray prior to clinic visit.         BEATRICE NEWELL MD, FACC             D:  2018   T: 2018   MT: LIONEL      Name:     MARCIE CABALLERO   MRN:      [id number]        Account:      [id number]   :      1940           Service Date: 2018      Document: [id number]

## 2018-09-28 NOTE — LETTER
9/28/2018    Igor Christina MD  [address] [facility] [address]    RE: Reji Aguirre       Dear Colleague,    I had the pleasure of seeing Reji Aguirre in the [facility].    HPI and Plan:   See dictation    Orders Placed This Encounter   Procedures     XR Chest 2 Views     Hepatic panel     TSH with free T4 reflex     Basic metabolic panel     Follow-Up with Cardiologist     Pulmonary function test       No orders of the defined types were placed in this encounter.      Encounter Diagnoses   Name Primary?     Persistent atrial fibrillation (H)      Atrial flutter, paroxysmal (H)      Benign essential hypertension        CURRENT MEDICATIONS:  Current Outpatient Prescriptions   Medication Sig Dispense Refill     Acetaminophen (TYLENOL PO) Take 500 mg by mouth every 8 hours as needed for mild pain or fever       allopurinol (ZYLOPRIM) 300 MG tablet TAKE ONE TABLET BY MOUTH EVERY DAY 90 tablet 0     amiodarone (PACERONE/CODARONE) 200 MG tablet Take 1 tab daily from Mon through Friday (skip Sat + Sun) 90 tablet 1     amLODIPine (NORVASC) 2.5 MG tablet Take 1 tablet (2.5 mg) by mouth daily 90 tablet 3     ASPIRIN NOT PRESCRIBED (INTENTIONAL) Please choose reason not prescribed, below 0 each      diphenhydrAMINE (BENADRYL) 25 MG capsule Take 25 mg by mouth nightly as needed for itching or allergies       eplerenone (INSPRA) 25 MG tablet Take 25 mg by mouth 2 times daily       hydrochlorothiazide (MICROZIDE) 12.5 MG capsule TAKE TWO CAPSULES BY MOUTH EVERY MORNING 180 capsule 2     nebivolol (BYSTOLIC) 5 MG tablet Take 1 tablet (5 mg) by mouth daily 90 tablet 3     rivaroxaban ANTICOAGULANT (XARELTO) 20 MG TABS tablet Take 1 tablet (20 mg) by mouth daily (with dinner) 90 tablet 1       ALLERGIES     Allergies   Allergen Reactions     Ampicillin      rash     Metoprolol      Mild fatigue with Toprol; mildly decreased exercise capacity     Spironolactone Nausea and Difficulty  breathing     Vicodin [Hydrocodone-Acetaminophen] Nausea       PAST MEDICAL HISTORY:  Past Medical History:   Diagnosis Date     Atrial fibrillation, unspecified type (H) 5/19/16    new onset A fib; nuclear stress test negative     Atrial flutter (H)      Benign essential hypertension 2/2/2004     Cardiomyopathy (H)      Diverticulosis of colon (without mention of hemorrhage) 11/16/07    incidental diverticuli noted at colonoscopy     Gout, unspecified      LBBB (left bundle branch block)      Rosacea      Special screening for malignant neoplasms, colon 1995       PAST SURGICAL HISTORY:  Past Surgical History:   Procedure Laterality Date     ARTHROPLASTY KNEE Left 6/6/2016    Procedure: ARTHROPLASTY KNEE;  Surgeon: Derek Varma MD;  Location: [facility] OR     ARTHROSCOPY SHOULDER ROTATOR CUFF REPAIR  7/9/08    Left shoulder arthroscopic rotator cuff repair with acromioplasty     C NONSPECIFIC PROCEDURE  5/99    left knee arthroscopy     CARDIOVERSION  07-     HC COLONOSCOPY THRU STOMA W BIOPSY/CAUTERY TUMOR/POLYP/LESION  11/16/07    normal colonoscopy except for incidental diverticuli     ORTHOPEDIC SURGERY  04/11    2nd toe Lt foot       FAMILY HISTORY:  Family History   Problem Relation Age of Onset     Cerebrovascular Disease Father      d:89, dementia     Respiratory Mother      d:94     Diabetes Sister      b. 1938, IDDM since age 10     Diabetes Brother      b. 1943, type II DM       SOCIAL HISTORY:  Social History     Social History     Marital status:      Spouse name: N/A     Number of children: N/A     Years of education: N/A     Social History Main Topics     Smoking status: Former Smoker     Quit date: 7/25/1963     Smokeless tobacco: Never Used     Alcohol use 0.0 oz/week     0 Standard drinks or equivalent per week      Comment: 1 drink a day     Drug use: No     Sexual activity: Yes     Partners: Female     Other Topics Concern     Caffeine Concern No     1 cup daily     Special  "Diet No     Exercise Yes     skiing     Social History Narrative       Review of Systems:  Skin:  Positive for     Eyes:  Positive for glasses  ENT:  Negative    Respiratory:  Negative    Cardiovascular:    edema;Positive for  Gastroenterology: Negative    Genitourinary:  Negative    Musculoskeletal:  Positive for back pain;arthritis;joint pain  Neurologic:  Negative    Psychiatric:  Negative    Heme/Lymph/Imm:  Positive for easy bruising  Endocrine:  Negative      Physical Exam:  Vitals: /90 (BP Location: Right arm, Patient Position: Sitting, Cuff Size: Adult Large)  Pulse (!) 43  Ht 1.727 m (5' 8\")  Wt 94.3 kg (208 lb)  SpO2 96%  BMI 31.63 kg/m2    Constitutional:  cooperative, alert and oriented, well developed, well nourished, in no acute distress appears anxious      Skin:  warm and dry to the touch, no apparent skin lesions or masses noted          Head:  normocephalic, no masses or lesions        Eyes:  pupils equal and round;conjunctivae and lids unremarkable        Lymph:      ENT:  no pallor or cyanosis, dentition good        Neck:  no carotid bruit;JVP normal        Respiratory:  normal breath sounds, clear to auscultation, normal A-P diameter, normal symmetry, normal respiratory excursion, no use of accessory muscles         Cardiac: no murmurs, gallops or rubs detected bradycardic distant heart sounds            pulses full and equal                                        GI:  abdomen soft;BS normoactive;non-tender        Extremities and Muscular Skeletal:  no deformities, clubbing, cyanosis, erythema observed   trace;bilateral LE edema          Neurological:           Psych:           Recent Lab Results:  LIPID RESULTS:  Lab Results   Component Value Date    CHOL 217 (H) 06/07/2018    HDL 62 06/07/2018     (H) 06/07/2018    TRIG 103 06/07/2018    CHOLHDLRATIO 2.6 10/09/2014       LIVER ENZYME RESULTS:  Lab Results   Component Value Date    AST 33 09/28/2018    ALT 38 09/28/2018 "       CBC RESULTS:  Lab Results   Component Value Date    WBC 8.0 06/07/2018    RBC 5.16 06/07/2018    HGB 16.8 06/07/2018    HCT 49.8 06/07/2018    MCV 97 06/07/2018    MCH 32.6 06/07/2018    MCHC 33.7 06/07/2018    RDW 15.0 06/07/2018     06/07/2018       BMP RESULTS:  Lab Results   Component Value Date     06/07/2018    POTASSIUM 4.4 06/07/2018    CHLORIDE 105 06/07/2018    CO2 29 06/07/2018    ANIONGAP 6 06/07/2018    GLC 90 06/07/2018    BUN 23 06/07/2018    CR 1.16 06/07/2018    GFRESTIMATED 61 06/07/2018    GFRESTBLACK 74 06/07/2018    HE 9.4 06/07/2018        A1C RESULTS:  No results found for: A1C    INR RESULTS:  Lab Results   Component Value Date    INR 1.94 (H) 08/16/2017           CC  Beatrice Perera MD  [address]  [place] [address]                    Thank you for allowing me to participate in the care of your patient.      Sincerely,     DR BEATRICE PERERA MD     [facility]    cc:   Beatrice Perera MD  [address]  [place] [address]

## 2018-09-28 NOTE — LETTER
9/28/2018      Igor Christina MD  [address] [facility] [address]      RE: Reji Aguirre       Dear Colleague,    I had the pleasure of seeing Reji Aguirre in the [facility].    Service Date: 09/28/2018      HISTORY OF PRESENT ILLNESS:  It is a pleasure for me to see Mr. Aguirre again for followup of paroxysmal atrial fibrillation, paroxysmal atrial flutter, tachycardia-induced hypertension, and resistant hypertension.        I had the pleasure of meeting this gentleman for the first time in the summer of 2016 shortly after he had knee surgery.  At that time, he had paroxysmal atrial flutter and fibrillation and an EF of 30%-40%.  The stress testing showed no evidence of ischemia.  He was placed on amiodarone.  This resulted in adequate suppression of his atrial arrhythmias and his ejection fraction had normalized.  At one point, his blood pressure was difficult to control and he actually went down to the [facility].  They put him on eplerenone as they thought he may have hyperaldosteronism.      He returns today telling me that he feels very well and he plans to live forever.  He is not troubled by shortness of breath and he has no heart failure symptoms.  He denies chest pains or palpitations.  He does have a resting heart rate in the 40s and 50s.  He is in sinus bradycardia.  He is tolerating all medications well.        Physical examination is notable only for the presence of the bradycardia.  His liver function tests and thyroid function tests are normal.  Last year's pulmonary function tests are adequate.                IMPRESSION:     1.  Paroxysmal persistent atrial fibrillation.  Maintained on low-dose amiodarone 5 days a week.  Normal liver, pulmonary, and thyroid function tests.  Continue current medications.     2.  Resistant hypertension.  Continue eplerenone, hydrochlorothiazide, Bystolic, and amlodipine.  No side effects.  Blood pressure at home appears  adequate.     3.  Sinus bradycardia.  Asymptomatic.        I will see him again in 6 months' time.  He will have pulmonary function tests as well as a chest x-ray prior to clinic visit.         BEATRICE NEWELL MD, [facility]             D: 2018   T: 2018   MT: LIONEL      Name:     MARCIE CABALLERO   MRN:      [id number]        Account:      [id number]   :      1940           Service Date: 2018      Document: [id number]         Outpatient Encounter Prescriptions as of 2018   Medication Sig Dispense Refill     Acetaminophen (TYLENOL PO) Take 500 mg by mouth every 8 hours as needed for mild pain or fever       allopurinol (ZYLOPRIM) 300 MG tablet TAKE ONE TABLET BY MOUTH EVERY DAY 90 tablet 0     amiodarone (PACERONE/CODARONE) 200 MG tablet Take 1 tab daily from Mon through Friday (skip Sat + Sun) 90 tablet 1     amLODIPine (NORVASC) 2.5 MG tablet Take 1 tablet (2.5 mg) by mouth daily 90 tablet 3     ASPIRIN NOT PRESCRIBED (INTENTIONAL) Please choose reason not prescribed, below 0 each      diphenhydrAMINE (BENADRYL) 25 MG capsule Take 25 mg by mouth nightly as needed for itching or allergies       eplerenone (INSPRA) 25 MG tablet Take 25 mg by mouth 2 times daily       hydrochlorothiazide (MICROZIDE) 12.5 MG capsule TAKE TWO CAPSULES BY MOUTH EVERY MORNING 180 capsule 2     nebivolol (BYSTOLIC) 5 MG tablet Take 1 tablet (5 mg) by mouth daily 90 tablet 3     rivaroxaban ANTICOAGULANT (XARELTO) 20 MG TABS tablet Take 1 tablet (20 mg) by mouth daily (with dinner) 90 tablet 1     [DISCONTINUED] amLODIPine (NORVASC) 5 MG tablet Take 0.5 tablets (2.5 mg) by mouth daily (Patient not taking: Reported on 2018)       No facility-administered encounter medications on file as of 2018.        Again, thank you for allowing me to participate in the care of your patient.      Sincerely,    DR BEATRICE NEWELL MD     [facility]

## 2018-09-28 NOTE — MR AVS SNAPSHOT
After Visit Summary   9/28/2018    Reji Aguirre    MRN: [id number]           Patient Information     Date Of Birth          1940        Visit Information        Provider Department      9/28/2018 10:45 AM Trever, Anam Butterfield MD [facility]        Today's Diagnoses     Persistent atrial fibrillation (H)        Atrial flutter, paroxysmal (H)        Benign essential hypertension           Follow-ups after your visit        Additional Services     Follow-Up with Cardiologist                 Your next 10 appointments already scheduled     Oct 10, 2018  8:50 AM CDT   UMP EP RETURN with ERIKA Arreguin CNP   [facility] ([facility])    [address]  [facility] [address] [phone]              Future tests that were ordered for you today     Open Future Orders        Priority Expected Expires Ordered    Follow-Up with Cardiologist Routine 3/27/2019 9/28/2019 9/28/2018    Hepatic panel Routine 3/27/2019 9/28/2019 9/28/2018    TSH with free T4 reflex Routine 3/27/2019 9/28/2019 9/28/2018    Basic metabolic panel Routine 3/27/2019 9/28/2019 9/28/2018    Pulmonary function test Routine 3/27/2019 9/28/2019 9/28/2018    XR Chest 2 Views Routine 3/27/2019 9/28/2019 9/28/2018            Who to contact     If you have questions or need follow up information about today's clinic visit or your schedule please contact [facility] directly at [phone].  Normal or non-critical lab and imaging results will be communicated to you by MyChart, letter or phone within 4 business days after the clinic has received the results. If you do not hear from us within 7 days, please contact the clinic through MyChart or phone. If you have a critical or abnormal lab result, we will notify you by phone as soon as possible.  Submit refill requests through  "MyChart or call your pharmacy and they will forward the refill request to us. Please allow 3 business days for your refill to be completed.          Additional Information About Your Visit        MyChart Information     wrenchgu[employer] mobilehart gives you secure access to your electronic health record. If you see a primary care provider, you can also send messages to your care team and make appointments. If you have questions, please call your primary care clinic.  If you do not have a primary care provider, please call [phone] and they will assist you.        Care EveryWhere ID     This is your Care EveryWhere ID. This could be used by other organizations to access your [facility] medical records  [id number]        Your Vitals Were     Pulse Height Pulse Oximetry BMI (Body Mass Index)          43 1.727 m (5' 8\") 96% 31.63 kg/m2         Blood Pressure from Last 3 Encounters:   09/28/18 138/90   09/18/18 132/74   06/07/18 134/64    Weight from Last 3 Encounters:   09/28/18 94.3 kg (208 lb)   09/18/18 95.1 kg (209 lb 9.6 oz)   06/07/18 90.1 kg (198 lb 9.6 oz)              We Performed the Following     Follow-Up with Cardiologist        Primary Care Provider Office Phone # Fax #    Igor Christina [phone]       [address] [facility] [address]        Equal Access to Services     FLORENTIN NOLASCO : Hadii hang ku hadasho Sosandoval, waaxda luqadaha, qaybta kaalmada vivian, andres ascencio. So [facility] [phone].    ATENCIÓN: Si habla español, tiene a noel disposición servicios gratuitos de asistencia lingüística. Carolyn al [phone].    We comply with applicable federal civil rights laws and [place] laws. We do not discriminate on the basis of race, color, national origin, age, disability, sex, sexual orientation, or gender identity.            Thank you!     Thank you for choosing [facility]  for your care. Our goal is always to " provide you with excellent care. Hearing back from our patients is one way we can continue to improve our services. Please take a few minutes to complete the written survey that you may receive in the mail after your visit with us. Thank you!             Your Updated Medication List - Protect others around you: Learn how to safely use, store and throw away your medicines at www.disposemymeds.org.          This list is accurate as of 9/28/18 11:37 AM.  Always use your most recent med list.                   Brand Name Dispense Instructions for use Diagnosis    allopurinol 300 MG tablet    ZYLOPRIM    90 tablet    TAKE ONE TABLET BY MOUTH EVERY DAY    Gout, unspecified cause, unspecified chronicity, unspecified site       amiodarone 200 MG tablet    PACERONE/CODARONE    90 tablet    Take 1 tab daily from Mon through Friday (skip Sat + Sun)    Atrial fibrillation, unspecified type (H)       amLODIPine 2.5 MG tablet    NORVASC    90 tablet    Take 1 tablet (2.5 mg) by mouth daily    Benign essential hypertension       ASPIRIN NOT PRESCRIBED    INTENTIONAL    0 each    Please choose reason not prescribed, below    Benign essential hypertension       BENADRYL 25 MG capsule   Generic drug:  diphenhydrAMINE      Take 25 mg by mouth nightly as needed for itching or allergies        hydrochlorothiazide 12.5 MG capsule    MICROZIDE    180 capsule    TAKE TWO CAPSULES BY MOUTH EVERY MORNING    Persistent atrial fibrillation (H)       INSPRA 25 MG tablet   Generic drug:  eplerenone      Take 25 mg by mouth 2 times daily        nebivolol 5 MG tablet    BYSTOLIC    90 tablet    Take 1 tablet (5 mg) by mouth daily    Benign essential hypertension, Atrial flutter, paroxysmal (H), Persistent atrial fibrillation (H)       rivaroxaban ANTICOAGULANT 20 MG Tabs tablet    XARELTO    90 tablet    Take 1 tablet (20 mg) by mouth daily (with dinner)    Atrial fibrillation, unspecified type (H)       TYLENOL PO      Take 500 mg by mouth every  8 hours as needed for mild pain or fever

## 2018-10-09 ENCOUNTER — TRANSFERRED RECORDS (OUTPATIENT)
Dept: HEALTH INFORMATION MANAGEMENT | Facility: CLINIC | Age: 78
End: 2018-10-09

## 2018-10-09 DIAGNOSIS — I10 BENIGN ESSENTIAL HYPERTENSION: ICD-10-CM

## 2018-10-09 DIAGNOSIS — I48.19 PERSISTENT ATRIAL FIBRILLATION (H): Primary | ICD-10-CM

## 2018-10-09 DIAGNOSIS — I48.92 ATRIAL FLUTTER, UNSPECIFIED TYPE (H): ICD-10-CM

## 2018-10-10 ENCOUNTER — OFFICE VISIT (OUTPATIENT)
Dept: CARDIOLOGY | Facility: CLINIC | Age: 78
End: 2018-10-10
Attending: INTERNAL MEDICINE
Payer: COMMERCIAL

## 2018-10-10 VITALS
HEIGHT: 68 IN | HEART RATE: 52 BPM | BODY MASS INDEX: 31.6 KG/M2 | WEIGHT: 208.5 LBS | DIASTOLIC BLOOD PRESSURE: 70 MMHG | SYSTOLIC BLOOD PRESSURE: 148 MMHG

## 2018-10-10 DIAGNOSIS — Z79.899 ON AMIODARONE THERAPY: ICD-10-CM

## 2018-10-10 DIAGNOSIS — I15.9 SECONDARY HYPERTENSION: ICD-10-CM

## 2018-10-10 DIAGNOSIS — I48.91 ATRIAL FIBRILLATION, UNSPECIFIED TYPE (H): Primary | ICD-10-CM

## 2018-10-10 DIAGNOSIS — I42.9 CARDIOMYOPATHY, UNSPECIFIED TYPE (H): ICD-10-CM

## 2018-10-10 PROCEDURE — 93000 ELECTROCARDIOGRAM COMPLETE: CPT | Performed by: NURSE PRACTITIONER

## 2018-10-10 PROCEDURE — 99214 OFFICE O/P EST MOD 30 MIN: CPT | Performed by: NURSE PRACTITIONER

## 2018-10-10 RX ORDER — AMIODARONE HYDROCHLORIDE 200 MG/1
TABLET ORAL
Qty: 90 TABLET | Refills: 3 | Status: SHIPPED | OUTPATIENT
Start: 2018-10-10 | End: 2019-03-25

## 2018-10-10 NOTE — PROGRESS NOTES
HPI:  Reji Aguirre is a 78 year old male who is a patient of Dr. Perera and Dr. Aj who presents today for atrial fibrillation follow up.  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    In the summer of 2016 shortly after he had knee surgery, he had paroxysmal atrial flutter and fibrillation and an EF of 30%-40%.  He underwent a successful DCCV (7/2016).   Stress test (5/2018) showed no evidence of ischemia.  He was placed on amiodarone.  This resulted in adequate suppression of his atrial arrhythmias and his ejection fraction had normalized.  He consulted with [facility] due to resistent HTN and placed him on eplerenone as they thought he may have hyperaldosteronism.  he saw Dr. Aj (10/2017) with no complaints and has bradycardia wtih HR in 40-50s.      Today Reji Aguirre presents feeling good.  He exercises 3x/week with no CV symptoms.  His Home -156/62-90. He consumes alcohol socially and does not use tobacco.  discussed losing weight as pt has gained 10# in the past year.   At this time he denies chest pain or pressure, headaches, dizziness, syncope, angina, dyspnea at rest or with exertion, dry cough, palpitations, orthopnea, PND, abdominal pain, abdominal edema, pedal edema, or claudication.  Denies easy bruising or bleeding, hematuria, hematochezia, and epistaxis. Denies signs/symptoms of stroke such as visual disturbance, difficulty speaking, facial drooping, confusion, problems with gait, or any new numbness or weakness.      Presenting EKG:    Sinus rhythm with ventricular rate of 47 bpm with left BBB  ASSESSMENT AND PLAN    (I48.91) Atrial fibrillation, unspecified type (H)    low-dose amiodarone 5 days a week.    Anticoagulation for a CHADSVASC 3 (age++, HTN) with Xarelto 20 mg daily     (I15.9) Secondary hypertension managed by [facility].     eplerenone 25 mg twice daily   hydrochlorothiazide 12.5 daily   Bystolic 5 mg daily  amlodipine 2.5 mg daily (had LE edema on 5 mg daily)  Blood pressure at home appears adequate.    Discussed losing weight as pt has gained 10# in the past year.      (I42.9) Cardiomyopathy, unspecified type (H)  Hx of cardiomyopathy.  No symptoms of HF  Repeat ECHO prior to seeing Dr. Perera in 3/2019      (Z79.899) On amiodarone therapy  AST & ALT 58/36 (6/7/2018)  PFT ordered.    TSH 0.86 (9/28/2018)  His 6 month amiodarone labs are ordered prior to seeing Dr. Perera in 3/2019  His 12 month amiodarone labs are ordered prior to seeing Dr. Aj in 10/2019      Patient expresses understanding and agreement with the plan.     I appreciate the chance to help with Reji URRUTIA Timothy Please let me know if you have any questions or concerns.    Sherlyn Castro, APRN, CNP    This note was completed in part using Dragon voice recognition software. Although reviewed after completion, some word and grammatical errors may occur.    Orders Placed This Encounter   Procedures     EKG 12-lead complete w/read - Clinics (performed today)     No orders of the defined types were placed in this encounter.    There are no discontinued medications.      Encounter Diagnoses   Name Primary?     Atrial fibrillation, unspecified type (H)      Cardiomyopathy, unspecified type (H)        CURRENT MEDICATIONS:  Current Outpatient Prescriptions   Medication Sig Dispense Refill     Acetaminophen (TYLENOL PO) Take 500 mg by mouth every 8 hours as needed for mild pain or fever       allopurinol (ZYLOPRIM) 300 MG tablet TAKE ONE TABLET BY MOUTH EVERY DAY 90 tablet 0     amiodarone (PACERONE/CODARONE) 200 MG tablet Take 1 tab daily from Mon through Friday (skip Sat + Sun) 90 tablet 1     amLODIPine (NORVASC) 2.5 MG tablet Take 1 tablet (2.5 mg) by mouth daily 90 tablet 3     diphenhydrAMINE (BENADRYL) 25 MG capsule Take 25 mg by mouth nightly as needed for itching or allergies        eplerenone (INSPRA) 25 MG tablet Take 25 mg by mouth 2 times daily       hydrochlorothiazide (MICROZIDE) 12.5 MG capsule TAKE TWO CAPSULES BY MOUTH EVERY MORNING 180 capsule 2     nebivolol (BYSTOLIC) 5 MG tablet Take 1 tablet (5 mg) by mouth daily 90 tablet 3     rivaroxaban ANTICOAGULANT (XARELTO) 20 MG TABS tablet Take 1 tablet (20 mg) by mouth daily (with dinner) 90 tablet 1     ASPIRIN NOT PRESCRIBED (INTENTIONAL) Please choose reason not prescribed, below 0 each        ALLERGIES     Allergies   Allergen Reactions     Ampicillin      rash     Metoprolol      Mild fatigue with Toprol; mildly decreased exercise capacity     Spironolactone Nausea and Difficulty breathing     Vicodin [Hydrocodone-Acetaminophen] Nausea       PAST MEDICAL HISTORY:  Past Medical History:   Diagnosis Date     Atrial fibrillation, unspecified type (H) 5/19/16    new onset A fib; nuclear stress test negative     Atrial flutter (H)      Benign essential hypertension 2/2/2004     Cardiomyopathy (H)      Diverticulosis of colon (without mention of hemorrhage) 11/16/07    incidental diverticuli noted at colonoscopy     Gout, unspecified      LBBB (left bundle branch block)      Rosacea      Special screening for malignant neoplasms, colon 1995       PAST SURGICAL HISTORY:  Past Surgical History:   Procedure Laterality Date     ARTHROPLASTY KNEE Left 6/6/2016    Procedure: ARTHROPLASTY KNEE;  Surgeon: Derek Varma MD;  Location: [facility] OR     ARTHROSCOPY SHOULDER ROTATOR CUFF REPAIR  7/9/08    Left shoulder arthroscopic rotator cuff repair with acromioplasty     C NONSPECIFIC PROCEDURE  5/99    left knee arthroscopy     CARDIOVERSION  07-     HC COLONOSCOPY THRU STOMA W BIOPSY/CAUTERY TUMOR/POLYP/LESION  11/16/07    normal colonoscopy except for incidental diverticuli     ORTHOPEDIC SURGERY  04/11    2nd toe Lt foot       FAMILY HISTORY:  Family History   Problem Relation Age of Onset     Cerebrovascular Disease Father   "    d:89, dementia     Respiratory Mother      d:94     Diabetes Sister      b. 1938, IDDM since age 10     Diabetes Brother      b. 1943, type II DM       SOCIAL HISTORY:  Social History     Social History     Marital status:      Spouse name: N/A     Number of children: N/A     Years of education: N/A     Social History Main Topics     Smoking status: Former Smoker     Types: Cigarettes     Quit date: 7/25/1963     Smokeless tobacco: Never Used     Alcohol use 0.0 oz/week     0 Standard drinks or equivalent per week      Comment: 1 drink a day     Drug use: No     Sexual activity: Yes     Partners: Female     Other Topics Concern     Caffeine Concern No     1 cup daily     Special Diet No     Exercise Yes     skiing     Social History Narrative       Review of Systems:  Skin:  Positive for lumps or bumps   Eyes:  Positive for glasses  ENT:  Negative    Respiratory:  Negative    Cardiovascular:  Negative    Gastroenterology: Negative    Genitourinary:  Negative    Musculoskeletal:  Negative    Neurologic:  Negative    Psychiatric:  Negative    Heme/Lymph/Imm:  Negative    Endocrine:  Negative      Physical Exam:  Vitals: Ht 1.727 m (5' 8\")  BMI 31.63 kg/m2    Constitutional:           Skin:           Head:           Eyes:           ENT:           Neck:           Chest:           Cardiac:                    Abdomen:           Vascular:                                        Extremities and Back:           Neurological:             Recent Lab Results:  LIPID RESULTS:  Lab Results   Component Value Date    CHOL 217 (H) 06/07/2018    HDL 62 06/07/2018     (H) 06/07/2018    TRIG 103 06/07/2018    CHOLHDLRATIO 2.6 10/09/2014       LIVER ENZYME RESULTS:  Lab Results   Component Value Date    AST 33 09/28/2018    ALT 38 09/28/2018       CBC RESULTS:  Lab Results   Component Value Date    WBC 8.0 06/07/2018    RBC 5.16 06/07/2018    HGB 16.8 06/07/2018    HCT 49.8 06/07/2018    MCV 97 06/07/2018    MCH 32.6 " 06/07/2018    MCHC 33.7 06/07/2018    RDW 15.0 06/07/2018     06/07/2018       BMP RESULTS:  Lab Results   Component Value Date     06/07/2018    POTASSIUM 4.4 06/07/2018    CHLORIDE 105 06/07/2018    CO2 29 06/07/2018    ANIONGAP 6 06/07/2018    GLC 90 06/07/2018    BUN 23 06/07/2018    CR 1.16 06/07/2018    GFRESTIMATED 61 06/07/2018    GFRESTBLACK 74 06/07/2018    HE 9.4 06/07/2018        A1C RESULTS:  No results found for: A1C    INR RESULTS:  Lab Results   Component Value Date    INR 1.94 (H) 08/16/2017           CC  Leonor Aj MD  [address]  [place] [id number]

## 2018-10-10 NOTE — LETTER
10/10/2018    Igor Christina MD  [address] [facility] [address]    RE: Reji Aguirre       Dear Colleague,    I had the pleasure of seeing Reji Aguirre in the [facility].    HPI:  Reji Aguirre is a 78 year old male who is a patient of Dr. Perera and Dr. Aj who presents today for atrial fibrillation follow up.  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    In the summer of 2016 shortly after he had knee surgery, he had paroxysmal atrial flutter and fibrillation and an EF of 30%-40%.  He underwent a successful DCCV (7/2016).   Stress test (5/2018) showed no evidence of ischemia.  He was placed on amiodarone.  This resulted in adequate suppression of his atrial arrhythmias and his ejection fraction had normalized.  He consulted with [facility] due to resistent HTN and placed him on eplerenone as they thought he may have hyperaldosteronism.  he saw Dr. Aj (10/2017) with no complaints and has bradycardia wtih HR in 40-50s.      Today Reji Aguirre presents feeling good.  He exercises 3x/week with no CV symptoms.  His Home -156/62-90. He consumes alcohol socially and does not use tobacco.  discussed losing weight as pt has gained 10# in the past year.   At this time he denies chest pain or pressure, headaches, dizziness, syncope, angina, dyspnea at rest or with exertion, dry cough, palpitations, orthopnea, PND, abdominal pain, abdominal edema, pedal edema, or claudication.  Denies easy bruising or bleeding, hematuria, hematochezia, and epistaxis. Denies signs/symptoms of stroke such as visual disturbance, difficulty speaking, facial drooping, confusion, problems with gait, or any new numbness or weakness.      Presenting EKG:    Sinus rhythm with ventricular rate of 47 bpm with left BBB  ASSESSMENT AND PLAN    (I48.91)  Atrial fibrillation, unspecified type (H)    low-dose amiodarone 5 days a week.    Anticoagulation for a CHADSVASC 3 (age++, HTN) with Xarelto 20 mg daily     (I15.9) Secondary hypertension managed by [facility].    eplerenone 25 mg twice daily   hydrochlorothiazide 12.5 daily   Bystolic 5 mg daily  amlodipine 2.5 mg daily (had LE edema on 5 mg daily)  Blood pressure at home appears adequate.    Discussed losing weight as pt has gained 10# in the past year.      (I42.9) Cardiomyopathy, unspecified type (H)  Hx of cardiomyopathy.  No symptoms of HF  Repeat ECHO prior to seeing Dr. Perera in 3/2019      (Z79.899) On amiodarone therapy  AST & ALT 58/36 (6/7/2018)  PFT ordered.    TSH 0.86 (9/28/2018)  His 6 month amiodarone labs are ordered prior to seeing Dr. Perera in 3/2019  His 12 month amiodarone labs are ordered prior to seeing Dr. Aj in 10/2019      Patient expresses understanding and agreement with the plan.     I appreciate the chance to help with Reji Aguirre Please let me know if you have any questions or concerns.    Sherlyn Castro, APRN, CNP    This note was completed in part using Dragon voice recognition software. Although reviewed after completion, some word and grammatical errors may occur.    Orders Placed This Encounter   Procedures     EKG 12-lead complete w/read - Clinics (performed today)     No orders of the defined types were placed in this encounter.    There are no discontinued medications.      Encounter Diagnoses   Name Primary?     Atrial fibrillation, unspecified type (H)      Cardiomyopathy, unspecified type (H)        CURRENT MEDICATIONS:  Current Outpatient Prescriptions   Medication Sig Dispense Refill     Acetaminophen (TYLENOL PO) Take 500 mg by mouth every 8 hours as needed for mild pain or fever       allopurinol (ZYLOPRIM) 300 MG tablet TAKE ONE TABLET BY MOUTH EVERY DAY 90 tablet 0     amiodarone (PACERONE/CODARONE) 200 MG tablet Take 1 tab daily from Mon through Friday (skip Sat  + Sun) 90 tablet 1     amLODIPine (NORVASC) 2.5 MG tablet Take 1 tablet (2.5 mg) by mouth daily 90 tablet 3     diphenhydrAMINE (BENADRYL) 25 MG capsule Take 25 mg by mouth nightly as needed for itching or allergies       eplerenone (INSPRA) 25 MG tablet Take 25 mg by mouth 2 times daily       hydrochlorothiazide (MICROZIDE) 12.5 MG capsule TAKE TWO CAPSULES BY MOUTH EVERY MORNING 180 capsule 2     nebivolol (BYSTOLIC) 5 MG tablet Take 1 tablet (5 mg) by mouth daily 90 tablet 3     rivaroxaban ANTICOAGULANT (XARELTO) 20 MG TABS tablet Take 1 tablet (20 mg) by mouth daily (with dinner) 90 tablet 1     ASPIRIN NOT PRESCRIBED (INTENTIONAL) Please choose reason not prescribed, below 0 each        ALLERGIES     Allergies   Allergen Reactions     Ampicillin      rash     Metoprolol      Mild fatigue with Toprol; mildly decreased exercise capacity     Spironolactone Nausea and Difficulty breathing     Vicodin [Hydrocodone-Acetaminophen] Nausea       PAST MEDICAL HISTORY:  Past Medical History:   Diagnosis Date     Atrial fibrillation, unspecified type (H) 5/19/16    new onset A fib; nuclear stress test negative     Atrial flutter (H)      Benign essential hypertension 2/2/2004     Cardiomyopathy (H)      Diverticulosis of colon (without mention of hemorrhage) 11/16/07    incidental diverticuli noted at colonoscopy     Gout, unspecified      LBBB (left bundle branch block)      Rosacea      Special screening for malignant neoplasms, colon 1995       PAST SURGICAL HISTORY:  Past Surgical History:   Procedure Laterality Date     ARTHROPLASTY KNEE Left 6/6/2016    Procedure: ARTHROPLASTY KNEE;  Surgeon: Derek Varma MD;  Location: [facility] OR     ARTHROSCOPY SHOULDER ROTATOR CUFF REPAIR  7/9/08    Left shoulder arthroscopic rotator cuff repair with acromioplasty     C NONSPECIFIC PROCEDURE  5/99    left knee arthroscopy     CARDIOVERSION  07-     HC COLONOSCOPY THRU STOMA W BIOPSY/CAUTERY TUMOR/POLYP/LESION   "11/16/07    normal colonoscopy except for incidental diverticuli     ORTHOPEDIC SURGERY  04/11    2nd toe Lt foot       FAMILY HISTORY:  Family History   Problem Relation Age of Onset     Cerebrovascular Disease Father      d:89, dementia     Respiratory Mother      d:94     Diabetes Sister      b. 1938, IDDM since age 10     Diabetes Brother      b. 1943, type II DM       SOCIAL HISTORY:  Social History     Social History     Marital status:      Spouse name: N/A     Number of children: N/A     Years of education: N/A     Social History Main Topics     Smoking status: Former Smoker     Types: Cigarettes     Quit date: 7/25/1963     Smokeless tobacco: Never Used     Alcohol use 0.0 oz/week     0 Standard drinks or equivalent per week      Comment: 1 drink a day     Drug use: No     Sexual activity: Yes     Partners: Female     Other Topics Concern     Caffeine Concern No     1 cup daily     Special Diet No     Exercise Yes     skiing     Social History Narrative       Review of Systems:  Skin:  Positive for lumps or bumps   Eyes:  Positive for glasses  ENT:  Negative    Respiratory:  Negative    Cardiovascular:  Negative    Gastroenterology: Negative    Genitourinary:  Negative    Musculoskeletal:  Negative    Neurologic:  Negative    Psychiatric:  Negative    Heme/Lymph/Imm:  Negative    Endocrine:  Negative      Physical Exam:  Vitals: Ht 1.727 m (5' 8\")  BMI 31.63 kg/m2    Constitutional:           Skin:           Head:           Eyes:           ENT:           Neck:           Chest:           Cardiac:                    Abdomen:           Vascular:                                        Extremities and Back:           Neurological:             Recent Lab Results:  LIPID RESULTS:  Lab Results   Component Value Date    CHOL 217 (H) 06/07/2018    HDL 62 06/07/2018     (H) 06/07/2018    TRIG 103 06/07/2018    CHOLHDLRATIO 2.6 10/09/2014       LIVER ENZYME RESULTS:  Lab Results   Component Value Date    " AST 33 09/28/2018    ALT 38 09/28/2018       CBC RESULTS:  Lab Results   Component Value Date    WBC 8.0 06/07/2018    RBC 5.16 06/07/2018    HGB 16.8 06/07/2018    HCT 49.8 06/07/2018    MCV 97 06/07/2018    MCH 32.6 06/07/2018    MCHC 33.7 06/07/2018    RDW 15.0 06/07/2018     06/07/2018       BMP RESULTS:  Lab Results   Component Value Date     06/07/2018    POTASSIUM 4.4 06/07/2018    CHLORIDE 105 06/07/2018    CO2 29 06/07/2018    ANIONGAP 6 06/07/2018    GLC 90 06/07/2018    BUN 23 06/07/2018    CR 1.16 06/07/2018    GFRESTIMATED 61 06/07/2018    GFRESTBLACK 74 06/07/2018    HE 9.4 06/07/2018        A1C RESULTS:  No results found for: A1C    INR RESULTS:  Lab Results   Component Value Date    INR 1.94 (H) 08/16/2017         Thank you for allowing me to participate in the care of your patient.    Sincerely,     ERIKA Guerra [facility]

## 2018-10-10 NOTE — PATIENT INSTRUCTIONS
As always, thank you for trusting us with your health care needs!    Do PFTs now   Then amiodarone labs and ECHO before seeing Dr. Perera.    THen amiodarone labs       If you have any questions regarding your visit please contact your care team:   Cardiology  Telephone Number    Afib RNs:  Nydia Nina and Pat [phone]   Call for Electrophysiology procedure scheduling concerns [phone]   Device Clinic (Pacemakers, ICDs, Loop Recorders)   RN's:  Gwendolyn Farmer Lynda, MJ, Olya Prasad During business hours:   [phone]

## 2018-10-10 NOTE — LETTER
10/10/2018    Igor Christina MD  [address] [facility] [address]    RE: Reji Aguirre       Dear Colleague,    I had the pleasure of seeing Reji Aguirre in the [facility].    HPI:  Reji Aguirre is a 78 year old male who is a patient of Dr. Perera and Dr. Aj who presents today for atrial fibrillation follow up.  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    In the summer of 2016 shortly after he had knee surgery, he had paroxysmal atrial flutter and fibrillation and an EF of 30%-40%.  He underwent a successful DCCV (7/2016).   Stress test (5/2018) showed no evidence of ischemia.  He was placed on amiodarone.  This resulted in adequate suppression of his atrial arrhythmias and his ejection fraction had normalized.  He consulted with [facility] due to resistent HTN and placed him on eplerenone as they thought he may have hyperaldosteronism.  he saw Dr. Aj (10/2017) with no complaints and has bradycardia wtih HR in 40-50s.      Today Reji Aguirre presents feeling good.  He exercises 3x/week with no CV symptoms.  His Home -156/62-90. He consumes alcohol socially and does not use tobacco.  discussed losing weight as pt has gained 10# in the past year.   At this time he denies chest pain or pressure, headaches, dizziness, syncope, angina, dyspnea at rest or with exertion, dry cough, palpitations, orthopnea, PND, abdominal pain, abdominal edema, pedal edema, or claudication.  Denies easy bruising or bleeding, hematuria, hematochezia, and epistaxis. Denies signs/symptoms of stroke such as visual disturbance, difficulty speaking, facial drooping, confusion, problems with gait, or any new numbness or weakness.      Presenting EKG:    Sinus rhythm with ventricular rate of 47 bpm with left BBB  ASSESSMENT AND PLAN    (I48.91)  Atrial fibrillation, unspecified type (H)    low-dose amiodarone 5 days a week.    Anticoagulation for a CHADSVASC 3 (age++, HTN) with Xarelto 20 mg daily     (I15.9) Secondary hypertension managed by [facility].    eplerenone 25 mg twice daily   hydrochlorothiazide 12.5 daily   Bystolic 5 mg daily  amlodipine 2.5 mg daily (had LE edema on 5 mg daily)  Blood pressure at home appears adequate.    Discussed losing weight as pt has gained 10# in the past year.      (I42.9) Cardiomyopathy, unspecified type (H)  Hx of cardiomyopathy.  No symptoms of HF  Repeat ECHO prior to seeing Dr. Perera in 3/2019      (Z79.899) On amiodarone therapy  AST & ALT 58/36 (6/7/2018)  PFT ordered.    TSH 0.86 (9/28/2018)  His 6 month amiodarone labs are ordered prior to seeing Dr. Perera in 3/2019  His 12 month amiodarone labs are ordered prior to seeing Dr. Aj in 10/2019      Patient expresses understanding and agreement with the plan.     I appreciate the chance to help with Reji Aguirre Please let me know if you have any questions or concerns.    Sherlyn Castro, APRN, CNP    This note was completed in part using Dragon voice recognition software. Although reviewed after completion, some word and grammatical errors may occur.    Orders Placed This Encounter   Procedures     EKG 12-lead complete w/read - Clinics (performed today)     No orders of the defined types were placed in this encounter.    There are no discontinued medications.      Encounter Diagnoses   Name Primary?     Atrial fibrillation, unspecified type (H)      Cardiomyopathy, unspecified type (H)        CURRENT MEDICATIONS:  Current Outpatient Prescriptions   Medication Sig Dispense Refill     Acetaminophen (TYLENOL PO) Take 500 mg by mouth every 8 hours as needed for mild pain or fever       allopurinol (ZYLOPRIM) 300 MG tablet TAKE ONE TABLET BY MOUTH EVERY DAY 90 tablet 0     amiodarone (PACERONE/CODARONE) 200 MG tablet Take 1 tab daily from Mon through Friday (skip Sat  + Sun) 90 tablet 1     amLODIPine (NORVASC) 2.5 MG tablet Take 1 tablet (2.5 mg) by mouth daily 90 tablet 3     diphenhydrAMINE (BENADRYL) 25 MG capsule Take 25 mg by mouth nightly as needed for itching or allergies       eplerenone (INSPRA) 25 MG tablet Take 25 mg by mouth 2 times daily       hydrochlorothiazide (MICROZIDE) 12.5 MG capsule TAKE TWO CAPSULES BY MOUTH EVERY MORNING 180 capsule 2     nebivolol (BYSTOLIC) 5 MG tablet Take 1 tablet (5 mg) by mouth daily 90 tablet 3     rivaroxaban ANTICOAGULANT (XARELTO) 20 MG TABS tablet Take 1 tablet (20 mg) by mouth daily (with dinner) 90 tablet 1     ASPIRIN NOT PRESCRIBED (INTENTIONAL) Please choose reason not prescribed, below 0 each        ALLERGIES     Allergies   Allergen Reactions     Ampicillin      rash     Metoprolol      Mild fatigue with Toprol; mildly decreased exercise capacity     Spironolactone Nausea and Difficulty breathing     Vicodin [Hydrocodone-Acetaminophen] Nausea       PAST MEDICAL HISTORY:  Past Medical History:   Diagnosis Date     Atrial fibrillation, unspecified type (H) 5/19/16    new onset A fib; nuclear stress test negative     Atrial flutter (H)      Benign essential hypertension 2/2/2004     Cardiomyopathy (H)      Diverticulosis of colon (without mention of hemorrhage) 11/16/07    incidental diverticuli noted at colonoscopy     Gout, unspecified      LBBB (left bundle branch block)      Rosacea      Special screening for malignant neoplasms, colon 1995       PAST SURGICAL HISTORY:  Past Surgical History:   Procedure Laterality Date     ARTHROPLASTY KNEE Left 6/6/2016    Procedure: ARTHROPLASTY KNEE;  Surgeon: Derek Varma MD;  Location: [facility] OR     ARTHROSCOPY SHOULDER ROTATOR CUFF REPAIR  7/9/08    Left shoulder arthroscopic rotator cuff repair with acromioplasty     C NONSPECIFIC PROCEDURE  5/99    left knee arthroscopy     CARDIOVERSION  07-     HC COLONOSCOPY THRU STOMA W BIOPSY/CAUTERY TUMOR/POLYP/LESION   "11/16/07    normal colonoscopy except for incidental diverticuli     ORTHOPEDIC SURGERY  04/11    2nd toe Lt foot       FAMILY HISTORY:  Family History   Problem Relation Age of Onset     Cerebrovascular Disease Father      d:89, dementia     Respiratory Mother      d:94     Diabetes Sister      b. 1938, IDDM since age 10     Diabetes Brother      b. 1943, type II DM       SOCIAL HISTORY:  Social History     Social History     Marital status:      Spouse name: N/A     Number of children: N/A     Years of education: N/A     Social History Main Topics     Smoking status: Former Smoker     Types: Cigarettes     Quit date: 7/25/1963     Smokeless tobacco: Never Used     Alcohol use 0.0 oz/week     0 Standard drinks or equivalent per week      Comment: 1 drink a day     Drug use: No     Sexual activity: Yes     Partners: Female     Other Topics Concern     Caffeine Concern No     1 cup daily     Special Diet No     Exercise Yes     skiing     Social History Narrative       Review of Systems:  Skin:  Positive for lumps or bumps   Eyes:  Positive for glasses  ENT:  Negative    Respiratory:  Negative    Cardiovascular:  Negative    Gastroenterology: Negative    Genitourinary:  Negative    Musculoskeletal:  Negative    Neurologic:  Negative    Psychiatric:  Negative    Heme/Lymph/Imm:  Negative    Endocrine:  Negative      Physical Exam:  Vitals: Ht 1.727 m (5' 8\")  BMI 31.63 kg/m2    Constitutional:           Skin:           Head:           Eyes:           ENT:           Neck:           Chest:           Cardiac:                    Abdomen:           Vascular:                                        Extremities and Back:           Neurological:             Recent Lab Results:  LIPID RESULTS:  Lab Results   Component Value Date    CHOL 217 (H) 06/07/2018    HDL 62 06/07/2018     (H) 06/07/2018    TRIG 103 06/07/2018    CHOLHDLRATIO 2.6 10/09/2014       LIVER ENZYME RESULTS:  Lab Results   Component Value Date    " AST 33 09/28/2018    ALT 38 09/28/2018       CBC RESULTS:  Lab Results   Component Value Date    WBC 8.0 06/07/2018    RBC 5.16 06/07/2018    HGB 16.8 06/07/2018    HCT 49.8 06/07/2018    MCV 97 06/07/2018    MCH 32.6 06/07/2018    MCHC 33.7 06/07/2018    RDW 15.0 06/07/2018     06/07/2018       BMP RESULTS:  Lab Results   Component Value Date     06/07/2018    POTASSIUM 4.4 06/07/2018    CHLORIDE 105 06/07/2018    CO2 29 06/07/2018    ANIONGAP 6 06/07/2018    GLC 90 06/07/2018    BUN 23 06/07/2018    CR 1.16 06/07/2018    GFRESTIMATED 61 06/07/2018    GFRESTBLACK 74 06/07/2018    HE 9.4 06/07/2018        A1C RESULTS:  No results found for: A1C    INR RESULTS:  Lab Results   Component Value Date    INR 1.94 (H) 08/16/2017           CC  Leonor Aj MD  [address]  [place] [id number]                  Thank you for allowing me to participate in the care of your patient.      Sincerely,     ERIKA Guerra [facility]    cc:   Leonor Aj MD  [address]  [place] [id number]

## 2018-10-10 NOTE — MR AVS SNAPSHOT
After Visit Summary   10/10/2018    Reji Aguirre    MRN: [id number]           Patient Information     Date Of Birth          1940        Visit Information        Provider Department      10/10/2018 8:50 AM Sherlyn Castro APRN [facility]        Today's Diagnoses     Atrial fibrillation, unspecified type (H)    -  1    Cardiomyopathy, unspecified type (H)        On amiodarone therapy        Secondary hypertension          Care Instructions    As always, thank you for trusting us with your health care needs!    Do PFTs now   Then amiodarone labs and ECHO before seeing Dr. Perera.    THen amiodarone labs       If you have any questions regarding your visit please contact your care team:   Cardiology  Telephone Number    Afib RNs:  Nydia Nina, and Marcelle [phone]   Call for Electrophysiology procedure scheduling concerns [phone]   Device Clinic (Pacemakers, ICDs, Loop Recorders)   RN's:  Gwendolyn Farmer Lynda, MJ, Olya Prasad During business hours:   [phone]                 Follow-ups after your visit        Additional Services     Follow-Up with Electrophysiologist                 Your next 10 appointments already scheduled     Oct 12, 2018  8:00 AM CDT   Office Visit with Igor Christina MD   [facility] ([facility])    [address] [place] [id number] [phone]           Bring a current list of meds and any records pertaining to this visit. For Physicals, please bring immunization records and any forms needing to be filled out. Please arrive 10 minutes early to complete paperwork.              Future tests that were ordered for you today     Open Future Orders        Priority Expected Expires Ordered    Follow-Up with Electrophysiologist Routine 10/10/2019 10/11/2019 10/10/2018    Echocardiogram Routine 2/25/2019 10/10/2019 10/10/2018    Hepatic panel  Routine 10/10/2019 10/10/2019 10/10/2018    TSH with free T4 reflex Routine 10/10/2019 10/10/2019 10/10/2018    Basic metabolic panel Routine 10/10/2019 10/10/2019 10/10/2018    Pulmonary function test Routine 10/10/2019 10/10/2019 10/10/2018    XR Chest 2 Views Routine 10/10/2019 10/10/2019 10/10/2018    Pulmonary function test Routine 10/17/2018 10/10/2019 10/10/2018    Hepatic panel Routine 4/8/2019 10/10/2019 10/10/2018    TSH with free T4 reflex Routine 4/8/2019 10/10/2019 10/10/2018    Basic metabolic panel Routine 4/8/2019 10/10/2019 10/10/2018    Pulmonary function test Routine 4/8/2019 10/10/2019 10/10/2018    XR Chest 2 Views Routine 4/8/2019 10/10/2019 10/10/2018    General PFT Lab (Please always keep checked) Routine 3/27/2019 10/9/2019 10/9/2018    Pulmonary Function Test Routine 3/27/2019 10/9/2019 10/9/2018            Who to contact     If you have questions or need follow up information about today's clinic visit or your schedule please contact [facility] directly at [phone].  Normal or non-critical lab and imaging results will be communicated to you by [employer], letter or phone within 4 business days after the clinic has received the results. If you do not hear from us within 7 days, please contact the clinic through [employer] or phone. If you have a critical or abnormal lab result, we will notify you by phone as soon as possible.  Submit refill requests through [employer] or call your pharmacy and they will forward the refill request to us. Please allow 3 business days for your refill to be completed.          Additional Information About Your Visit        [employer] Information     [employer] gives you secure access to your electronic health record. If you see a primary care provider, you can also send messages to your care team and make appointments. If you have questions, please call your primary care clinic.  If you do not have a primary care provider,  "please call [phone] and they will assist you.        Care EveryWhere ID     This is your Care EveryWhere ID. This could be used by other organizations to access your [facility] medical records  [id number]        Your Vitals Were     Pulse Height BMI (Body Mass Index)             52 1.727 m (5' 8\") 31.7 kg/m2          Blood Pressure from Last 3 Encounters:   10/10/18 148/70   09/28/18 138/90   09/18/18 132/74    Weight from Last 3 Encounters:   10/10/18 94.6 kg (208 lb 8 oz)   09/28/18 94.3 kg (208 lb)   09/18/18 95.1 kg (209 lb 9.6 oz)              We Performed the Following     EKG 12-lead complete w/read - Clinics (performed today)     Follow-Up with Cardiac Advanced Practice Provider          Where to get your medicines      These medications were sent to [facility] [place] [address]     Phone:  [phone]     amiodarone 200 MG tablet          Primary Care Provider Office Phone # Fax #    Igor Christina [phone]       [address] [place] [address]        Equal Access to Services     FLORENTIN NOLASCO AH: Hadii aad ku hadasho Soomaali, waaxda luqadaha, qaybta kaalmada adeegyada, waxay idiin haytang ascencio. So [facility] [phone].    ATENCIÓN: Si habla español, tiene a noel disposición servicios gratuitos de asistencia lingüística. Llame al [phone].    We comply with applicable federal civil rights laws and [place] laws. We do not discriminate on the basis of race, color, national origin, age, disability, sex, sexual orientation, or gender identity.            Thank you!     Thank you for choosing [facility]  for your care. Our goal is always to provide you with excellent care. Hearing back from our patients is one way we can continue to improve our services. Please take a few minutes to complete the written survey that you may receive in the " mail after your visit with us. Thank you!             Your Updated Medication List - Protect others around you: Learn how to safely use, store and throw away your medicines at www.disposemymeds.org.          This list is accurate as of 10/10/18  9:29 AM.  Always use your most recent med list.                   Brand Name Dispense Instructions for use Diagnosis    allopurinol 300 MG tablet    ZYLOPRIM    90 tablet    TAKE ONE TABLET BY MOUTH EVERY DAY    Gout, unspecified cause, unspecified chronicity, unspecified site       amiodarone 200 MG tablet    PACERONE/CODARONE    90 tablet    Take 1 tab daily from Mon through Friday (skip Sat + Sun)    Atrial fibrillation, unspecified type (H)       amLODIPine 2.5 MG tablet    NORVASC    90 tablet    Take 1 tablet (2.5 mg) by mouth daily    Benign essential hypertension       ASPIRIN NOT PRESCRIBED    INTENTIONAL    0 each    Please choose reason not prescribed, below    Benign essential hypertension       BENADRYL 25 MG capsule   Generic drug:  diphenhydrAMINE      Take 25 mg by mouth nightly as needed for itching or allergies        hydrochlorothiazide 12.5 MG capsule    MICROZIDE    180 capsule    TAKE TWO CAPSULES BY MOUTH EVERY MORNING    Persistent atrial fibrillation (H)       INSPRA 25 MG tablet   Generic drug:  eplerenone      Take 25 mg by mouth 2 times daily        nebivolol 5 MG tablet    BYSTOLIC    90 tablet    Take 1 tablet (5 mg) by mouth daily    Benign essential hypertension, Atrial flutter, paroxysmal (H), Persistent atrial fibrillation (H)       rivaroxaban ANTICOAGULANT 20 MG Tabs tablet    XARELTO    90 tablet    Take 1 tablet (20 mg) by mouth daily (with dinner)    Atrial fibrillation, unspecified type (H)       TYLENOL PO      Take 500 mg by mouth every 8 hours as needed for mild pain or fever

## 2018-10-12 DIAGNOSIS — Z79.899 ON AMIODARONE THERAPY: Primary | ICD-10-CM

## 2018-10-22 ENCOUNTER — HOSPITAL ENCOUNTER (OUTPATIENT)
Dept: RESPIRATORY THERAPY | Facility: CLINIC | Age: 78
Discharge: HOME | End: 2018-10-22
Attending: INTERNAL MEDICINE | Admitting: INTERNAL MEDICINE
Payer: MEDICARE

## 2018-10-22 ENCOUNTER — MYC MEDICAL ADVICE (OUTPATIENT)
Dept: CARDIOLOGY | Facility: CLINIC | Age: 78
End: 2018-10-22

## 2018-10-22 VITALS — OXYGEN SATURATION: 96 %

## 2018-10-22 DIAGNOSIS — I10 BENIGN ESSENTIAL HYPERTENSION: ICD-10-CM

## 2018-10-22 DIAGNOSIS — I48.19 PERSISTENT ATRIAL FIBRILLATION (H): ICD-10-CM

## 2018-10-22 DIAGNOSIS — I48.92 ATRIAL FLUTTER, UNSPECIFIED TYPE (H): ICD-10-CM

## 2018-10-22 LAB
DLCOUNC-%PRED-PRE: 96 %
DLCOUNC-PRE: 22.63 ML/MIN/MMHG
DLCOUNC-PRED: 23.46 ML/MIN/MMHG
ERV-%PRED-PRE: 62 %
ERV-PRE: 0.38 L
ERV-PRED: 0.6 L
EXPTIME-PRE: 11.1 SEC
FEF2575-%PRED-POST: 79 %
FEF2575-%PRED-PRE: 90 %
FEF2575-POST: 1.6 L/SEC
FEF2575-PRE: 1.81 L/SEC
FEF2575-PRED: 2.01 L/SEC
FEFMAX-%PRED-PRE: 82 %
FEFMAX-PRE: 5.83 L/SEC
FEFMAX-PRED: 7.03 L/SEC
FEV1-%PRED-PRE: 79 %
FEV1-PRE: 2.18 L
FEV1FEV6-PRE: 78 %
FEV1FEV6-PRED: 77 %
FEV1FVC-PRE: 76 %
FEV1FVC-PRED: 72 %
FEV1SVC-PRE: 68 %
FEV1SVC-PRED: 66 %
FIFMAX-PRE: 1.76 L/SEC
FRCPLETH-%PRED-PRE: 75 %
FRCPLETH-PRE: 2.72 L
FRCPLETH-PRED: 3.62 L
FVC-%PRED-PRE: 78 %
FVC-PRE: 2.88 L
FVC-PRED: 3.65 L
IC-%PRED-PRE: 78 %
IC-PRE: 2.81 L
IC-PRED: 3.56 L
RVPLETH-%PRED-PRE: 86 %
RVPLETH-PRE: 2.34 L
RVPLETH-PRED: 2.71 L
TLCPLETH-%PRED-PRE: 83 %
TLCPLETH-PRE: 5.53 L
TLCPLETH-PRED: 6.62 L
VA-%PRED-PRE: 71 %
VA-PRE: 4.52 L
VC-%PRED-PRE: 76 %
VC-PRE: 3.18 L
VC-PRED: 4.16 L

## 2018-10-22 PROCEDURE — 40000275 ZZH STATISTIC RCP TIME EA 10 MIN

## 2018-10-22 PROCEDURE — 25000125 ZZHC RX 250

## 2018-10-22 PROCEDURE — 94726 PLETHYSMOGRAPHY LUNG VOLUMES: CPT

## 2018-10-22 PROCEDURE — 94729 DIFFUSING CAPACITY: CPT

## 2018-10-22 PROCEDURE — 94060 EVALUATION OF WHEEZING: CPT

## 2018-10-22 RX ORDER — ALBUTEROL SULFATE 0.83 MG/ML
2.5 SOLUTION RESPIRATORY (INHALATION)
Status: COMPLETED | OUTPATIENT
Start: 2018-10-22 | End: 2018-10-22

## 2018-10-22 RX ORDER — ALBUTEROL SULFATE 0.83 MG/ML
SOLUTION RESPIRATORY (INHALATION)
Status: COMPLETED
Start: 2018-10-22 | End: 2018-10-22

## 2018-10-22 RX ADMIN — ALBUTEROL SULFATE 2.5 MG: 2.5 SOLUTION RESPIRATORY (INHALATION) at 10:39

## 2018-10-22 RX ADMIN — ALBUTEROL SULFATE 2.5 MG: 0.83 SOLUTION RESPIRATORY (INHALATION) at 10:39

## 2018-10-22 NOTE — PROGRESS NOTES
PFT Note:        Pt completed pulmonary function testing with DLCO.  Pre/post flow volume loops with 2.5mg Albuterol nebulizer.  Good Pt effort and cooperation.     Spirometry  Meets all ATS-ERS recommendations.    Plethysmography  All plethysmographic measurements meet ATS-ERS recommendations.    DLCO  Meets all ATS-ERS recommendations.  DLCO is an average of 2 maneuvers.  No recent Hgb for DLCO correction.    October 22, 2018.11:07 AM  Can Andujar

## 2018-10-23 ENCOUNTER — OFFICE VISIT (OUTPATIENT)
Dept: INTERNAL MEDICINE | Facility: CLINIC | Age: 78
End: 2018-10-23
Payer: COMMERCIAL

## 2018-10-23 VITALS
WEIGHT: 205.4 LBS | HEART RATE: 48 BPM | BODY MASS INDEX: 31.23 KG/M2 | DIASTOLIC BLOOD PRESSURE: 70 MMHG | TEMPERATURE: 98.6 F | SYSTOLIC BLOOD PRESSURE: 122 MMHG | OXYGEN SATURATION: 95 %

## 2018-10-23 DIAGNOSIS — I48.19 PERSISTENT ATRIAL FIBRILLATION (H): ICD-10-CM

## 2018-10-23 DIAGNOSIS — I10 BENIGN ESSENTIAL HYPERTENSION: ICD-10-CM

## 2018-10-23 DIAGNOSIS — M10.9 GOUT, UNSPECIFIED CAUSE, UNSPECIFIED CHRONICITY, UNSPECIFIED SITE: ICD-10-CM

## 2018-10-23 DIAGNOSIS — R22.42 LOWER LEG MASS, LEFT: Primary | ICD-10-CM

## 2018-10-23 DIAGNOSIS — I48.91 ATRIAL FIBRILLATION, UNSPECIFIED TYPE (H): ICD-10-CM

## 2018-10-23 DIAGNOSIS — I48.92 ATRIAL FLUTTER, PAROXYSMAL (H): ICD-10-CM

## 2018-10-23 PROCEDURE — 99213 OFFICE O/P EST LOW 20 MIN: CPT | Performed by: INTERNAL MEDICINE

## 2018-10-23 RX ORDER — NEBIVOLOL 5 MG/1
5 TABLET ORAL DAILY
Qty: 90 TABLET | Refills: 3 | Status: SHIPPED | OUTPATIENT
Start: 2018-10-23 | End: 2019-03-11

## 2018-10-23 NOTE — PROGRESS NOTES
SUBJECTIVE:   Reji Aguirre is a 78 year old male who presents to clinic today for the following health issues:      Concern - Mass on Left shin  Onset: 6 months     Description:   Mass on L shin, no pain.     Intensity: mild    Progression of Symptoms:  same    Accompanying Signs & Symptoms:  None     Previous history of similar problem:   none    Precipitating factors:   Worsened by: none    Alleviating factors:  Improved by: none    Therapies Tried and outcome: none        Problem list and histories reviewed & adjusted, as indicated.  Additional history: as documented        Reviewed and updated as needed this visit by clinical staff  Allergies  Meds       Reviewed and updated as needed this visit by Provider           Past Medical History:  ---------------------------  Past Medical History:   Diagnosis Date     Atrial fibrillation, unspecified type (H) 5/19/16    new onset A fib; nuclear stress test negative     Atrial flutter (H)      Benign essential hypertension 2/2/2004     Cardiomyopathy (H)      Diverticulosis of colon (without mention of hemorrhage) 11/16/07    incidental diverticuli noted at colonoscopy     Gout, unspecified      LBBB (left bundle branch block)      Rosacea      Special screening for malignant neoplasms, colon 1995       Past Surgical History:  ---------------------------  Past Surgical History:   Procedure Laterality Date     ARTHROPLASTY KNEE Left 6/6/2016    Procedure: ARTHROPLASTY KNEE;  Surgeon: Derek Varma MD;  Location: [facility] OR     ARTHROSCOPY SHOULDER ROTATOR CUFF REPAIR  7/9/08    Left shoulder arthroscopic rotator cuff repair with acromioplasty     C NONSPECIFIC PROCEDURE  5/99    left knee arthroscopy     CARDIOVERSION  07-      COLONOSCOPY THRU STOMA W BIOPSY/CAUTERY TUMOR/POLYP/LESION  11/16/07    normal colonoscopy except for incidental diverticuli     ORTHOPEDIC SURGERY  04/11    2nd toe Lt foot       Current  Medications:  ---------------------------  Current Outpatient Prescriptions   Medication Sig Dispense Refill     Acetaminophen (TYLENOL PO) Take 500 mg by mouth every 8 hours as needed for mild pain or fever       amiodarone (PACERONE/CODARONE) 200 MG tablet Take 1 tab daily from Mon through Friday (skip Sat + Sun) 90 tablet 3     amLODIPine (NORVASC) 2.5 MG tablet Take 1 tablet (2.5 mg) by mouth daily 90 tablet 3     ASPIRIN NOT PRESCRIBED (INTENTIONAL) Please choose reason not prescribed, below 0 each      diphenhydrAMINE (BENADRYL) 25 MG capsule Take 25 mg by mouth nightly as needed for itching or allergies       eplerenone (INSPRA) 25 MG tablet Take 25 mg by mouth 2 times daily       hydrochlorothiazide (MICROZIDE) 12.5 MG capsule TAKE TWO CAPSULES BY MOUTH EVERY MORNING 180 capsule 2     nebivolol (BYSTOLIC) 5 MG tablet Take 1 tablet (5 mg) by mouth daily 90 tablet 3     allopurinol (ZYLOPRIM) 300 MG tablet Take 1 tablet (300 mg) by mouth daily 90 tablet 1     rivaroxaban ANTICOAGULANT (XARELTO) 20 MG TABS tablet Take 1 tablet (20 mg) by mouth daily (with dinner) 90 tablet 1       Allergies:  -------------  Allergies   Allergen Reactions     Ampicillin      rash     Metoprolol      Mild fatigue with Toprol; mildly decreased exercise capacity     Spironolactone Nausea and Difficulty breathing     Vicodin [Hydrocodone-Acetaminophen] Nausea       Social History:  -------------------  Social History     Social History     Marital status:      Spouse name: N/A     Number of children: N/A     Years of education: N/A     Occupational History     Not on file.     Social History Main Topics     Smoking status: Former Smoker     Types: Cigarettes     Quit date: 7/25/1963     Smokeless tobacco: Never Used     Alcohol use 0.0 oz/week     0 Standard drinks or equivalent per week      Comment: 1 drink a day     Drug use: No     Sexual activity: Yes     Partners: Female     Other Topics Concern     Caffeine Concern No      1 cup daily     Special Diet No     Exercise Yes     skiing     Social History Narrative       Family Medical History:  ------------------------------  Family History   Problem Relation Age of Onset     Cerebrovascular Disease Father      d:89, dementia     Respiratory Mother      d:94     Diabetes Sister      b. 1938, IDDM since age 10     Diabetes Brother      b. 1943, type II DM         ROS:  REVIEW OF SYSTEMS:    RESP: negative for cough, dyspnea, wheezing, hemoptysis  CV: negative for chest pain, palpitations, PND, MAGAÑA, orthopnea; reports no changes in their ability to perform physical activity (from cardiovascular standpoint)  GI: negative for dysphagia, N/V, pain, melena, diarrhea and constipation  NEURO: negative for numbness/tingling, paralysis, incoordination, or focal weakness     OBJECTIVE:                                                    /70  Pulse (!) 48  Temp 98.6  F (37  C) (Oral)  Wt 205 lb 6.4 oz (93.2 kg)  SpO2 95%  BMI 31.23 kg/m2     EXT:  SKIN: soft, rubbery, mobile, nontender subcutaneous mass on left anterior pretibial area, (probably right where the top of a ski boot will land on the shin), moveable within the soft tissue, consistent with possible lipoma.  approx 3-4 cm.          ASSESSMENT/PLAN:                                                      (R22.42) Lower leg mass, left  (primary encounter diagnosis)  Comment: suspect benign mass, possible lip[sharlene vs other cyst,  Suspect may be due to the top of his ski boot hitting that exact location.   Nontedner, freely mobile.   Does not interfere with any of his activities or motions.   No lower extremity edema.  obersve for now, if any worsening, enlargement, or new sx, then may need MRI vs ultrasound   Plan:     (I10) Benign essential hypertension  Comment: This condition is currently controlled on the current medical regimen.  Continue current therapy.   Plan: nebivolol (BYSTOLIC) 5 MG tablet            (I48.92) Atrial  flutter, paroxysmal (H)  Comment:   Plan: nebivolol (BYSTOLIC) 5 MG tablet            (I48.1) Persistent atrial fibrillation (H)  Comment:   Plan: nebivolol (BYSTOLIC) 5 MG tablet                    See Patient Instructions    BAR SALEH M.D., MD  [facility]     Spent greater than 15 minutes with pt , greater than 50% of time was educational and counseling.

## 2018-10-23 NOTE — PROCEDURES
Procedure Date: 10/22/2018      Please see medical chart for graphs and statistics related to this report.       REFERRING PHYSICIAN:   Sherlyn Castro, CNP   TECHNICIAN:   Can Andujar   DIAGNOSIS:   Cardiomyopathy, A-fib, A-flutter, LBBB      HEIGHT:   67.50 inches   WEIGHT:   200.00 Lbs.      DYSPNEA:   No dyspnea   COUGH:   No cough   WHEEZE:   Rare      TOBACCO PROD:   Cigarette   YEARS SMOKED:   6.0   PKS/DAY:   0.5   YEARS QUIT:    60.0                                                              MEDICATIONS:   Amiodarone       POST-TEST COMMENTS:   Patient completed pulmonary function testing with DLCO.  Pre/post flow volume loops with 2.5 mg Albuterol nebulizer.  Good patient effort and cooperation.  All testing meets ATS-ERS recommendations.  DLCO is an average of 2 maneuvers.  No recent Hgb for DLCO correction.         INTERPRETATION:      1.  Mild ventilatory defect, no obstruction   2.  Low-normal lung volumes   3.  Normal gas transfer   4.  Stable compared to ,          VIDYA MOSQUEDA MD             D: 10/23/2018   T: 10/23/2018   MT: JONAS      Name:     MARCIE CABALLERO   MRN:      [id number]        Account:        [id number]   :      1940           Procedure Date: 10/22/2018      Document: [id number]       cc: Igor JAMES, CNP

## 2018-10-23 NOTE — TELEPHONE ENCOUNTER
Patient requests refills and wants new Rx's sent to [facility] in [place].    Cem Meyer, PharmD  [facility]  [phone]

## 2018-10-23 NOTE — PATIENT INSTRUCTIONS
*  The small mass on the left anterior shin is probably a benign subcutaneous mass, possibly from ski boots.     Observe, contact us if it gets any larger.

## 2018-10-24 RX ORDER — ALLOPURINOL 300 MG/1
1 TABLET ORAL DAILY
Qty: 90 TABLET | Refills: 1 | Status: SHIPPED | OUTPATIENT
Start: 2018-10-24 | End: 2019-06-04

## 2018-10-24 NOTE — TELEPHONE ENCOUNTER
"Requested Prescriptions   Pending Prescriptions Disp Refills     allopurinol (ZYLOPRIM) 300 MG tablet 90 tablet 0     Sig: Take 1 tablet (300 mg) by mouth daily    Gout Agents Protocol Passed    10/23/2018  2:04 PM       Passed - CBC on file in past 12 months    Recent Labs   Lab Test  06/07/18   0907   WBC  8.0   RBC  5.16   HGB  16.8   HCT  49.8   PLT  162                Passed - ALT on file in past 12 months    Recent Labs   Lab Test  09/28/18   0909   ALT  38            Passed - Has Uric Acid on file in past 12 months and value is less than 6    Recent Labs   Lab Test  06/07/18   0907   URIC  4.3     If level is 6mg/dL or greater, ok to refill one time and refer to provider.          Passed - Recent (12 mo) or future (30 days) visit within the authorizing provider's specialty    Patient had office visit in the last 12 months or has a visit in the next 30 days with authorizing provider or within the authorizing provider's specialty.  See \"Patient Info\" tab in inbasket, or \"Choose Columns\" in Meds & Orders section of the refill encounter.             Passed - Patient is age 18 or older       Passed - Normal serum creatinine on file in the past 12 months    Recent Labs   Lab Test  06/07/18 0907   CR  1.16          Last Written Prescription Date:  09/04/18  Last Fill Quantity: 90,  # refills: 0   Last office visit: 10/23/18:  Dr Christina  Future Office Visit:             rivaroxaban ANTICOAGULANT (XARELTO) 20 MG TABS tablet 90 tablet 1     Sig: Take 1 tablet (20 mg) by mouth daily (with dinner)    Direct Oral Anticoagulant Agents Failed    10/23/2018  2:04 PM       Failed - Creatinine Clearance greater than 50 ml/min on file in past 3 mos    No lab results found.         Failed - Serum creatinine less than or equal to 1.4 on file in past 3 mos    Recent Labs   Lab Test  06/07/18   0907   CR  1.16            Passed - Normal Platelets on file in past 12 months    Recent Labs   Lab Test  06/07/18 0907   PLT  162 "              Passed - Medication is active on med list       Passed - Patient is 18 years of age or older       Passed - Recent (6 mo) or future (30 days) visit within the authorizing provider's specialty        Last Written Prescription Date:  02/06/18  Last Fill Quantity: 90,  # refills: 1   Last office visit: 10/23/18:  Dr Christina   Future Office Visit:

## 2018-11-30 ENCOUNTER — TELEPHONE (OUTPATIENT)
Dept: CARDIOLOGY | Facility: CLINIC | Age: 78
End: 2018-11-30

## 2019-02-07 ENCOUNTER — TELEPHONE (OUTPATIENT)
Dept: CARDIOLOGY | Facility: CLINIC | Age: 79
End: 2019-02-07

## 2019-02-07 NOTE — TELEPHONE ENCOUNTER
Patient had called clinic left voicemail inquiring about results of PFT and follow up appt.  Call placed to patient and message left for him to return call.  ANU Arce

## 2019-02-08 NOTE — TELEPHONE ENCOUNTER
Pt called back at 1636 and stated that he would call back on Monday.  Call to pt and he was just wondering about his appointments and thought that something was incorrect.  Pt had his PFT, which is normal and pt is aware of this. Pt will have an echo on 3/25, which pt forgot about and then will see Dr Perera with labs in April. Pt will now go on a 5 week vacation. No further questions. Kasey

## 2019-02-10 ENCOUNTER — APPOINTMENT (OUTPATIENT)
Dept: GENERAL RADIOLOGY | Facility: CLINIC | Age: 79
End: 2019-02-10
Attending: EMERGENCY MEDICINE
Payer: OTHER MISCELLANEOUS

## 2019-02-10 ENCOUNTER — HOSPITAL ENCOUNTER (EMERGENCY)
Facility: CLINIC | Age: 79
Discharge: HOME | End: 2019-02-10
Attending: EMERGENCY MEDICINE | Admitting: EMERGENCY MEDICINE
Payer: OTHER MISCELLANEOUS

## 2019-02-10 ENCOUNTER — APPOINTMENT (OUTPATIENT)
Dept: CT IMAGING | Facility: CLINIC | Age: 79
End: 2019-02-10
Attending: EMERGENCY MEDICINE
Payer: OTHER MISCELLANEOUS

## 2019-02-10 VITALS
HEIGHT: 68 IN | WEIGHT: 193 LBS | RESPIRATION RATE: 15 BRPM | DIASTOLIC BLOOD PRESSURE: 80 MMHG | TEMPERATURE: 97.9 F | HEART RATE: 58 BPM | OXYGEN SATURATION: 92 % | BODY MASS INDEX: 29.25 KG/M2 | SYSTOLIC BLOOD PRESSURE: 178 MMHG

## 2019-02-10 DIAGNOSIS — S06.0X1A CONCUSSION WITH LOSS OF CONSCIOUSNESS OF 30 MINUTES OR LESS, INITIAL ENCOUNTER: ICD-10-CM

## 2019-02-10 DIAGNOSIS — S23.9XXA THORACIC BACK SPRAIN, INITIAL ENCOUNTER: ICD-10-CM

## 2019-02-10 DIAGNOSIS — S09.90XA INJURY OF HEAD, INITIAL ENCOUNTER: ICD-10-CM

## 2019-02-10 PROCEDURE — 72072 X-RAY EXAM THORAC SPINE 3VWS: CPT

## 2019-02-10 PROCEDURE — 70450 CT HEAD/BRAIN W/O DYE: CPT

## 2019-02-10 PROCEDURE — 71046 X-RAY EXAM CHEST 2 VIEWS: CPT

## 2019-02-10 PROCEDURE — 99285 EMERGENCY DEPT VISIT HI MDM: CPT | Mod: 25

## 2019-02-10 ASSESSMENT — MIFFLIN-ST. JEOR: SCORE: 1574.94

## 2019-02-10 ASSESSMENT — ENCOUNTER SYMPTOMS
HEADACHES: 0
BACK PAIN: 1
CONFUSION: 0

## 2019-02-10 NOTE — ED AVS SNAPSHOT
[facility] Emergency Department  [address]  [facility] [address]  Phone:  [phone]  Fax:  [phone]                                    Reji Aguirre   MRN: [id number]    Department:  [facility] Emergency Department   Date of Visit:  2/10/2019           After Visit Summary Signature Page    I have received my discharge instructions, and my questions have been answered. I have discussed any challenges I see with this plan with the nurse or doctor.    ..........................................................................................................................................  Patient/Patient Representative Signature      ..........................................................................................................................................  Patient Representative Print Name and Relationship to Patient    ..................................................               ................................................  Date                                   Time    ..........................................................................................................................................  Reviewed by Signature/Title    ...................................................              ..............................................  Date                                               Time          22EPIC Rev 08/18

## 2019-02-10 NOTE — ED PROVIDER NOTES
"  History     Chief Complaint:  Fall    HPI   Reji Aguirre is a 78 year old male who is on Xarelto for atrial fibrillation who presents with fall. The patient is a  at [employer] and was trying to get on the ski lift. The patient states he went over the snow and hit the left side of his head and his upper back between his shoulder blades. He notes back pain but no headache. He notes he was wearing a helmet and it did not crack. The patient states he has been a  for 50 years. The wife states the patient appears at baseline.   Patient was wearing a helmet and notes that the helmet and goggles \"turned sideways.\"  No vomiting and ?LOC.  No double vision.  No numbness in the hands or weakness in the legs.  No cough or chest pain.    Allergies:  Ampicillin  Metoprolol  Spironolactone   Vicodin      Medications:    Allopurinol  Amiodarone  Amlodipine  Asprin  Inspra  Microzide  Nebivolol  Xarelto    Past Medical History:    Atrial fibrillation  Atrial flutter   Anemia  Hematuria  Benign essential hypertension   Cardiomyopathy   Diverticulosis of colon (without mention of hemorrhage)   Gout   LBBB (left bundle branch block)   Rosacea   Rotator cuff  Special screening for malignant neoplasms, colon     Past Surgical History:    Arthroplasty knee  Arthroscopy shoulder rotator   Cardioversion  Orthopedic surgery    Family History:    Father: cerebrovascular disease  Mother: respiratory  Sister: diabetes  Brother: diabetes    Social History:  The patient was accompanied to the ED by wife.  Smoking Status: former smoker  Smokeless Tobacco: Never Used  Alcohol Use: Positive  Marital Status:       Review of Systems   Musculoskeletal: Positive for back pain (between shoulder blades).   Neurological: Negative for headaches.   Psychiatric/Behavioral: Negative for confusion.   All other systems reviewed and are negative.  patient comes in after fall while skiing at [employer] where he hit left side " "of head. patient recalls accident but has about 5-10 min where he ski to chair lift and taking cat walk down. Patient is on Xarelto for Afib. Patient also reports pain between shoulder blades.  No SOB.    Physical Exam     Patient Vitals for the past 24 hrs:   BP Temp Temp src Pulse Heart Rate Resp SpO2 Height Weight   02/10/19 1638 178/80 97.9  F (36.6  C) Oral 58 58 15 95 % 1.727 m (5' 8\") 87.5 kg (193 lb)     Physical Exam   Musculoskeletal:        Back:        GEN: patient smiling, no distress, joking with wife at the bedside  HEAD: atraumatic, normocephalic, no cephalohematoma  EYES: pupils reactive (3plus to 1plus bilaterally), extraocular muscles intact, conjunctivae normal  ENT: TMs flat and white bilaterally, oropharynx normal with no erythema or exudate, mucus membranes dry  NECK: no posterior midline tenderness, no cervical LAD  RESPIRATORY: no tachypnea, breath sounds clear to auscultation (no rales, wheezes, rhonchi), chest wall nontender, normal phonation  CVS: normal S1/S2, II/VI systolic ej murmurs. No rubs/gallops  ABDOMEN: soft, nontender, no masses or organomegaly, no rebound, positive bowel sounds  BACK:, no spinal tenderness, paraspinous thoracic tenderness (see pictoral).  Lifts arms over head.  EXTREMITIES: intact pulses x 4, full range of motion at joints, no edema  MUSCULOSKELETAL: no deformities, no clavicle or AC/SC tenderness.  Normal ROM at the shoulder and thighs and humerus, and elbow and fingers and tib/fib  SKIN: warm and dry, no acute rashes   NEURO: GCS 15, cranial nerves intact.  Motor- moves all 4 extremities with 5/5 strength,  5/5.  DF and PF 5/5.  Sensation- intact all dermatones to pinprick and light touch.  Reflexes- DTRs 2plus.  Coordination- ambulatory.  Overall symmetrical exam  HEME: no bruising or petechiae/contusions  LYMPH: no lymphadenopathy    Emergency Department Course     Imaging:  Radiology findings were communicated with the patient who voiced understanding " "of the findings.    Thoracic spine XR, 3 views   Normal alignment. Some degenerative bony spurring. No  compression deformity or other acute appearing abnormality.  Reading per radiology    Chest XR,  PA & LAT   Areas of linear scarring or platelike atelectasis in the  left lung base without change. Otherwise no active cardiopulmonary  disease and no change since the prior exam.  Reading per radiology    CT Head w/o Contrast  1. No acute abnormality.  2. Atrophy of the brain.  White matter changes consistent with  sequelae of small vessel ischemic disease.  3. Extensive calcified atherosclerotic plaque in the internal carotid  and distal vertebral arteries.  BHAVESH HAMPTON MD  Reading per radiology    Emergency Department Course:    1640 Nursing notes and vitals reviewed.    1645 I performed an exam of the patient as documented above.     1750 The patient was sent for a CT Head while in the emergency department, results above.     1756 The patient was sent for a XR Chest and Thoracic Spine while in the emergency department, results above.     1827 recheck, updated patient and wife    1843 I personally reviewed the imaging results with the patient and answered all related questions prior to discharge.    /80   Pulse 58   Temp 97.9  F (36.6  C) (Oral)   Resp 15   Ht 1.727 m (5' 8\")   Wt 87.5 kg (193 lb)   SpO2 92%   BMI 29.35 kg/m    Discussed results with patient.  Gave patient copies of all results (applicable labs, CT scans and/or ultrasounds).  Answered questions.  Asked patient to followup with PCP.  Circled any abnormal lab values and asked patient to followup with PCP.    Impression & Plan      Medical Decision Making:  Reji Aguirre is a 78 year old male who presents with a head injury. Based on this patient's initial presentation, I was concerned about possible intracranial injuries (e.g. skull fracture, epidural hematoma, subdural hematoma, intracerebral hemorrhage, and traumatic subarachnoid " "hemorrhage)- patient with head injury and on NOAC.  CT imaging was obtained and fortunately was normal.  At this time it appears that the patient's symptoms are due to a concussion.  The patient/family understand that they must return if any \"red flags\" appear/develop in the coming hours/days, as this may represent an indication to perform a repeat CT scan or further evaluation.  I have noted that \"red flags\" include: headaches that get worse, increased drowsiness, strange behavior, repetitive speech, seizures, repeated vomiting, growing confusion, increased irritability, slurred speech, weakness or numbness, and loss of responsiveness.  This information will also be provided in writing at discharge.  I have discussed the second impact syndrome, and the importance of not sustaining repeated concussion in the next 1-2 weeks.  Post concussive syndrome is also discussed. The patient's questions have been answered. They have a responsible adult to accompany them home.    In addition, the patient with mild pain between shoulder blades (ie the thoracic region).  Normal neurological exam.  CXR and thoracic xrays negative.  Patient with ice to area and observation.  Tylenol prn.  Wife made aware that late intracranial bleeding can occur, even with a negative head CT.  Plan for careful observation.    Diagnosis:    ICD-10-CM    1. Injury of head, initial encounter S09.90XA    2. Thoracic back sprain, initial encounter S23.9XXA      Disposition:   The patient is discharged to home.    Discharge Medications:  No discharge medication.     Instructions to patient:  Return if any \"red flags\" appear/develop in the coming hours/days, as this may represent an indication to perform a CT scan.  \"Red flags\" include: headaches that get worse, increased drowsiness, strange behavior, repetitive speech, seizures, repeated vomiting, growing confusion, increased irritability, slurred speech, weakness or numbness, and loss of responsiveness.  "     OK motrin or tylenol for headache.    Please followup with PCP in 2-3 days.  Come back if not better.    Hydrate and push fluids.      Post concussive syndrome involves headaches, dizziness, personality changes, confusion, tiredness/lethargy.  This is a normal part of the healing process.    Frequent breaks and extra time to complete projects/work/school may be necessary.    Scribe Disclosure:  I, Abby Pruitt, am serving as a scribe at 4:50 PM on 2/10/2019 to document services personally performed by Maday Chao MD based on my observations and the provider's statements to me.  [facility] EMERGENCY DEPARTMENT       Maday Chao MD  02/10/19 7946

## 2019-02-10 NOTE — ED TRIAGE NOTES
pt comes in after fall while skiing at "[employer] where he hit left side of head. pt recalls accident but has about 5-10 min where he ski to chair lift and taking cat walk down. pt is on xarelto for afib. pt now A&Ox4. pt also reports pain between shoulder blades.

## 2019-02-11 NOTE — DISCHARGE INSTRUCTIONS
"Return if any \"red flags\" appear/develop in the coming hours/days, as this may represent an indication to perform another CT scan.  \"Red flags\" include: headaches that get worse, increased drowsiness, strange behavior, repetitive speech, seizures, repeated vomiting, growing confusion, increased irritability, slurred speech, weakness or numbness, and loss of responsiveness.      OK motrin or tylenol for headache.    Please followup with PCP in 2-3 days.  Come back if not better.    Hydrate and push fluids.      Post concussive syndrome involves headaches, dizziness, personality changes, confusion, tiredness/lethargy.  This is a normal part of the healing process.    Frequent breaks and extra time to complete projects/work/school may be necessary.   "

## 2019-02-11 NOTE — ED NOTES
Pt and non of his belongings in room at discharge. Pt left without discontinue papers or signing discontinue papers. Asked that papers be sent to pt home address.

## 2019-03-11 DIAGNOSIS — I48.19 PERSISTENT ATRIAL FIBRILLATION (H): ICD-10-CM

## 2019-03-11 DIAGNOSIS — I10 BENIGN ESSENTIAL HYPERTENSION: ICD-10-CM

## 2019-03-11 DIAGNOSIS — I48.92 ATRIAL FLUTTER, PAROXYSMAL (H): ICD-10-CM

## 2019-03-11 RX ORDER — NEBIVOLOL 5 MG/1
5 TABLET ORAL DAILY
Qty: 90 TABLET | Refills: 2 | Status: SHIPPED | OUTPATIENT
Start: 2019-03-11 | End: 2019-06-11

## 2019-03-25 ENCOUNTER — HOSPITAL ENCOUNTER (OUTPATIENT)
Dept: CARDIOLOGY | Facility: CLINIC | Age: 79
Discharge: HOME | End: 2019-03-25
Attending: NURSE PRACTITIONER | Admitting: NURSE PRACTITIONER
Payer: MEDICARE

## 2019-03-25 DIAGNOSIS — I48.91 ATRIAL FIBRILLATION, UNSPECIFIED TYPE (H): ICD-10-CM

## 2019-03-25 DIAGNOSIS — I42.9 CARDIOMYOPATHY, UNSPECIFIED TYPE (H): ICD-10-CM

## 2019-03-25 PROCEDURE — 93306 TTE W/DOPPLER COMPLETE: CPT

## 2019-03-25 PROCEDURE — 93306 TTE W/DOPPLER COMPLETE: CPT | Mod: 26 | Performed by: INTERNAL MEDICINE

## 2019-03-26 RX ORDER — AMIODARONE HYDROCHLORIDE 200 MG/1
TABLET ORAL
Qty: 90 TABLET | Refills: 1 | Status: SHIPPED | OUTPATIENT
Start: 2019-03-26 | End: 2019-09-19

## 2019-03-26 NOTE — TELEPHONE ENCOUNTER
Requested Prescriptions   Pending Prescriptions Disp Refills     amiodarone (PACERONE/CODARONE) 200 MG tablet [Pharmacy Med Name: AMIODARONE 200MG    TAB] 90 tablet 1     Sig: TAKE 1 TABLET BY MOUTH ONCE DAILY MONDAY  THROUGH  FRIDAY,  SKIP  SATURDAY  AND  SUNDAY    There is no refill protocol information for this order        Last Written Prescription Date:  10/10/2018  Last Fill Quantity: 90,  # refills: 3   Last office visit: 10/23/2018 with prescribing provider:  10/23/2018   Future Office Visit:   Next 5 appointments (look out 90 days)    Apr 09, 2019  3:20 PM CDT  [facility] [address] [address] [phone]   Apr 23, 2019  9:45 AM CDT  Return Visit with Anam Perera MD  [facility] ([facility]) [address] [address] [id number] [phone]

## 2019-04-08 NOTE — PROGRESS NOTES
SUBJECTIVE:                                                    Reji Aguirre is a 78 year old male who presents to clinic today for the following health issues:      Hypertension Follow-up      Outpatient blood pressures are being checked at home.  Results are 130/60.    Low Salt Diet: no added salt      Amount of exercise or physical activity: 2-3 days/week for an average of greater than 60 minutes    Problems taking medications regularly: No    Medication side effects: none    Diet: regular (no restrictions)    Would like to go over CT and echo  results.         2.  Head injury while skiing in early Feb.   Fell while skiing, trying to avoid another skiier, hit loose snow and slid sideways.   Struck head on snow.  Was wearing helemt.   Has loss of consciousness.    Taken to ER via ambulance.   Head CT showed no bleed  Had mild memory los of that event, some lingering symptoms such as difficulty in reading and concentration    3.  [facility]     4.  Reviewed ECHO from last month showing EF 55-60%, and resolutio nof prior cardiomyopathy'  pulkmonary pressures slghtly hgigher.     5./  Has history of atrial fibrillation.    No palpitations, no dizziness, no dyspnea on exertion, no shortness of breath.  No racing heart, no fast heart rates.    Taking medications as ordered, no side effects reported.   Patient is taking anticoagulation (Xarelto) according to direction, with no reported side effects.     Problem list and histories reviewed & adjusted, as indicated.  Additional history: as documented          Past Medical History:  ---------------------------  Past Medical History:   Diagnosis Date     Atrial fibrillation, unspecified type (H) 5/19/16    new onset A fib; nuclear stress test negative     Atrial flutter (H)      Benign essential hypertension 2/2/2004     Cardiomyopathy (H)      Carotid artery disease (H) 02/10/2019    Extensive calcified atherosclerotic  plaque in the internal carotid arteries seen on routine head CT     Diverticulosis of colon (without mention of hemorrhage) 11/16/07    incidental diverticuli noted at colonoscopy     Gout, unspecified      LBBB (left bundle branch block)      Rosacea      Special screening for malignant neoplasms, colon 1995       Past Surgical History:  ---------------------------  Past Surgical History:   Procedure Laterality Date     ARTHROPLASTY KNEE Left 6/6/2016    Procedure: ARTHROPLASTY KNEE;  Surgeon: Derek Varma MD;  Location: [facility] OR     ARTHROSCOPY SHOULDER ROTATOR CUFF REPAIR  7/9/08    Left shoulder arthroscopic rotator cuff repair with acromioplasty     C NONSPECIFIC PROCEDURE  5/99    left knee arthroscopy     CARDIOVERSION  07-      COLONOSCOPY THRU STOMA W BIOPSY/CAUTERY TUMOR/POLYP/LESION  11/16/07    normal colonoscopy except for incidental diverticuli     ORTHOPEDIC SURGERY  04/11    2nd toe Lt foot       Current Medications:  ---------------------------  Current Outpatient Medications   Medication Sig Dispense Refill     Acetaminophen (TYLENOL PO) Take 500 mg by mouth every 8 hours as needed for mild pain or fever       allopurinol (ZYLOPRIM) 300 MG tablet Take 1 tablet (300 mg) by mouth daily 90 tablet 1     amiodarone (PACERONE/CODARONE) 200 MG tablet TAKE 1 TABLET BY MOUTH ONCE DAILY MONDAY  THROUGH  FRIDAY,  SKIP  SATURDAY  AND  SUNDAY 90 tablet 1     amLODIPine (NORVASC) 2.5 MG tablet Take 1 tablet (2.5 mg) by mouth daily 90 tablet 3     ASPIRIN NOT PRESCRIBED (INTENTIONAL) Please choose reason not prescribed, below 0 each      diphenhydrAMINE (BENADRYL) 25 MG capsule Take 25 mg by mouth nightly as needed for itching or allergies       eplerenone (INSPRA) 25 MG tablet Take 25 mg by mouth 2 times daily       hydrochlorothiazide (MICROZIDE) 12.5 MG capsule TAKE TWO CAPSULES BY MOUTH EVERY MORNING 180 capsule 2     nebivolol (BYSTOLIC) 5 MG tablet Take 1 tablet (5 mg) by mouth daily 90  tablet 2     rivaroxaban ANTICOAGULANT (XARELTO) 20 MG TABS tablet Take 1 tablet (20 mg) by mouth daily (with dinner) 90 tablet 1       Allergies:  -------------  Allergies   Allergen Reactions     Ampicillin      rash     Metoprolol      Mild fatigue with Toprol; mildly decreased exercise capacity     Spironolactone Nausea and Difficulty breathing     Vicodin [Hydrocodone-Acetaminophen] Nausea       Social History:  -------------------  Social History     Socioeconomic History     Marital status:      Spouse name: Not on file     Number of children: Not on file     Years of education: Not on file     Highest education level: Not on file   Occupational History     Not on file   Social Needs     Financial resource strain: Not on file     Food insecurity:     Worry: Not on file     Inability: Not on file     Transportation needs:     Medical: Not on file     Non-medical: Not on file   Tobacco Use     Smoking status: Former Smoker     Types: Cigarettes     Last attempt to quit: 1963     Years since quittin.7     Smokeless tobacco: Never Used   Substance and Sexual Activity     Alcohol use: Yes     Alcohol/week: 0.0 oz     Comment: 1 drink a day     Drug use: No     Sexual activity: Yes     Partners: Female   Lifestyle     Physical activity:     Days per week: Not on file     Minutes per session: Not on file     Stress: Not on file   Relationships     Social connections:     Talks on phone: Not on file     Gets together: Not on file     Attends Congregation service: Not on file     Active member of club or organization: Not on file     Attends meetings of clubs or organizations: Not on file     Relationship status: Not on file     Intimate partner violence:     Fear of current or ex partner: Not on file     Emotionally abused: Not on file     Physically abused: Not on file     Forced sexual activity: Not on file   Other Topics Concern     Parent/sibling w/ CABG, MI or angioplasty before 65F 55M? Not Asked       Service Not Asked     Blood Transfusions Not Asked     Caffeine Concern No     Comment: 1 cup daily     Occupational Exposure Not Asked     Hobby Hazards Not Asked     Sleep Concern Not Asked     Stress Concern Not Asked     Weight Concern Not Asked     Special Diet No     Back Care Not Asked     Exercise Yes     Comment: skiing     Bike Helmet Not Asked     Seat Belt Not Asked     Self-Exams Not Asked   Social History Narrative     Not on file       Family Medical History:  ------------------------------  Family History   Problem Relation Age of Onset     Cerebrovascular Disease Father         d:89, dementia     Respiratory Mother         d:94     Diabetes Sister         b. 1938, IDDM since age 10     Diabetes Brother         b. 1943, type II DM         ROS:  REVIEW OF SYSTEMS:    RESP: negative for cough, dyspnea, wheezing, hemoptysis  CV: negative for chest pain, palpitations, PND, MAGAÑA, orthopnea; reports no changes in their ability to perform physical activity (from cardiovascular standpoint)  GI: negative for dysphagia, N/V, pain, melena, diarrhea and constipation  NEURO: negative for numbness/tingling, paralysis, incoordination, or focal weakness     OBJECTIVE:                                                    /80   Pulse 50   Temp 98.3  F (36.8  C) (Oral)   Wt 91.6 kg (202 lb)   SpO2 95%   BMI 30.71 kg/m       GENERAL alert and no distress  EYES:  Normal sclera,conjunctiva, EOMI  HENT: oral and posterior pharynx without lesions or erythema, facies symmetric  NECK: Neck supple. No LAD, without thyroidmegaly or JVD., Carotids without bruits.  RESP: Clear to ausculation bilaterally without wheezes or crackles. Normal BS in all fields.  CV: RRR normal S1S2 without murmurs, rubs or gallops. PMI normal  LYMPH: no cervical lymph adenopathy appreciated  MS: extremities- no gross deformities of the visible extremities noted, no edema  PSYCH: Alert and oriented times 3; speech- coherent  SKIN:   No obvious significant skin lesions on visible portions of face          ASSESSMENT/PLAN:                                                      (F07.81) Post-concussion syndrome  (primary encounter diagnosis)  Comment: symptoms mostly resovled.   Discussed concussions and the postconcussion syndrome and its possilbe sx. including ongon headaches, mild short term memory loss, mild personality changes.  discussed warning sign sof more significant head and brain injuries.  DIscussed expected course of improvement.    At this point based on the patient's current and history, there is no cause for further intervention or further workup/referral.  Pt is to call if there are any further changes or new symptoms.   Plan:     (I77.9) Bilateral carotid artery disease, unspecified type (H)  Comment: Discussed secondary risk factor modification and recommended continuing aggressive management of these items.     Start statin.  If tolerates the 10 mgdose, then increae to 20.   Plan: rosuvastatin (CRESTOR) 10 MG tablet, Lipid         panel reflex to direct LDL Fasting            (I48.91) Atrial fibrillation, unspecified type (H)  Comment: This condition is currently controlled on the current medical regimen.  Continue current therapy.   Plan:     (I42.9) Cardiomyopathy, unspecified type (H)  Comment: resolved with resolution of atrial fibrillation   Plan:     (I10) Benign essential hypertension  Comment: This condition is currently controlled on the current medical regimen.  Continue current therapy.   Discussed current hypertension treatment guidelines, including indications for treatment and treatment options.  Discussed the importance for aggressive management of HTN to prevent vascular complications later.  Recommended lower fat, lower carbohydrate, and lower sodium (<2000 mg)diet.  Discussed required intervals for follow up on HTN, lab studies.  Recommened pt. follow their blood pressures outside the clinic to ensure that BPs  are remaining within guidelines, and to contact me if the readings are not within guidelines on a regular basis so we can adjust treatment as needed.   Plan: eplerenone (INSPRA) 25 MG tablet            (Z12.11) Screen for colon cancer  Comment:   Plan: GASTROENTEROLOGY ADULT REF PROCEDURE ONLY         Radha Workman [phone]; No Provider         Preference            (Z13.6) CARDIOVASCULAR SCREENING; LDL GOAL LESS THAN 100  Comment: Discussed cardiac disease risk factors and cardiac disease risk factor modification.   Plan: Lipid panel reflex to direct LDL Fasting               See Patient Instructions    BAR SALEH M.D., MD  [facility]    (Chart documentation may have been completed, in part, with [employer] voice-recognition software. Even though reviewed, some grammatical, spelling, and word errors may remain.)

## 2019-04-09 ENCOUNTER — OFFICE VISIT (OUTPATIENT)
Dept: INTERNAL MEDICINE | Facility: CLINIC | Age: 79
End: 2019-04-09
Payer: MEDICARE

## 2019-04-09 VITALS
DIASTOLIC BLOOD PRESSURE: 80 MMHG | OXYGEN SATURATION: 95 % | WEIGHT: 202 LBS | BODY MASS INDEX: 30.71 KG/M2 | SYSTOLIC BLOOD PRESSURE: 120 MMHG | TEMPERATURE: 98.3 F | HEART RATE: 50 BPM

## 2019-04-09 DIAGNOSIS — I42.9 CARDIOMYOPATHY, UNSPECIFIED TYPE (H): ICD-10-CM

## 2019-04-09 DIAGNOSIS — Z13.6 CARDIOVASCULAR SCREENING; LDL GOAL LESS THAN 100: ICD-10-CM

## 2019-04-09 DIAGNOSIS — Z12.11 SCREEN FOR COLON CANCER: ICD-10-CM

## 2019-04-09 DIAGNOSIS — F07.81 POST-CONCUSSION SYNDROME: Primary | ICD-10-CM

## 2019-04-09 DIAGNOSIS — I48.91 ATRIAL FIBRILLATION, UNSPECIFIED TYPE (H): ICD-10-CM

## 2019-04-09 DIAGNOSIS — I10 BENIGN ESSENTIAL HYPERTENSION: ICD-10-CM

## 2019-04-09 DIAGNOSIS — I77.9 BILATERAL CAROTID ARTERY DISEASE, UNSPECIFIED TYPE (H): ICD-10-CM

## 2019-04-09 PROCEDURE — 99214 OFFICE O/P EST MOD 30 MIN: CPT | Performed by: INTERNAL MEDICINE

## 2019-04-09 RX ORDER — EPLERENONE 25 MG/1
25 TABLET, FILM COATED ORAL 2 TIMES DAILY
COMMUNITY
Start: 2019-04-09 | End: 2019-04-12

## 2019-04-09 RX ORDER — ROSUVASTATIN CALCIUM 10 MG/1
10 TABLET, COATED ORAL DAILY
Qty: 90 TABLET | Refills: 3 | Status: SHIPPED | OUTPATIENT
Start: 2019-04-09 | End: 2020-03-23

## 2019-04-09 NOTE — PATIENT INSTRUCTIONS
"*  Due to the carotid artery disease seen on the Head CT scan, start an additaion lmedication to help prevent more plqaue from being laid down and to help stabilize plaque from rupture causing strokes.     --Start Rosuvastatin 10 mg once per day.  Roque side effects  Include new or worsened muscle aching, or new or worsened intestinal upset.     --If you tolerate the 10 mg dose, then we may try to get you onto a higher dose.     *  The nutritional supplement CoQ10 200 mg per day can help muscle pains and also help reduce the chance of musculoskeletal and soft tissue side effects from \"statin\" cholesterol lowering medications.  You can take any over the counter version and find at any grocery or drug store.  The cheapest prices are usually found at [employer] or [employer].       *  Continue all other medications at the same doses.  Contact your usual pharmacy if you need refills.     *  Return to see Dr. Perera as planned in the Cardiology Clinic in 2 weeks, sooner if needed.  Please get fasting labs done before this appointment.  Bring something to eat after the labs.  Eat nothing for at least 8 hours prior to having these labs drawn.  Call [phone] to schedule appointments at [facility].     *  Colonoscopy at [facility] at your convenience.   [facility]  [phone] will contact you to arrange this    *  Hold the Xarelto for 3 doses prior to the colonoscopy , resume at same dose after the colonoscopy.      *    Consider using the SuPrep colonoscopy prep to prepare the colon for the colonoscopy.  This requires much less liquid to be drunk to clean you out.  It may not be covered by insurance (approx $60-65), but it is much better tolerated and easier to use, therefore may be worth the extra expense.     *  Return to see me in 6 months, sooner if needed.  Call [phone] to schedule this appointment.          POST CONCUSSION SYNDROME:     *  There is no evidence for needing an MRI or Head CT " scan at this time.     *  Tylenol as needed.     *  Consider transition to warm compresses rather than cold.  If you find cold helps you more, then continue to use it, but usually about now is when heat usually becomes more helpful.    *  Don't be surprised if your memory takes a little while to return or if you don't recover memory from the incident.  It is common to have loss of memory around the time of the head injury.     *  You may find subtle changes in personality, which is fairly normal.  Let us know if you have any major changes in mood or difficulties in mental functioning    *  Slow return to activity, do not sign up for too many activities until you feel back to normal.     *  Contact us immediately if you develop very severe headache or significant vomiting, or significant changes in vision.  These can all be signs of worsening head injury.          Post-concussion syndrome is a complex disorder in which various symptoms -- mostly commonly headache, dizziness, and sleep problems,  last for weeks and sometimes months after an intial injury to the brain.  Concussion is a mild traumatic brain injury that usually happens after a blow to the head. It can also occur with jerky movement of the head or body. You don't have to lose consciousness to get a concussion or post-concussion syndrome. In fact, the risk of post-concussion syndrome doesn't appear to be associated with the severity of the initial injury.  In most people, symptoms occur within the first seven to 10 days and go away within three months. Sometimes, they can persist for a year or more.  The goal of treatment after concussion is to effectively manage your symptoms.  ~ You WILL get better~     GENERAL ADVICE  \  ~~ Avoid activities that trigger your symptoms.  Stop your activity  as soon as you feel the symptoms coming on  ~~ Do not try to push through symptoms or they may get worse or take longer to go away.  ~~ Allow yourself more time to  complete activities   ~~ Drink plenty of water throughout the day (8-10 glasses per day)  ~~ Write things down.  At home, at work, whenever there is something that you should remember, even it is simple.     SLEEP  ~~ Sleep is essential to your recovery   ~~ If your headaches are keeping you awake or causing you to wake  frequently, take ibuprofen or tylenol immediately before bedtime~  ~~ Attempt to get 6+ hours of uninterrupted sleep a night.  ~~ Rest (eyes closed, dark room)  as frequently as needed during the day until symptoms improve     SCREEN  ~ Look into [employer] to adjust your screen resolutions~  ~ Increase font~  ~ limit screen activities including computer, TV, e-readers and phones.  ~ Avoid reading if it increases your symptoms.  Audio- books are a great option often available at your public library.     HEADACHE  ~ Take your tylenol (1000 mg) three times a day for now, do not wait until you have a headache~    Light sensitivities:   ~~ Avoid florescent lighting when possible  ~~ Always wear sunglasses outside and even wear them indoors if it is helpful.  ~~ No night driving  Sound sensitivities:  ~ Avoid crowded areas  ~ Avoid multiple conversations at the same time around you  ~ Avoid loud music or loud sounds     NECK PAIN  ~ Applying Ice or heat  ffor up to 20 minutes at a time can help relieve pain  ~ Massage is ok if it doesn't trigger more symptoms~  ~ Gentle stretches are good     DIZZINESS  ~ No driving  ~ Keep a chair at the side of the bed to help get steady prior to getting up and going to the bathroom  ~ No climbing heights or using ladders or stepstools.     FATIGUE  ~ OK to engage in light, aerobic activity 2-3+ times a week.  However, if feeling fatigued stop and rest  ~ Practice good sleep hygiene, as above     ANXIETY, DEPRESSION, OR MOOD SWINGS:  ~~Transient changes in mood, including increased anxiety and depression, are normal following a brain injury. However, if symptoms  persist consider seeing a psychologist or psychiatrist.  Counseling and or/ medications may be helpful in treating ongoing anxiety or depression. We would be happy to make a referral for you.       IF YOUR SYMPTOMS ARE SEVERE OR ARE LINGERING LONGER THAN EXPECTED, THEN WE CAN SEND YOU TO OUR CONCUSSION  SERVICE.  THEY WILL CALL YOU TO SET UP A FOLLOW UP APPOINTMENT AND APPOINTMENTS WITH:  PHYSICIAL THERAPY, OCCUPATIONAL THERAPY, BEHAVIORAL OPTOMETRY con

## 2019-04-09 NOTE — LETTER
[address] [address]  [phone]        November 29, 2019    Reji Aguirre  [address] [facility] [address]              Dear Reji Aguirre    This is to remind you that your non-fasting labs is due.    You may call our office at [phone] to schedule an appointment.    Please disregard this notice if you have already had your labs drawn or made an appointment.        Sincerely,        Igor Christina MD

## 2019-04-11 DIAGNOSIS — I10 BENIGN ESSENTIAL HYPERTENSION: ICD-10-CM

## 2019-04-11 NOTE — TELEPHONE ENCOUNTER
Requested Prescriptions   Pending Prescriptions Disp Refills     eplerenone (INSPRA) 25 MG tablet       Sig: Take 1 tablet (25 mg) by mouth 2 times daily       There is no refill protocol information for this order      Last Written Prescription Date:    Last Fill Quantity: ,  # refills:    Last office visit: 4/9/2019 with prescribing provider:  04/09/19   Future Office Visit: 0  Next 5 appointments (look out 90 days)    Apr 23, 2019  9:45 AM CDT  Return Visit with Anam Perera MD  [facility] ([facility]) [id number] [address] [address] [phone]

## 2019-04-11 NOTE — TELEPHONE ENCOUNTER
"Requested Prescriptions   Pending Prescriptions Disp Refills     eplerenone (INSPRA) 25 MG tablet       Sig: Take 1 tablet (25 mg) by mouth 2 times daily       Diuretics (Including Combos) Protocol Passed - 4/11/2019  3:04 PM        Passed - Blood pressure under 140/90 in past 12 months     BP Readings from Last 3 Encounters:   04/09/19 120/80   02/10/19 178/80   10/23/18 122/70                 Passed - Recent (12 mo) or future (30 days) visit within the authorizing provider's specialty     Patient had office visit in the last 12 months or has a visit in the next 30 days with authorizing provider or within the authorizing provider's specialty.  See \"Patient Info\" tab in inbasket, or \"Choose Columns\" in Meds & Orders section of the refill encounter.              Passed - Medication is active on med list        Passed - Patient is age 18 or older        Passed - Normal serum creatinine on file in past 12 months     Recent Labs   Lab Test 06/07/18  0907   CR 1.16              Passed - Normal serum potassium on file in past 12 months     Recent Labs   Lab Test 06/07/18  0907   POTASSIUM 4.4                    Passed - Normal serum sodium on file in past 12 months     Recent Labs   Lab Test 06/07/18  0907                 Routing refill request to provider for review/approval because:  Medication is reported/historical        "

## 2019-04-12 RX ORDER — EPLERENONE 25 MG/1
25 TABLET, FILM COATED ORAL 2 TIMES DAILY
Qty: 180 TABLET | Refills: 1 | Status: SHIPPED | OUTPATIENT
Start: 2019-04-12 | End: 2020-07-13

## 2019-04-22 PROBLEM — F07.81 POST CONCUSSION SYNDROME: Status: ACTIVE | Noted: 2019-04-22

## 2019-04-23 ENCOUNTER — OFFICE VISIT (OUTPATIENT)
Dept: CARDIOLOGY | Facility: CLINIC | Age: 79
End: 2019-04-23
Payer: MEDICARE

## 2019-04-23 ENCOUNTER — HOSPITAL ENCOUNTER (OUTPATIENT)
Dept: GENERAL RADIOLOGY | Facility: CLINIC | Age: 79
Discharge: HOME | End: 2019-04-23
Attending: NURSE PRACTITIONER | Admitting: NURSE PRACTITIONER
Payer: MEDICARE

## 2019-04-23 ENCOUNTER — TRANSFERRED RECORDS (OUTPATIENT)
Dept: HEALTH INFORMATION MANAGEMENT | Facility: CLINIC | Age: 79
End: 2019-04-23

## 2019-04-23 VITALS
HEIGHT: 68 IN | WEIGHT: 205.1 LBS | SYSTOLIC BLOOD PRESSURE: 128 MMHG | HEART RATE: 44 BPM | DIASTOLIC BLOOD PRESSURE: 76 MMHG | BODY MASS INDEX: 31.08 KG/M2

## 2019-04-23 DIAGNOSIS — I48.92 ATRIAL FLUTTER, PAROXYSMAL (H): Primary | ICD-10-CM

## 2019-04-23 DIAGNOSIS — I48.92 ATRIAL FLUTTER, PAROXYSMAL (H): ICD-10-CM

## 2019-04-23 DIAGNOSIS — I48.19 PERSISTENT ATRIAL FIBRILLATION (H): ICD-10-CM

## 2019-04-23 DIAGNOSIS — I10 BENIGN ESSENTIAL HYPERTENSION: ICD-10-CM

## 2019-04-23 DIAGNOSIS — Z79.899 ON AMIODARONE THERAPY: ICD-10-CM

## 2019-04-23 LAB
ALBUMIN SERPL-MCNC: 3.6 G/DL (ref 3.4–5)
ALP SERPL-CCNC: 70 U/L (ref 40–150)
ALT SERPL W P-5'-P-CCNC: 60 U/L (ref 0–70)
ANION GAP SERPL CALCULATED.3IONS-SCNC: 5 MMOL/L (ref 3–14)
AST SERPL W P-5'-P-CCNC: 47 U/L (ref 0–45)
BILIRUB DIRECT SERPL-MCNC: 0.2 MG/DL (ref 0–0.2)
BILIRUB SERPL-MCNC: 0.6 MG/DL (ref 0.2–1.3)
BUN SERPL-MCNC: 17 MG/DL (ref 7–30)
CALCIUM SERPL-MCNC: 8.8 MG/DL (ref 8.5–10.1)
CHLORIDE SERPL-SCNC: 106 MMOL/L (ref 94–109)
CO2 SERPL-SCNC: 29 MMOL/L (ref 20–32)
CREAT SERPL-MCNC: 1.06 MG/DL (ref 0.66–1.25)
GFR SERPL CREATININE-BSD FRML MDRD: 67 ML/MIN/{1.73_M2}
GLUCOSE SERPL-MCNC: 100 MG/DL (ref 70–99)
POTASSIUM SERPL-SCNC: 4.1 MMOL/L (ref 3.4–5.3)
PROT SERPL-MCNC: 7.1 G/DL (ref 6.8–8.8)
SODIUM SERPL-SCNC: 140 MMOL/L (ref 133–144)
TSH SERPL DL<=0.005 MIU/L-ACNC: 0.75 MU/L (ref 0.4–4)

## 2019-04-23 PROCEDURE — 99214 OFFICE O/P EST MOD 30 MIN: CPT | Mod: 25 | Performed by: INTERNAL MEDICINE

## 2019-04-23 PROCEDURE — 84443 ASSAY THYROID STIM HORMONE: CPT | Performed by: INTERNAL MEDICINE

## 2019-04-23 PROCEDURE — 36415 COLL VENOUS BLD VENIPUNCTURE: CPT | Performed by: INTERNAL MEDICINE

## 2019-04-23 PROCEDURE — 80048 BASIC METABOLIC PNL TOTAL CA: CPT | Performed by: INTERNAL MEDICINE

## 2019-04-23 PROCEDURE — 93005 ELECTROCARDIOGRAM TRACING: CPT | Performed by: INTERNAL MEDICINE

## 2019-04-23 PROCEDURE — 80076 HEPATIC FUNCTION PANEL: CPT | Performed by: INTERNAL MEDICINE

## 2019-04-23 PROCEDURE — 71046 X-RAY EXAM CHEST 2 VIEWS: CPT

## 2019-04-23 ASSESSMENT — MIFFLIN-ST. JEOR: SCORE: 1629.83

## 2019-04-23 NOTE — LETTER
4/23/2019      Igor Christina MD  [address] [facility] [address]      RE: Reji Aguirre       Dear Colleague,    I had the pleasure of seeing Reji Aguirre in the [facility].    Service Date: 04/23/2019      HISTORY OF PRESENT ILLNESS:  It is a pleasure for me to see Mr. Aguirre today.  He is here for followup of persistent atrial fibrillation.  He also has a history of paroxysmal atrial flutter, resistant hypertension and tachycardia-induced cardiomyopathy.  Fortunately, the latter has resolved.  Echocardiogram recently shows normal ejection fraction.  For full details, please refer to his recent medical records.      He is feeling well at this time.  He has no dizzy spells, syncopal episodes, chest pain, shortness of breath.      He sustained a concussion this winter when he was skiing.  He had a head CT which fortunately showed no major trauma, but atherosclerotic disease was noted in his cerebral vasculature and he has been on rosuvastatin.  He also showed me some blood pressure readings.  The majority of his readings are around 130, though there are occasional spikes up to 150.  His blood pressure is excellent in our offices today.  Cardiac examination otherwise unremarkable.  Recent amiodarone lab tests look excellent.      IMPRESSION:   1.  Persistent atrial fibrillation.  Maintained on low-dose amiodarone.  He is also on Xarelto.  He reports no issues with bleeding.   2.  Atrial flutter status post ablation.   3.  Resistant hypertension.  Under reasonable control.  He is on eplerenone per [facility] who is of the opinion that he may have hypertension due to hyperaldosteronism.   4.  Tachycardia-induced cardiomyopathy, resolved.   5.  Obesity.  I gave him a target weight loss of about 15 pounds prior to next clinic visit.   6.  Physiologic sinus bradycardia.  Heart rate is persistently between 40-50.  He has no symptoms and we should continue with conservative  treatment.         BEATRICE NEWELL MD, [facility]             D: 2019   T: 2019   MT: LIONEL      Name:     MARCIE CABALLERO   MRN:      [id number]        Account:      [id number]   :      1940           Service Date: 2019      Document: [id number]         Outpatient Encounter Medications as of 2019   Medication Sig Dispense Refill     Acetaminophen (TYLENOL PO) Take 500 mg by mouth every 8 hours as needed for mild pain or fever       allopurinol (ZYLOPRIM) 300 MG tablet Take 1 tablet (300 mg) by mouth daily 90 tablet 1     amiodarone (PACERONE/CODARONE) 200 MG tablet TAKE 1 TABLET BY MOUTH ONCE DAILY MONDAY  THROUGH  FRIDAY,  SKIP  SATURDAY  AND   90 tablet 1     amLODIPine (NORVASC) 2.5 MG tablet Take 1 tablet (2.5 mg) by mouth daily 90 tablet 3     diphenhydrAMINE (BENADRYL) 25 MG capsule Take 25 mg by mouth nightly as needed for itching or allergies       eplerenone (INSPRA) 25 MG tablet Take 1 tablet (25 mg) by mouth 2 times daily 180 tablet 1     hydrochlorothiazide (MICROZIDE) 12.5 MG capsule TAKE TWO CAPSULES BY MOUTH EVERY MORNING 180 capsule 2     nebivolol (BYSTOLIC) 5 MG tablet Take 1 tablet (5 mg) by mouth daily 90 tablet 2     rivaroxaban ANTICOAGULANT (XARELTO) 20 MG TABS tablet Take 1 tablet (20 mg) by mouth daily (with dinner) 90 tablet 1     rosuvastatin (CRESTOR) 10 MG tablet Take 1 tablet (10 mg) by mouth daily 90 tablet 3     ASPIRIN NOT PRESCRIBED (INTENTIONAL) Please choose reason not prescribed, below 0 each      No facility-administered encounter medications on file as of 2019.        Again, thank you for allowing me to participate in the care of your patient.      Sincerely,    DR BEATRICE NEWELL MD     [facility]

## 2019-04-23 NOTE — PROGRESS NOTES
HPI and Plan:   See dictation    Orders Placed This Encounter   Procedures     Venipuncture every 6 months     HC DIFFUSING CAPACITY     CHEST X-RAY 2 VW     HEPATIC PANEL     CBC WITH PLATELETS     TSH     Follow-Up with Cardiologist     EKG 12-LEAD RADIOLOGY       No orders of the defined types were placed in this encounter.      Encounter Diagnoses   Name Primary?     Atrial flutter, paroxysmal (H) Yes     Persistent atrial fibrillation (H)      Benign essential hypertension        CURRENT MEDICATIONS:  Current Outpatient Medications   Medication Sig Dispense Refill     Acetaminophen (TYLENOL PO) Take 500 mg by mouth every 8 hours as needed for mild pain or fever       allopurinol (ZYLOPRIM) 300 MG tablet Take 1 tablet (300 mg) by mouth daily 90 tablet 1     amiodarone (PACERONE/CODARONE) 200 MG tablet TAKE 1 TABLET BY MOUTH ONCE DAILY MONDAY  THROUGH  FRIDAY,  SKIP  SATURDAY  AND  SUNDAY 90 tablet 1     amLODIPine (NORVASC) 2.5 MG tablet Take 1 tablet (2.5 mg) by mouth daily 90 tablet 3     diphenhydrAMINE (BENADRYL) 25 MG capsule Take 25 mg by mouth nightly as needed for itching or allergies       eplerenone (INSPRA) 25 MG tablet Take 1 tablet (25 mg) by mouth 2 times daily 180 tablet 1     hydrochlorothiazide (MICROZIDE) 12.5 MG capsule TAKE TWO CAPSULES BY MOUTH EVERY MORNING 180 capsule 2     nebivolol (BYSTOLIC) 5 MG tablet Take 1 tablet (5 mg) by mouth daily 90 tablet 2     rivaroxaban ANTICOAGULANT (XARELTO) 20 MG TABS tablet Take 1 tablet (20 mg) by mouth daily (with dinner) 90 tablet 1     rosuvastatin (CRESTOR) 10 MG tablet Take 1 tablet (10 mg) by mouth daily 90 tablet 3     ASPIRIN NOT PRESCRIBED (INTENTIONAL) Please choose reason not prescribed, below 0 each        ALLERGIES     Allergies   Allergen Reactions     Ampicillin      rash     Metoprolol      Mild fatigue with Toprol; mildly decreased exercise capacity     Spironolactone Nausea and Difficulty breathing     Vicodin  [Hydrocodone-Acetaminophen] Nausea       PAST MEDICAL HISTORY:  Past Medical History:   Diagnosis Date     Atrial fibrillation, unspecified type (H) 5/19/16    new onset A fib; nuclear stress test negative     Atrial flutter (H)      Benign essential hypertension 2/2/2004     Cardiomyopathy (H)      Carotid artery disease (H) 02/10/2019    Extensive calcified atherosclerotic plaque in the internal carotid arteries seen on routine head CT     Diverticulosis of colon (without mention of hemorrhage) 11/16/07    incidental diverticuli noted at colonoscopy     Gout, unspecified      LBBB (left bundle branch block)      Post concussion syndrome 4/22/2019     Rosacea      Special screening for malignant neoplasms, colon 1995       PAST SURGICAL HISTORY:  Past Surgical History:   Procedure Laterality Date     ARTHROPLASTY KNEE Left 6/6/2016    Procedure: ARTHROPLASTY KNEE;  Surgeon: Derek Varma MD;  Location: [facility] OR     ARTHROSCOPY SHOULDER ROTATOR CUFF REPAIR  7/9/08    Left shoulder arthroscopic rotator cuff repair with acromioplasty     C NONSPECIFIC PROCEDURE  5/99    left knee arthroscopy     CARDIOVERSION  07-     HC COLONOSCOPY THRU STOMA W BIOPSY/CAUTERY TUMOR/POLYP/LESION  11/16/07    normal colonoscopy except for incidental diverticuli     ORTHOPEDIC SURGERY  04/11    2nd toe Lt foot       FAMILY HISTORY:  Family History   Problem Relation Age of Onset     Cerebrovascular Disease Father         d:89, dementia     Respiratory Mother         d:94     Diabetes Sister         b. 1938, IDDM since age 10     Diabetes Brother         b. 1943, type II DM       SOCIAL HISTORY:  Social History     Socioeconomic History     Marital status:      Spouse name: None     Number of children: None     Years of education: None     Highest education level: None   Occupational History     None   Social Needs     Financial resource strain: None     Food insecurity:     Worry: None     Inability: None      Transportation needs:     Medical: None     Non-medical: None   Tobacco Use     Smoking status: Former Smoker     Types: Cigarettes     Last attempt to quit: 1963     Years since quittin.7     Smokeless tobacco: Never Used   Substance and Sexual Activity     Alcohol use: Yes     Alcohol/week: 0.0 oz     Comment: 1 drink a day     Drug use: No     Sexual activity: Yes     Partners: Female   Lifestyle     Physical activity:     Days per week: None     Minutes per session: None     Stress: None   Relationships     Social connections:     Talks on phone: None     Gets together: None     Attends Restoration service: None     Active member of club or organization: None     Attends meetings of clubs or organizations: None     Relationship status: None     Intimate partner violence:     Fear of current or ex partner: None     Emotionally abused: None     Physically abused: None     Forced sexual activity: None   Other Topics Concern     Parent/sibling w/ CABG, MI or angioplasty before 65F 55M? Not Asked      Service Not Asked     Blood Transfusions Not Asked     Caffeine Concern No     Comment: 1 cup daily     Occupational Exposure Not Asked     Hobby Hazards Not Asked     Sleep Concern Not Asked     Stress Concern Not Asked     Weight Concern Not Asked     Special Diet No     Back Care Not Asked     Exercise Yes     Comment: skiing     Bike Helmet Not Asked     Seat Belt Not Asked     Self-Exams Not Asked   Social History Narrative     None       Review of Systems:  Skin:  Negative     Eyes:  Positive for glasses  ENT:  Negative hearing loss  Respiratory:  Negative    Cardiovascular:  Negative    Gastroenterology: Negative    Genitourinary:  Negative    Musculoskeletal:  Negative    Neurologic:  Negative    Psychiatric:  Negative    Heme/Lymph/Imm:  Negative    Endocrine:  Negative      Physical Exam:  Vitals: /76 (BP Location: Right arm, Patient Position: Sitting, Cuff Size: Adult Regular)   Pulse  "(!) 44   Ht 1.727 m (5' 8\")   Wt 93 kg (205 lb 1.6 oz)   BMI 31.19 kg/m      Constitutional:  cooperative, alert and oriented, well developed, well nourished, in no acute distress appears anxious      Skin:  warm and dry to the touch, no apparent skin lesions or masses noted          Head:  normocephalic, no masses or lesions        Eyes:  pupils equal and round        Lymph:No Cervical lymphadenopathy present     ENT:  no pallor or cyanosis        Neck:  JVP normal;carotid pulses are full and equal bilaterally        Respiratory:  normal breath sounds, clear to auscultation, normal A-P diameter, normal symmetry, normal respiratory excursion, no use of accessory muscles         Cardiac: no murmurs, gallops or rubs detected;apical impulse not displaced bradycardic distant heart sounds            pulses full and equal                                        GI:  abdomen soft obese      Extremities and Muscular Skeletal:  no edema   trace;bilateral LE edema          Neurological:  no gross motor deficits        Psych:  Alert and Oriented x 3        Recent Lab Results:  LIPID RESULTS:  Lab Results   Component Value Date    CHOL 217 (H) 06/07/2018    HDL 62 06/07/2018     (H) 06/07/2018    TRIG 103 06/07/2018    CHOLHDLRATIO 2.6 10/09/2014       LIVER ENZYME RESULTS:  Lab Results   Component Value Date    AST 47 (H) 04/23/2019    ALT 60 04/23/2019       CBC RESULTS:  Lab Results   Component Value Date    WBC 8.0 06/07/2018    RBC 5.16 06/07/2018    HGB 16.8 06/07/2018    HCT 49.8 06/07/2018    MCV 97 06/07/2018    MCH 32.6 06/07/2018    MCHC 33.7 06/07/2018    RDW 15.0 06/07/2018     06/07/2018       BMP RESULTS:  Lab Results   Component Value Date     04/23/2019    POTASSIUM 4.1 04/23/2019    CHLORIDE 106 04/23/2019    CO2 29 04/23/2019    ANIONGAP 5 04/23/2019     (H) 04/23/2019    BUN 17 04/23/2019    CR 1.06 04/23/2019    GFRESTIMATED 67 04/23/2019    GFRESTBLACK 78 04/23/2019    EH 8.8 " 04/23/2019        A1C RESULTS:  No results found for: A1C    INR RESULTS:  Lab Results   Component Value Date    INR 1.94 (H) 08/16/2017           CC  No referring provider defined for this encounter.

## 2019-04-23 NOTE — LETTER
4/23/2019    Igor Christina MD  [address] [facility] [address]    RE: Reji Aguirre       Dear Colleague,    I had the pleasure of seeing Reji Aguirre in the [facility].    HPI and Plan:   See dictation    Orders Placed This Encounter   Procedures     Venipuncture every 6 months     HC DIFFUSING CAPACITY     CHEST X-RAY 2 VW     HEPATIC PANEL     CBC WITH PLATELETS     TSH     Follow-Up with Cardiologist     EKG 12-LEAD RADIOLOGY       No orders of the defined types were placed in this encounter.      Encounter Diagnoses   Name Primary?     Atrial flutter, paroxysmal (H) Yes     Persistent atrial fibrillation (H)      Benign essential hypertension        CURRENT MEDICATIONS:  Current Outpatient Medications   Medication Sig Dispense Refill     Acetaminophen (TYLENOL PO) Take 500 mg by mouth every 8 hours as needed for mild pain or fever       allopurinol (ZYLOPRIM) 300 MG tablet Take 1 tablet (300 mg) by mouth daily 90 tablet 1     amiodarone (PACERONE/CODARONE) 200 MG tablet TAKE 1 TABLET BY MOUTH ONCE DAILY MONDAY  THROUGH  FRIDAY,  SKIP  SATURDAY  AND  SUNDAY 90 tablet 1     amLODIPine (NORVASC) 2.5 MG tablet Take 1 tablet (2.5 mg) by mouth daily 90 tablet 3     diphenhydrAMINE (BENADRYL) 25 MG capsule Take 25 mg by mouth nightly as needed for itching or allergies       eplerenone (INSPRA) 25 MG tablet Take 1 tablet (25 mg) by mouth 2 times daily 180 tablet 1     hydrochlorothiazide (MICROZIDE) 12.5 MG capsule TAKE TWO CAPSULES BY MOUTH EVERY MORNING 180 capsule 2     nebivolol (BYSTOLIC) 5 MG tablet Take 1 tablet (5 mg) by mouth daily 90 tablet 2     rivaroxaban ANTICOAGULANT (XARELTO) 20 MG TABS tablet Take 1 tablet (20 mg) by mouth daily (with dinner) 90 tablet 1     rosuvastatin (CRESTOR) 10 MG tablet Take 1 tablet (10 mg) by mouth daily 90 tablet 3     ASPIRIN NOT PRESCRIBED (INTENTIONAL) Please choose reason not prescribed, below 0 each        ALLERGIES      Allergies   Allergen Reactions     Ampicillin      rash     Metoprolol      Mild fatigue with Toprol; mildly decreased exercise capacity     Spironolactone Nausea and Difficulty breathing     Vicodin [Hydrocodone-Acetaminophen] Nausea       PAST MEDICAL HISTORY:  Past Medical History:   Diagnosis Date     Atrial fibrillation, unspecified type (H) 5/19/16    new onset A fib; nuclear stress test negative     Atrial flutter (H)      Benign essential hypertension 2/2/2004     Cardiomyopathy (H)      Carotid artery disease (H) 02/10/2019    Extensive calcified atherosclerotic plaque in the internal carotid arteries seen on routine head CT     Diverticulosis of colon (without mention of hemorrhage) 11/16/07    incidental diverticuli noted at colonoscopy     Gout, unspecified      LBBB (left bundle branch block)      Post concussion syndrome 4/22/2019     Rosacea      Special screening for malignant neoplasms, colon 1995       PAST SURGICAL HISTORY:  Past Surgical History:   Procedure Laterality Date     ARTHROPLASTY KNEE Left 6/6/2016    Procedure: ARTHROPLASTY KNEE;  Surgeon: Derek Varma MD;  Location: [facility] OR     ARTHROSCOPY SHOULDER ROTATOR CUFF REPAIR  7/9/08    Left shoulder arthroscopic rotator cuff repair with acromioplasty     C NONSPECIFIC PROCEDURE  5/99    left knee arthroscopy     CARDIOVERSION  07-     HC COLONOSCOPY THRU STOMA W BIOPSY/CAUTERY TUMOR/POLYP/LESION  11/16/07    normal colonoscopy except for incidental diverticuli     ORTHOPEDIC SURGERY  04/11    2nd toe Lt foot       FAMILY HISTORY:  Family History   Problem Relation Age of Onset     Cerebrovascular Disease Father         d:89, dementia     Respiratory Mother         d:94     Diabetes Sister         b. 1938, IDDM since age 10     Diabetes Brother         b. 1943, type II DM       SOCIAL HISTORY:  Social History     Socioeconomic History     Marital status:      Spouse name: None     Number of children: None      Years of education: None     Highest education level: None   Occupational History     None   Social Needs     Financial resource strain: None     Food insecurity:     Worry: None     Inability: None     Transportation needs:     Medical: None     Non-medical: None   Tobacco Use     Smoking status: Former Smoker     Types: Cigarettes     Last attempt to quit: 1963     Years since quittin.7     Smokeless tobacco: Never Used   Substance and Sexual Activity     Alcohol use: Yes     Alcohol/week: 0.0 oz     Comment: 1 drink a day     Drug use: No     Sexual activity: Yes     Partners: Female   Lifestyle     Physical activity:     Days per week: None     Minutes per session: None     Stress: None   Relationships     Social connections:     Talks on phone: None     Gets together: None     Attends Mandaeism service: None     Active member of club or organization: None     Attends meetings of clubs or organizations: None     Relationship status: None     Intimate partner violence:     Fear of current or ex partner: None     Emotionally abused: None     Physically abused: None     Forced sexual activity: None   Other Topics Concern     Parent/sibling w/ CABG, MI or angioplasty before 65F 55M? Not Asked      Service Not Asked     Blood Transfusions Not Asked     Caffeine Concern No     Comment: 1 cup daily     Occupational Exposure Not Asked     Hobby Hazards Not Asked     Sleep Concern Not Asked     Stress Concern Not Asked     Weight Concern Not Asked     Special Diet No     Back Care Not Asked     Exercise Yes     Comment: skiing     Bike Helmet Not Asked     Seat Belt Not Asked     Self-Exams Not Asked   Social History Narrative     None       Review of Systems:  Skin:  Negative     Eyes:  Positive for glasses  ENT:  Negative hearing loss  Respiratory:  Negative    Cardiovascular:  Negative    Gastroenterology: Negative    Genitourinary:  Negative    Musculoskeletal:  Negative    Neurologic:  Negative   "  Psychiatric:  Negative    Heme/Lymph/Imm:  Negative    Endocrine:  Negative      Physical Exam:  Vitals: /76 (BP Location: Right arm, Patient Position: Sitting, Cuff Size: Adult Regular)   Pulse (!) 44   Ht 1.727 m (5' 8\")   Wt 93 kg (205 lb 1.6 oz)   BMI 31.19 kg/m       Constitutional:  cooperative, alert and oriented, well developed, well nourished, in no acute distress appears anxious      Skin:  warm and dry to the touch, no apparent skin lesions or masses noted          Head:  normocephalic, no masses or lesions        Eyes:  pupils equal and round        Lymph:No Cervical lymphadenopathy present     ENT:  no pallor or cyanosis        Neck:  JVP normal;carotid pulses are full and equal bilaterally        Respiratory:  normal breath sounds, clear to auscultation, normal A-P diameter, normal symmetry, normal respiratory excursion, no use of accessory muscles         Cardiac: no murmurs, gallops or rubs detected;apical impulse not displaced bradycardic distant heart sounds            pulses full and equal                                        GI:  abdomen soft obese      Extremities and Muscular Skeletal:  no edema   trace;bilateral LE edema          Neurological:  no gross motor deficits        Psych:  Alert and Oriented x 3        Recent Lab Results:  LIPID RESULTS:  Lab Results   Component Value Date    CHOL 217 (H) 06/07/2018    HDL 62 06/07/2018     (H) 06/07/2018    TRIG 103 06/07/2018    CHOLHDLRATIO 2.6 10/09/2014       LIVER ENZYME RESULTS:  Lab Results   Component Value Date    AST 47 (H) 04/23/2019    ALT 60 04/23/2019       CBC RESULTS:  Lab Results   Component Value Date    WBC 8.0 06/07/2018    RBC 5.16 06/07/2018    HGB 16.8 06/07/2018    HCT 49.8 06/07/2018    MCV 97 06/07/2018    MCH 32.6 06/07/2018    MCHC 33.7 06/07/2018    RDW 15.0 06/07/2018     06/07/2018       BMP RESULTS:  Lab Results   Component Value Date     04/23/2019    POTASSIUM 4.1 04/23/2019    " CHLORIDE 106 04/23/2019    CO2 29 04/23/2019    ANIONGAP 5 04/23/2019     (H) 04/23/2019    BUN 17 04/23/2019    CR 1.06 04/23/2019    GFRESTIMATED 67 04/23/2019    GFRESTBLACK 78 04/23/2019    HE 8.8 04/23/2019        A1C RESULTS:  No results found for: A1C    INR RESULTS:  Lab Results   Component Value Date    INR 1.94 (H) 08/16/2017           CC  No referring provider defined for this encounter.                  Thank you for allowing me to participate in the care of your patient.      Sincerely,     DR BEATRICE NEWELL MD     [facility]    cc:   No referring provider defined for this encounter.

## 2019-04-23 NOTE — PROGRESS NOTES
Service Date: 2019      HISTORY OF PRESENT ILLNESS:  It is a pleasure for me to see Mr. Aguirre today.  He is here for followup of persistent atrial fibrillation.  He also has a history of paroxysmal atrial flutter, resistant hypertension and tachycardia-induced cardiomyopathy.  Fortunately, the latter has resolved.  Echocardiogram recently shows normal ejection fraction.  For full details, please refer to his recent medical records.      He is feeling well at this time.  He has no dizzy spells, syncopal episodes, chest pain, shortness of breath.      He sustained a concussion this winter when he was skiing.  He had a head CT which fortunately showed no major trauma, but atherosclerotic disease was noted in his cerebral vasculature and he has been on rosuvastatin.  He also showed me some blood pressure readings.  The majority of his readings are around 130, though there are occasional spikes up to 150.  His blood pressure is excellent in our offices today.  Cardiac examination otherwise unremarkable.  Recent amiodarone lab tests look excellent.      IMPRESSION:   1.  Persistent atrial fibrillation.  Maintained on low-dose amiodarone.  He is also on Xarelto.  He reports no issues with bleeding.   2.  Atrial flutter status post ablation.   3.  Resistant hypertension.  Under reasonable control.  He is on eplerenone per [facility] who is of the opinion that he may have hypertension due to hyperaldosteronism.   4.  Tachycardia-induced cardiomyopathy, resolved.   5.  Obesity.  I gave him a target weight loss of about 15 pounds prior to next clinic visit.   6.  Physiologic sinus bradycardia.  Heart rate is persistently between 40-50.  He has no symptoms and we should continue with conservative treatment.         BEATRICE NEWELL MD, [facility]             D: 2019   T: 2019   MT: LIONEL      Name:     MARCIE AGUIRRE   MRN:      [id number]        Account:      [id number]   :      1940           Service Date:  04/23/2019      Document: [id number]

## 2019-04-26 ENCOUNTER — TELEPHONE (OUTPATIENT)
Dept: CARDIOLOGY | Facility: CLINIC | Age: 79
End: 2019-04-26

## 2019-04-26 NOTE — TELEPHONE ENCOUNTER
Patient left message on voicemail questioning a procedure that was ordered.  Called patient back and had to leave message.  Awaiting return call.  ANU Arce

## 2019-05-01 ENCOUNTER — HOSPITAL ENCOUNTER (OUTPATIENT)
Facility: CLINIC | Age: 79
Discharge: HOME | End: 2019-05-01
Attending: INTERNAL MEDICINE | Admitting: INTERNAL MEDICINE
Payer: MEDICARE

## 2019-05-01 VITALS
RESPIRATION RATE: 16 BRPM | DIASTOLIC BLOOD PRESSURE: 77 MMHG | HEART RATE: 44 BPM | OXYGEN SATURATION: 95 % | SYSTOLIC BLOOD PRESSURE: 131 MMHG

## 2019-05-01 LAB — COLONOSCOPY: NORMAL

## 2019-05-01 PROCEDURE — 25000128 H RX IP 250 OP 636: Performed by: INTERNAL MEDICINE

## 2019-05-01 PROCEDURE — G0500 MOD SEDAT ENDO SERVICE >5YRS: HCPCS | Performed by: INTERNAL MEDICINE

## 2019-05-01 PROCEDURE — 45378 DIAGNOSTIC COLONOSCOPY: CPT | Performed by: INTERNAL MEDICINE

## 2019-05-01 PROCEDURE — G0121 COLON CA SCRN NOT HI RSK IND: HCPCS | Performed by: INTERNAL MEDICINE

## 2019-05-01 RX ORDER — LIDOCAINE 40 MG/G
CREAM TOPICAL
Status: DISCONTINUED | OUTPATIENT
Start: 2019-05-01 | End: 2019-05-01 | Stop reason: HOSPADM

## 2019-05-01 RX ORDER — ONDANSETRON 2 MG/ML
4 INJECTION INTRAMUSCULAR; INTRAVENOUS
Status: DISCONTINUED | OUTPATIENT
Start: 2019-05-01 | End: 2019-05-01 | Stop reason: HOSPADM

## 2019-05-01 RX ORDER — ONDANSETRON 4 MG/1
4 TABLET, ORALLY DISINTEGRATING ORAL EVERY 6 HOURS PRN
Status: DISCONTINUED | OUTPATIENT
Start: 2019-05-01 | End: 2019-05-01 | Stop reason: HOSPADM

## 2019-05-01 RX ORDER — FLUMAZENIL 0.1 MG/ML
0.2 INJECTION, SOLUTION INTRAVENOUS
Status: DISCONTINUED | OUTPATIENT
Start: 2019-05-01 | End: 2019-05-01 | Stop reason: HOSPADM

## 2019-05-01 RX ORDER — NALOXONE HYDROCHLORIDE 0.4 MG/ML
.1-.4 INJECTION, SOLUTION INTRAMUSCULAR; INTRAVENOUS; SUBCUTANEOUS
Status: DISCONTINUED | OUTPATIENT
Start: 2019-05-01 | End: 2019-05-01 | Stop reason: HOSPADM

## 2019-05-01 RX ORDER — ONDANSETRON 2 MG/ML
4 INJECTION INTRAMUSCULAR; INTRAVENOUS EVERY 6 HOURS PRN
Status: DISCONTINUED | OUTPATIENT
Start: 2019-05-01 | End: 2019-05-01 | Stop reason: HOSPADM

## 2019-05-01 RX ORDER — FENTANYL CITRATE 50 UG/ML
INJECTION, SOLUTION INTRAMUSCULAR; INTRAVENOUS PRN
Status: DISCONTINUED | OUTPATIENT
Start: 2019-05-01 | End: 2019-05-01 | Stop reason: HOSPADM

## 2019-05-01 NOTE — DISCHARGE INSTRUCTIONS
DIVERTICULOSIS  You have diverticulosis. This means that small pouches have formed in the wall of the colon (large intestine). Most often, this problem causes no symptoms. But the pouches in the colon are at risk for becoming infected or inflamed. When this happens, the condition is called diverticulitis.  The doctor will talk with you about how to manage your condition. Diet changes may be all that are needed to help control diverticulosis and prevent progression to diverticulitis. If you develop diverticulitis, other treatments will likely be needed.  HOME CARE  Medications: You may be told to take fiber supplements daily. Fiber adds bulk to the stool so that it passes through the colon more easily. Stool softeners may be recommended. You may also be given medications for pain relief. Be sure to take all medications as directed.  General Care:    Eat unprocessed foods that are high in fiber. Whole grains, fruits, and vegetables are good choices.    Drink 6 to 8 glasses of water daily.    Watch for changes in your bowel movements. Tell the doctor if you notice any changes.    Begin an exercise program. Ask your doctor how to get started.    Get plenty of rest and sleep.  FOLLOW UP as advised by the doctor or our staff. Regular visits may be needed to check on your health. Be sure to keep all your appointments.  GET PROMPT MEDICAL ATTENTION if any of the following occurs:    Fever of 100.6 F (38 C) or higher, or as directed by your healthcare provider    Severe cramps in the lower left side of the abdomen or pain that is getting progressively worse.    Tenderness in the lower left side of the abdomen or worsening pain throughout the abdomen    Diarrhea or constipation that does not improve within 24 hours    Nausea and vomiting    Bleeding from the rectum      [id number] [facility], [address] [place] [address], [place] [address]. All rights reserved. This information is not intended as a substitute for professional  medical care. Always follow your healthcare professional's instructions.    Eating a High-Fiber Diet  Fiber is what gives strength and structure to plants. Most grains, beans, vegetables, and fruits contain fiber. Foods rich in fiber are often low in calories and fat, and they fill you up more. They may also reduce your risks for certain health problems. To find out the amount of fiber in canned, packaged, or frozen foods, read the  Nutrition Facts  label. It tells you how much fiber is in a serving.      Types of Fiber and Their Benefits  There are two types of fiber: insoluble and soluble. They both aid digestion and help you maintain a healthy weight.  Insoluble fiber: This is found in whole grains, cereals, certain fruits and vegetables (such as apple skin, corn, and carrots). Insoluble fiber may prevent constipation and reduce the risk of certain types of cancer.   Soluble fiber: This type of fiber is in oats, beans, and certain fruits and vegetables (such as strawberries and peas). Soluble fiber can reduce cholesterol (which may help lower the risk of heart disease), and helps control blood sugar levels.  Look for High-Fiber Foods  Whole-grain breads and cereals: Try to eat 6-8 ounces a day. Include wheat and oat bran cereals, whole-wheat muffins or toast, and corn tortillas in your meals.  Fruits: Try to eat 2 cups a day. Apples, oranges, strawberries, pears, and bananas are good sources. (Note: Fruit juice is low in fiber.)  Vegetables: Try to eat 3 cups a day. Add asparagus, carrots, broccoli, peas, and corn to your meals.  Legumes (beans): One cup of cooked lentils gives you over 15 grams of fiber. Try navy beans, lentils, and chickpeas.  Seeds:  A small handful of seeds gives you about 3 grams of fiber. Try sunflower seeds.    Keep Track of Your Fiber  A healthy diet includes 31 grams of fiber a day if you have a 2,000-calorie diet. Keep track of how much fiber you eat. Start by reading food labels. Then  eat a variety of foods high in fiber. Ask your doctor about supplemental fiber products.            [id number] Ericka Restrepo, [address] [place] [address], [place] [address]. All rights reserved. This information is not intended as a substitute for professional medical care. Always follow your healthcare professional's instructions.

## 2019-05-01 NOTE — LETTER
April 18, 2019      Reji Aguirre  [address] [facility] [address]        Dear Reji,     Thank you for choosing [facility]. You are scheduled for the following service.     Date:  5/1/2019      Procedure: COLONOSCOPY  Doctor: Eva        Arrival Time:  0800 AM   *check in at Emergency/Endoscopy desk*  Procedure Time: 0830AM    Location:   [facility]        Endoscopy Department, First Floor (Enter through ER Doors) *         [address] [place], [place] [address]      [phone] or [phone] () to reschedule      Colonoscopy is the most accurate test to detect colon polyps and colon cancer; and the only test where polyps can be removed. During this procedure, a doctor examines the lining of your large intestine and rectum through a flexible tube.     Transportation  Arrange for a ride for the day of your procedures with a responsible adult.  A taxi ride is not an option unless you are accompanied by a responsible adult. If you fail to arrange transportation with a responsible adult, your procedure will be cancelled and rescheduled.      SUPREP  Bowel Prep Kit   7 days before:    Discontinue fiber supplements and medications containing iron. This includes Metamucil  and Fibercon ; and multivitamins with iron.  3 days before:    Begin a low-fiber diet. A low-fiber diet helps making the cleanout more effective. For additional details on low-fiber diet, please refer to the table on the last page.  2 days before:    Stop eating solid foods at 11:45 pm.  1 day before:    In the morning: begin a clear liquid diet (liquids you can see through).     Examples of a clear liquid diet include: water, clear broth or bouillon, Gatorade, Pedialyte or Powerade, carbonated and non-carbonated soft drinks (Sprite , 7-Up , ginger ale), strained fruit juices without pulp (apple, white grape, white cranberry), Jell-O  and popsicles.     The following  are not allowed on a clear liquid diet: red liquids, alcoholic beverages,  dairy products (milk, creamer, and yogurt), protein shakes,  juice with pulp and chewing tobacco.    FIRST DOSE: at ________ pm, begin Step 1 of SUPREP Bowel Prep Kit.   You must complete Steps 1 through 4 using 1 (one) 6-ounce-bottle before going to bed.  Follow instructions shown in the box.    DAY OF YOUR PROCEDURE  You may take all of your morning medications including blood pressure medications, blood thinners (if you have not been instructed to stop these by our office), methadone, and anti-seizure medications. Do not take insulin or vitamins prior to your procedure.   Medication taken by mouth may not be absorbed properly when taken within 1 hour before the start of each dose of SUPREP Bowel Prep Kit.      SECOND DOSE: at __________ am, begin Step 1 of SUPREP Bowel Prep Kit.    For this dose, repeat Steps 1 through 4 shown in the box, using the other 6-ounce-bottle.      You must finish drinking the final glass of water at least 4 hours, or as directed, before your colonoscopy.  You are ready for the procedure, if you followed all instructions and your stool is no longer formed, but clear or yellow liquid. If you are unsure whether your colon is clean, please call our office at [phone] before you leave for your appointment.    Bring the following to your procedure:  - Insurance Card/Photo ID.   - List of current medications including over-the-counter medications and supplements.   - Your rescue inhaler if you currently use one to control asthma.    Canceling or rescheduling your appointment:   If you must cancel or reschedule your appointment, please call [phone] as soon as possible.      COLONOSCOPY PRE-PROCEDURE CHECKLIST  If you have diabetes, ask your regular doctor for diet and medication restrictions.  If you take an anticoagulant or anti-platelet medication (such as Coumadin , Lovenox , Pradaxa , Xarelto , Eliquis ,  etc.), please call your primary doctor for advice on holding this medication.  If you take aspirin you may continue to do so.  If you are or may be pregnant, please discuss the risks and benefits of this procedure with your doctor.    What happens during a colonoscopy?  Plan to spend up to two hours, starting at registration time, at the endoscopy center the day of your procedure. The colonoscopy takes an average of 15 to 30 minutes. Recovery time is about 30 minutes.    Before the exam:    You will change into a gown.    Your medical history and medication list will be reviewed with you, unless that has been done over the phone prior to the procedure.     A nurse will insert an intravenous (IV) line into your hand or arm.    The doctor will meet with you and will give you a consent form to sign.    During the exam:     Medicine will be given through the IV line to help you relax.     Your heart rate and oxygen levels will be monitored. If your blood pressure is low, you may be given fluids through the IV line.     The doctor will insert a flexible hollow tube, called a colonoscope, into your rectum. The scope will be advanced slowly through the large intestine (colon).    You may have a feeling of fullness or pressure.     If an abnormal tissue or a polyp is found, the doctor may remove it through the endoscope for closer examination, or biopsy. Tissue removal is painless.    After the exam:           Any tissue samples removed during the exam will be sent to a lab for evaluation. It may take 5-7 working days for you to be notified of the results.     A nurse will provide you with complete discharge instructions before you leave the endoscopy center. Be sure to ask the nurse for specific instructions if you take blood thinners such as Aspirin, Coumadin or Plavix.     The doctor will prepare a full report for you and for the physician who referred you for the procedure.     Your doctor will talk with you about the  initial results of your exam.      Medication given during the exam will prohibit you from driving for the rest of the day.     Following the exam, you may resume your normal diet. Your first meal should be light, no greasy foods. Avoid alcohol until the next day.     You may resume your regular activities the day after the procedure.       LOW-FIBER DIET  Foods RECOMMENDED Foods to AVOID   Breads, Cereal, Rice and Pasta:   White bread, rolls, biscuits, croissant and jose j toast.   Waffles, [place] toast and pancakes.   White rice, noodles, pasta, macaroni and peeled cooked potatoes.   Plain crackers and saltines.   Cooked cereals: farina, cream of rice.   Cold cereals: Puffed Rice , Rice Krispies , Corn Flakes  and Special K    Breads, Cereal, Rice and Pasta:   Breads or rolls with nuts, seeds or fruit.   Whole wheat, pumpernickel, rye breads and cornbread.   Potatoes with skin, brown or wild rice, and kasha (buckwheat).     Vegetables:   Tender cooked and canned vegetables without seeds: carrots, asparagus tips, green or wax beans, pumpkin, spinach, lima beans. Vegetables:   Raw or steamed vegetables.   Vegetables with seeds.   Sauerkraut.   Winter squash, peas, broccoli, Brussel sprouts, cabbage, onions, cauliflower, baked beans, peas and corn.   Fruits:   Strained fruit juice.   Canned fruit, except pineapple.   Ripe bananas and melon. Fruits:   Prunes and prune juice.   Raw fruits.   Dried fruits: figs, dates and raisins.   Milk/Dairy:   Milk: plain or flavored.   Yogurt, custard and ice cream.   Cheese and cottage cheese Milk/Dairy:     Meat and other proteins:   ground, well-cooked tender beef, lamb, ham, veal, pork, fish, poultry and organ meats.   Eggs.   Peanut butter without nuts. Meat and other proteins:   Tough, fibrous meats with gristle.   Dry beans, peas and lentils.   Peanut butter with nuts.   Tofu.   Fats, Snack, Sweets, Condiments and Beverages:   Margarine, butter, oils, mayonnaise, sour cream  and salad dressing, plain gravy.   Sugar, hard candy, clear jelly, honey and syrup.   Spices, cooked herbs, bouillon, broth and soups made with allowed vegetable, ketchup and mustard.   Coffee, tea and carbonated drinks.   Plain cakes, cookies and pretzels.   Gelatin, plain puddings, custard, ice cream, sherbet and popsicles. Fats, Snack, Sweets, Condiments and Beverages:   Nuts, seeds and coconut.   Jam, marmalade and preserves.   Pickles, olives, relish and horseradish.   All desserts containing nuts, seeds, dried fruit and coconut; or made from whole grains or bran.   Candy made with nuts or seeds.   Popcorn.       DIRECTIONS TO THE ENDOSCOPY DEPARTMENT    From the north ([facility])  Take [address] South, exit on [address] . Get into the left hand ghazal, turn left (east), go one-half mile to [address] and turn left. Go north to the first stoplight, take a right on [address] and follow it to the Emergency entrance.    From the south ([facility])  Take 35N to the 35E split and exit on [address] . On [address] , turn left (west) to [address]. Turn right (north) on [address]. Go north to the first stoplight, take a right on [address] and follow it to the Emergency entrance.    From the east via 35E ([facility])  Take [address]E south to [place] [address] exit. Turn right on [address] . Go west to [address]. Turn right (north) on [address]. Go to the first stoplight, take a right and follow on [address] to the Emergency entrance.    From the east via [address] ([facility])  Take [address] to [address]. Turn left (south) on [address] to [address]. Turn left (east) on [address] and follow it to the Emergency entrance.    From the west via [address] ([place], [place])  Take [address] east to [address]. Turn right (south) on [address] to [address]. Turn left (east) on [address]  and follow it to the Emergency entrance.

## 2019-05-02 DIAGNOSIS — I48.91 ATRIAL FIBRILLATION, UNSPECIFIED TYPE (H): ICD-10-CM

## 2019-05-03 ENCOUNTER — TELEPHONE (OUTPATIENT)
Dept: CARDIOLOGY | Facility: CLINIC | Age: 79
End: 2019-05-03

## 2019-05-03 DIAGNOSIS — I10 BENIGN ESSENTIAL HYPERTENSION: ICD-10-CM

## 2019-05-03 RX ORDER — AMLODIPINE BESYLATE 2.5 MG/1
2.5 TABLET ORAL DAILY
Qty: 90 TABLET | Refills: 3 | Status: SHIPPED | OUTPATIENT
Start: 2019-05-03 | End: 2019-10-30

## 2019-05-03 NOTE — TELEPHONE ENCOUNTER
05/03/19 Pt called [facility] RN line. States he received a letter in the mail stating he needed PFTS done. He is on Amiodarone tx. Had PFTS done in 10/18 and results were normal. Pt instructed he does not need to schedule PFTS at this time. Pt voiced understanding. Jose 1:08 pm

## 2019-05-03 NOTE — TELEPHONE ENCOUNTER
Received refill request for:  Amlodipine  Last OV was: 4/23/2019 with Dr. Perera  Labs/EKG: nn/a  F/U scheduled: orders in Epic for 10/2019  New script sent to: [facility]

## 2019-05-03 NOTE — TELEPHONE ENCOUNTER
Requested Prescriptions   Pending Prescriptions Disp Refills     XARELTO 20 MG TABS tablet [Pharmacy Med Name: XARELTO 20MG        TAB] 90 tablet 1     Sig: TAKE 1 TABLET BY MOUTH ONCE DAILY WITH  DINNER  Last Written Prescription Date:  10/24/18  Last Fill Quantity: 90,  # refills: 1   Last office visit: 4/9/2019 with prescribing provider:  04/09/19   Future Office Visit:  0       Direct Oral Anticoagulant Agents Failed - 5/2/2019  5:57 PM        Failed - Creatinine Clearance greater than 50 ml/min on file in past 3 mos     No lab results found.          Passed - Normal Platelets on file in past 12 months     Recent Labs   Lab Test 06/07/18  0907                  Passed - Medication is active on med list        Passed - Serum creatinine less than or equal to 1.4 on file in past 3 mos     Recent Labs   Lab Test 04/23/19  0912   CR 1.06             Passed - Patient is 18 years of age or older        Passed - Recent (6 mo) or future (30 days) visit within the authorizing provider's specialty

## 2019-05-06 RX ORDER — RIVAROXABAN 20 MG/1
TABLET, FILM COATED ORAL
Qty: 90 TABLET | Refills: 1 | Status: SHIPPED | OUTPATIENT
Start: 2019-05-06 | End: 2019-10-28

## 2019-06-03 DIAGNOSIS — M10.9 GOUT, UNSPECIFIED CAUSE, UNSPECIFIED CHRONICITY, UNSPECIFIED SITE: ICD-10-CM

## 2019-06-03 DIAGNOSIS — I48.19 PERSISTENT ATRIAL FIBRILLATION (H): ICD-10-CM

## 2019-06-03 NOTE — TELEPHONE ENCOUNTER
Requested Prescriptions   Pending Prescriptions Disp Refills     allopurinol (ZYLOPRIM) 300 MG tablet 90 tablet 1     Sig: Take 1 tablet (300 mg) by mouth daily  Last Written Prescription Date:  10/24/18  Last Fill Quantity: 90,  # refills: 1   Last office visit: 4/9/2019 with prescribing provider:  04/09/19   Future Office Visit:  0       There is no refill protocol information for this order        hydrochlorothiazide (MICROZIDE) 12.5 MG capsule 180 capsule 2     Sig: Take 2 capsules (25 mg) by mouth every morning  Last Written Prescription Date:  09/05/18  Last Fill Quantity: 180,  # refills: 2   Last office visit: 4/9/2019 with prescribing provider:  04/09/19   Future Office Visit:  0       There is no refill protocol information for this order

## 2019-06-04 RX ORDER — ALLOPURINOL 300 MG/1
1 TABLET ORAL DAILY
Qty: 90 TABLET | Refills: 2 | Status: SHIPPED | OUTPATIENT
Start: 2019-06-04 | End: 2020-02-07

## 2019-06-04 RX ORDER — HYDROCHLOROTHIAZIDE 12.5 MG/1
2 CAPSULE ORAL EVERY MORNING
Qty: 180 CAPSULE | Refills: 2 | Status: SHIPPED | OUTPATIENT
Start: 2019-06-04 | End: 2020-01-15

## 2019-06-04 NOTE — TELEPHONE ENCOUNTER
"Requested Prescriptions   Pending Prescriptions Disp Refills     allopurinol (ZYLOPRIM) 300 MG tablet 90 tablet 1     Sig: Take 1 tablet (300 mg) by mouth daily       Gout Agents Protocol Passed - 6/3/2019  2:40 PM        Passed - CBC on file in past 12 months     Recent Labs   Lab Test 06/07/18  0907   WBC 8.0   RBC 5.16   HGB 16.8   HCT 49.8                    Passed - ALT on file in past 12 months     Recent Labs   Lab Test 04/23/19  0912   ALT 60             Passed - Has Uric Acid on file in past 12 months and value is less than 6     Recent Labs   Lab Test 06/07/18  0907   URIC 4.3     If level is 6mg/dL or greater, ok to refill one time and refer to provider.           Passed - Recent (12 mo) or future (30 days) visit within the authorizing provider's specialty     Patient had office visit in the last 12 months or has a visit in the next 30 days with authorizing provider or within the authorizing provider's specialty.  See \"Patient Info\" tab in [email or web address], or \"Choose Columns\" in Meds & Orders section of the refill encounter.              Passed - Medication is active on med list        Passed - Patient is age 18 or older        Passed - Normal serum creatinine on file in the past 12 months     Recent Labs   Lab Test 04/23/19  0912   CR 1.06             hydrochlorothiazide (MICROZIDE) 12.5 MG capsule 180 capsule 2     Sig: Take 2 capsules (25 mg) by mouth every morning       Diuretics (Including Combos) Protocol Passed - 6/3/2019  2:40 PM        Passed - Blood pressure under 140/90 in past 12 months     BP Readings from Last 3 Encounters:   05/01/19 131/77   04/23/19 128/76   04/09/19 120/80                 Passed - Recent (12 mo) or future (30 days) visit within the authorizing provider's specialty     Patient had office visit in the last 12 months or has a visit in the next 30 days with authorizing provider or within the authorizing provider's specialty.  See \"Patient Info\" tab in [email or web address], or \"Choose " "Columns\" in Meds & Orders section of the refill encounter.              Passed - Medication is active on med list        Passed - Patient is age 18 or older        Passed - Normal serum creatinine on file in past 12 months     Recent Labs   Lab Test 04/23/19 0912   CR 1.06              Passed - Normal serum potassium on file in past 12 months     Recent Labs   Lab Test 04/23/19 0912   POTASSIUM 4.1                    Passed - Normal serum sodium on file in past 12 months     Recent Labs   Lab Test 04/23/19 0912                   "

## 2019-06-11 DIAGNOSIS — I48.92 ATRIAL FLUTTER, PAROXYSMAL (H): ICD-10-CM

## 2019-06-11 DIAGNOSIS — I10 BENIGN ESSENTIAL HYPERTENSION: ICD-10-CM

## 2019-06-11 DIAGNOSIS — I48.19 PERSISTENT ATRIAL FIBRILLATION (H): ICD-10-CM

## 2019-06-11 RX ORDER — NEBIVOLOL 5 MG/1
5 TABLET ORAL DAILY
Qty: 90 TABLET | Refills: 3 | Status: SHIPPED | OUTPATIENT
Start: 2019-06-11 | End: 2019-10-30

## 2019-07-29 ENCOUNTER — TELEPHONE (OUTPATIENT)
Dept: INTERNAL MEDICINE | Facility: CLINIC | Age: 79
End: 2019-07-29

## 2019-07-29 ENCOUNTER — NURSE TRIAGE (OUTPATIENT)
Dept: NURSING | Facility: CLINIC | Age: 79
End: 2019-07-29

## 2019-07-29 NOTE — TELEPHONE ENCOUNTER
Is he asking which prep he used in May or is he needing another colonoscopy for another reason.  He will not need any further screening colonoscopies in the future.      I do not know what prep he used when he had his colonoscopy in May.     Most commonly it will be either Miralax powder/Gatorade (multipel glasses of the powder and liquid over the course of the evening) or else SuPrep. (one small bottle with a couple of glasses the night before and then again the morning of the procedure).      He should be able to remember what he got.  The SuPrep requires a prescription, the Miralax/gatorade is just over the counter.

## 2019-07-29 NOTE — TELEPHONE ENCOUNTER
Clinic Action Needed: Please call Pt to advise .   Reason for call;  Pt called. Pt   Saw [place] nephrologist Dr ANJANA Almonte phone [phone] on 7/17/19  and she wants to know what the colonoscopy prep Rx was specifically , so please call and send her this information and leave a note on [facility] too , for future reference for me  .  Recommendation / teaching ;    Caller Verbalizes understanding and denies further questions and will call back if further  questions  .  Kandi Cerda RN  - [facility] Nurse Advisor

## 2019-07-29 NOTE — TELEPHONE ENCOUNTER
Clinic Action Needed: Please call Pt to advise .   Reason for call;  Pt called. Pt   Saw [place] nephrologist Dr ANJANA Almonte phone [phone] on 7/17/19  and she wants to know what the colonoscopy prep Rx was specifically , so please call and send her this information and leave a note on [employer] too , for future reference for me  .  Recommendation / teaching ;    Caller Verbalizes understanding and denies further questions and will call back if further  questions  .  Kandi Cerda RN  - [place] Nurse Advisor

## 2019-09-19 DIAGNOSIS — I48.91 ATRIAL FIBRILLATION, UNSPECIFIED TYPE (H): ICD-10-CM

## 2019-09-19 RX ORDER — AMIODARONE HYDROCHLORIDE 200 MG/1
TABLET ORAL
Qty: 90 TABLET | Refills: 0 | Status: SHIPPED | OUTPATIENT
Start: 2019-09-19 | End: 2019-09-20

## 2019-09-20 DIAGNOSIS — I48.91 ATRIAL FIBRILLATION, UNSPECIFIED TYPE (H): ICD-10-CM

## 2019-09-20 RX ORDER — AMIODARONE HYDROCHLORIDE 200 MG/1
TABLET ORAL
Qty: 90 TABLET | Refills: 0 | Status: SHIPPED | OUTPATIENT
Start: 2019-09-20 | End: 2020-02-08

## 2019-10-09 ENCOUNTER — IMMUNIZATION (OUTPATIENT)
Dept: NURSING | Facility: CLINIC | Age: 79
End: 2019-10-09
Payer: MEDICARE

## 2019-10-09 PROCEDURE — G0008 ADMIN INFLUENZA VIRUS VAC: HCPCS

## 2019-10-09 PROCEDURE — 90662 IIV NO PRSV INCREASED AG IM: CPT

## 2019-10-28 DIAGNOSIS — I48.91 ATRIAL FIBRILLATION, UNSPECIFIED TYPE (H): ICD-10-CM

## 2019-10-28 NOTE — TELEPHONE ENCOUNTER
Requested Prescriptions   Pending Prescriptions Disp Refills     XARELTO ANTICOAGULANT 20 MG TABS tablet [Pharmacy Med Name: XARELTO 20MG        TAB] 90 tablet 1     Sig: TAKE 1 TABLET BY MOUTH ONCE DAILY WITH  DINNER       Direct Oral Anticoagulant Agents Failed - 10/28/2019  9:37 AM        Failed - Normal Platelets on file in past 12 months     Recent Labs   Lab Test 06/07/18  0907                  Failed - Creatinine Clearance greater than 50 ml/min on file in past 3 mos     No lab results found.          Failed - Serum creatinine less than or equal to 1.4 on file in past 3 mos     Recent Labs   Lab Test 04/23/19  0912   CR 1.06             Failed - Recent (6 mo) or future (30 days) visit within the authorizing provider's specialty        Passed - Medication is active on med list        Passed - Patient is 18 years of age or older

## 2019-10-30 ENCOUNTER — OFFICE VISIT (OUTPATIENT)
Dept: CARDIOLOGY | Facility: CLINIC | Age: 79
End: 2019-10-30
Payer: MEDICARE

## 2019-10-30 VITALS
WEIGHT: 199 LBS | SYSTOLIC BLOOD PRESSURE: 150 MMHG | DIASTOLIC BLOOD PRESSURE: 68 MMHG | BODY MASS INDEX: 30.16 KG/M2 | HEART RATE: 60 BPM | HEIGHT: 68 IN

## 2019-10-30 DIAGNOSIS — Z79.899 ON AMIODARONE THERAPY: ICD-10-CM

## 2019-10-30 DIAGNOSIS — I10 BENIGN ESSENTIAL HYPERTENSION: Primary | ICD-10-CM

## 2019-10-30 DIAGNOSIS — I44.7 LBBB (LEFT BUNDLE BRANCH BLOCK): ICD-10-CM

## 2019-10-30 DIAGNOSIS — I48.19 PERSISTENT ATRIAL FIBRILLATION (H): ICD-10-CM

## 2019-10-30 LAB
ALBUMIN SERPL-MCNC: 3.7 G/DL (ref 3.4–5)
ALP SERPL-CCNC: 74 U/L (ref 40–150)
ALT SERPL W P-5'-P-CCNC: 67 U/L (ref 0–70)
ANION GAP SERPL CALCULATED.3IONS-SCNC: 4 MMOL/L (ref 3–14)
AST SERPL W P-5'-P-CCNC: 43 U/L (ref 0–45)
BILIRUB DIRECT SERPL-MCNC: 0.2 MG/DL (ref 0–0.2)
BILIRUB SERPL-MCNC: 0.5 MG/DL (ref 0.2–1.3)
BUN SERPL-MCNC: 30 MG/DL (ref 7–30)
CALCIUM SERPL-MCNC: 8.7 MG/DL (ref 8.5–10.1)
CHLORIDE SERPL-SCNC: 105 MMOL/L (ref 94–109)
CO2 SERPL-SCNC: 30 MMOL/L (ref 20–32)
CREAT SERPL-MCNC: 1.01 MG/DL (ref 0.66–1.25)
GFR SERPL CREATININE-BSD FRML MDRD: 71 ML/MIN/{1.73_M2}
GLUCOSE SERPL-MCNC: 157 MG/DL (ref 70–99)
POTASSIUM SERPL-SCNC: 3.7 MMOL/L (ref 3.4–5.3)
PROT SERPL-MCNC: 6.9 G/DL (ref 6.8–8.8)
SODIUM SERPL-SCNC: 139 MMOL/L (ref 133–144)
TSH SERPL DL<=0.005 MIU/L-ACNC: 0.64 MU/L (ref 0.4–4)

## 2019-10-30 PROCEDURE — 84443 ASSAY THYROID STIM HORMONE: CPT | Performed by: NURSE PRACTITIONER

## 2019-10-30 PROCEDURE — 36415 COLL VENOUS BLD VENIPUNCTURE: CPT | Performed by: NURSE PRACTITIONER

## 2019-10-30 PROCEDURE — 93000 ELECTROCARDIOGRAM COMPLETE: CPT | Performed by: INTERNAL MEDICINE

## 2019-10-30 PROCEDURE — 80048 BASIC METABOLIC PNL TOTAL CA: CPT | Performed by: NURSE PRACTITIONER

## 2019-10-30 PROCEDURE — 80076 HEPATIC FUNCTION PANEL: CPT | Performed by: NURSE PRACTITIONER

## 2019-10-30 PROCEDURE — 99214 OFFICE O/P EST MOD 30 MIN: CPT | Performed by: INTERNAL MEDICINE

## 2019-10-30 RX ORDER — NEBIVOLOL 5 MG/1
TABLET ORAL
Qty: 90 TABLET | Refills: 3 | COMMUNITY
Start: 2019-10-30 | End: 2019-12-02

## 2019-10-30 RX ORDER — AMLODIPINE BESYLATE 5 MG/1
5 TABLET ORAL DAILY
Qty: 90 TABLET | Refills: 3 | Status: SHIPPED | OUTPATIENT
Start: 2019-10-30 | End: 2020-11-16

## 2019-10-30 RX ORDER — RIVAROXABAN 20 MG/1
TABLET, FILM COATED ORAL
Qty: 90 TABLET | Refills: 1 | Status: SHIPPED | OUTPATIENT
Start: 2019-10-30 | End: 2020-04-19

## 2019-10-30 ASSESSMENT — MIFFLIN-ST. JEOR: SCORE: 1597.16

## 2019-10-30 NOTE — LETTER
10/30/2019      Igor Christina MD  [address] [facility] [address]      RE: Reji Aguirre       Dear Colleague,    I had the pleasure of seeing Reji Aguirre in the [facility].    Service Date: 10/30/2019      HISTORY OF PRESENT ILLNESS:    I had the pleasure of seeing Mr. 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  History of persistent atrial fibrillation.  Treated with amiodarone since 2016 and has maintained sinus rhythm.   B.  Tachycardia-induced cardiomyopathy.  Normalization of EF after sinus rhythm maintenance.   C.  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  Treated at [facility].  Medications include eplerenone, Bystolic, hydrochlorothiazide, amlodipine.   D.  Left bundle branch block.   E.  Concussion earlier this year with postconcussion syndrome and difficulties with memory and concentration since then.      Elijah has been feeling well.  He says he remains very active with absolutely no dyspnea, chest pain, dizziness, syncope, near-syncope or other cardiac symptoms.  He appeared forgetful today.      He has had no cough.  He has been on amiodarone since 2016.  He takes 200 mg Monday through Friday (1000 mg/week).  We have noticed that his heart rate has been slow over the past 1-2 years, but he has not had symptoms related to this.      His nephrologist at the [facility] decreased his Bystolic from 5 mg daily to 2.5 mg daily last summer because of bradycardia.  Elijah brought a detailed log of BP measurements over the past 3 months.  About 90% of the time, his blood pressure is fine.  10% of the time, systolic blood pressure is between 145 and 158 mmHg.      PHYSICAL EXAMINATION:   VITAL SIGNS:  Blood pressure 150/68, pulse 42 and regular, weight 90 kg, height 173 cm.   GENERAL:  He is a very pleasant man in no distress.   HEAD:  Normocephalic.   NECK:  Thick, supple, without carotid  bruits.   LUNGS:  Completely clear.   CARDIOVASCULAR:  Bradycardic rhythm without gallop, murmur or rub.   ABDOMEN:  Mildly obese, soft, nontender.   EXTREMITIES:  No clubbing, cyanosis or edema.   SKIN:  No rash.      DIAGNOSTIC STUDIES:     His 12-lead ECG today showed a bradycardic rhythm, mostly sinus with first-degree AV block and LBBB.  There is a low-grade sinus pause of 2 seconds.      Most recent echocardiogram in 03/2019 showed LVEF of 55%-60%.  There was aortic sclerosis with no significant aortic stenosis, mild pulmonary hypertension.        IMPRESSION:   1.  History of persistent atrial fibrillation.  Elijah has remained in sinus rhythm on amiodarone 1,000 mg per week.  He had liver function tests and thyroid function tests earlier today and these are all normal.  He had a chest x-ray last April and it was normal as well.  He does not have symptoms to suggest amiodarone lung toxicity.         I am concerned about the degree of bradycardia, as his rate today was in the low 40s.  He does not appear to have symptoms from this, but I think we need to stop his beta-blocker.   2.  Hypertension.  The Bystolic will be stopped because of bradycardia.  We will increase amlodipine.  See below.   3.  Left bundle branch block.  Chronic finding.  LV function is normal.      RECOMMENDATIONS:   A.  Stop Bystolic.   B.  Increase amlodipine from 2.5 mg daily to 5 mg daily.  In previous years, this dose of amlodipine caused lower extremity edema, but perhaps it will not at this point since he is on diuretic therapy as well.  I asked him to call if he develops significant ankle swelling.   C.  24-hour Holter monitor to be hooked up in 2 weeks.   D.  Chest x-ray, thyroid panel and liver function tests were ordered in 6 months.   E.  See EP JENNIFER in [place] in 1 year.      It was my pleasure seeing Mr. Aguirre.  Please feel free to contact me with any questions.        RICKI WATERS MD, [facility]           cc:   Igor  MD [place]    [address]  [address]             D: 10/30/2019   T: 10/30/2019   MT: [id number]      Name:     MARCIE CABALLERO   MRN:      [id number]        Account:      [id number]   :      1940           Service Date: 10/30/2019      Document: [id number]           Outpatient Encounter Medications as of 10/30/2019   Medication Sig Dispense Refill     Acetaminophen (TYLENOL PO) Take 500 mg by mouth every 8 hours as needed for mild pain or fever       allopurinol (ZYLOPRIM) 300 MG tablet Take 1 tablet (300 mg) by mouth daily 90 tablet 2     amiodarone (PACERONE/CODARONE) 200 MG tablet TAKE 1 TABLET BY MOUTH ONCE DAILY MONDAY  THROUGH  FRIDAY,  SKIP  SATURDAY  AND   90 tablet 0     amLODIPine (NORVASC) 5 MG tablet Take 1 tablet (5 mg) by mouth daily 90 tablet 3     diphenhydrAMINE (BENADRYL) 25 MG capsule Take 25 mg by mouth nightly as needed for itching or allergies       eplerenone (INSPRA) 25 MG tablet Take 1 tablet (25 mg) by mouth 2 times daily 180 tablet 1     hydrochlorothiazide (MICROZIDE) 12.5 MG capsule Take 2 capsules (25 mg) by mouth every morning 180 capsule 2     nebivolol (BYSTOLIC) 5 MG tablet Take 1/2 tab (2.5 mg) daily 90 tablet 3     rosuvastatin (CRESTOR) 10 MG tablet Take 1 tablet (10 mg) by mouth daily 90 tablet 3     [DISCONTINUED] XARELTO 20 MG TABS tablet TAKE 1 TABLET BY MOUTH ONCE DAILY WITH  DINNER 90 tablet 1     ASPIRIN NOT PRESCRIBED (INTENTIONAL) Please choose reason not prescribed, below 0 each      [DISCONTINUED] amLODIPine (NORVASC) 2.5 MG tablet Take 1 tablet (2.5 mg) by mouth daily 90 tablet 3     [DISCONTINUED] nebivolol (BYSTOLIC) 5 MG tablet Take 1 tablet (5 mg) by mouth daily 90 tablet 3     No facility-administered encounter medications on file as of 10/30/2019.        Again, thank you for allowing me to participate in the care of your patient.      Sincerely,    Leonor Aj MD     [facility]  Heart Care

## 2019-10-30 NOTE — PROGRESS NOTES
Service Date: 10/30/2019      HISTORY OF PRESENT ILLNESS:    I had the pleasure of seeing . 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  History of persistent atrial fibrillation.  Treated with amiodarone since 2016 and has maintained sinus rhythm.   B.  Tachycardia-induced cardiomyopathy.  Normalization of EF after sinus rhythm maintenance.   C.  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  Treated at [facility].  Medications include eplerenone, Bystolic, hydrochlorothiazide, amlodipine.   D.  Left bundle branch block.   E.  Concussion earlier this year with postconcussion syndrome and difficulties with memory and concentration since then.      Elijah has been feeling well.  He says he remains very active with absolutely no dyspnea, chest pain, dizziness, syncope, near-syncope or other cardiac symptoms.  He appeared forgetful today.      He has had no cough.  He has been on amiodarone since 2016.  He takes 200 mg Monday through Friday (1000 mg/week).  We have noticed that his heart rate has been slow over the past 1-2 years, but he has not had symptoms related to this.      His nephrologist at the [facility] decreased his Bystolic from 5 mg daily to 2.5 mg daily last summer because of bradycardia.  Elijah brought a detailed log of BP measurements over the past 3 months.  About 90% of the time, his blood pressure is fine.  10% of the time, systolic blood pressure is between 145 and 158 mmHg.      PHYSICAL EXAMINATION:   VITAL SIGNS:  Blood pressure 150/68, pulse 42 and regular, weight 90 kg, height 173 cm.   GENERAL:  He is a very pleasant man in no distress.   HEAD:  Normocephalic.   NECK:  Thick, supple, without carotid bruits.   LUNGS:  Completely clear.   CARDIOVASCULAR:  Bradycardic rhythm without gallop, murmur or rub.   ABDOMEN:  Mildly obese, soft, nontender.   EXTREMITIES:  No clubbing, cyanosis or edema.   SKIN:  No rash.       DIAGNOSTIC STUDIES:     His 12-lead ECG today showed a bradycardic rhythm, mostly sinus with first-degree AV block and LBBB.  There is a low-grade sinus pause of 2 seconds.      Most recent echocardiogram in 03/2019 showed LVEF of 55%-60%.  There was aortic sclerosis with no significant aortic stenosis, mild pulmonary hypertension.        IMPRESSION:   1.  History of persistent atrial fibrillation.  Elijah has remained in sinus rhythm on amiodarone 1,000 mg per week.  He had liver function tests and thyroid function tests earlier today and these are all normal.  He had a chest x-ray last April and it was normal as well.  He does not have symptoms to suggest amiodarone lung toxicity.         I am concerned about the degree of bradycardia, as his rate today was in the low 40s.  He does not appear to have symptoms from this, but I think we need to stop his beta-blocker.   2.  Hypertension.  The Bystolic will be stopped because of bradycardia.  We will increase amlodipine.  See below.   3.  Left bundle branch block.  Chronic finding.  LV function is normal.      RECOMMENDATIONS:   A.  Stop Bystolic.   B.  Increase amlodipine from 2.5 mg daily to 5 mg daily.  In previous years, this dose of amlodipine caused lower extremity edema, but perhaps it will not at this point since he is on diuretic therapy as well.  I asked him to call if he develops significant ankle swelling.   C.  24-hour Holter monitor to be hooked up in 2 weeks.   D.  Chest x-ray, thyroid panel and liver function tests were ordered in 6 months.   E.  See EP JENNIFER in [place] in 1 year.      It was my pleasure seeing Mr. Aguirre.  Please feel free to contact me with any questions.        RICKI WATERS MD, [facility]           cc:   Igor Christina MD    [address]  [address]             D: 10/30/2019   T: 10/30/2019   MT: [id number]      Name:     MARCIE AGUIRRE   MRN:      [id number]        Account:       [id number]   :      1940           Service Date: 10/30/2019      Document: [id number]

## 2019-10-30 NOTE — PROGRESS NOTES
HPI and Plan:   See dictation    Orders Placed This Encounter   Procedures     X-ray Chest 2 vws*     Comprehensive metabolic panel     TSH     Follow-Up with Cardiac Advanced Practice Provider     EKG 12-lead complete w/read - Clinics (performed today)     EKG 12-lead complete w/read - Clinics (to be scheduled)     Holter Monitor 24 hour Adult Pediatric       Orders Placed This Encounter   Medications     nebivolol (BYSTOLIC) 5 MG tablet     Sig: Take 1/2 tab (2.5 mg) daily     Dispense:  90 tablet     Refill:  3     amLODIPine (NORVASC) 5 MG tablet     Sig: Take 1 tablet (5 mg) by mouth daily     Dispense:  90 tablet     Refill:  3       Medications Discontinued During This Encounter   Medication Reason     nebivolol (BYSTOLIC) 5 MG tablet Reorder     amLODIPine (NORVASC) 2.5 MG tablet Reorder         Encounter Diagnoses   Name Primary?     Benign essential hypertension Yes     Persistent atrial fibrillation        CURRENT MEDICATIONS:  Current Outpatient Medications   Medication Sig Dispense Refill     Acetaminophen (TYLENOL PO) Take 500 mg by mouth every 8 hours as needed for mild pain or fever       allopurinol (ZYLOPRIM) 300 MG tablet Take 1 tablet (300 mg) by mouth daily 90 tablet 2     amiodarone (PACERONE/CODARONE) 200 MG tablet TAKE 1 TABLET BY MOUTH ONCE DAILY MONDAY  THROUGH  FRIDAY,  SKIP  SATURDAY  AND  SUNDAY 90 tablet 0     amLODIPine (NORVASC) 5 MG tablet Take 1 tablet (5 mg) by mouth daily 90 tablet 3     diphenhydrAMINE (BENADRYL) 25 MG capsule Take 25 mg by mouth nightly as needed for itching or allergies       eplerenone (INSPRA) 25 MG tablet Take 1 tablet (25 mg) by mouth 2 times daily 180 tablet 1     hydrochlorothiazide (MICROZIDE) 12.5 MG capsule Take 2 capsules (25 mg) by mouth every morning 180 capsule 2     nebivolol (BYSTOLIC) 5 MG tablet Take 1/2 tab (2.5 mg) daily 90 tablet 3     rosuvastatin (CRESTOR) 10 MG tablet Take 1 tablet (10 mg) by mouth daily 90 tablet 3     XARELTO 20 MG TABS  tablet TAKE 1 TABLET BY MOUTH ONCE DAILY WITH  DINNER 90 tablet 1     ASPIRIN NOT PRESCRIBED (INTENTIONAL) Please choose reason not prescribed, below 0 each        ALLERGIES     Allergies   Allergen Reactions     Ampicillin      rash     Metoprolol      Mild fatigue with Toprol; mildly decreased exercise capacity     Spironolactone Nausea and Difficulty breathing     Vicodin [Hydrocodone-Acetaminophen] Nausea       PAST MEDICAL HISTORY:  Past Medical History:   Diagnosis Date     Atrial fibrillation, unspecified type (H) 05/19/2016    [facility] 2016     Atrial flutter (H)      Benign bladder tumor     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 @ [place]     Benign essential hypertension 2/2/2004     Bradycardia      Cardiomyopathy (H)     tachycardia-induced -resolved     Carotid artery disease (H) 02/10/2019    Extensive calcified atherosclerotic plaque in the internal carotid arteries seen on routine head CT     Diverticulosis of colon (without mention of hemorrhage) 11/16/07    incidental diverticuli noted at colonoscopy     Gout, unspecified      Hyperlipidemia      LBBB (left bundle branch block)      Post concussion syndrome 4/22/2019     Primary aldosteronism (H)      Rosacea      Special screening for malignant neoplasms, colon 1995       PAST SURGICAL HISTORY:  Past Surgical History:   Procedure Laterality Date     ARTHROPLASTY KNEE Left 6/6/2016    Procedure: ARTHROPLASTY KNEE;  Surgeon: Derek Varma MD;  Location: [facility] OR     ARTHROSCOPY SHOULDER ROTATOR CUFF REPAIR  7/9/08    Left shoulder arthroscopic rotator cuff repair with acromioplasty     C NONSPECIFIC PROCEDURE  5/99    left knee arthroscopy     CARDIOVERSION  07-     COLONOSCOPY N/A 5/1/2019    Procedure: COLONOSCOPY;  Surgeon: Marcello Velasquez MD;  Location:  GI     HC COLONOSCOPY THRU STOMA W BIOPSY/CAUTERY TUMOR/POLYP/LESION  11/16/07    normal colonoscopy except for incidental diverticuli     ORTHOPEDIC  SURGERY      2nd toe Lt foot     OTHER SURGICAL HISTORY      resection of bladder tumors 2017 @ [place]       FAMILY HISTORY:  Family History   Problem Relation Age of Onset     Cerebrovascular Disease Father         d:89, dementia     Respiratory Mother         d:94     Diabetes Sister         b. 1938, IDDM since age 10     Diabetes Brother         b. 1943, type II DM       SOCIAL HISTORY:  Social History     Socioeconomic History     Marital status:      Spouse name: None     Number of children: None     Years of education: None     Highest education level: None   Occupational History     None   Social Needs     Financial resource strain: None     Food insecurity:     Worry: None     Inability: None     Transportation needs:     Medical: None     Non-medical: None   Tobacco Use     Smoking status: Former Smoker     Types: Cigarettes     Last attempt to quit: 1963     Years since quittin.3     Smokeless tobacco: Never Used   Substance and Sexual Activity     Alcohol use: Yes     Alcohol/week: 0.0 standard drinks     Comment: 1 drink a day     Drug use: No     Sexual activity: Yes     Partners: Female   Lifestyle     Physical activity:     Days per week: None     Minutes per session: None     Stress: None   Relationships     Social connections:     Talks on phone: None     Gets together: None     Attends Evangel[employer] service: None     Active member of club or organization: None     Attends meetings of clubs or organizations: None     Relationship status: None     Intimate partner violence:     Fear of current or ex partner: None     Emotionally abused: None     Physically abused: None     Forced sexual activity: None   Other Topics Concern     Parent/sibling w/ CABG, MI or angioplasty before 65F 55M? Not Asked      Service Not Asked     Blood Transfusions Not Asked     Caffeine Concern No     Comment: 1 cup daily     Occupational Exposure Not Asked     Hobby Hazards Not Asked     Sleep  Concern Not Asked     Stress Concern Not Asked     Weight Concern Not Asked     Special Diet No     Back Care Not Asked     Exercise Yes     Comment: skiing     Bike Helmet Not Asked     Seat Belt Not Asked     Self-Exams Not Asked   Social History Narrative     None       Review of Systems:  Skin:  Negative for       Eyes:  Positive for glasses    ENT:  Negative for      Respiratory:  Negative for       Cardiovascular:  Negative      Gastroenterology: Negative      Genitourinary:  not assessed      Musculoskeletal:  Negative for      Neurologic:  Negative for      Psychiatric:  Negative for      Heme/Lymph/Imm:  Negative for      Endocrine:  Negative for        [id number]

## 2019-10-30 NOTE — PATIENT INSTRUCTIONS
1.  Stop Bystolic.  Your pulse is too slow to safely continue it...  2.  Off Bystolic, I expect your BP to rise.  In anticipation of that, we will increase amlodipine from 2.5 mg daily to 5 mg daily.  Call us if you develop ankle swelling.    3.  Will hook up a 24-hour cardiac monitor in 2-3 weeks.  4.  Will do CXR, thyroid and liver tests in 6 months.

## 2019-10-30 NOTE — LETTER
10/30/2019    Igor Christina MD  [address] [facility] [address]    RE: Reji Aguirre       Dear Colleague,    I had the pleasure of seeing Reji Aguirre in the [facility].    HPI and Plan:   See dictation    Orders Placed This Encounter   Procedures     X-ray Chest 2 vws*     Comprehensive metabolic panel     TSH     Follow-Up with Cardiac Advanced Practice Provider     EKG 12-lead complete w/read - Clinics (performed today)     EKG 12-lead complete w/read - Clinics (to be scheduled)     Holter Monitor 24 hour Adult Pediatric       Orders Placed This Encounter   Medications     nebivolol (BYSTOLIC) 5 MG tablet     Sig: Take 1/2 tab (2.5 mg) daily     Dispense:  90 tablet     Refill:  3     amLODIPine (NORVASC) 5 MG tablet     Sig: Take 1 tablet (5 mg) by mouth daily     Dispense:  90 tablet     Refill:  3       Medications Discontinued During This Encounter   Medication Reason     nebivolol (BYSTOLIC) 5 MG tablet Reorder     amLODIPine (NORVASC) 2.5 MG tablet Reorder         Encounter Diagnoses   Name Primary?     Benign essential hypertension Yes     Persistent atrial fibrillation        CURRENT MEDICATIONS:  Current Outpatient Medications   Medication Sig Dispense Refill     Acetaminophen (TYLENOL PO) Take 500 mg by mouth every 8 hours as needed for mild pain or fever       allopurinol (ZYLOPRIM) 300 MG tablet Take 1 tablet (300 mg) by mouth daily 90 tablet 2     amiodarone (PACERONE/CODARONE) 200 MG tablet TAKE 1 TABLET BY MOUTH ONCE DAILY MONDAY  THROUGH  FRIDAY,  SKIP  SATURDAY  AND  SUNDAY 90 tablet 0     amLODIPine (NORVASC) 5 MG tablet Take 1 tablet (5 mg) by mouth daily 90 tablet 3     diphenhydrAMINE (BENADRYL) 25 MG capsule Take 25 mg by mouth nightly as needed for itching or allergies       eplerenone (INSPRA) 25 MG tablet Take 1 tablet (25 mg) by mouth 2 times daily 180 tablet 1     hydrochlorothiazide (MICROZIDE) 12.5 MG capsule Take 2 capsules (25 mg)  by mouth every morning 180 capsule 2     nebivolol (BYSTOLIC) 5 MG tablet Take 1/2 tab (2.5 mg) daily 90 tablet 3     rosuvastatin (CRESTOR) 10 MG tablet Take 1 tablet (10 mg) by mouth daily 90 tablet 3     XARELTO 20 MG TABS tablet TAKE 1 TABLET BY MOUTH ONCE DAILY WITH  DINNER 90 tablet 1     ASPIRIN NOT PRESCRIBED (INTENTIONAL) Please choose reason not prescribed, below 0 each        ALLERGIES     Allergies   Allergen Reactions     Ampicillin      rash     Metoprolol      Mild fatigue with Toprol; mildly decreased exercise capacity     Spironolactone Nausea and Difficulty breathing     Vicodin [Hydrocodone-Acetaminophen] Nausea       PAST MEDICAL HISTORY:  Past Medical History:   Diagnosis Date     Atrial fibrillation, unspecified type (H) 05/19/2016    [facility] 2016     Atrial flutter (H)      Benign bladder tumor     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 @ [place]     Benign essential hypertension 2/2/2004     Bradycardia      Cardiomyopathy (H)     tachycardia-induced -resolved     Carotid artery disease (H) 02/10/2019    Extensive calcified atherosclerotic plaque in the internal carotid arteries seen on routine head CT     Diverticulosis of colon (without mention of hemorrhage) 11/16/07    incidental diverticuli noted at colonoscopy     Gout, unspecified      Hyperlipidemia      LBBB (left bundle branch block)      Post concussion syndrome 4/22/2019     Primary aldosteronism (H)      Rosacea      Special screening for malignant neoplasms, colon 1995       PAST SURGICAL HISTORY:  Past Surgical History:   Procedure Laterality Date     ARTHROPLASTY KNEE Left 6/6/2016    Procedure: ARTHROPLASTY KNEE;  Surgeon: Derek Varma MD;  Location: [facility] OR     ARTHROSCOPY SHOULDER ROTATOR CUFF REPAIR  7/9/08    Left shoulder arthroscopic rotator cuff repair with acromioplasty     C NONSPECIFIC PROCEDURE  5/99    left knee arthroscopy     CARDIOVERSION  07-     COLONOSCOPY N/A 5/1/2019     Procedure: COLONOSCOPY;  Surgeon: Marcello Velasquez MD;  Location: [facility] GI     HC COLONOSCOPY THRU STOMA W BIOPSY/CAUTERY TUMOR/POLYP/LESION  07    normal colonoscopy except for incidental diverticuli     ORTHOPEDIC SURGERY      2nd toe Lt foot     OTHER SURGICAL HISTORY      resection of bladder tumors 2017 @ [place]       FAMILY HISTORY:  Family History   Problem Relation Age of Onset     Cerebrovascular Disease Father         d:89, dementia     Respiratory Mother         d:94     Diabetes Sister         b. 1938, IDDM since age 10     Diabetes Brother         b. 1943, type II DM       SOCIAL HISTORY:  Social History     Socioeconomic History     Marital status:      Spouse name: None     Number of children: None     Years of education: None     Highest education level: None   Occupational History     None   Social Needs     Financial resource strain: None     Food insecurity:     Worry: None     Inability: None     Transportation needs:     Medical: None     Non-medical: None   Tobacco Use     Smoking status: Former Smoker     Types: Cigarettes     Last attempt to quit: 1963     Years since quittin.3     Smokeless tobacco: Never Used   Substance and Sexual Activity     Alcohol use: Yes     Alcohol/week: 0.0 standard drinks     Comment: 1 drink a day     Drug use: No     Sexual activity: Yes     Partners: Female   Lifestyle     Physical activity:     Days per week: None     Minutes per session: None     Stress: None   Relationships     Social connections:     Talks on phone: None     Gets together: None     Attends Religion service: None     Active member of club or organization: None     Attends meetings of clubs or organizations: None     Relationship status: None     Intimate partner violence:     Fear of current or ex partner: None     Emotionally abused: None     Physically abused: None     Forced sexual activity: None   Other Topics Concern     Parent/sibling w/ CABG, MI or  angioplasty before 65F 55M? Not Asked      Service Not Asked     Blood Transfusions Not Asked     Caffeine Concern No     Comment: 1 cup daily     Occupational Exposure Not Asked     Hobby Hazards Not Asked     Sleep Concern Not Asked     Stress Concern Not Asked     Weight Concern Not Asked     Special Diet No     Back Care Not Asked     Exercise Yes     Comment: skiing     Bike Helmet Not Asked     Seat Belt Not Asked     Self-Exams Not Asked   Social History Narrative     None       Review of Systems:  Skin:  Negative for       Eyes:  Positive for glasses    ENT:  Negative for      Respiratory:  Negative for       Cardiovascular:  Negative      Gastroenterology: Negative      Genitourinary:  not assessed      Musculoskeletal:  Negative for      Neurologic:  Negative for      Psychiatric:  Negative for      Heme/Lymph/Imm:  Negative for      Endocrine:  Negative for        [id number]                Thank you for allowing me to participate in the care of your patient.      Sincerely,     Leonor Aj MD     [facility]    cc:   Igor Christina MD  [address] [place] [address]

## 2019-11-13 ENCOUNTER — HOSPITAL ENCOUNTER (OUTPATIENT)
Dept: CARDIOLOGY | Facility: CLINIC | Age: 79
Discharge: HOME | End: 2019-11-13
Attending: INTERNAL MEDICINE | Admitting: INTERNAL MEDICINE
Payer: MEDICARE

## 2019-11-13 DIAGNOSIS — I48.19 PERSISTENT ATRIAL FIBRILLATION (H): ICD-10-CM

## 2019-11-13 DIAGNOSIS — I10 BENIGN ESSENTIAL HYPERTENSION: ICD-10-CM

## 2019-11-13 PROCEDURE — 93225 XTRNL ECG REC<48 HRS REC: CPT

## 2019-11-13 PROCEDURE — 93227 XTRNL ECG REC<48 HR R&I: CPT | Performed by: INTERNAL MEDICINE

## 2019-11-25 ENCOUNTER — TELEPHONE (OUTPATIENT)
Dept: CARDIOLOGY | Facility: CLINIC | Age: 79
End: 2019-11-25

## 2019-11-25 NOTE — TELEPHONE ENCOUNTER
11/25/19 Notes recorded by Leonor Aj MD on 11/24/2019 at 9:11 AM CST  He is mildly to moderately bradycardic throughout the day, avg HR 56.  However, there is no high-grade sol- (and he has no sx's).  We will simply monitor for now.  He should call for us if he has sudden dizziness or syncope.  Please call.  LETICIA Harris    Pt called and informed . Pt has no further questions at this time. Jose 840 am

## 2019-12-02 ENCOUNTER — OFFICE VISIT (OUTPATIENT)
Dept: INTERNAL MEDICINE | Facility: CLINIC | Age: 79
End: 2019-12-02
Payer: MEDICARE

## 2019-12-02 VITALS
OXYGEN SATURATION: 95 % | TEMPERATURE: 98.2 F | HEIGHT: 68 IN | DIASTOLIC BLOOD PRESSURE: 70 MMHG | HEART RATE: 52 BPM | WEIGHT: 200.5 LBS | BODY MASS INDEX: 30.39 KG/M2 | SYSTOLIC BLOOD PRESSURE: 120 MMHG

## 2019-12-02 DIAGNOSIS — M54.59 MECHANICAL LOW BACK PAIN: Primary | ICD-10-CM

## 2019-12-02 DIAGNOSIS — I10 BENIGN ESSENTIAL HYPERTENSION: ICD-10-CM

## 2019-12-02 DIAGNOSIS — M10.9 GOUT, UNSPECIFIED CAUSE, UNSPECIFIED CHRONICITY, UNSPECIFIED SITE: ICD-10-CM

## 2019-12-02 DIAGNOSIS — I48.91 ATRIAL FIBRILLATION, UNSPECIFIED TYPE (H): ICD-10-CM

## 2019-12-02 DIAGNOSIS — I48.19 PERSISTENT ATRIAL FIBRILLATION (H): ICD-10-CM

## 2019-12-02 DIAGNOSIS — I77.9 BILATERAL CAROTID ARTERY DISEASE, UNSPECIFIED TYPE (H): ICD-10-CM

## 2019-12-02 PROCEDURE — 99214 OFFICE O/P EST MOD 30 MIN: CPT | Performed by: INTERNAL MEDICINE

## 2019-12-02 ASSESSMENT — MIFFLIN-ST. JEOR: SCORE: 1603.96

## 2019-12-02 NOTE — PATIENT INSTRUCTIONS
"*  Stop the Bystolic as recommended last by the Cardiologist.     *  Increase the Amlodipine to 5 mg once per day    *  Continue all other medications at the same doses.  Contact your usual pharmacy if you need refills.     *  Return to see me in middle April 2020, return sooner if needed.  Please get fasting labs done at the [facility] or any other [facility] lab 1-2 days before this appointment (schedule a \"lab appointment\").  If you get the labs done at another clinic, make arrangements with them directly.  The orders will be in place.  Eat nothing for at least 8 hours prior to having these labs drawn.  Use [employer] or Call [phone] to schedule the appointment with me and lab appointment.             LUMBAR STRAIN/SPASM:     *  based on your history, No evidence for herniated disc, spinal stenosis, or vertebral fracture.    *  You CANNOT take NSAIDS (Advil, Motrin, Aleve, Ibuprofen, or prescription versions of these medications) due to the Xarelto that you are taking.     *  Tylenol can help the lower back some and is safe to take with the Xarelto.     *  do not go out of your way for activity, however, do not avoid basic activates and gentle activity.  Do not lay on the sofa, do not go on bed rest.      *  moist heat as needed like a micorwavable bean bag.  Apply the heat for 10-15 minutes a few times per day as needed.  I would avoid any sort of electrical heating pad out of fear of causing skin burns.  If you do use an electric heating pad, do not use a heating pad for longer than 15 minutes to lower the risk of burns.    *  avoid any lifting for the next 1 week, then a slow return to activity.  Remember to always use proper lifting technique, always bending at the knees rather than the waist.  Your thigh muscles are MUCH stronger than the smaller muscle of your lower back.    *  Physical therapy through [facility] (third floor of the [facility] in " "[place] and many other locations throughout the [facility]) will be the most important thing to getting you better.     *  expect your back to be more easily re-aggravated over the next few months.  the soft tissue and muscles are going to be more easily re-irritated over the next several weeks so be careful to return to physical action slowly.    Look for back execises on [employer].  (search \"low back pain rehabilitation exercises\"):  Here are some examples:    --[email or web address]     --[email or web address]      *  If your back does not get better with these measures or if you develop new or worsened symptoms, then please return to see us       "

## 2019-12-02 NOTE — PROGRESS NOTES
Subjective     Reji Aguirre is a 79 year old male who presents to clinic today for the following health issues:    HPI   Back Pain       Duration: x1-2 years, pain worsened x1.5 weeks ago         Specific cause: lifting and bending over the past 2 weeks     Description:   Location of pain: low back left  Character of pain: sharp, dull ache and intermittent  Pain radiation:radiates into the left hip  New numbness or weakness in legs, not attributed to pain:  no     Intensity: Currently 3/10, At its worst 9-10/10    History:   Pain interferes with job: YES  Any previous MRI or X-rays: None for this   Sees a specialist for back pain:  No  Therapies tried without relief: none     Alleviating factors:   Improved by: none       Precipitating factors:  Worsened by: Bending and Walking          Accompanying Signs & Symptoms:  Risk of Fracture:  None  Risk of Cauda Equina:  None  Risk of Infection:  None  Risk of Cancer:  None  Risk of Ankylosing Spondylitis:  Onset at age <35, male, AND morning back stiffness. no            2.    Blood presure remains well controlled at home  Readings outside clinic are within normal limits.  Reviewed last 6 BP readings in chart:  BP Readings from Last 6 Encounters:   12/02/19 120/70   10/30/19 (!) 150/68   05/01/19 131/77   04/23/19 128/76   04/09/19 120/80   02/10/19 178/80     He has not experienced any significant side effects from medicaiotns for hypertension.    NO active cardiac complaints or symptoms with exercise.     3.  Has history of atrial fibrillation.    No palpitations, no dizziness, no dyspnea on exertion, no shortness of breath.  No racing heart, no fast heart rates.    Taking medications as ordered, no side effects reported.   Patient is taking anticoagulation according to direction, with no reported side effects.     4.  History of multi-previous gout attacks, none since taking allopurinol.              Reviewed and updated as needed this visit by Provider      "    Review of Systems         Objective    /70   Pulse 52   Temp 98.2  F (36.8  C) (Temporal)   Ht 1.727 m (5' 8\")   Wt 90.9 kg (200 lb 8 oz)   SpO2 95%   BMI 30.49 kg/m    Body mass index is 30.49 kg/m .  Physical Exam                     Past Medical History:  ---------------------------  Past Medical History:   Diagnosis Date     Atrial fibrillation, unspecified type (H) 05/19/2016    [facility] 2016     Atrial flutter (H)      Benign bladder tumor     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 @ [facility]     Benign essential hypertension 2/2/2004     Bradycardia      Cardiomyopathy (H)     tachycardia-induced -resolved     Carotid artery disease (H) 02/10/2019    Extensive calcified atherosclerotic plaque in the internal carotid arteries seen on routine head CT     Diverticulosis of colon (without mention of hemorrhage) 11/16/07    incidental diverticuli noted at colonoscopy     Gout, unspecified      Hyperlipidemia      LBBB (left bundle branch block)      Post concussion syndrome 4/22/2019     Primary aldosteronism (H)      Rosacea      Special screening for malignant neoplasms, colon 1995       Past Surgical History:  ---------------------------  Past Surgical History:   Procedure Laterality Date     ARTHROPLASTY KNEE Left 6/6/2016    Procedure: ARTHROPLASTY KNEE;  Surgeon: Derek Varma MD;  Location:  OR     ARTHROSCOPY SHOULDER ROTATOR CUFF REPAIR  7/9/08    Left shoulder arthroscopic rotator cuff repair with acromioplasty     C NONSPECIFIC PROCEDURE  5/99    left knee arthroscopy     CARDIOVERSION  07-     COLONOSCOPY N/A 5/1/2019    Procedure: COLONOSCOPY;  Surgeon: Marcello Velasquez MD;  Location:  GI      COLONOSCOPY THRU STOMA W BIOPSY/CAUTERY TUMOR/POLYP/LESION  11/16/07    normal colonoscopy except for incidental diverticuli     ORTHOPEDIC SURGERY  04/11    2nd toe Lt foot     OTHER SURGICAL HISTORY      resection of bladder tumors 2017 @ [facility] "       Current Medications:  ---------------------------  Current Outpatient Medications   Medication Sig Dispense Refill     Acetaminophen (TYLENOL PO) Take 500 mg by mouth every 8 hours as needed for mild pain or fever       allopurinol (ZYLOPRIM) 300 MG tablet Take 1 tablet (300 mg) by mouth daily 90 tablet 2     amiodarone (PACERONE/CODARONE) 200 MG tablet TAKE 1 TABLET BY MOUTH ONCE DAILY MONDAY  THROUGH  FRIDAY,  SKIP  SATURDAY  AND  SUNDAY 90 tablet 0     amLODIPine (NORVASC) 5 MG tablet Take 1 tablet (5 mg) by mouth daily 90 tablet 3     ASPIRIN NOT PRESCRIBED (INTENTIONAL) Please choose reason not prescribed, below 0 each      eplerenone (INSPRA) 25 MG tablet Take 1 tablet (25 mg) by mouth 2 times daily 180 tablet 1     hydrochlorothiazide (MICROZIDE) 12.5 MG capsule Take 2 capsules (25 mg) by mouth every morning 180 capsule 2     rosuvastatin (CRESTOR) 10 MG tablet Take 1 tablet (10 mg) by mouth daily 90 tablet 3     XARELTO ANTICOAGULANT 20 MG TABS tablet TAKE 1 TABLET BY MOUTH ONCE DAILY WITH  DINNER 90 tablet 1     diphenhydrAMINE (BENADRYL) 25 MG capsule Take 25 mg by mouth nightly as needed for itching or allergies         Allergies:  -------------  Allergies   Allergen Reactions     Ampicillin      rash     Metoprolol      Mild fatigue with Toprol; mildly decreased exercise capacity     Spironolactone Nausea and Difficulty breathing     Vicodin [Hydrocodone-Acetaminophen] Nausea       Social History:  -------------------  Social History     Socioeconomic History     Marital status:      Spouse name: Not on file     Number of children: Not on file     Years of education: Not on file     Highest education level: Not on file   Occupational History     Not on file   Social Needs     Financial resource strain: Not on file     Food insecurity:     Worry: Not on file     Inability: Not on file     Transportation needs:     Medical: Not on file     Non-medical: Not on file   Tobacco Use     Smoking  status: Former Smoker     Types: Cigarettes     Last attempt to quit: 1963     Years since quittin.4     Smokeless tobacco: Never Used   Substance and Sexual Activity     Alcohol use: Yes     Alcohol/week: 0.0 standard drinks     Comment: 1 drink a day     Drug use: No     Sexual activity: Yes     Partners: Female   Lifestyle     Physical activity:     Days per week: Not on file     Minutes per session: Not on file     Stress: Not on file   Relationships     Social connections:     Talks on phone: Not on file     Gets together: Not on file     Attends Christianity service: Not on file     Active member of club or organization: Not on file     Attends meetings of clubs or organizations: Not on file     Relationship status: Not on file     Intimate partner violence:     Fear of current or ex partner: Not on file     Emotionally abused: Not on file     Physically abused: Not on file     Forced sexual activity: Not on file   Other Topics Concern     Parent/sibling w/ CABG, MI or angioplasty before 65F 55M? Not Asked      Service Not Asked     Blood Transfusions Not Asked     Caffeine Concern No     Comment: 1 cup daily     Occupational Exposure Not Asked     Hobby Hazards Not Asked     Sleep Concern Not Asked     Stress Concern Not Asked     Weight Concern Not Asked     Special Diet No     Back Care Not Asked     Exercise Yes     Comment: skiing     Bike Helmet Not Asked     Seat Belt Not Asked     Self-Exams Not Asked   Social History Narrative     Not on file       Family Medical History:  ------------------------------  Family History   Problem Relation Age of Onset     Cerebrovascular Disease Father         d:89, dementia     Respiratory Mother         d:94     Diabetes Sister         b. 1938, IDDM since age 10     Diabetes Brother         b. 1943, type II DM         ROS:  REVIEW OF SYSTEMS:    RESP: negative for cough, dyspnea, wheezing, hemoptysis  CV: negative for chest pain, palpitations, PND,  "MAG[place], orthopnea; reports no significant changes in their ability to perform physical activity (from cardiovascular standpoint)  GI: negative for dysphagia, N/V, pain, melena, diarrhea and constipation  NEURO: negative for new numbness/tingling, paralysis, incoordination, or focal weakness     OBJECTIVE:                                                    /70   Pulse 52   Temp 98.2  F (36.8  C) (Temporal)   Ht 1.727 m (5' 8\")   Wt 90.9 kg (200 lb 8 oz)   SpO2 95%   BMI 30.49 kg/m       GENERAL alert and no distress  EYES:  Normal sclera,conjunctiva, EOMI  HENT: oral and posterior pharynx without lesions or erythema, facies symmetric  NECK: Neck supple. No LAD, without thyroidmegaly.  RESP: Clear to ausculation bilaterally without wheezes or crackles. Normal BS in all fields.  CV: RRR normal S1S2 without murmurs, rubs or gallops.  LYMPH: no cervical lymph adenopathy appreciated  MS: extremities- no gross deformities of the visible extremities noted,   EXT:  no lower extremity edema  PSYCH: Alert and oriented times 3; speech- coherent  SKIN:  No obvious significant skin lesions on visible portions of face  BACK:  Pt appears uncomfortable, but not in severe distress.  Pain to palpation of paraspinal lumbar muscles, muscles in spasm, palpation reproduces pain exactly. straight leg-raises negative bilaterally.  Able to move on and off the exam table, no obsevred weakness in the feet or legs with walking, standing, or ambulation. Normal reflexes in the achilles and patellar tendons.          ASSESSMENT/PLAN:                                                      (M54.5) Mechanical low back pain  (primary encounter diagnosis)  Comment: Discussed typical mechanical back pain, typical causes, and atypical back pain, including red flag symptoms.  Discussed conservative tratments inclduing physical therpy, stretching and strengthening, use of heat and/or ice, NSAIDs with food, antispasmodics where indicated, and the use " of narcotic pain meds where indicated.    Plan: [facility] AND [facility]OPRACT[facility] REFERRAL            (I48.91) Atrial fibrillation, unspecified type (H)  Comment: This condition is currently controlled on the current medical regimen.  Continue current therapy.   He has not experienced any significant side effects of this medication.   Plan: CBC with platelets, Lipid panel reflex to         direct LDL Fasting, CANCELED: AST, CANCELED:         ALT            (I73.9) Bilateral carotid artery disease, unspecified type (H)  Comment: Discussed secondary risk factor modification and recommended continuing aggressive management of these items.   Plan: CBC with platelets, Lipid panel reflex to         direct LDL Fasting, CANCELED: AST, CANCELED:         ALT            (I10) Benign essential hypertension  Comment: This condition is currently controlled on the current medical regimen.  Continue current therapy.   Discussed current hypertension treatment guidelines, including indications for treatment and treatment options.  Discussed the importance for aggressive management of HTN to prevent vascular complications later.  Recommended lower fat, lower carbohydrate, and lower sodium (<2000 mg)diet.  Discussed required intervals for follow up on HTN, lab studies.  Recommened pt. follow their blood pressures outside the clinic to ensure that BPs are remaining within guidelines, and to contact me if the readings are not within guidelines on a regular basis so we can adjust treatment as needed.   Plan: CBC with platelets, Lipid panel reflex to         direct LDL Fasting, CANCELED: AST, CANCELED:         ALT            (I48.19) Persistent atrial fibrillation  Comment:   Plan:     (M10.9) Gout, unspecified cause, unspecified chronicity, unspecified site  Comment: This condition is currently controlled on the current medical regimen.  Continue current therapy.   Recheck labs.   Plan: Uric acid              See Patient  Instructions    BAR SALEH M.D., MD  [facility]    (Chart documentation may have been completed, in part, with gDecide voice-recognition software. Even though reviewed, some grammatical, spelling, and word errors may remain.)

## 2019-12-02 NOTE — NURSING NOTE
"Chief Complaint   Patient presents with     Back Pain     /70   Pulse 52   Temp 98.2  F (36.8  C) (Temporal)   Ht 1.727 m (5' 8\")   Wt 90.9 kg (200 lb 8 oz)   SpO2 95%   BMI 30.49 kg/m   Estimated body mass index is 30.49 kg/m  as calculated from the following:    Height as of this encounter: 1.727 m (5' 8\").    Weight as of this encounter: 90.9 kg (200 lb 8 oz).        Health Maintenance that is potentially due pending provider review:  NONE    n/a    NAMITA Dawson  "

## 2019-12-09 ENCOUNTER — THERAPY VISIT (OUTPATIENT)
Dept: PHYSICAL THERAPY | Facility: CLINIC | Age: 79
End: 2019-12-09
Payer: MEDICARE

## 2019-12-09 DIAGNOSIS — M54.50 ACUTE LEFT-SIDED LOW BACK PAIN WITHOUT SCIATICA: ICD-10-CM

## 2019-12-09 PROCEDURE — 97161 PT EVAL LOW COMPLEX 20 MIN: CPT | Mod: GP | Performed by: PHYSICAL THERAPIST

## 2019-12-09 PROCEDURE — 97110 THERAPEUTIC EXERCISES: CPT | Mod: GP | Performed by: PHYSICAL THERAPIST

## 2019-12-09 NOTE — PROGRESS NOTES
[facility] Initial Evaluation -- Lumbar    Date: December 9, 2019  Reji Aguirre is a 79 year old male with a lumbar condition.   Referral: IM  Work mechanical stresses:  retired  Employment status:  retired  Leisure mechanical stresses: skiing  Functional disability score (STEPHAN/STarT Back):  10%; 1/9--LOW  VAS score (0-10): 3/10  Patient goals/expectations:  To get the pain to go away    HISTORY:    Present symptoms: L LBP  Pain quality (sharp/shooting/stabbing/aching/burning/cramping):  Aching, sharp   Paresthesia (yes/no):  no    Present since (onset date): 06/09/2018--worsened 11/16/2019 after trying to bend over to pick something up and had LBP     Symptoms (improving/unchanging/worsening):  improving.     Symptoms commenced as a result of: bending to pick something up from the floor  Condition occurred in the following environment:   home     Symptoms at onset (back/thigh/leg): L LBP  Constant symptoms (back/thigh/leg): none  Intermittent symptoms (back/thigh/leg): L LBP    Symptoms are made worse with the following: always rising, always bending; always standing immediately; sometimes as the day progresses, always when still  Symptoms are made better with the following: Always Walking, always on the move    Disturbed sleep (yes/no): no  Sleeping postures (prone/sup/side R/L): sides    Previous episodes (0/1-5/6-10/11+): 5-6 over the past 1.5 years Year of first episode: 2018    Previous history: a few episodes of LBP over the past 1.5 years  Previous treatments: none      Specific Questions:  Cough/Sneeze/Strain (pos/neg): neg  Bowel/Bladder (normal/abnormal): normal  Gait (normal/abnormal): slightly flexed posture, decreased dheeraj  Medications (nil/NSAIDS/analg/steroids/anticoag/other):  Other - High blood pressure  Medical allergies:  none  General health (excellent/good/fair/poor):  good  Pertinent medical history:  Concussions/Dizziness and Heart problems--A-fib  Imaging  "(None/Xray/MRI/Other):  none  Recent or major surgery (yes/no):  No--hx of L TKA, L shoulder surgery, L foot  Night pain (yes/no): no  Accidents (yes/no): no  Unexplained weight loss (yes/no): no  Barriers at home: no  Other red flags: no    EXAMINATION    Posture:   Sitting (good/fair/poor): fair  Standing (good/fair/poor):good  Lordosis (red/acc/normal): red  Correction of posture (better/worse/no effect): better    Lateral Shift (right/left/nil): nil  Relevant (yes/no):  na  Other Observations: na    Neurological:    Motor deficit:  Not assessed--no radicular complaints    Reflexes:  Not assessed--no radicular complaints    Sensory deficit:  Not assessed--no radicular complaints    Dural signs:  Not assessed--no radicular complaints      Movement Loss:   Jose L Mod Min Nil Pain   Flexion   x  PDM   Extension  x   \"feels good\"   Side Gliding R   x  Increases LBP   Side Gliding L   x  Increases LBP> R SG     Test Movements:   During: produces, abolishes, increases, decreases, no effect, centralizing, peripheralizing   After: better, worse, no better, no worse, no effect, centralized, peripheralized    Pretest symptoms standing:    Symptoms During Symptoms After ROM increased ROM decreased No Effect   FIS        Rep FIS        EIS        Rep EIS        Pretest symptoms lying:  L LBP 1/10   Symptoms During Symptoms After ROM increased ROM decreased No Effect   JESUS        Rep JESUS        EIL decreases Better         Rep EIL Decreases    Better    x     If required, pretest symptoms:    Symptoms During Symptoms After ROM increased ROM decreased No Effect   SGIS - R        Rep SGIS - R        SGIS - L        Rep SGIS - L          Static Tests:  Sitting slouched:    Sitting erect:    Standing slouched   Standing erect:    Lying prone in extension:  Increases L LBP, NW after Long sitting:      Other Tests:     Provisional Classification:  Derangement - Asymmetrical, unilateral, symptoms above knee    Principle of " Management:  Education:  Posture--use of lumbar roll in sitting, avoid flexion; body mechanics; POC, treatment rationale, expected responses   Equipment provided:  Purchased lumbar roll  Mechanical therapy (Y/N):  y   Extension principle:  REIL x10 reps, every 2 hours; SHANW when unable to lie down  Lateral Principle:    Flexion principle:    Other:      ASSESSMENT/PLAN:    Patient is a 79 year old male with lumbar complaints.  Provisional classification of derangement with directional preference for extension.  He had decreased pain which remained better after repeated lumbar extension in lying.  He also had decreased pain with sit to stand transitions after using a lumbar roll in sitting.  He should make good progress with treatment focusing on lumbar extension exercises in addition to posture and body mechanics training to decrease pain and improve function.        Patient has the following significant findings with corresponding treatment plan.                Diagnosis 1:  L LBP  Pain -  self management, education, directional preference exercise and home program  Decreased ROM/flexibility - manual therapy, therapeutic exercise and home program  Decreased function - therapeutic activities and home program  Impaired posture - neuro re-education and home program    Cumulative Therapy Evaluation is: Low complexity.    Previous and current functional limitations:  (See Goal Flow Sheet for this information)    Short term and Long term goals: (See Goal Flow Sheet for this information)     Communication ability:  Patient appears to be able to clearly communicate and understand verbal and written communication and follow directions correctly.  Treatment Explanation - The following has been discussed with the patient:   RX ordered/plan of care  Anticipated outcomes  Possible risks and side effects  This patient would benefit from PT intervention to resume normal activities.   Rehab potential is good.    Frequency:  1 X  week, once daily  Duration:  for 4 weeks tapering to 2 X a month over 1 month  Discharge Plan:  Achieve all LTG.  Independent in home treatment program.  Reach maximal therapeutic benefit.    Please refer to the daily flowsheet for treatment today, total treatment time and time spent performing 1:1 timed codes.

## 2019-12-09 NOTE — LETTER
DEPARTMENT OF HEALTH AND HUMAN SERVICES  CENTERS FOR MEDICARE & MEDICAID SERVICES    PLAN/UPDATED PLAN OF PROGRESS FOR OUTPATIENT REHABILITATION    PATIENTS NAME:  Reji Aguirre   : 1940  PROVIDER NUMBER:    [id number]  HICN:  [id number]   PROVIDER NAME: [facility] PHYSICAL THERAPY  MEDICAL RECORD NUMBER: [id number]   START OF CARE DATE:  SOC Date: 19   TYPE:  PT  PRIMARY/TREATMENT DIAGNOSIS: (Pertinent Medical Diagnosis)  Acute left-sided low back pain without sciatica    VISITS FROM START OF CARE:  Rxs Used: 1     [place] [facility] Initial Evaluation -- Lumbar  Date: 2019  Reji Aguirre is a 79 year old male with a lumbar condition.   Referral: IM  Work mechanical stresses:  retired  Employment status:  retired  Leisure mechanical stresses: skiing  Functional disability score (STEPHAN/STarT Back):  10%; --LOW  VAS score (0-10): 3/10  Patient goals/expectations:  To get the pain to go away    HISTORY:  Present symptoms: L LBP  Pain quality (sharp/shooting/stabbing/aching/burning/cramping):  Aching, sharp   Paresthesia (yes/no):  no  Present since (onset date): 2018--worsened 2019 after trying to bend over to pick something up and had LBP     Symptoms (improving/unchanging/worsening):  improving.   Symptoms commenced as a result of: bending to pick something up from the floor  Condition occurred in the following environment:   home   Symptoms at onset (back/thigh/leg): L LBP  Constant symptoms (back/thigh/leg): none  Intermittent symptoms (back/thigh/leg): L LBP  Symptoms are made worse with the following: always rising, always bending; always standing immediately; sometimes as the day progresses, always when still  Symptoms are made better with the following: Always Walking, always on the move  Disturbed sleep (yes/no): no    Sleeping postures (prone/sup/side R/L): sides  Previous episodes (0/1-5/6-10/11+): 5-6 over the past  "1.5 years   Year of first episode: 2018  Previous history: a few episodes of LBP over the past 1.5 years  Previous treatments: none          Specific Questions:  Cough/Sneeze/Strain (pos/neg): neg  Bowel/Bladder (normal/abnormal): normal  Gait (normal/abnormal): slightly flexed posture, decreased dheeraj  Medications (nil/NSAIDS/analg/steroids/anticoag/other):  Other - High blood pressure  Medical allergies:  none  General health (excellent/good/fair/poor):  good  Pertinent medical history:  Concussions/Dizziness and Heart problems--A-fib  Imaging (None/Xray/MRI/Other):  none  Recent or major surgery (yes/no):  No--hx of L TKA, L shoulder surgery, L foot  Night pain (yes/no): no  Accidents (yes/no): no  Unexplained weight loss (yes/no): no  Barriers at home: no  Other red flags: no    EXAMINATION  Posture:   Sitting (good/fair/poor): fair  Standing (good/fair/poor):good  Lordosis (red/acc/normal): red  Correction of posture (better/worse/no effect): better  Lateral Shift (right/left/nil): nil  Relevant (yes/no):  na  Other Observations: na    Neurological:  Motor deficit:  Not assessed--no radicular complaints    Reflexes:  Not assessed--no radicular complaints    Sensory deficit:  Not assessed--no radicular complaints    Dural signs:  Not assessed--no radicular complaints    Movement Loss:   Jose L Mod Min Nil Pain   Flexion   x  PDM   Extension  x   \"feels good\"   Side Gliding R   x  Increases LBP   Side Gliding L   x  Increases LBP> R SG     Test Movements:   During: produces, abolishes, increases, decreases, no effect, centralizing, peripheralizing   After: better, worse, no better, no worse, no effect, centralized, peripheralized    Pretest symptoms standing:    Symptoms During Symptoms After ROM increased ROM decreased No Effect   FIS        Rep FIS        EIS        Rep EIS        Pretest symptoms lying:  L LBP 1/10   Symptoms During Symptoms After ROM increased ROM decreased No Effect   JESUS        Rep JESUS      "   EIL decreases Better         Rep EIL Decreases    Better    x     If required, pretest symptoms:    Symptoms During Symptoms After ROM increased ROM decreased No Effect   SGIS - R        Rep SGIS - R        SGIS - L        Rep SGIS - L          Static Tests:  Sitting slouched:    Sitting erect:    Standing slouched   Standing erect:    Lying prone in extension:  Increases L LBP, NW after Long sitting:      Other Tests:     Provisional Classification:  Derangement - Asymmetrical, unilateral, symptoms above knee    Principle of Management:  Education:  Posture--use of lumbar roll in sitting, avoid flexion; body mechanics; POC, treatment rationale, expected responses   Equipment provided:  Purchased lumbar roll  Mechanical therapy (Y/N):  y   Extension principle:  REIL x10 reps, every 2 hours; SHAWN when unable to lie down    ASSESSMENT/PLAN:  Patient is a 79 year old male with lumbar complaints.  Provisional classification of derangement with directional preference for extension.  He had decreased pain which remained better after repeated lumbar extension in lying.  He also had decreased pain with sit to stand transitions after using a lumbar roll in sitting.  He should make good progress with treatment focusing on lumbar extension exercises in addition to posture and body mechanics training to decrease pain and improve function.    Patient has the following significant findings with corresponding treatment plan.                Diagnosis 1:  L LBP  Pain -  self management, education, directional preference exercise and home program  Decreased ROM/flexibility - manual therapy, therapeutic exercise and home program  Decreased function - therapeutic activities and home program  Impaired posture - neuro re-education and home program    Cumulative Therapy Evaluation is: Low complexity.  Previous and current functional limitations:  (See Goal Flow Sheet for this information)    Short term and Long term goals: (See Goal  "Flow Sheet for this information)   Communication ability:  Patient appears to be able to clearly communicate and understand verbal and written communication and follow directions correctly.  Treatment Explanation - The following has been discussed with the patient:   RX ordered/plan of care  Anticipated outcomes  Possible risks and side effects  This patient would benefit from PT intervention to resume normal activities.   Rehab potential is good.    Frequency:  1 X week, once daily  Duration:  for 4 weeks tapering to 2 X a month over 1 month  Discharge Plan:  Achieve all LTG.  Independent in home treatment program.  Reach maximal therapeutic benefit.    Caregiver Signature/Credentials _____________________________ Date ________       Treating Provider: Bhakti Glover, PT   I have reviewed and certified the need for these services and plan of treatment while under my care.        PHYSICIAN'S SIGNATURE:   ____________________________ Date___________                           Igor Christina MD    Certification period:  Beginning of Cert date period: 12/09/19 to  End of Cert period date: 02/17/20     Functional Level Progress Report: Please see attached \"Goal Flow sheet for Functional level.\"    ____X____ Continue Services or       ________ DC Services                Service dates: From  SOC Date: 12/09/19 date to present                         "

## 2019-12-17 ENCOUNTER — THERAPY VISIT (OUTPATIENT)
Dept: PHYSICAL THERAPY | Facility: CLINIC | Age: 79
End: 2019-12-17
Payer: MEDICARE

## 2019-12-17 DIAGNOSIS — M54.50 ACUTE LEFT-SIDED LOW BACK PAIN WITHOUT SCIATICA: ICD-10-CM

## 2019-12-17 PROCEDURE — 97110 THERAPEUTIC EXERCISES: CPT | Mod: GP | Performed by: PHYSICAL THERAPIST

## 2019-12-17 PROCEDURE — 97530 THERAPEUTIC ACTIVITIES: CPT | Mod: GP | Performed by: PHYSICAL THERAPIST

## 2020-01-06 ENCOUNTER — TRANSFERRED RECORDS (OUTPATIENT)
Dept: HEALTH INFORMATION MANAGEMENT | Facility: CLINIC | Age: 80
End: 2020-01-06

## 2020-01-07 ENCOUNTER — THERAPY VISIT (OUTPATIENT)
Dept: PHYSICAL THERAPY | Facility: CLINIC | Age: 80
End: 2020-01-07
Payer: MEDICARE

## 2020-01-07 DIAGNOSIS — M54.50 ACUTE LEFT-SIDED LOW BACK PAIN WITHOUT SCIATICA: ICD-10-CM

## 2020-01-07 PROCEDURE — 97530 THERAPEUTIC ACTIVITIES: CPT | Mod: GP | Performed by: PHYSICAL THERAPIST

## 2020-01-07 PROCEDURE — 97110 THERAPEUTIC EXERCISES: CPT | Mod: GP | Performed by: PHYSICAL THERAPIST

## 2020-01-07 NOTE — PROGRESS NOTES
"Subjective:  HPI                    Objective:  System    Physical Exam    General     ROS    Assessment/Plan:    DISCHARGE REPORT    Progress reporting period is from 12/09/2019 to 01/07/2020.       SUBJECTIVE  Subjective: \"It almost never hurts on the L side like it did initially.  I can sleep on either side now without problems.\"  He reports no limitations with daily activities at this point due to LBP.  He can ski for 5 hours but has L LB soreness 2/10 with walking to his car.  He can stand for at least 2 hours without pain now.  He no longer notices pain with sit to stand.      Current Pain level: 0/10.     Initial Pain level: 3/10.   Changes in function:  Yes, no pain with sit to stand, improved standing tolerance, able to sleep on either side without pain.  Adverse reaction to treatment or activity: None    OBJECTIVE  Objective: Lumbar AROM:  flexion WNL, NE, extension 25%, NE.       ASSESSMENT/PLAN  Patient has been seen for 3 visits with treatment focusing on lumbar extension exercises and mobilizations in addition to posture and body mechanics training to address LBP.  He has responded well to lumbar extension exercises and reports near resolution of his symptoms today.  He has a good HEP and should be able to use this to further manage and abolish his pain.  He will continue with his HEP independently at this point.    Updated problem list and treatment plan: Diagnosis 1:  L LBP  Pain -  self management, education, directional preference exercise and home program  Decreased ROM/flexibility - home program  Decreased function - home program  Impaired posture - home program  STG/LTGs have been met or progress has been made towards goals:  Yes (See Goal flow sheet completed today.)  Assessment of Progress: The patient's condition is improving.  Self Management Plans:  Patient has been instructed in a home treatment program.  Patient is independent in a home treatment program.  Patient  has been instructed in " self management of symptoms.  Patient is independent in self management of symptoms.  Reji continues to require the following intervention to meet STG and LTG's:  PT intervention is no longer required to meet STG/LTG.    Recommendations:  This patient is ready to be discharged from therapy and continue their home treatment program.    Please refer to the daily flowsheet for treatment today, total treatment time and time spent performing 1:1 timed codes.

## 2020-01-15 DIAGNOSIS — I48.19 PERSISTENT ATRIAL FIBRILLATION (H): ICD-10-CM

## 2020-01-15 RX ORDER — HYDROCHLOROTHIAZIDE 12.5 MG/1
CAPSULE ORAL
Qty: 180 CAPSULE | Refills: 2 | Status: SHIPPED | OUTPATIENT
Start: 2020-01-15 | End: 2020-10-20

## 2020-01-15 NOTE — TELEPHONE ENCOUNTER
"  Requested Prescriptions   Pending Prescriptions Disp Refills     hydrochlorothiazide (MICROZIDE) 12.5 MG capsule [Pharmacy Med Name: hydroCHLOROthiazide 12.5 MG Oral Capsule]  0     Sig: TAKE 2 CAPSULES BY MOUTH IN THE MORNING       Diuretics (Including Combos) Protocol Passed - 1/15/2020 11:39 AM        Passed - Blood pressure under 140/90 in past 12 months     BP Readings from Last 3 Encounters:   12/02/19 120/70   10/30/19 (!) 150/68   05/01/19 131/77                 Passed - Recent (12 mo) or future (30 days) visit within the authorizing provider's specialty     Patient has had an office visit with the authorizing provider or a provider within the authorizing providers department within the previous 12 mos or has a future within next 30 days. See \"Patient Info\" tab in inbasket, or \"Choose Columns\" in Meds & Orders section of the refill encounter.              Passed - Medication is active on med list        Passed - Patient is age 18 or older        Passed - Normal serum creatinine on file in past 12 months     Recent Labs   Lab Test 10/30/19  0824   CR 1.01              Passed - Normal serum potassium on file in past 12 months     Recent Labs   Lab Test 10/30/19  0824   POTASSIUM 3.7                    Passed - Normal serum sodium on file in past 12 months     Recent Labs   Lab Test 10/30/19  0824                   "

## 2020-03-15 ENCOUNTER — HEALTH MAINTENANCE LETTER (OUTPATIENT)
Age: 80
End: 2020-03-15

## 2020-03-23 DIAGNOSIS — I77.9 BILATERAL CAROTID ARTERY DISEASE, UNSPECIFIED TYPE (H): ICD-10-CM

## 2020-03-23 RX ORDER — ROSUVASTATIN CALCIUM 10 MG/1
TABLET, COATED ORAL
Qty: 90 TABLET | Refills: 0 | Status: SHIPPED | OUTPATIENT
Start: 2020-03-23 | End: 2020-06-24

## 2020-03-23 NOTE — TELEPHONE ENCOUNTER
"Requested Prescriptions   Pending Prescriptions Disp Refills     rosuvastatin (CRESTOR) 10 MG tablet [Pharmacy Med Name: Rosuvastatin Calcium 10 MG Oral Tablet] 90 tablet 0     Sig: Take 1 tablet by mouth once daily       Statins Protocol Failed - 3/23/2020 10:20 AM        Failed - LDL on file in past 12 months     Recent Labs   Lab Test 06/07/18  0907   *             Passed - No abnormal creatine kinase in past 12 months     No lab results found.             Passed - Recent (12 mo) or future (30 days) visit within the authorizing provider's specialty     Patient has had an office visit with the authorizing provider or a provider within the authorizing providers department within the previous 12 mos or has a future within next 30 days. See \"Patient Info\" tab in inbasket, or \"Choose Columns\" in Meds & Orders section of the refill encounter.              Passed - Medication is active on med list        Passed - Patient is age 18 or older           Medication is being filled for 1 time refill only due to:  Patient needs labs lipids.    "

## 2020-04-07 ENCOUNTER — TELEPHONE (OUTPATIENT)
Dept: PHYSICAL THERAPY | Facility: CLINIC | Age: 80
End: 2020-04-07

## 2020-04-18 DIAGNOSIS — I48.91 ATRIAL FIBRILLATION, UNSPECIFIED TYPE (H): ICD-10-CM

## 2020-04-19 RX ORDER — RIVAROXABAN 20 MG/1
TABLET, FILM COATED ORAL
Qty: 90 TABLET | Refills: 0 | Status: SHIPPED | OUTPATIENT
Start: 2020-04-19 | End: 2020-07-08

## 2020-04-19 NOTE — TELEPHONE ENCOUNTER
Requested Prescriptions   Pending Prescriptions Disp Refills     XARELTO ANTICOAGULANT 20 MG TABS tablet [Pharmacy Med Name: Xarelto 20 MG Oral Tablet] 90 tablet 0     Sig: TAKE 1 TABLET BY MOUTH ONCE DAILY WITH SUPPER   Last Written Prescription Date:  10/30/2019  Last Fill Quantity: 90,  # refills: 1   Last Office Visit: 12/2/2019   Future Office Visit:    Next 5 appointments (look out 90 days)    Jun 17, 2020 11:15 AM CDT  Telephone Visit with Leonor Aj MD  [facility] [address]  [facility] [address] [phone]             Direct Oral Anticoagulant Agents Failed - 4/18/2020 11:02 AM        Failed - Normal Platelets on file in past 12 months     Recent Labs   Lab Test 06/07/18  0907                  Failed - Creatinine Clearance greater than 50 ml/min on file in past 3 mos     No lab results found.          Failed - Serum creatinine less than or equal to 1.4 on file in past 3 mos     Recent Labs   Lab Test 10/30/19  0824   CR 1.01       Ok to refill medication if creatinine is low          Passed - Medication is active on med list        Passed - Patient is 18 years of age or older        Passed - Recent (6 mo) or future (30 days) visit within the authorizing provider's specialty

## 2020-05-04 DIAGNOSIS — I48.91 ATRIAL FIBRILLATION, UNSPECIFIED TYPE (H): ICD-10-CM

## 2020-05-04 RX ORDER — AMIODARONE HYDROCHLORIDE 200 MG/1
TABLET ORAL
Qty: 90 TABLET | Refills: 0 | Status: SHIPPED | OUTPATIENT
Start: 2020-05-04 | End: 2020-06-24

## 2020-06-23 ENCOUNTER — TELEPHONE (OUTPATIENT)
Dept: CARDIOLOGY | Facility: CLINIC | Age: 80
End: 2020-06-23

## 2020-06-23 ENCOUNTER — HOSPITAL ENCOUNTER (OUTPATIENT)
Dept: LAB | Facility: CLINIC | Age: 80
End: 2020-06-23
Attending: INTERNAL MEDICINE
Payer: MEDICARE

## 2020-06-23 ENCOUNTER — HOSPITAL ENCOUNTER (OUTPATIENT)
Dept: GENERAL RADIOLOGY | Facility: CLINIC | Age: 80
End: 2020-06-23
Attending: INTERNAL MEDICINE
Payer: MEDICARE

## 2020-06-23 DIAGNOSIS — I10 BENIGN ESSENTIAL HYPERTENSION: ICD-10-CM

## 2020-06-23 DIAGNOSIS — I48.91 ATRIAL FIBRILLATION, UNSPECIFIED TYPE (H): ICD-10-CM

## 2020-06-23 DIAGNOSIS — I77.9 BILATERAL CAROTID ARTERY DISEASE, UNSPECIFIED TYPE (H): ICD-10-CM

## 2020-06-23 DIAGNOSIS — I48.19 PERSISTENT ATRIAL FIBRILLATION (H): ICD-10-CM

## 2020-06-23 LAB
ALBUMIN SERPL-MCNC: 3.9 G/DL (ref 3.4–5)
ALP SERPL-CCNC: 75 U/L (ref 40–150)
ALT SERPL W P-5'-P-CCNC: 63 U/L (ref 0–70)
ANION GAP SERPL CALCULATED.3IONS-SCNC: 10 MMOL/L (ref 3–14)
AST SERPL W P-5'-P-CCNC: 45 U/L (ref 0–45)
BILIRUB SERPL-MCNC: 0.8 MG/DL (ref 0.2–1.3)
BUN SERPL-MCNC: 20 MG/DL (ref 7–30)
CALCIUM SERPL-MCNC: 9 MG/DL (ref 8.5–10.1)
CHLORIDE SERPL-SCNC: 103 MMOL/L (ref 94–109)
CHOLEST SERPL-MCNC: 151 MG/DL
CO2 SERPL-SCNC: 25 MMOL/L (ref 20–32)
CREAT SERPL-MCNC: 1.07 MG/DL (ref 0.66–1.25)
ERYTHROCYTE [DISTWIDTH] IN BLOOD BY AUTOMATED COUNT: 14.4 % (ref 10–15)
GFR SERPL CREATININE-BSD FRML MDRD: 66 ML/MIN/{1.73_M2}
GLUCOSE SERPL-MCNC: 95 MG/DL (ref 70–99)
HCT VFR BLD AUTO: 49.8 % (ref 40–53)
HDLC SERPL-MCNC: 91 MG/DL
HGB BLD-MCNC: 16.5 G/DL (ref 13.3–17.7)
LDLC SERPL CALC-MCNC: 46 MG/DL
MCH RBC QN AUTO: 31.9 PG (ref 26.5–33)
MCHC RBC AUTO-ENTMCNC: 33.1 G/DL (ref 31.5–36.5)
MCV RBC AUTO: 96 FL (ref 78–100)
NONHDLC SERPL-MCNC: 60 MG/DL
PLATELET # BLD AUTO: 171 10E9/L (ref 150–450)
POTASSIUM SERPL-SCNC: 3.7 MMOL/L (ref 3.4–5.3)
PROT SERPL-MCNC: 7.6 G/DL (ref 6.8–8.8)
RBC # BLD AUTO: 5.17 10E12/L (ref 4.4–5.9)
SODIUM SERPL-SCNC: 138 MMOL/L (ref 133–144)
TRIGL SERPL-MCNC: 70 MG/DL
TSH SERPL DL<=0.005 MIU/L-ACNC: 0.67 MU/L (ref 0.4–4)
URATE SERPL-MCNC: 4.4 MG/DL (ref 3.5–7.2)
WBC # BLD AUTO: 7.7 10E9/L (ref 4–11)

## 2020-06-23 PROCEDURE — 85027 COMPLETE CBC AUTOMATED: CPT | Performed by: INTERNAL MEDICINE

## 2020-06-23 PROCEDURE — 71046 X-RAY EXAM CHEST 2 VIEWS: CPT

## 2020-06-23 PROCEDURE — 80061 LIPID PANEL: CPT | Performed by: INTERNAL MEDICINE

## 2020-06-23 PROCEDURE — 80053 COMPREHEN METABOLIC PANEL: CPT | Performed by: INTERNAL MEDICINE

## 2020-06-23 PROCEDURE — 36415 COLL VENOUS BLD VENIPUNCTURE: CPT | Performed by: INTERNAL MEDICINE

## 2020-06-23 PROCEDURE — 84443 ASSAY THYROID STIM HORMONE: CPT | Performed by: INTERNAL MEDICINE

## 2020-06-23 PROCEDURE — 84550 ASSAY OF BLOOD/URIC ACID: CPT | Performed by: INTERNAL MEDICINE

## 2020-06-23 NOTE — TELEPHONE ENCOUNTER
Called and left message for patient with results of TSH, LFT's and CXR on Amiodarone as reviewed by Dr. Aj.  Pt instructed to call with any questions.     ANU Pink            ----- Message from Leonor Aj MD sent at 6/23/2020  3:51 PM CDT -----  TSH, LFTs normal on amio-.  Please let him know.  DI

## 2020-06-24 DIAGNOSIS — I48.91 ATRIAL FIBRILLATION, UNSPECIFIED TYPE (H): ICD-10-CM

## 2020-06-24 DIAGNOSIS — I77.9 BILATERAL CAROTID ARTERY DISEASE, UNSPECIFIED TYPE (H): ICD-10-CM

## 2020-06-24 RX ORDER — ROSUVASTATIN CALCIUM 10 MG/1
10 TABLET, COATED ORAL DAILY
Qty: 90 TABLET | Refills: 1 | Status: SHIPPED | OUTPATIENT
Start: 2020-06-24 | End: 2020-12-14

## 2020-06-24 RX ORDER — AMIODARONE HYDROCHLORIDE 200 MG/1
TABLET ORAL
Qty: 90 TABLET | Refills: 0 | Status: SHIPPED | OUTPATIENT
Start: 2020-06-24 | End: 2020-12-31

## 2020-06-26 ENCOUNTER — OFFICE VISIT (OUTPATIENT)
Dept: INTERNAL MEDICINE | Facility: CLINIC | Age: 80
End: 2020-06-26
Payer: MEDICARE

## 2020-06-26 VITALS
SYSTOLIC BLOOD PRESSURE: 148 MMHG | OXYGEN SATURATION: 96 % | DIASTOLIC BLOOD PRESSURE: 70 MMHG | RESPIRATION RATE: 16 BRPM | WEIGHT: 195.5 LBS | HEART RATE: 58 BPM | BODY MASS INDEX: 29.73 KG/M2

## 2020-06-26 DIAGNOSIS — M70.72 BURSITIS OF OTHER BURSA OF LEFT HIP: Primary | ICD-10-CM

## 2020-06-26 PROCEDURE — 99214 OFFICE O/P EST MOD 30 MIN: CPT | Performed by: PHYSICIAN ASSISTANT

## 2020-06-26 RX ORDER — METHYLPREDNISOLONE 4 MG
TABLET, DOSE PACK ORAL
Qty: 21 TABLET | Refills: 0 | Status: SHIPPED | OUTPATIENT
Start: 2020-06-26 | End: 2020-07-09

## 2020-06-26 NOTE — PROGRESS NOTES
"Subjective     Reji Aguirre is a 79 year old male who presents to clinic today for the following health issues:    HPI   Musculoskeletal problem/pain      Duration: 4 days    Description  Location: Left hip    Intensity:  3-8/10    Accompanying signs and symptoms: none    History  Previous similar problem: no   Previous evaluation:  none    Precipitating or alleviating factors:  Trauma or overuse: shoveling dirt and wheel barrels up and down the driveway.  Brushing the driveway after   Aggravating factors include: standing, walking, climbing stairs and overuse    Therapies tried and outcome: rest/inactivity        -------------------------------------    BP Readings from Last 3 Encounters:   06/26/20 (!) 148/70   12/02/19 120/70   10/30/19 (!) 150/68    Wt Readings from Last 3 Encounters:   06/26/20 88.7 kg (195 lb 8 oz)   12/02/19 90.9 kg (200 lb 8 oz)   10/30/19 90.3 kg (199 lb)                    Reviewed and updated as needed this visit by Provider  Allergies  Meds         Review of Systems   Constitutional, HEENT, cardiovascular, pulmonary, gi and gu systems are negative, except as otherwise noted.      Objective    BP (!) 148/70   Pulse 58   Resp 16   Wt 88.7 kg (195 lb 8 oz)   SpO2 96%   BMI 29.73 kg/m    Body mass index is 29.73 kg/m .  Physical Exam   GENERAL: healthy, alert and no distress  RESP: lungs clear to auscultation - no rales, rhonchi or wheezes  CV: regular rates and rhythm  MS: tenderness left hip lateral posterior   SKIN: no suspicious lesions or rashes    Diagnostic Test Results:  none         Assessment & Plan     1. Bursitis of other bursa of left hip    - methylPREDNISolone (MEDROL DOSEPAK) 4 MG tablet therapy pack; Follow Package Directions  Dispense: 21 tablet; Refill: 0     BMI:   Estimated body mass index is 29.73 kg/m  as calculated from the following:    Height as of 12/2/19: 1.727 m (5' 8\").    Weight as of this encounter: 88.7 kg (195 lb 8 oz).           Patient " Instructions   Icing to the area    Or heat if muscle is stiff.      Return in about 2 weeks (around 7/10/2020) for recheck if not improving, regular primary provider.    Asuncion Minor PA-C  [facility]

## 2020-07-08 DIAGNOSIS — I48.91 ATRIAL FIBRILLATION, UNSPECIFIED TYPE (H): ICD-10-CM

## 2020-07-08 RX ORDER — RIVAROXABAN 20 MG/1
TABLET, FILM COATED ORAL
Qty: 90 TABLET | Refills: 0 | Status: ON HOLD | OUTPATIENT
Start: 2020-07-08 | End: 2020-09-19

## 2020-07-08 NOTE — TELEPHONE ENCOUNTER
Routing refill request to provider for review/approval because:  Labs not current:  Creat clearance

## 2020-07-09 ENCOUNTER — OFFICE VISIT (OUTPATIENT)
Dept: INTERNAL MEDICINE | Facility: CLINIC | Age: 80
End: 2020-07-09
Payer: MEDICARE

## 2020-07-09 VITALS
HEIGHT: 68 IN | WEIGHT: 189.3 LBS | DIASTOLIC BLOOD PRESSURE: 80 MMHG | TEMPERATURE: 97.8 F | BODY MASS INDEX: 28.69 KG/M2 | HEART RATE: 67 BPM | SYSTOLIC BLOOD PRESSURE: 128 MMHG | OXYGEN SATURATION: 96 %

## 2020-07-09 DIAGNOSIS — I10 BENIGN ESSENTIAL HYPERTENSION: ICD-10-CM

## 2020-07-09 DIAGNOSIS — I48.19 PERSISTENT ATRIAL FIBRILLATION (H): ICD-10-CM

## 2020-07-09 DIAGNOSIS — I48.91 ATRIAL FIBRILLATION, UNSPECIFIED TYPE (H): ICD-10-CM

## 2020-07-09 DIAGNOSIS — I77.9 BILATERAL CAROTID ARTERY DISEASE, UNSPECIFIED TYPE (H): ICD-10-CM

## 2020-07-09 DIAGNOSIS — M70.62 TROCHANTERIC BURSITIS OF LEFT HIP: Primary | ICD-10-CM

## 2020-07-09 DIAGNOSIS — M10.9 GOUT, UNSPECIFIED CAUSE, UNSPECIFIED CHRONICITY, UNSPECIFIED SITE: ICD-10-CM

## 2020-07-09 PROCEDURE — 99213 OFFICE O/P EST LOW 20 MIN: CPT | Performed by: INTERNAL MEDICINE

## 2020-07-09 ASSESSMENT — MIFFLIN-ST. JEOR: SCORE: 1553.16

## 2020-07-09 NOTE — PROGRESS NOTES
"Subjective     Reji Aguirre is a 79 year old male who presents to clinic today for the following health issues:    HPI   {SUPERLIST (Optional):377102}  Musculoskeletal problem/pain      Duration: ***    Description  Location: ***    Intensity:  {mild,moderate,severe:127177}    Accompanying signs and symptoms: {OTHER MS SYMPTOMS:389183::\"none\"}    History  Previous similar problem: { :947298}  Previous evaluation:  {PREVIOUS ms evaluation:716935::\"none\"}    Precipitating or alleviating factors:  Trauma or overuse: { :641036}  Aggravating factors include: {AGGRAVATING MS FACTORS CHRONIC PROB:763901::\"none\"}    Therapies tried and outcome: {MS RELIEF ITEMS:803019::\"nothing\"}      {HIST REVIEW/ LINKS 2 (Optional):984092}    {Additional problems for the provider to add (optional):660539}  Reviewed and updated as needed this visit by Provider         Review of Systems   {ROS COMP (Optional):848888}      Objective    There were no vitals taken for this visit.  There is no height or weight on file to calculate BMI.  Physical Exam   {Exam List (Optional):735826}    {Diagnostic Test Results (Optional):250704::\"Diagnostic Test Results:\",\"Labs reviewed in Epic\"}        {PROVIDER CHARTING PREFERENCE:579742}      "

## 2020-07-09 NOTE — PROGRESS NOTES
Subjective     Reji Aguirre is a 79 year old male who presents to clinic today for the following health issues:    HPI   Musculoskeletal problem/pain      Duration: x3 weeks    Description  Location: LT hip    Intensity:  moderate    Accompanying signs and symptoms: dull ache, can be sharp at times depending on movement, unable to extend LT leg fully     History  Previous similar problem: no   Previous evaluation:  none    Precipitating or alleviating factors:  Trauma or overuse: YES- lifting shovels of dirt into wheel barrel and then pushing it forward to dump the dirt   Aggravating factors include: climbing stairs    Therapies tried and outcome: course of methylprednisolone and tylenol - helps with sx     Pain specific in the lateral left hip prominence for the past 2 to 3 weeks ever since spending an afternoon moving dirt in his backyard with a wheelbarrow.  He can move his hips in all directions but has pain with any palpation or resting of her on the lateral left hip area.    2.    Hypertension:  Blood presure remains well controlled at home  Readings outside clinic are within normal limits.  Reviewed last 6 BP readings in chart:  BP Readings from Last 6 Encounters:   07/09/20 128/80   06/26/20 (!) 148/70   12/02/19 120/70   10/30/19 (!) 150/68   05/01/19 131/77   04/23/19 128/76     He has not experienced any significant side effects from medicaiotns for hypertension.    NO active cardiac complaints or symptoms with exercise.     3.  Has history of atrial fibrillation.    No palpitations, no dizziness, no dyspnea on exertion, no shortness of breath.  No racing heart, no fast heart rates.    Taking medications as ordered, no side effects reported.   Patient is taking anticoagulation according to direction, with no reported side effects.       Reviewed and updated as needed this visit by Provider         Review of Systems   Constitutional, HEENT, cardiovascular, pulmonary, gi and gu systems are negative,  "except as otherwise noted.      Objective    /80   Pulse 67   Temp 97.8  F (36.6  C) (Temporal)   Ht 1.727 m (5' 8\")   Wt 85.9 kg (189 lb 4.8 oz)   SpO2 96%   BMI 28.78 kg/m    Body mass index is 28.78 kg/m .  Physical Exam     GENERAL alert and no distress  EYES:  Normal sclera,conjunctiva, EOMI  HENT: oral and posterior pharynx without lesions or erythema, facies symmetric  NECK: Neck supple. No LAD, without thyroidmegaly.  RESP: Clear to ausculation bilaterally without wheezes or crackles. Normal BS in all fields.  CV: RRR normal S1S2 without murmurs, rubs or gallops.  LYMPH: no cervical lymph adenopathy appreciated  MS: extremities- no gross deformities of the visible extremities noted,   EXT:  no lower extremity edema  PSYCH: Alert and oriented times 3; speech- coherent  SKIN:  No obvious significant skin lesions on visible portions of face     HIP: Full range of motion both directions for both hips.  Patient's \"hip pain\" is reproduced with palpation along the upper left lateral tibial band specifically over the trochanteric process and up into the tensor fascia elijah.  Palpation directly over the trochanteric prominence reproduces the bulk of his pain.    Diagnostic Test Results:  Labs reviewed in Epic          (M70.62) Trochanteric bursitis of left hip  (primary encounter diagnosis)  Comment: Discussed the mechanical nature of trochanteric bursitis.  Discussed treatment options including conservative measures of  stretching, changes in mechanics and any features of overuse, NSAIDs (if able to take them safely).  Also discussed the role of steroid injections.   If no better with conservative means, then will consider steroid injections and PT referral if needed.    Plan: [facility] CH[facility]RACT[facility] REFERRAL            (M10.9) Gout, unspecified cause, unspecified chronicity, unspecified site  Comment: This condition is currently controlled on the current medical regimen.  Continue current therapy. "   Plan:     (I48.19) Persistent atrial fibrillation (H)  Comment: This condition is currently controlled on the current medical regimen.  Continue current therapy.   Plan:     (I48.91) Atrial fibrillation, unspecified type (H)  Comment: This condition is currently controlled on the current medical regimen.  Continue current therapy.   Plan:     (I77.9) Bilateral carotid artery disease, unspecified type (H)  Comment: Discussed secondary risk factor modification and recommended continuing aggressive management of these items.   Plan:     (I10) Benign essential hypertension  Comment: This condition is currently controlled on the current medical regimen.  Continue current therapy.   Plan:          Igor Christina M.D.  Dept. of Internal Medicine  [facility]

## 2020-07-09 NOTE — PATIENT INSTRUCTIONS
TROCHANTERIC BURSITIS:     * Trochanteric bursitis, or greater trochanter pain syndrome (GTPS), is an inflammation of the bursa overlying the greater trochanter of femur (located on the outside part of the upper thigh). This condition is characterized by tenderness over the lateral hip, and pain that may be present both at rest and with movement.   * Trochanteric bursitis can be caused by irritation from a tight iliotibial band (IT-band) rubbing over the bursa, a direct blow to the area, or from biomechanical abnormalities causing repetitive microtrauma.   * Treatment for trochanteric bursitis typically focuses on controlling inflammation (using ice or anti-inflammatories) and alleviating causative factors.   *  Stop, change or reduce any activities that may have caused the symptoms.     *  You should avoid taking any sort of over the counter anti inflammatory medications NSAIDS (Advil, Motrin, Aleve, Ibuprofen, or prescription versions of these medications) due to the Xarelto helps.   *  If you do not improve using the methods below, then let us know and we may need to refer you to the sports medicine specialists for a steroid injection into the affected area.  Most often this is not required.   *  Physical therapy to help design a program of stretches and recovery exercises to help recover from this and prevent it from happening again.      --[facility] [phone]    *  Stretches for the iliotibial band (IT-band) may be prescribed to reduce the friction over the outside part of the upper thigh.   *  Strengthening, particularly of the muscles surrounding the hips, is encouraged.   *  If you have trochanteric bursitis, you should perform a combination of flexibility and strengthening techniques to help your body heal and prevent further injury.   *  Begin by foam rolling your adductors, hip flexors, and IT-band. Foam rolling is a form of self-massage that can help relax tight muscles before  "you stretch them. Hold the tender spots for 30 seconds to allow your muscle time to relax and release the knots that are causing tension in the muscle.          *  After you have completed the foam rolling, statically stretch your adductors and hip flexor complex. Hold each stretch for 30 seconds to allow your muscles time to elongate.     *  Stretch the affected legs.  Repeat all stretches 3-5 times, 3 different times a day. With all these stretches you may feel it more up near the hip as opposed to down lower where you may be experiencing pain; this is normal.  (All photos below assume that it is the right leg that is the injured leg)    Strectch #1:   Pull foot up to back of buttocks. Cross the uninjured leg over the injured leg and push down, hold for 30 seconds.    Stretch #2:    Stretch #2: Cross injured leg behind and lean towards the uninjured side. This stretch is best performed with arms over the head, creating a \"bow\" from ankle to hand on the injured side (unlike how it is depicted).    Stretch #3:  Stretch # 3: Cross injured leg over the uninjured side and pull the leg as close to your chest as possible.      "

## 2020-07-13 ENCOUNTER — NURSE TRIAGE (OUTPATIENT)
Dept: NURSING | Facility: CLINIC | Age: 80
End: 2020-07-13

## 2020-07-13 DIAGNOSIS — I10 BENIGN ESSENTIAL HYPERTENSION: ICD-10-CM

## 2020-07-13 RX ORDER — EPLERENONE 25 MG/1
25 TABLET, FILM COATED ORAL 2 TIMES DAILY
Qty: 180 TABLET | Refills: 1 | Status: SHIPPED | OUTPATIENT
Start: 2020-07-13 | End: 2021-01-04

## 2020-07-13 NOTE — TELEPHONE ENCOUNTER
Additional Information    Caller requesting a NON-URGENT new prescription or refill and triager unable to refill per department policy    Protocols used: MEDICATION QUESTION CALL-A-OH

## 2020-07-13 NOTE — TELEPHONE ENCOUNTER
Patient calls stating he will be leaving this Wednesday for [place]. He doesn't think he will have access to a pharmacy and will run out of Eplerenone 25mg during that time.  He spoke to the pharmacist at [facility] today who told him he needs to contact his PCP. Nephrologist, Dr. Almonte originally prescribed it for him. Patient currently has 50 tablets but states it won't be enough to last the during of his vacation.     Phone call to Brenda Arias. She states patient had been getting prescriptions from Dr. Almonte of #120 tablets at a time since 10/3/19 with the last prescription filled on 6/9/20. They had sent the refill request to Dr. Almonte who denied it stating patient needed to contact his PCP.     Informed patient that message will be sent to his provider and they will contact him.     KELLY Kaye RN

## 2020-08-10 ENCOUNTER — THERAPY VISIT (OUTPATIENT)
Dept: PHYSICAL THERAPY | Facility: CLINIC | Age: 80
End: 2020-08-10
Payer: MEDICARE

## 2020-08-10 DIAGNOSIS — M54.50 ACUTE RIGHT-SIDED LOW BACK PAIN WITHOUT SCIATICA: ICD-10-CM

## 2020-08-10 PROCEDURE — 97161 PT EVAL LOW COMPLEX 20 MIN: CPT | Mod: 95 | Performed by: PHYSICAL THERAPIST

## 2020-08-10 PROCEDURE — 97110 THERAPEUTIC EXERCISES: CPT | Mod: 95 | Performed by: PHYSICAL THERAPIST

## 2020-08-10 NOTE — LETTER
"DEPARTMENT OF HEALTH AND HUMAN SERVICES  CENTERS FOR MEDICARE & MEDICAID SERVICES    PLAN/UPDATED PLAN OF PROGRESS FOR OUTPATIENT REHABILITATION@  Virtual Visit Reasoning: The virtual visit will provide the care the patient needs. We reviewed the patient's chart, and PTRx prescription to determine the following telemedicine visit is appropriate and effective for the patient's care.    PATIENTS NAME:  Reji Aguirre   : 1940  PROVIDER NUMBER:    [id number]  HICN:  [id number]   PROVIDER NAME: [facility] ATHLETIC MEDICINE [facility] PHYSICAL THERAPY  MEDICAL RECORD NUMBER: [id number]   START OF CARE DATE:  SOC Date: 08/10/20   TYPE:  PT  PRIMARY/TREATMENT DIAGNOSIS: (Pertinent Medical Diagnosis)  Acute right-sided low back pain without sciatica    VISITS FROM START OF CARE:  Rxs Used: 1     Physical Therapy Virtual Initial Visit    The patient has been notified of following:     \"This virtual visit will be conducted between you and your provider. We have found that certain health care needs can be provided without the need for physical presence.  This service lets us provide the care you need with a virtual visit.\"    Due to external, as well as internal [facility] management of the COVID-19 Virus, Reji Aguirre was not seen in our clinic.  As a substitution, we implemented a virtual visit to manage this patient's condition utilizing the [employer] virtual visit platform via the patient s existing code.  The provider, Bhakti Glover, reviewed the patient's chart, PTRx prescription, and spoke with the patient to determine the following telemedicine visit is appropriate and effective for the patient's care.    The following type of visit was completed:   Video Visit:  The [employer] platform uses a synchronous HIPAA compliant video stream for this patient encounter.      Subjective:  The history is provided by the patient. No  was used.   Patient Health History  Reji Aguirre being seen for " R hip pain.   Problem began: 6/25/2020.   Problem occurred: lifting shovels of dirt into a wheelbarrow and then pushing it   Pain is reported as 8/10 on pain scale.  General health as reported by patient is good.  Pertinent medical history includes: concussions/dizziness and heart problems (A-fib).   Red flags:  None as reported by patient.  Medical allergies: none.   Surgeries include:  Other (No--hx of L TKA, L shoulder surgery, L foot).    Current medications:  None.    Current occupation is retired.   Therapist Generated HPI Evaluation  Problem details: Onset of L LB/hip pain 06/25/2020 when shoveling dirt into a wheelbarrow and pushing it.  L-sided pain has resolved but now moved to the R.         Type of problem:  Lumbar (right).  This is a new condition.  Condition occurred with:  Bending, lifting and other reason (pushing).  Where condition occurred: at home.  Patient reports pain:  Lumbar spine right.  Pain is described as sharp and aching and is intermittent.  Pain radiates to:  No radiation. Pain is the same all the time.  Since onset symptoms are unchanged.  Associated with: none. Exacerbated by: any movement, changing positions, sit to stand, getting out of bed, walking--has pain immediately, bending, lifting, twisting.  and relieved by activity/movement.  Imaging testing: none.  Past treatment: none.   Barriers include:  None as reported by patient.    Objective:  Gait:  Guarded, slightly flexed posture  Gait Type:  Antalgic          Lumbar/SI Evaluation  ROM:    AROM Lumbar:   Flexion:          Nil loss--increases R LBP  Ext:                    Mod to max loss--decreases R LBP   Side Bend:        Left:     Right:   Rotation:           Left:     Right:   Side Glide:        Left:  Max loss--increases R LBP    Right:  Max loss--increases R LBP  Strength: REIL--increases R LBP, better, less pain walking; SHAWN--decreases R LBP, better, less pain walking  ROS  PTRx Content from today's visit:  Exercise  Name: Prone Press Ups - Reps: 10 reps, every 2 hours - Sessions: goal 100 reps/day  Exercise Name: Standing Extension - Reps: 10 reps, every 2 hours when unable to lie down  Exercise Name: Neutral Spine Slouch Overcorrect  Exercise Name: Body Mechanics - Waiters Roe  Assessment/Plan:   Patient is a 79 year old male with R hip/lumbar complaints. Provisional classification of lumbar derangement with directional preference for extension.  He had decreased R LBP after repeated lumbar extension both in lying and standing and will try at home to assess further.  Treatment will focus on lumbar extension exercises in addition to posture and body mechanics to improve mobility, decrease pain, and improve overall function.     Patient has the following significant findings with corresponding treatment plan.                Diagnosis 1:  R hip pain/lumbar  Pain -  self management, education, directional preference exercise and home program  Decreased ROM/flexibility - therapeutic exercise and home program  Decreased function - therapeutic activities and home program  Impaired posture - neuro re-education and home program    Cumulative Therapy Evaluation is: Low complexity.    Previous and current functional limitations:  (See Goal Flow Sheet for this information)    Short term and Long term goals: (See Goal Flow Sheet for this information)   Communication ability:  Patient appears to be able to clearly communicate and understand verbal and written communication and follow directions correctly.  Treatment Explanation - The following has been discussed with the patient:   RX ordered/plan of care  Anticipated outcomes  Possible risks and side effects  This patient would benefit from PT intervention to resume normal activities.   Rehab potential is good.  Frequency:  1 X week, once daily  Duration:  for 6 weeks  Discharge Plan:  Achieve all LTG.  Independent in home treatment program.  Reach maximal therapeutic benefit.    Virtual  "visit contact time  Time of service began: 2:20 PM  Time of service ended: 3:00 PM  Total Time for set up, visit, and documentation: 55 minutes    Payor: MEDICARE / Plan: MEDICARE / Product Type: Medicare /     Procedure Code/s   Therapeutic Exercise (51923): 10 minutes  Neuromuscular Re-education (73340): 5 minutes    I have reviewed the note as documented above.  This accurately captures the substance of my conversation with the patient.  Provider location: [place], [place] ([facility]/State)  Patient location: home    PHYSICIAN'S SIGNATURE:   _______________________________ Date___________                            Igor Christina MD    Certification period:  Beginning of Cert date period: 08/10/20 to  End of Cert period date: 10/19/20     Functional Level Progress Report: Please see attached \"Goal Flow sheet for Functional level.\"    ____X____ Continue Services or       ________ DC Services                Service dates: From  SOC Date: 08/10/20 date to present                         "

## 2020-08-10 NOTE — PROGRESS NOTES
"Physical Therapy Virtual Initial Visit      The patient has been notified of following:     \"This virtual visit will be conducted between you and your provider. We have found that certain health care needs can be provided without the need for physical presence.  This service lets us provide the care you need with a virtual visit.\"    Due to external, as well as internal [facility] management of the COVID-19 Virus, Reji Aguirre was not seen in our clinic.  As a substitution, we implemented a virtual visit to manage this patient's condition utilizing the [employer] virtual visit platform via the patient s existing code.  The provider, Bhakti Glover, reviewed the patient's chart, PTRx prescription, and spoke with the patient to determine the following telemedicine visit is appropriate and effective for the patient's care.    The following type of visit was completed:   Video Visit:  The [employer] platform uses a synchronous HIPAA compliant video stream for this patient encounter.         Subjective:  The history is provided by the patient. No  was used.   Patient Health History  Reji Aguirre being seen for R hip pain.     Problem began: 6/25/2020.   Problem occurred: lifting shovels of dirt into a wheelbarrow and then pushing it   Pain is reported as 8/10 on pain scale.  General health as reported by patient is good.  Pertinent medical history includes: concussions/dizziness and heart problems (A-fib).   Red flags:  None as reported by patient.  Medical allergies: none.   Surgeries include:  Other (No--hx of L TKA, L shoulder surgery, L foot).    Current medications:  None.    Current occupation is retired.                     Therapist Generated HPI Evaluation  Problem details: Onset of L LB/hip pain 06/25/2020 when shoveling dirt into a wheelbarrow and pushing it.  L-sided pain has resolved but now moved to the R.         Type of problem:  Lumbar (right).    This is a new condition.  Condition " occurred with:  Bending, lifting and other reason (pushing).  Where condition occurred: at home.  Patient reports pain:  Lumbar spine right.  Pain is described as sharp and aching and is intermittent.  Pain radiates to:  No radiation. Pain is the same all the time.  Since onset symptoms are unchanged.  Associated with: none. Exacerbated by: any movement, changing positions, sit to stand, getting out of bed, walking--has pain immediately, bending, lifting, twisting.  and relieved by activity/movement.  Imaging testing: none.  Past treatment: none.   Barriers include:  None as reported by patient.                  Objective:      Gait:  Guarded, slightly flexed posture  Gait Type:  Antalgic                  Lumbar/SI Evaluation  ROM:    AROM Lumbar:   Flexion:          Nil loss--increases R LBP  Ext:                    Mod to max loss--decreases R LBP   Side Bend:        Left:     Right:   Rotation:           Left:     Right:   Side Glide:        Left:  Max loss--increases R LBP    Right:  Max loss--increases R LBP        Strength: REIL--increases R LBP, better, less pain walking; SHAWN--decreases R LBP, better, less pain walking                                                               General   ROS    PTRx Content from today's visit:  Exercise Name: Prone Press Ups - Reps: 10 reps, every 2 hours - Sessions: goal 100 reps/day  Exercise Name: Standing Extension - Reps: 10 reps, every 2 hours when unable to lie down  Exercise Name: Neutral Spine Slouch Overcorrect  Exercise Name: Body Mechanics - Waiters Yulan    Assessment/Plan:     Patient is a 79 year old male with R hip/lumbar complaints. Provisional classification of lumbar derangement with directional preference for extension.  He had decreased R LBP after repeated lumbar extension both in lying and standing and will try at home to assess further.  Treatment will focus on lumbar extension exercises in addition to posture and body mechanics to improve mobility,  decrease pain, and improve overall function.     Patient has the following significant findings with corresponding treatment plan.                Diagnosis 1:  R hip pain/lumbar  Pain -  self management, education, directional preference exercise and home program  Decreased ROM/flexibility - therapeutic exercise and home program  Decreased function - therapeutic activities and home program  Impaired posture - neuro re-education and home program    Cumulative Therapy Evaluation is: Low complexity.    Previous and current functional limitations:  (See Goal Flow Sheet for this information)    Short term and Long term goals: (See Goal Flow Sheet for this information)     Communication ability:  Patient appears to be able to clearly communicate and understand verbal and written communication and follow directions correctly.  Treatment Explanation - The following has been discussed with the patient:   RX ordered/plan of care  Anticipated outcomes  Possible risks and side effects  This patient would benefit from PT intervention to resume normal activities.   Rehab potential is good.    Frequency:  1 X week, once daily  Duration:  for 6 weeks  Discharge Plan:  Achieve all LTG.  Independent in home treatment program.  Reach maximal therapeutic benefit.    Please refer to the daily flowsheet for treatment today, total treatment time and time spent performing 1:1 timed codes.       Virtual visit contact time    Time of service began: 2:20 PM  Time of service ended: 3:00 PM  Total Time for set up, visit, and documentation: 55 minutes    Payor: MEDICARE / Plan: MEDICARE / Product Type: Medicare /     Procedure Code/s   Therapeutic Exercise (79871): 10 minutes  Neuromuscular Re-education (44153): 5 minutes    I have reviewed the note as documented above.  This accurately captures the substance of my conversation with the patient.  Provider location: [place], [place] ([facility]/State)  Patient location:  home    ___________________________________________________

## 2020-08-21 ENCOUNTER — THERAPY VISIT (OUTPATIENT)
Dept: PHYSICAL THERAPY | Facility: CLINIC | Age: 80
End: 2020-08-21
Payer: MEDICARE

## 2020-08-21 DIAGNOSIS — M54.50 ACUTE RIGHT-SIDED LOW BACK PAIN WITHOUT SCIATICA: ICD-10-CM

## 2020-08-21 PROCEDURE — 97110 THERAPEUTIC EXERCISES: CPT | Mod: GP | Performed by: PHYSICAL THERAPIST

## 2020-08-27 ENCOUNTER — THERAPY VISIT (OUTPATIENT)
Dept: PHYSICAL THERAPY | Facility: CLINIC | Age: 80
End: 2020-08-27
Payer: MEDICARE

## 2020-08-27 DIAGNOSIS — M54.50 ACUTE RIGHT-SIDED LOW BACK PAIN WITHOUT SCIATICA: ICD-10-CM

## 2020-08-27 PROCEDURE — 97110 THERAPEUTIC EXERCISES: CPT | Mod: GP | Performed by: PHYSICAL THERAPIST

## 2020-09-04 ENCOUNTER — ANCILLARY PROCEDURE (OUTPATIENT)
Dept: GENERAL RADIOLOGY | Facility: CLINIC | Age: 80
End: 2020-09-04
Attending: INTERNAL MEDICINE
Payer: MEDICARE

## 2020-09-04 ENCOUNTER — OFFICE VISIT (OUTPATIENT)
Dept: INTERNAL MEDICINE | Facility: CLINIC | Age: 80
End: 2020-09-04
Payer: MEDICARE

## 2020-09-04 ENCOUNTER — THERAPY VISIT (OUTPATIENT)
Dept: PHYSICAL THERAPY | Facility: CLINIC | Age: 80
End: 2020-09-04
Payer: MEDICARE

## 2020-09-04 VITALS
RESPIRATION RATE: 16 BRPM | WEIGHT: 195 LBS | OXYGEN SATURATION: 95 % | SYSTOLIC BLOOD PRESSURE: 138 MMHG | BODY MASS INDEX: 29.65 KG/M2 | HEART RATE: 67 BPM | DIASTOLIC BLOOD PRESSURE: 76 MMHG

## 2020-09-04 DIAGNOSIS — M54.50 ACUTE RIGHT-SIDED LOW BACK PAIN WITHOUT SCIATICA: ICD-10-CM

## 2020-09-04 DIAGNOSIS — M54.50 ACUTE RIGHT-SIDED LOW BACK PAIN WITHOUT SCIATICA: Primary | ICD-10-CM

## 2020-09-04 DIAGNOSIS — Z23 NEED FOR PROPHYLACTIC VACCINATION AND INOCULATION AGAINST INFLUENZA: ICD-10-CM

## 2020-09-04 PROCEDURE — 90662 IIV NO PRSV INCREASED AG IM: CPT | Performed by: INTERNAL MEDICINE

## 2020-09-04 PROCEDURE — G0008 ADMIN INFLUENZA VIRUS VAC: HCPCS | Performed by: INTERNAL MEDICINE

## 2020-09-04 PROCEDURE — 97110 THERAPEUTIC EXERCISES: CPT | Mod: 95 | Performed by: PHYSICAL THERAPIST

## 2020-09-04 PROCEDURE — 72100 X-RAY EXAM L-S SPINE 2/3 VWS: CPT

## 2020-09-04 PROCEDURE — 73502 X-RAY EXAM HIP UNI 2-3 VIEWS: CPT

## 2020-09-04 PROCEDURE — 99213 OFFICE O/P EST LOW 20 MIN: CPT | Mod: 25 | Performed by: INTERNAL MEDICINE

## 2020-09-04 PROCEDURE — 97535 SELF CARE MNGMENT TRAINING: CPT | Mod: 95 | Performed by: PHYSICAL THERAPIST

## 2020-09-04 RX ORDER — METHYLPREDNISOLONE 4 MG
TABLET, DOSE PACK ORAL
Qty: 21 TABLET | Refills: 0 | Status: SHIPPED | OUTPATIENT
Start: 2020-09-04 | End: 2020-09-10

## 2020-09-04 RX ORDER — CYCLOBENZAPRINE HCL 5 MG
5 TABLET ORAL 3 TIMES DAILY PRN
Qty: 30 TABLET | Refills: 0 | Status: SHIPPED | OUTPATIENT
Start: 2020-09-04 | End: 2020-12-07

## 2020-09-04 NOTE — PATIENT INSTRUCTIONS
"      LUMBAR STRAIN/SPASM:     *  based on your history, No evidence for herniated disc, spinal stenosis, or vertebral fracture.    *  Due to the intense inflammation in the tissues of your lower back, take a short course of steroid to reduce acute inflammation:     --Medrol Dose pack over the next 6 days    *  Take Cyclobenzaprine (generic Flexeril) 5 mg three times per day as needed for muscle spasms.  Do not take if you do not have muscle spasms.  The main side effect from this medication is drowsiness.  Do not drink alcohol after taking this, do not drive after taking this, do not use heavy machinery or perform dangerous tasks after taking due to the possible drowsiness.       *  do not go out of your way for activity, however, do not avoid basic activates and gentle activity.  Do not lay on the sofa, do not go on bed rest.        *  moist heat as needed like a micorwavable bean bag.  Apply the heat for 10-15 minutes a few times per day as needed.  I would avoid any sort of electrical heating pad out of fear of causing skin burns.  If you do use an electric heating pad, do not use a heating pad for longer than 15 minutes to lower the risk of burns.    *  avoid any lifting for the next 1 week, then a slow return to activity.  Remember to always use proper lifting technique, always bending at the knees rather than the waist.  Your thigh muscles are MUCH stronger than the smaller muscle of your lower back.    *  Physical therapy through [facility] (third floor of the [facility] in [place] and many other locations throughout the [facility] and [facility]) if you do not get better.    *  expect your back to be more easily re-aggravated over the next few months.  the soft tissue and muscles are going to be more easily re-irritated over the next several weeks so be careful to return to physical action slowly.    Look for back execises on [employer].  (search \"low back pain rehabilitation " "exercises\"):  Here are some examples:    --[email or web address]     --[email or web address]             "

## 2020-09-04 NOTE — PROGRESS NOTES
Subjective:  HPI  Physical Exam                    Objective:  System    Physical Exam    General     ROS    Assessment/Plan:    PROGRESS  REPORT    Progress reporting period is from 9/4/2020.       SUBJECTIVE  Subjective changes noted by patient:  .  Subjective: Was feeling much better but then was working on lining the driveway at his cabin and lifted a heavy rock and had terrible pain.  Was able drive home but terrible pain getting out of the car.  Continued to to press ups and standing extension and walked 40 minutes on the 2nd and 35 on the 3rd.  Slowly improving again but frustrated by set back.  Goal is to go grouse hunting at the end of the month.    Current pain level is 8/10  .     Previous pain level was   Initial Pain level: 8/10.   Changes in function:  Yes (See Goal flowsheet attached for changes in current functional level)  Adverse reaction to treatment or activity: None, activity - set back with bending and lifting a rock    OBJECTIVE  Changes noted in objective findings:    Objective: Recommend patient talk to MD about next steps.  Try standing extension at counter as getting down on the floor to do extension is difficult.  Encouraged patient to try a back brace temporarily to help with reminders to avoid flexion while in acute pain.  Continue to walk a few times a day since he is able to do this without pain.  Continue with lumbar roll with sitting as well.       ASSESSMENT/PLAN  Updated problem list and treatment plan: Diagnosis 1:  Right back pain  Pain -  hot/cold therapy, self management, education, directional preference exercise and home program  Decreased ROM/flexibility - manual therapy, therapeutic exercise and home program  Decreased function - therapeutic activities and home program  STG/LTGs have been met or progress has been made towards goals:  Was improving and now had a set back  Assessment of Progress: The patient's condition has potential to improve.  The patient's condition has  exacerbated.  Self Management Plans:  Patient has been instructed in a home treatment program.  I have re-evaluated this patient and find that the nature, scope, duration and intensity of the therapy is appropriate for the medical condition of the patient.  Reji continues to require the following intervention to meet STG and LTG's:  PT    Recommendations:  This patient would benefit from further evaluation.    Please refer to the daily flowsheet for treatment today, total treatment time and time spent performing 1:1 timed codes.

## 2020-09-04 NOTE — PROGRESS NOTES
"Subjective     Reji Aguirre is a 80 year old male who presents to clinic today for the following health issues:    HPI       Musculoskeletal problem/pain  Onset/Duration: 1.5 months   Description  Location: hip - right  Joint Swelling: no  Redness: no  Pain: YES  Warmth: no  Intensity:  severe  Progression of Symptoms:  same  Accompanying signs and symptoms:   Fevers: no  Numbness/tingling/weakness: no  History  Trauma to the area: YES  Recent illness:  no  Previous similar problem: no  Previous evaluation:  no  Precipitating or alleviating factors:  Aggravating factors include: bending, getting up   Therapies tried and outcome: tylenol     Annual Wellness Visit    Are you in the first 12 months of your Medicare Part B coverage?  No    Physical Health:    In general, how would you rate your overall physical health? good    Outside of work, how many days during the week do you exercise?2-3 days/week    Outside of work, approximately how many minutes a day do you exercise?45-60 minutes    If you drink alcohol do you typically have >3 drinks per day or >7 drinks per week? No    Do you usually eat at least 4 servings of fruit and vegetables a day, include whole grains & fiber and avoid regularly eating high fat or \"junk\" foods? Yes    Do you have any problems taking medications regularly? No    Do you have any side effects from medications? none    Needs assistance for the following daily activities: no assistance needed    Which of the following safety concerns are present in your home?  none identified     Hearing impairment: No, pt has hearing aides     In the past 6 months, have you been bothered by leaking of urine? no    Mental Health:    In general, how would you rate your overall mental or emotional health? excellent  PHQ-2 Score:      Do you feel safe in your environment? Yes    Have you ever done Advance Care Planning? (For example, a Health Directive, POLST, or a discussion with a medical provider or your " loved ones about your wishes)? Yes, patient states has an Advance Care Planning document and will bring a copy to the clinic.    Fall risk:  Fallen 2 or more times in the past year?: No  Any fall with injury in the past year?: No    Cognitive Screenin) Repeat 3 items (Leader, Season, Table)    2) Clock draw: ABNORMAL numbers are wrong  3) 3 item recall: Recalls 3 objects  Results: 3 items recalled: COGNITIVE IMPAIRMENT LESS LIKELY    Mini-CogTM Copyright S Christy. Licensed by the author for use in [employer]; reprinted with permission ([email or web address]). All rights reserved.      Do you have sleep apnea, excessive snoring or daytime drowsiness?: no    Current providers sharing in care for this patient include:   Patient Care Team:  Igor Christina MD as PCP - General  Igor Christina MD as Assigned PCP      Past Medical History:  ---------------------------  Past Medical History:   Diagnosis Date     Atrial fibrillation, unspecified type (H) 2016    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 @ [place]     Benign essential hypertension 2004     Bradycardia      Cardiomyopathy (H)     tachycardia-induced -resolved     Carotid artery disease (H) 02/10/2019    Extensive calcified atherosclerotic plaque in the internal carotid arteries seen on routine head CT     Diverticulosis of colon (without mention of hemorrhage) 07    incidental diverticuli noted at colonoscopy     Gout, unspecified      Hyperlipidemia      LBBB (left bundle branch block)      Post concussion syndrome 2019     Primary aldosteronism (H)      Rosacea      Special screening for malignant neoplasms, colon        Past Surgical History:  ---------------------------  Past Surgical History:   Procedure Laterality Date     ARTHROPLASTY KNEE Left 2016    Procedure: ARTHROPLASTY KNEE;  Surgeon: Derek Varma  MD Abhijeet;  Location: [facility] OR     ARTHROSCOPY SHOULDER ROTATOR CUFF REPAIR  7/9/08    Left shoulder arthroscopic rotator cuff repair with acromioplasty     C NONSPECIFIC PROCEDURE  5/99    left knee arthroscopy     CARDIOVERSION  07-     COLONOSCOPY N/A 5/1/2019    Procedure: COLONOSCOPY;  Surgeon: Marcello Velasquez MD;  Location:  GI      COLONOSCOPY THRU STOMA W BIOPSY/CAUTERY TUMOR/POLYP/LESION  11/16/07    normal colonoscopy except for incidental diverticuli     ORTHOPEDIC SURGERY  04/11    2nd toe Lt foot     OTHER SURGICAL HISTORY      resection of bladder tumors 2017 @ [place]       Current Medications:  ---------------------------  Current Outpatient Medications   Medication Sig Dispense Refill     Acetaminophen (TYLENOL PO) Take 500 mg by mouth every 8 hours as needed for mild pain or fever       allopurinol (ZYLOPRIM) 300 MG tablet TAKE 1 TABLET BY MOUTH ONCE DAILY 90 tablet 3     amiodarone (PACERONE) 200 MG tablet TAKE 1 TABLET BY MOUTH ONCE DAILY MONDAY  THROUGH  FRIDAY,  SKIP  SATURDAY  AND  SUNDAY 90 tablet 0     amLODIPine (NORVASC) 5 MG tablet Take 1 tablet (5 mg) by mouth daily 90 tablet 3     ASPIRIN NOT PRESCRIBED (INTENTIONAL) Please choose reason not prescribed, below 0 each      diphenhydrAMINE (BENADRYL) 25 MG capsule Take 25 mg by mouth nightly as needed for itching or allergies       eplerenone (INSPRA) 25 MG tablet Take 1 tablet (25 mg) by mouth 2 times daily 180 tablet 1     hydrochlorothiazide (MICROZIDE) 12.5 MG capsule TAKE 2 CAPSULES BY MOUTH IN THE MORNING 180 capsule 2     rosuvastatin (CRESTOR) 10 MG tablet Take 1 tablet (10 mg) by mouth daily 90 tablet 1     XARELTO ANTICOAGULANT 20 MG TABS tablet TAKE 1 TABLET BY MOUTH ONCE DAILY WITH SUPPER 90 tablet 0       Allergies:  -------------  Allergies   Allergen Reactions     Ampicillin      rash     Metoprolol      Mild fatigue with Toprol; mildly decreased exercise capacity     Spironolactone Nausea and Difficulty breathing      Vicodin [Hydrocodone-Acetaminophen] Nausea       Social History:  -------------------  Social History     Socioeconomic History     Marital status:      Spouse name: Not on file     Number of children: Not on file     Years of education: Not on file     Highest education level: Not on file   Occupational History     Not on file   Social Needs     Financial resource strain: Not on file     Food insecurity     Worry: Not on file     Inability: Not on file     Transportation needs     Medical: Not on file     Non-medical: Not on file   Tobacco Use     Smoking status: Former Smoker     Types: Cigarettes     Last attempt to quit: 1963     Years since quittin.1     Smokeless tobacco: Never Used   Substance and Sexual Activity     Alcohol use: Yes     Alcohol/week: 0.0 standard drinks     Comment: 1 drink a day     Drug use: No     Sexual activity: Yes     Partners: Female   Lifestyle     Physical activity     Days per week: Not on file     Minutes per session: Not on file     Stress: Not on file   Relationships     Social connections     Talks on phone: Not on file     Gets together: Not on file     Attends Taoism service: Not on file     Active member of club or organization: Not on file     Attends meetings of clubs or organizations: Not on file     Relationship status: Not on file     Intimate partner violence     Fear of current or ex partner: Not on file     Emotionally abused: Not on file     Physically abused: Not on file     Forced sexual activity: Not on file   Other Topics Concern     Parent/sibling w/ CABG, MI or angioplasty before 65F 55M? Not Asked      Service Not Asked     Blood Transfusions Not Asked     Caffeine Concern No     Comment: 1 cup daily     Occupational Exposure Not Asked     Hobby Hazards Not Asked     Sleep Concern Not Asked     Stress Concern Not Asked     Weight Concern Not Asked     Special Diet No     Back Care Not Asked     Exercise Yes     Comment: skiing      Bike Helmet Not Asked     Seat Belt Not Asked     Self-Exams Not Asked   Social History Narrative     Not on file       Family Medical History:  ------------------------------  Family History   Problem Relation Age of Onset     Cerebrovascular Disease Father         d:89, dementia     Respiratory Mother         d:94     Diabetes Sister         b. 1938, IDDM since age 10     Diabetes Brother         b. 1943, type II DM        Review of Systems   Constitutional, HEENT, cardiovascular, pulmonary, gi and gu systems are negative, except as otherwise noted.      Objective    /76   Pulse 67   Resp 16   Wt 88.5 kg (195 lb)   SpO2 95%   BMI 29.65 kg/m    Body mass index is 29.65 kg/m .  Physical Exam   GENERAL alert and no distress  EYES:  Normal sclera,conjunctiva, EOMI  HENT: oral and posterior pharynx without lesions or erythema, facies symmetric  NECK: Neck supple. No LAD, without thyroidmegaly.  RESP: Clear to ausculation bilaterally without wheezes or crackles. Normal BS in all fields.  CV: RRR normal S1S2 without murmurs, rubs or gallops.  LYMPH: no cervical lymph adenopathy appreciated  MS: extremities- no gross deformities of the visible extremities noted,   EXT:  no lower extremity edema  PSYCH: Alert and oriented times 3; speech- coherent  SKIN:  No obvious significant skin lesions on visible portions of face  BACK:  Tenderness exactly over the right lower lateral musclaes as they insert onto the right pelvic ridge.    'hip joint has FROM without any pain              (M54.5) Acute right-sided low back pain without sciatica  (primary encounter diagnosis)  Comment: Discussed typical mechanical back pain, typical causes, and atypical back pain, including red flag symptoms.  Discussed conservative tratments inclduing physical therpy, stretching and strengthening, use of heat and/or ice, NSAIDs with food, antispasmodics where indicated, and the use of narcotic pain meds where indicated.    Plan: XR  Lumbar Spine 2/3 Views, XR Pelvis and Hip         Right 2 Views, methylPREDNISolone (MEDROL         DOSEPAK) 4 MG tablet therapy pack,         cyclobenzaprine (FLEXERIL) 5 MG tablet, [facility] PT,        HAND, AND CHIROPRACTIC REFERRAL            (Z23) Need for prophylactic vaccination and inoculation against influenza  Comment:   Plan: FLUZONE HIGH DOSE 65+  [78445], Vaccine         Administration, Initial [21723]

## 2020-09-15 ENCOUNTER — THERAPY VISIT (OUTPATIENT)
Dept: PHYSICAL THERAPY | Facility: CLINIC | Age: 80
End: 2020-09-15
Payer: MEDICARE

## 2020-09-15 DIAGNOSIS — M54.50 ACUTE RIGHT-SIDED LOW BACK PAIN WITHOUT SCIATICA: ICD-10-CM

## 2020-09-15 PROCEDURE — 97110 THERAPEUTIC EXERCISES: CPT | Mod: GP | Performed by: PHYSICAL THERAPIST

## 2020-09-17 ENCOUNTER — APPOINTMENT (OUTPATIENT)
Dept: CT IMAGING | Facility: CLINIC | Age: 80
End: 2020-09-17
Attending: EMERGENCY MEDICINE
Payer: MEDICARE

## 2020-09-17 ENCOUNTER — HOSPITAL ENCOUNTER (INPATIENT)
Facility: CLINIC | Age: 80
LOS: 2 days | Discharge: HOME | DRG: 085 | End: 2020-09-19
Attending: INTERNAL MEDICINE | Admitting: INTERNAL MEDICINE
Payer: MEDICARE

## 2020-09-17 ENCOUNTER — APPOINTMENT (OUTPATIENT)
Dept: CT IMAGING | Facility: CLINIC | Age: 80
DRG: 085 | End: 2020-09-17
Attending: PHYSICIAN ASSISTANT
Payer: MEDICARE

## 2020-09-17 ENCOUNTER — APPOINTMENT (OUTPATIENT)
Dept: MRI IMAGING | Facility: CLINIC | Age: 80
DRG: 085 | End: 2020-09-17
Attending: PHYSICIAN ASSISTANT
Payer: MEDICARE

## 2020-09-17 ENCOUNTER — HOSPITAL ENCOUNTER (EMERGENCY)
Facility: CLINIC | Age: 80
Discharge: ANOTHER HOSPITAL | End: 2020-09-17
Attending: EMERGENCY MEDICINE | Admitting: EMERGENCY MEDICINE
Payer: MEDICARE

## 2020-09-17 ENCOUNTER — APPOINTMENT (OUTPATIENT)
Dept: CT IMAGING | Facility: CLINIC | Age: 80
DRG: 085 | End: 2020-09-17
Attending: INTERNAL MEDICINE
Payer: MEDICARE

## 2020-09-17 VITALS
RESPIRATION RATE: 21 BRPM | HEART RATE: 154 BPM | SYSTOLIC BLOOD PRESSURE: 130 MMHG | WEIGHT: 195 LBS | DIASTOLIC BLOOD PRESSURE: 75 MMHG | BODY MASS INDEX: 29.65 KG/M2 | OXYGEN SATURATION: 96 % | TEMPERATURE: 98.1 F

## 2020-09-17 DIAGNOSIS — S01.01XA LACERATION OF SCALP, INITIAL ENCOUNTER: ICD-10-CM

## 2020-09-17 DIAGNOSIS — S22.048A OTHER CLOSED FRACTURE OF FOURTH THORACIC VERTEBRA, INITIAL ENCOUNTER (H): Primary | ICD-10-CM

## 2020-09-17 DIAGNOSIS — S06.5XAA SUBDURAL HEMATOMA (H): ICD-10-CM

## 2020-09-17 LAB
ANION GAP SERPL CALCULATED.3IONS-SCNC: 8 MMOL/L (ref 3–14)
BASOPHILS # BLD AUTO: 0 10E9/L (ref 0–0.2)
BASOPHILS NFR BLD AUTO: 0.3 %
BUN SERPL-MCNC: 21 MG/DL (ref 7–30)
CALCIUM SERPL-MCNC: 9.1 MG/DL (ref 8.5–10.1)
CHLORIDE SERPL-SCNC: 103 MMOL/L (ref 94–109)
CO2 SERPL-SCNC: 26 MMOL/L (ref 20–32)
CREAT SERPL-MCNC: 0.99 MG/DL (ref 0.66–1.25)
DIFFERENTIAL METHOD BLD: ABNORMAL
EOSINOPHIL # BLD AUTO: 0 10E9/L (ref 0–0.7)
EOSINOPHIL NFR BLD AUTO: 0.1 %
ERYTHROCYTE [DISTWIDTH] IN BLOOD BY AUTOMATED COUNT: 14.1 % (ref 10–15)
GFR SERPL CREATININE-BSD FRML MDRD: 72 ML/MIN/{1.73_M2}
GLUCOSE SERPL-MCNC: 125 MG/DL (ref 70–99)
HCT VFR BLD AUTO: 47.9 % (ref 40–53)
HGB BLD-MCNC: 15.4 G/DL (ref 13.3–17.7)
IMM GRANULOCYTES # BLD: 0.1 10E9/L (ref 0–0.4)
IMM GRANULOCYTES NFR BLD: 0.7 %
INR PPP: 1.25 (ref 0.86–1.14)
LABORATORY COMMENT REPORT: NORMAL
LYMPHOCYTES # BLD AUTO: 1.2 10E9/L (ref 0.8–5.3)
LYMPHOCYTES NFR BLD AUTO: 8.4 %
MCH RBC QN AUTO: 32 PG (ref 26.5–33)
MCHC RBC AUTO-ENTMCNC: 32.2 G/DL (ref 31.5–36.5)
MCV RBC AUTO: 100 FL (ref 78–100)
MONOCYTES # BLD AUTO: 1.1 10E9/L (ref 0–1.3)
MONOCYTES NFR BLD AUTO: 8 %
NEUTROPHILS # BLD AUTO: 11.3 10E9/L (ref 1.6–8.3)
NEUTROPHILS NFR BLD AUTO: 82.5 %
NRBC # BLD AUTO: 0 10*3/UL
NRBC BLD AUTO-RTO: 0 /100
PLATELET # BLD AUTO: 167 10E9/L (ref 150–450)
POTASSIUM SERPL-SCNC: 3.9 MMOL/L (ref 3.4–5.3)
RBC # BLD AUTO: 4.81 10E12/L (ref 4.4–5.9)
SARS-COV-2 RNA SPEC QL NAA+PROBE: NEGATIVE
SARS-COV-2 RNA SPEC QL NAA+PROBE: NORMAL
SODIUM SERPL-SCNC: 137 MMOL/L (ref 133–144)
SPECIMEN SOURCE: NORMAL
SPECIMEN SOURCE: NORMAL
WBC # BLD AUTO: 13.7 10E9/L (ref 4–11)

## 2020-09-17 PROCEDURE — 96365 THER/PROPH/DIAG IV INF INIT: CPT

## 2020-09-17 PROCEDURE — 99291 CRITICAL CARE FIRST HOUR: CPT | Mod: 25

## 2020-09-17 PROCEDURE — 25000132 ZZH RX MED GY IP 250 OP 250 PS 637: Mod: GY | Performed by: EMERGENCY MEDICINE

## 2020-09-17 PROCEDURE — 80048 BASIC METABOLIC PNL TOTAL CA: CPT | Performed by: EMERGENCY MEDICINE

## 2020-09-17 PROCEDURE — 72125 CT NECK SPINE W/O DYE: CPT

## 2020-09-17 PROCEDURE — 99221 1ST HOSP IP/OBS SF/LOW 40: CPT | Performed by: PHYSICIAN ASSISTANT

## 2020-09-17 PROCEDURE — 85610 PROTHROMBIN TIME: CPT | Performed by: EMERGENCY MEDICINE

## 2020-09-17 PROCEDURE — 72128 CT CHEST SPINE W/O DYE: CPT

## 2020-09-17 PROCEDURE — 25000132 ZZH RX MED GY IP 250 OP 250 PS 637: Mod: GY | Performed by: PHYSICIAN ASSISTANT

## 2020-09-17 PROCEDURE — 85025 COMPLETE CBC W/AUTO DIFF WBC: CPT | Performed by: EMERGENCY MEDICINE

## 2020-09-17 PROCEDURE — 25000128 H RX IP 250 OP 636: Performed by: PHYSICIAN ASSISTANT

## 2020-09-17 PROCEDURE — 99223 1ST HOSP IP/OBS HIGH 75: CPT | Mod: AI | Performed by: PHYSICIAN ASSISTANT

## 2020-09-17 PROCEDURE — 12002 RPR S/N/AX/GEN/TRNK2.6-7.5CM: CPT

## 2020-09-17 PROCEDURE — 25800030 ZZH RX IP 258 OP 636: Performed by: PHYSICIAN ASSISTANT

## 2020-09-17 PROCEDURE — 12000000 ZZH R&B MED SURG/OB

## 2020-09-17 PROCEDURE — C9803 HOPD COVID-19 SPEC COLLECT: HCPCS

## 2020-09-17 PROCEDURE — 70450 CT HEAD/BRAIN W/O DYE: CPT

## 2020-09-17 PROCEDURE — 70450 CT HEAD/BRAIN W/O DYE: CPT | Mod: 77

## 2020-09-17 PROCEDURE — U0003 INFECTIOUS AGENT DETECTION BY NUCLEIC ACID (DNA OR RNA); SEVERE ACUTE RESPIRATORY SYNDROME CORONAVIRUS 2 (SARS-COV-2) (CORONAVIRUS DISEASE [COVID-19]), AMPLIFIED PROBE TECHNIQUE, MAKING USE OF HIGH THROUGHPUT TECHNOLOGIES AS DESCRIBED BY CMS-2020-01-R: HCPCS | Performed by: EMERGENCY MEDICINE

## 2020-09-17 PROCEDURE — C9132 KCENTRA, PER I.U.: HCPCS | Performed by: EMERGENCY MEDICINE

## 2020-09-17 PROCEDURE — 25000555 ZZHC RX FACTOR IP 250 OP 636: Performed by: EMERGENCY MEDICINE

## 2020-09-17 PROCEDURE — 72146 MRI CHEST SPINE W/O DYE: CPT

## 2020-09-17 RX ORDER — HYDRALAZINE HYDROCHLORIDE 20 MG/ML
10-20 INJECTION INTRAMUSCULAR; INTRAVENOUS
Status: DISCONTINUED | OUTPATIENT
Start: 2020-09-17 | End: 2020-09-19 | Stop reason: HOSPADM

## 2020-09-17 RX ORDER — POTASSIUM CHLORIDE 29.8 MG/ML
20 INJECTION INTRAVENOUS
Status: DISCONTINUED | OUTPATIENT
Start: 2020-09-17 | End: 2020-09-19 | Stop reason: HOSPADM

## 2020-09-17 RX ORDER — POTASSIUM CL/LIDO/0.9 % NACL 10MEQ/0.1L
10 INTRAVENOUS SOLUTION, PIGGYBACK (ML) INTRAVENOUS
Status: DISCONTINUED | OUTPATIENT
Start: 2020-09-17 | End: 2020-09-19 | Stop reason: HOSPADM

## 2020-09-17 RX ORDER — PROCHLORPERAZINE 25 MG
12.5 SUPPOSITORY, RECTAL RECTAL EVERY 12 HOURS PRN
Status: DISCONTINUED | OUTPATIENT
Start: 2020-09-17 | End: 2020-09-19 | Stop reason: HOSPADM

## 2020-09-17 RX ORDER — AMOXICILLIN 250 MG
1 CAPSULE ORAL 2 TIMES DAILY PRN
Status: DISCONTINUED | OUTPATIENT
Start: 2020-09-17 | End: 2020-09-19 | Stop reason: HOSPADM

## 2020-09-17 RX ORDER — AMLODIPINE BESYLATE 5 MG/1
5 TABLET ORAL DAILY
Status: DISCONTINUED | OUTPATIENT
Start: 2020-09-17 | End: 2020-09-19 | Stop reason: HOSPADM

## 2020-09-17 RX ORDER — ALLOPURINOL 300 MG/1
300 TABLET ORAL DAILY
Status: DISCONTINUED | OUTPATIENT
Start: 2020-09-17 | End: 2020-09-19 | Stop reason: HOSPADM

## 2020-09-17 RX ORDER — LIDOCAINE 40 MG/G
CREAM TOPICAL
Status: DISCONTINUED | OUTPATIENT
Start: 2020-09-17 | End: 2020-09-19 | Stop reason: HOSPADM

## 2020-09-17 RX ORDER — POTASSIUM CHLORIDE 1.5 G/1.58G
20-40 POWDER, FOR SOLUTION ORAL
Status: DISCONTINUED | OUTPATIENT
Start: 2020-09-17 | End: 2020-09-19 | Stop reason: HOSPADM

## 2020-09-17 RX ORDER — POTASSIUM CHLORIDE 7.45 MG/ML
10 INJECTION INTRAVENOUS
Status: DISCONTINUED | OUTPATIENT
Start: 2020-09-17 | End: 2020-09-19 | Stop reason: HOSPADM

## 2020-09-17 RX ORDER — NALOXONE HYDROCHLORIDE 0.4 MG/ML
.1-.4 INJECTION, SOLUTION INTRAMUSCULAR; INTRAVENOUS; SUBCUTANEOUS
Status: DISCONTINUED | OUTPATIENT
Start: 2020-09-17 | End: 2020-09-19 | Stop reason: HOSPADM

## 2020-09-17 RX ORDER — ONDANSETRON 4 MG/1
4 TABLET, ORALLY DISINTEGRATING ORAL EVERY 6 HOURS PRN
Status: DISCONTINUED | OUTPATIENT
Start: 2020-09-17 | End: 2020-09-19 | Stop reason: HOSPADM

## 2020-09-17 RX ORDER — POLYETHYLENE GLYCOL 3350 17 G/17G
17 POWDER, FOR SOLUTION ORAL DAILY PRN
Status: DISCONTINUED | OUTPATIENT
Start: 2020-09-17 | End: 2020-09-19 | Stop reason: HOSPADM

## 2020-09-17 RX ORDER — ACETAMINOPHEN 325 MG/1
650 TABLET ORAL EVERY 4 HOURS PRN
Status: DISCONTINUED | OUTPATIENT
Start: 2020-09-17 | End: 2020-09-19 | Stop reason: HOSPADM

## 2020-09-17 RX ORDER — EPLERENONE 25 MG/1
25 TABLET, FILM COATED ORAL 2 TIMES DAILY
Status: DISCONTINUED | OUTPATIENT
Start: 2020-09-17 | End: 2020-09-19 | Stop reason: HOSPADM

## 2020-09-17 RX ORDER — AMIODARONE HYDROCHLORIDE 200 MG/1
200 TABLET ORAL
Status: DISCONTINUED | OUTPATIENT
Start: 2020-09-17 | End: 2020-09-19 | Stop reason: HOSPADM

## 2020-09-17 RX ORDER — ONDANSETRON 2 MG/ML
4 INJECTION INTRAMUSCULAR; INTRAVENOUS EVERY 6 HOURS PRN
Status: DISCONTINUED | OUTPATIENT
Start: 2020-09-17 | End: 2020-09-19 | Stop reason: HOSPADM

## 2020-09-17 RX ORDER — AMOXICILLIN 250 MG
2 CAPSULE ORAL 2 TIMES DAILY PRN
Status: DISCONTINUED | OUTPATIENT
Start: 2020-09-17 | End: 2020-09-19 | Stop reason: HOSPADM

## 2020-09-17 RX ORDER — PROCHLORPERAZINE MALEATE 5 MG
5 TABLET ORAL EVERY 6 HOURS PRN
Status: DISCONTINUED | OUTPATIENT
Start: 2020-09-17 | End: 2020-09-19 | Stop reason: HOSPADM

## 2020-09-17 RX ORDER — AZELAIC ACID 0.15 G/G
0.5 GEL TOPICAL 2 TIMES DAILY
COMMUNITY
End: 2024-01-03

## 2020-09-17 RX ORDER — LIDOCAINE HYDROCHLORIDE AND EPINEPHRINE 10; 10 MG/ML; UG/ML
5 INJECTION, SOLUTION INFILTRATION; PERINEURAL ONCE
Status: DISCONTINUED | OUTPATIENT
Start: 2020-09-17 | End: 2020-09-17 | Stop reason: HOSPADM

## 2020-09-17 RX ORDER — POTASSIUM CHLORIDE 1500 MG/1
20-40 TABLET, EXTENDED RELEASE ORAL
Status: DISCONTINUED | OUTPATIENT
Start: 2020-09-17 | End: 2020-09-19 | Stop reason: HOSPADM

## 2020-09-17 RX ORDER — ACETAMINOPHEN 500 MG
1000 TABLET ORAL EVERY 4 HOURS PRN
Status: DISCONTINUED | OUTPATIENT
Start: 2020-09-17 | End: 2020-09-17 | Stop reason: HOSPADM

## 2020-09-17 RX ORDER — ROSUVASTATIN CALCIUM 10 MG/1
10 TABLET, COATED ORAL EVERY EVENING
Status: DISCONTINUED | OUTPATIENT
Start: 2020-09-17 | End: 2020-09-19 | Stop reason: HOSPADM

## 2020-09-17 RX ORDER — AZELAIC ACID 0.15 G/G
GEL TOPICAL 2 TIMES DAILY
Status: DISCONTINUED | OUTPATIENT
Start: 2020-09-18 | End: 2020-09-18

## 2020-09-17 RX ORDER — HYDROCHLOROTHIAZIDE 12.5 MG/1
25 CAPSULE ORAL DAILY
Status: DISCONTINUED | OUTPATIENT
Start: 2020-09-17 | End: 2020-09-19 | Stop reason: HOSPADM

## 2020-09-17 RX ORDER — CYCLOBENZAPRINE HCL 5 MG
5 TABLET ORAL 3 TIMES DAILY PRN
Status: DISCONTINUED | OUTPATIENT
Start: 2020-09-17 | End: 2020-09-19 | Stop reason: HOSPADM

## 2020-09-17 RX ORDER — BISACODYL 10 MG
10 SUPPOSITORY, RECTAL RECTAL DAILY PRN
Status: DISCONTINUED | OUTPATIENT
Start: 2020-09-17 | End: 2020-09-19 | Stop reason: HOSPADM

## 2020-09-17 RX ORDER — SODIUM CHLORIDE 9 MG/ML
INJECTION, SOLUTION INTRAVENOUS CONTINUOUS
Status: ACTIVE | OUTPATIENT
Start: 2020-09-17 | End: 2020-09-17

## 2020-09-17 RX ADMIN — AMIODARONE HYDROCHLORIDE 200 MG: 200 TABLET ORAL at 10:03

## 2020-09-17 RX ADMIN — DOCUSATE SODIUM 50 MG AND SENNOSIDES 8.6 MG 2 TABLET: 8.6; 5 TABLET, FILM COATED ORAL at 21:08

## 2020-09-17 RX ADMIN — HYDROCHLOROTHIAZIDE 25 MG: 12.5 CAPSULE ORAL at 10:03

## 2020-09-17 RX ADMIN — EPLERENONE 25 MG: 25 TABLET, FILM COATED ORAL at 10:03

## 2020-09-17 RX ADMIN — ACETAMINOPHEN 1000 MG: 500 TABLET, FILM COATED ORAL at 05:26

## 2020-09-17 RX ADMIN — CYCLOBENZAPRINE HYDROCHLORIDE 5 MG: 5 TABLET, FILM COATED ORAL at 13:54

## 2020-09-17 RX ADMIN — ALLOPURINOL 300 MG: 300 TABLET ORAL at 08:53

## 2020-09-17 RX ADMIN — ROSUVASTATIN CALCIUM 10 MG: 10 TABLET, FILM COATED ORAL at 19:55

## 2020-09-17 RX ADMIN — CYCLOBENZAPRINE HYDROCHLORIDE 5 MG: 5 TABLET, FILM COATED ORAL at 21:08

## 2020-09-17 RX ADMIN — PROTHROMBIN, COAGULATION FACTOR VII HUMAN, COAGULATION FACTOR IX HUMAN, COAGULATION FACTOR X HUMAN, PROTEIN C, PROTEIN S HUMAN, AND WATER 2104 UNITS: KIT at 05:24

## 2020-09-17 RX ADMIN — ACETAMINOPHEN 650 MG: 325 TABLET, FILM COATED ORAL at 16:08

## 2020-09-17 RX ADMIN — ACETAMINOPHEN 650 MG: 325 TABLET, FILM COATED ORAL at 12:13

## 2020-09-17 RX ADMIN — SODIUM CHLORIDE: 9 INJECTION, SOLUTION INTRAVENOUS at 08:55

## 2020-09-17 RX ADMIN — EPLERENONE 25 MG: 25 TABLET, FILM COATED ORAL at 20:00

## 2020-09-17 RX ADMIN — HYDRALAZINE HYDROCHLORIDE 10 MG: 20 INJECTION INTRAMUSCULAR; INTRAVENOUS at 10:57

## 2020-09-17 RX ADMIN — AMLODIPINE BESYLATE 5 MG: 5 TABLET ORAL at 09:03

## 2020-09-17 RX ADMIN — ACETAMINOPHEN 650 MG: 325 TABLET, FILM COATED ORAL at 19:55

## 2020-09-17 ASSESSMENT — ENCOUNTER SYMPTOMS
VOMITING: 0
HEADACHES: 1
CONFUSION: 0
SHORTNESS OF BREATH: 0
FEVER: 0
COUGH: 0
BRUISES/BLEEDS EASILY: 1
NAUSEA: 0
ABDOMINAL PAIN: 0

## 2020-09-17 ASSESSMENT — ACTIVITIES OF DAILY LIVING (ADL)
ADLS_ACUITY_SCORE: 12

## 2020-09-17 NOTE — PLAN OF CARE
PT: Patient just admitted this am after a mechanical fall with subdural hematomas; Still undergoing workup and Neurosurgery consult.

## 2020-09-17 NOTE — PROVIDER NOTIFICATION
"Paged Sergio Negron, \"pt thoracic is back. Could you please review & make recommendations? Does he need MRI & continued bedrest? \"    ADDENDUM: Orthotics consult for TLSO & MRI. Sergio rounded on the patient & updated the pt.     Orthotics called stated they couldn't come till tomorrow morning. Left voicemail on therapy scheduling # to ask if they could schedule his PT tomorrow afternoon.   "

## 2020-09-17 NOTE — PROVIDER NOTIFICATION
"Paged Girma Chung, \"Pt has intermittent spasms in mid back. OK for low dose muscle relaxant? Still awaiting thoracic CT. But Luke from neurosurgery said if they are stable ok to resume a diet & ambulate. Thanks\"    ADDEDNUM: Regular diet order placed & PTA flexeril ordered  ADDENUM 2: No results from CT thoracic called ER CT regarding status. CT tech to page radiology.  "

## 2020-09-17 NOTE — PHARMACY-ADMISSION MEDICATION HISTORY
Pharmacy Medication History  Admission medication history interview status for the 9/17/2020  admission is complete. See EPIC admission navigator for prior to admission medications     Medication history sources: Patient, Surescripts and Patient's home med list  Medication history source reliability: Good  Adherence assessment: Good    Significant changes made to the medication list:        Additional medication history information:       Medication reconciliation completed by provider prior to medication history? Yes    Time spent in this activity: 15 min      Prior to Admission medications    Medication Sig Last Dose Taking? Auth Provider   allopurinol (ZYLOPRIM) 300 MG tablet TAKE 1 TABLET BY MOUTH ONCE DAILY  Patient taking differently: Take 300 mg by mouth daily  9/16/2020 at am Yes Igor Christina MD   amiodarone (PACERONE) 200 MG tablet TAKE 1 TABLET BY MOUTH ONCE DAILY MONDAY  THROUGH  FRIDAY,  SKIP  SATURDAY  AND  SUNDAY  Patient taking differently: Take 200 mg by mouth five times a week TAKE 1 TABLET BY MOUTH ONCE DAILY MONDAY  THROUGH  Friday;  SKIP  SATURDAY  AND  SUNDAY 9/16/2020 at Unknown time Yes Igor Christina MD   amLODIPine (NORVASC) 5 MG tablet Take 1 tablet (5 mg) by mouth daily 9/16/2020 at Unknown time Yes Leonor Aj MD   cyclobenzaprine (FLEXERIL) 5 MG tablet Take 1 tablet (5 mg) by mouth 3 times daily as needed for muscle spasms 9/16/2020 at Unknown time Yes Igor Christina MD   diphenhydrAMINE (BENADRYL) 25 MG capsule Take 25 mg by mouth nightly as needed for itching or allergies Past Week at Unknown time Yes Reported, Patient   eplerenone (INSPRA) 25 MG tablet Take 1 tablet (25 mg) by mouth 2 times daily 9/16/2020 at Unknown time Yes Igor Christina MD   hydrochlorothiazide (MICROZIDE) 12.5 MG capsule TAKE 2 CAPSULES BY MOUTH IN THE MORNING  Patient taking differently: Take 25 mg by mouth every morning  9/16/2020 at Unknown time Yes  Igor Christina MD   rosuvastatin (CRESTOR) 10 MG tablet Take 1 tablet (10 mg) by mouth daily 9/16/2020 at pm Yes Igor Christina MD   XARELTO ANTICOAGULANT 20 MG TABS tablet TAKE 1 TABLET BY MOUTH ONCE DAILY WITH SUPPER 9/16/2020 at pm Yes Igor Christina MD   Acetaminophen (TYLENOL PO) Take 500-1,000 mg by mouth every 8 hours as needed for mild pain or fever    Reported, Patient   ASPIRIN NOT PRESCRIBED (INTENTIONAL) Please choose reason not prescribed, below   Igor Christina MD

## 2020-09-17 NOTE — PLAN OF CARE
Nursing shift note  Pt here with mechanical fall with SDH with mild mass effect & midline shift. A&Ox4. Neuros intact ex R hand grasp slightly weaker but still strong. VSS ex HTN intermittently (SBP<140). Required  Regular diet. Take meds whole. Bedrest strict until CT results. C/o back pain managed with tylenol & ice. Laceration with small drainage--incisions intact SUJEY. Cervical, thoracic, & head CT done. Thoracic CT abnormal--neurosurgery notified. Plan for MRI & orthotics consult for TLSO.  Discharge pending medically workup. Neurosurgery following. PT consulted.     ADDENDUM [id number]: No neuro changes. Has intermittent numbness/tingling in R hand that happened when holding the phone that lasted about 30 seconds to a minute--resolved. Will continue to monitor closely.  Continues to have very slight RUE weakness compared to L but still 5/5 strength. Muscle spasms in back--flexeril given x1. Giving tylenol PRN q4h. Ice/heat to back. Pt getting MRI done now.     Behavior & Aggression Tool color:      Pt's belongings:   Present     09/17/20 0919   Initial Information   Patient Belongings Remaining with Patient purse/wallet;shoes;cell phone/electronics;clothing;cash/credit card;glasses;ring         Home medications:  wife will be bringing in azelaic gel tomorrow morning

## 2020-09-17 NOTE — ED PROVIDER NOTES
History     Chief Complaint:  Laceration    HPI   Reji Aguirre is a 80 year old male with history of atrial fibrillation, atrial flutter, bradycardia, cardiomyopathy, carotid artery disease, left bundle branch block amongst other as noted below, currently anticoagulated on Xarelto, who presents with his wife for evaluation of a laceration sustained to his posterior scalp after a mechanical fall that occurred at 1930. Patient reports he was walking up the stairs when he tripped and ended up falling backwards hitting his head.  He also reports buttock pain.  Patient's scalp was bleeding, therefore he put a towel on it, however the bleeding has persisted, prompting his presentation.  Unknown LOC.  He denies other pain at this time.  [facility], last Tdap was in 2015.      Allergies:  Ampicillin  Metoprolol  Spironolactone  Vicodin  Amlodipine      Medications:    Bystolic  Inspra  Xarelto  Crestor  Microzide  Flexeril  Aspirin  Pacerone  Norvasc  Zyloprim    Past Medical History:    Atrial fibrillation  Atrial flutter  Benign bladder tumor  Hypertension  Bradycardia  Cardiomyopathy  Carotid artery disease  Diverticulosis of colon  Gout  Hyperlipidemia  Left bundle branch block  Primary aldosteronism    Past Surgical History:    Arthroplasty knee - left  Arthroscopy shoulder rotator cuff repair   Left knee arthroscopy  Cardioversion  Colonoscopy  Colonoscopy thru stoma w biopsy/cautery tumor/polyp/lesion  Orthopedic surgery - 2nd toe left foot  Resection of bladder tumors     Family History:    Father - cerebrovascular disease, dementia  Mother - respiratory  Sister - diabetes  Brother - diabetes    Social History:  The patient was accompanied to the ED by spouse.  Smoking Status: Former  Smokeless Tobacco: Never  Alcohol Use: yes  Drug Use: No   Marital Status:   [2]     Review of Systems   Constitutional: Negative for fever.   Respiratory: Negative for cough and shortness of breath.     Cardiovascular: Negative for chest pain.   Gastrointestinal: Negative for abdominal pain, nausea and vomiting.   Neurological: Positive for headaches.   Hematological: Bruises/bleeds easily.   Psychiatric/Behavioral: Negative for confusion.   All other systems reviewed and are negative.        Physical Exam     Patient Vitals for the past 24 hrs:   BP Temp Temp src Pulse Resp SpO2 Weight   09/17/20 0347 (!) 145/100 98.1  F (36.7  C) Oral 93 18 98 % 88.5 kg (195 lb)       Physical Exam    Nursing note and vitals reviewed.  Constitutional: Cooperative.   HENT:   Mouth/Throat: Moist mucous membranes.   Eyes: EOMI, nonicteric sclera  Cardiovascular: Normal rate, regular rhythm, no murmurs, rubs, or gallops  Pulmonary/Chest: Effort normal and breath sounds normal. No respiratory distress. No wheezes. No rales.   Abdominal: Soft. Nontender, nondistended, no guarding or rigidity.   Musculoskeletal: Normal range of motion of all extremities as well as the neck.  No midline C/T/L-spine tenderness.  Neurological: Alert. Moves all extremities spontaneously.   Skin: Skin is warm and dry. No rash noted.   Psychiatric: Normal mood and affect.    Emergency Department Course     Imaging:  Radiology findings were communicated with the patient and family who voiced understanding of the findings.    Head CT w/o Contrast:  IMPRESSION:   1.  Acute subdural hematoma overlying the left frontal and parietal convexity measuring up to 8 mm in radial thickness at the frontoparietal junction.   2.  Mild mass effect contributes to a 4 mm left-to-right midline shift.   3.  Thin localized subdural hematoma overlying the anterolateral/inferior left frontal lobe measuring 3 mm in thickness.   4.  Right parietal scalp hematoma. No fracture.   Reading per radiology. Imaging independently reviewed and agree with radiologist interpretation.      Laboratory:  Laboratory findings were communicated with the patient and family who voiced understanding of  the findings.    CBC: WBC 13.7 (H) o/w WNL (HGB 15.4, )  BMP: Glucose 125 (H) o/w WNL (Creatinine 0.99)  INR: 1.25 (H)    Asymptomatic COVID-19 (Coronavirus) PCR by Nasopharyngeal Swab: Pending     Procedures    Laceration Repair        LACERATION:  A simple clean 4 cm laceration.      LOCATION: Occipital scalp      ANESTHESIA:  5 mL 1% Lidocaine w/ Epi      PREPARATION:  Irrigation with Normal Saline and Shur Clens      DEBRIDEMENT:  no debridement      CLOSURE:  Wound was closed with 10 Staples       Interventions:  0524 KCentra 2000 units IV  0526 Tylenol 1000 mg PO    Emergency Department Course:  Past medical records, nursing notes, and vitals reviewed.    (0350)   I performed an exam of the patient as documented above. History obtained from patient.    The patient was sent for a Head CT w/o Contrast while in the emergency department, results above.      (0435)   I spoke with Dr. Lamb of the Radiology service regarding patient's presentation, findings, and plan of care. Subdural hematoma noted on CT.     (0450)   I spoke with Crissy Porras PA-c of the Neurosurgery service from [facility] regarding patient's presentation, findings, and plan of care. Recommends transfer.     IV was inserted and blood was drawn for laboratory testing, results above.    A nasal swab was obtained for laboratory testing, findings above.      (0455)   I rechecked the patient and discussed the results of his workup thus far. Discussed that transfer is indicated.     (0512)   I spoke with Dr. Bradley of the Hospitalist service from [facility] regarding patient's presentation, findings, and plan of care.      (0530)   Performed the above laceration repair.     Findings and plan explained to the Patient and spouse. Patient will be transferred to [facility] via EMS. Discussed the case with Dr. Bradley, who will admit the patient to a monitored bed for further monitoring, evaluation, and  "treatment.    I personally reviewed the laboratory and imaging results with the Patient and spouse and answered all related questions prior to transfer.     Impression & Plan         Medical Decision Making:  Patient presents after fall at home.  He fell approximately 10 hours before ED evaluation.  He presents for persistent bleeding.  Notably, he is on Xarelto for history of A. fib.  Head CT obtained for evaluation of intracranial bleeding given his anticoagulation status.  Unfortunately, patient does have a subdural hematoma.  He was given Kcentra to attempt to reverse his Xarelto.  He is neurovascularly intact.  He also declines additional imaging of his L-spine and pelvis stating \"I didn't break anything. It's just bruised.\"  I discussed with neurosurgery who recommend transfer to [facility] for neuro monitoring and repeat head CT.  I also discussed with the hospitalist service there and Dr. Bradley accepts patient for transfer.  Scalp laceration wa identified and repaired with staples prior to transfer.  All of patient's questions were answered and he is in agreement with the plan.  His wife is updated by nursing.    Critical Care Time: was 35 minutes for this patient excluding procedures    Diagnosis:    ICD-10-CM    1. Subdural hematoma (H)  S06.5X9A CBC with platelets differential     Basic metabolic panel     INR   2. Laceration of scalp, initial encounter  S01.01XA        Disposition:  Transferred to [facility].    Scribe Disclosure:  I, Kym Nixon, am serving as a scribe at 3:55 AM on 9/17/2020 to document services personally performed by Sergio Lew MD based on my observations and the provider's statements to me.   9/17/2020   [facility] EMERGENCY DEPARTMENT       Sergio Lew MD  09/17/20 1017    "

## 2020-09-17 NOTE — H&P
[facility]    History and Physical - Hospitalist Service       Date of Admission:  9/17/2020  PRIMARY CARE PROVIDER:    Igor Christina    Assessment & Plan   Reji Aguirre is a 80 year old male admitted on 9/17/2020.    Past medical history significant for atrial fib/flutter on Xarelto, HTN, HLP, LBBB, Carotid artery disease, Gout, Primary hyperaldosteronism, history of CM, history of benign bladder tumor who was directly admitted to [facility] after a mechanical fall that resulted in a subdural hematomas.      Mechanical fall with subdural hematomas:  Left frontal and parietal convexity 8 mm subdural with 4 mm midline shift as well as anterolateral/inferior left frontal thin 3 mm subduralnoted on head CT.  Patient received KCentra due to Xarelto.  Neurosurgery was contacted and recommended transfer to [facility] with Neurochecks every 2 hours and repeat head CT later this morning.    --Neurosurgery consult requested.    --Neurochecks every 2 hours.    --Head of bed at 30 degrees.    --Hold PTA Xarelto.    --Repeat head CT later today, per Neurosurgery.    --Goal SBP < 140.    --NPO; ASAP dysphagia screen.   --IV Fluids at 75 ml/hr for 10 hours.     --PT.  ADDENDUM:  Patient passed dysphagia screening.  Will advance to regular diet.    Repeat Head CT unchanged from previous study.      Neck and back pain:  Patient with complaints of neck and back/spine pain.  He is able to move his neck without difficulty.  Noted decreased strength of the right upper extremity which he stated was due to pain.  He denied pain in the shoulder (which has extensive ecchymosis) but instead on his spine/back.    --Cervical and thoracic CT without contrast ordered.    --Monitor.    --PRN APAP available.    ADDENDUM:    -Cervical CT without evidence of fracture or malalignment.  Noted multilevel degenerative changes most advanced at C5-C6 with mod bilateral neural foraminal stenosis.    -Thoracic CT with diffuse  idiopathic skeletal hyperostosis with extensive fusing syndesmophytes throughout the thoracic spine.  Linear lucency through the anterior right lateral T4-T5 syndesmophyte which could be acute or chronic fracture.  Fracture involves the anterior aspect of the vertebral column, no evidence of posterior involvement but patient at risk for unstable fractures.     --Requested nursing staff to page Neurosurgery regarding findings.      Posterior scalp laceration:  Laceration repaired in the ED.    --Removal of staples/sutures in 10-14 days.      Chronic Atrial fib/flutter on Xarelto:  PTA medications include amiodarone 200 mg daily except Sat and Sun as well as Xarelto 20 mg daily.    --Resume PTA amiodarone.    --Hold PTA Xarelto.    --Telemetry.      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    --Goal SBP < 140.    --Resume PTA medications.    --PRN IV medications.      HLP:  --Resume PTA Crestor 10 mg daily.      LBBB:  Known LBBB on previous EKG's.  No interventions.      Carotid artery disease:  Extensive calcified atherosclerotic plaque in the internal carotid arteries seen on routine head CT 2/2019.    --Continue PTA Crestor as above and blood pressure management.      Gout:  --Resume PTA Allopurinol 300 mg daily.      Primary aldosteronism:  --Resume blood pressure medications as above.      History of CM:  Noted EF of 30-40% from 2016; most recent ECHO 3/2019 with improved EF of 55-60%.    --Daily weights.    --I's & O's.    --Resume cardiac medications as above.      History of benign bladder tumor:  S/p resection.  No interventions.         Diet: NPO, dysphagia screen pending.  DVT Prophylaxis: Pneumatic Compression Devices  [place] Catheter: in place, indication:    Code Status: Full code.         Disposition Plan   Expected discharge: 2 - 3 days, recommended to TBD; awaiting PT assessment once Neurosurgery  consult completed, pain manageable on oral medications, PT assessment completed with safe dispo plan in place.  Entered: Gumaro Chung PA-C 09/17/2020, 6:54 AM     The patient's care was discussed with the Patient.    The patient has been discussed with Dr. Kuhn, who agrees with the assessment and plan at this time. Dr. Kuhn will evaluate the patient independently.     Gumaro Chung PA-C  [facility]    ______________________________________________________________________    Chief Complaint   Direct admit to [facility] after a mechanical fall that resulted in a subdural hematomas.    History is obtained from the patient and EMR.      History of Present Illness   Reji Aguirre is a 80 year old male with a past medical history significant for atrial fib/flutter on Xarelto, HTN, HLP, LBBB, Carotid artery disease, Gout, Primary hyperaldosteronism, history of CM, history of benign bladder tumor who was directly admitted to [facility] after a mechanical fall that resulted in a subdural hematomas.      Patient presented to [facility] ED after a mechanical fall with continued scalp bleeding.  Patient fell while walking up the stairs last night (9/16).  He denied a sense of lightheadedness or dizziness prior to the fall and believes he either misstepped or his right knee buckled.  He fell backwards striking his back and his head.  He was dazed and is unsure if he lost consciousness.  He had cut the back of his scalp and the bleeding never stopped which led to his presentation to the ED.      Dr. Lew evaluated the patient in the ED.  He performed a posterior laceration repair.  Head CT without contrast was performed and revealed an acute subdural hematoma overlying the left frontal and parietal convexity measuring up to 8 mm in radial thickness at the frontoparietal junction.  It also showed mild mass effect of 4 mm left to right as well as thin localized subdural hematoma  overlying the anterolateral/inferior left frontal lobe measuring 3 mm in thickness and a right parietal scalp hematoma.  CBC with differential revealed a  WBC of 13.7, HGB of 15.4 and PLT of 167.  A BMP revealed a glucose of 125 otherwise within normal limit (creatinine 0.99) and IVR of 1.25.      As patient is on Xarelto he received KCentra.  Dr. Lew discussed Head CT findings with Radiology (Dr. Lamb) and then contacted Neurosurgery and spoke with Crissy Porras PA-C.  Patient was then transferred to [facility] for ongoing evaluation and management.      I evaluated the patient in his hospital room.  We discussed the events that led to his ED evaluation.  Patient indicated that his right knee soheila often and that he has been working with PT.  He also has some ongoing right hip pain.  He reports that he bruises and bleeds very easily due to Xarelto.      Review of Systems    The 10 point Review of Systems is negative other than noted in the HPI.      Past Medical History    I have reviewed this patient's medical history and updated it with pertinent information if needed.   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 @ [place]     Benign essential hypertension 2/2/2004     Bradycardia      Cardiomyopathy (H)     tachycardia-induced -resolved     Carotid artery disease (H) 02/10/2019    Extensive calcified atherosclerotic plaque in the internal carotid arteries seen on routine head CT     Diverticulosis of colon (without mention of hemorrhage) 11/16/07    incidental diverticuli noted at colonoscopy     Gout, unspecified      Hyperlipidemia      LBBB (left bundle branch block)      Post concussion syndrome 4/22/2019     Primary aldosteronism (H)      Rosacea      Special screening for malignant neoplasms, colon 1995   Chronic  atrial fib/flutter on Xarelto, HTN, HLP, LBBB, Carotid artery disease, Gout, Primary hyperaldosteronism, history of CM, history of benign bladder tumor    Past Surgical History   I have reviewed this patient's surgical history and updated it with pertinent information if needed.  Past Surgical History:   Procedure Laterality Date     ARTHROPLASTY KNEE Left 2016    Procedure: ARTHROPLASTY KNEE;  Surgeon: Derek Varma MD;  Location: [facility] OR     ARTHROSCOPY SHOULDER ROTATOR CUFF REPAIR  08    Left shoulder arthroscopic rotator cuff repair with acromioplasty     C NONSPECIFIC PROCEDURE      left knee arthroscopy     CARDIOVERSION  2016     COLONOSCOPY N/A 2019    Procedure: COLONOSCOPY;  Surgeon: Marcello Velasquez MD;  Location:  GI     HC COLONOSCOPY THRU STOMA W BIOPSY/CAUTERY TUMOR/POLYP/LESION  07    normal colonoscopy except for incidental diverticuli     ORTHOPEDIC SURGERY      2nd toe Lt foot     OTHER SURGICAL HISTORY      resection of bladder tumors  @ [place]   Tonsillectomy  Bilateral cataract removal and lens replacement    Social History   I have reviewed this patient's social history and updated it with pertinent information if needed.  Patient resides in a house in [place], [place] with his wife.  He has a history of smoking tobacco but quit in .  He consumes alcohol daily (2 drinks).  He denied illicit drug use.  He does not currently use a cane, walker, CPAP.    Social History     Tobacco Use     Smoking status: Former Smoker     Types: Cigarettes     Last attempt to quit: 1963     Years since quittin.1     Smokeless tobacco: Never Used   Substance Use Topics     Alcohol use: Yes     Alcohol/week: 0.0 standard drinks     Comment: 1 drink a day     Drug use: No        Family History   I have reviewed this patient's family history and updated it with pertinent information if needed.   Family History   Problem Relation Age of Onset      Cerebrovascular Disease Father         d:89, dementia     Respiratory Mother         d:94     Diabetes Sister         b. 1938, IDDM since age 10     Diabetes Brother         b. 1943, type II DM       Prior to Admission Medications   Prior to Admission Medications   Prescriptions Last Dose Informant Patient Reported? Taking?   ASPIRIN NOT PRESCRIBED (INTENTIONAL)   Yes No   Sig: Please choose reason not prescribed, below   Acetaminophen (TYLENOL PO)   Yes No   Sig: Take 500 mg by mouth every 8 hours as needed for mild pain or fever   XARELTO ANTICOAGULANT 20 MG TABS tablet   No No   Sig: TAKE 1 TABLET BY MOUTH ONCE DAILY WITH SUPPER   allopurinol (ZYLOPRIM) 300 MG tablet   No No   Sig: TAKE 1 TABLET BY MOUTH ONCE DAILY   amLODIPine (NORVASC) 5 MG tablet   No No   Sig: Take 1 tablet (5 mg) by mouth daily   amiodarone (PACERONE) 200 MG tablet   No No   Sig: TAKE 1 TABLET BY MOUTH ONCE DAILY MONDAY  THROUGH  FRIDAY,  SKIP  SATURDAY  AND  SUNDAY   cyclobenzaprine (FLEXERIL) 5 MG tablet   No No   Sig: Take 1 tablet (5 mg) by mouth 3 times daily as needed for muscle spasms   diphenhydrAMINE (BENADRYL) 25 MG capsule   Yes No   Sig: Take 25 mg by mouth nightly as needed for itching or allergies   eplerenone (INSPRA) 25 MG tablet   No No   Sig: Take 1 tablet (25 mg) by mouth 2 times daily   hydrochlorothiazide (MICROZIDE) 12.5 MG capsule   No No   Sig: TAKE 2 CAPSULES BY MOUTH IN THE MORNING   rosuvastatin (CRESTOR) 10 MG tablet   No No   Sig: Take 1 tablet (10 mg) by mouth daily      Facility-Administered Medications: None     Allergies   Allergies   Allergen Reactions     Ampicillin      rash     Metoprolol      Mild fatigue with Toprol; mildly decreased exercise capacity     Spironolactone Nausea and Difficulty breathing     Vicodin [Hydrocodone-Acetaminophen] Nausea       Physical Exam   Temp: 98.7  F (37.1  C) Temp src: Oral BP: 133/77 Pulse: 75   Resp: 16 SpO2: 96 % O2 Device: None (Room air)       Constitutional:  Awake, alert, cooperative, no apparent distress, although grimacing out in pain with certain movements.    ENT: Hair matted down and full of mostly dried blood and noted laceration repair of the posterior scalp, otherwise normocephalic and atraumatic.  Oral pharynx with moist mucus membranes, tonsils without erythema or exudates.  Eyes pupils are equal, round and reactive to light; extra occular movements intact.  Normal sclera.    Neck: Supple, symmetrical, trachea midline, no adenopathy.  Pulmonary: No increased work of breathing, good air exchange, clear to auscultation bilaterally, no crackles or wheezing.  Cardiovascular: Regular rate and rhythm, normal S1 and S2, no S3 or S4, and no murmur noted.  GI: Normal bowel sounds, soft, non-distended, non-tender.    Skin/Integumen: Large/extensive ecchymosis along the right shoulder.    Neuro: CN II-XII grossly intact.  Decreased right upper extremity strength ( and extension/flexion/abduction/adduction).  Left upper extremity strength, coordination and sensation intact.  Lower extremity strength, coordination and sensation intact.  Intact finger to nose assessment of the bilateral upper extremities as well as bilateral lower extremity heel shin slide.    Psych:  Alert and oriented x 3. Normal affect.  Extremities: No lower extremity edema noted, and calves are non-tender to palpation bilaterally.   Musculoskeletal: Tenderness to palpation in the upper spine/thoracic region.  No tenderness to palpation of the right scapula or shoulder.      Data   Data reviewed today: I reviewed all medications, new labs and imaging results over the last 24 hours. I personally reviewed no images or EKG's today.    Recent Labs   Lab 09/17/20  0447   WBC 13.7*   HGB 15.4         INR 1.25*      POTASSIUM 3.9   CHLORIDE 103   CO2 26   BUN 21   CR 0.99   ANIONGAP 8   HE 9.1   *     Recent Results (from the past 24 hour(s))   Head CT w/o contrast    Narrative     EXAM: CT HEAD W/O CONTRAST  LOCATION: [facility]  DATE/TIME: 9/17/2020 4:13 AM    INDICATION: Head trauma. On Coumadin. Laceration.  COMPARISON: 02/10/2019.  TECHNIQUE: Routine without IV contrast. Multiplanar reformats. Dose reduction techniques were used.    FINDINGS:  INTRACRANIAL CONTENTS: There is an acute subdural hematoma overlying the left frontal and parietal convexity measuring up to 8 mm in radial thickness in the parasagittal frontal/parietal junction. Mild mass effect on the underlying brain parenchyma   contributes to a 4 mm left-to-right midline shift. There is a very small subdural hematoma overlying the anterolateral/inferior left frontal lobe measuring 3 mm in thickness. No CT evidence of acute infarct. Mild presumed chronic small vessel ischemic   changes. Chronic lacunar infarct left corpus striatum and right basal ganglia. Mild to moderate generalized volume loss. No hydrocephalus.     VISUALIZED ORBITS/SINUSES/MASTOIDS: No intraorbital abnormality. No paranasal sinus mucosal disease. No middle ear or mastoid effusion.    BONES/SOFT TISSUES: No acute abnormality.      Impression    IMPRESSION:  1.  Acute subdural hematoma overlying the left frontal and parietal convexity measuring up to 8 mm in radial thickness at the frontoparietal junction.  2.  Mild mass effect contributes to a 4 mm left-to-right midline shift.  3.  Thin localized subdural hematoma overlying the anterolateral/inferior left frontal lobe measuring 3 mm in thickness.  4.  Right parietal scalp hematoma. No fracture.    Exam discussed with Dr. Lew at 4:35 AM.

## 2020-09-17 NOTE — CONSULTS
[facility]    Neurosurgery Consultation     Date of Admission:  9/17/2020  Date of Consult (When I saw the patient): 09/17/20    Assessment & Plan   Reji Aguirre is a 80 year old male who was admitted on 9/17/2020. I was asked to see the patient for SDH.  Patient tripped while walking up the stairs last night and struck the back of his head.  He is on Xarelto for atrial fib/flutter.  He presented to the ER, where CT scan demonstrated acute subdural hematoma in the left frontoparietal convexity measuring up to 8 mm in thickness, with 4 mm of left-to-right midline shift.  Repeat head CT this morning was stable, measuring with 3 mm of left-to-right shift.  He is seen in the ICU.  He is alert and oriented.  He denies headache at present.    Active Problems:    Subdural hematoma (H)    Assessment: Nonoperative management.  Hold anticoagulation.  Goal normotension.  No immediate surgical intervention anticipated.        I have discussed the following assessment and plan with Dr. Crowder, who is in agreement with the initial plan and will follow up with further consultation recommendations.    Sergio Negron PA-C  [facility]  [place] [address]    Tel [phone]  Pager [phone]        Code Status    Full Code    Reason for Consult   Reason for consult: SDH    Primary Care Physician   Igor Christina    Chief Complaint   Fall    History is obtained from the patient and electronic health record    History of Present Illness   Reji Aguirre is a 80 year old male who presents with  SDH.  Patient tripped while walking up the stairs last night and struck the back of his head.  He is on Xarelto for atrial fib/flutter.  He presented to the ER, where CT scan demonstrated acute subdural hematoma in the left frontoparietal convexity measuring up to 8 mm in thickness, with 4 mm of left-to-right midline shift.  Repeat head  CT this morning was stable, measuring with 3 mm of left-to-right shift.  He is seen in the ICU.  He is alert and oriented.  He denies headache at present.    Past Medical History   I have reviewed this patient's medical history and updated it with pertinent information if needed.   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 @ [facility]     Benign essential hypertension 2/2/2004     Bradycardia      Cardiomyopathy (H)     tachycardia-induced -resolved     Carotid artery disease (H) 02/10/2019    Extensive calcified atherosclerotic plaque in the internal carotid arteries seen on routine head CT     Diverticulosis of colon (without mention of hemorrhage) 11/16/07    incidental diverticuli noted at colonoscopy     Gout, unspecified      Hyperlipidemia      LBBB (left bundle branch block)      Post concussion syndrome 4/22/2019     Primary aldosteronism (H)      Rosacea      Special screening for malignant neoplasms, colon 1995       Past Surgical History   I have reviewed this patient's surgical history and updated it with pertinent information if needed.  Past Surgical History:   Procedure Laterality Date     ARTHROPLASTY KNEE Left 6/6/2016    Procedure: ARTHROPLASTY KNEE;  Surgeon: Derek Varma MD;  Location: [facility] OR     ARTHROSCOPY SHOULDER ROTATOR CUFF REPAIR  7/9/08    Left shoulder arthroscopic rotator cuff repair with acromioplasty     C NONSPECIFIC PROCEDURE  5/99    left knee arthroscopy     CARDIOVERSION  07-     COLONOSCOPY N/A 5/1/2019    Procedure: COLONOSCOPY;  Surgeon: Marcello Velasquez MD;  Location:  GI     HC COLONOSCOPY THRU STOMA W BIOPSY/CAUTERY TUMOR/POLYP/LESION  11/16/07    normal colonoscopy except for incidental diverticuli     ORTHOPEDIC SURGERY  04/11    2nd toe Lt foot     OTHER SURGICAL  HISTORY      resection of bladder tumors 2017 @ [place]       Prior to Admission Medications   Prior to Admission Medications   Prescriptions Last Dose Informant Patient Reported? Taking?   ASPIRIN NOT PRESCRIBED (INTENTIONAL)  Self Yes No   Sig: Please choose reason not prescribed, below   Acetaminophen (TYLENOL PO)  Self Yes No   Sig: Take 500-1,000 mg by mouth every 8 hours as needed for mild pain or fever    XARELTO ANTICOAGULANT 20 MG TABS tablet 9/16/2020 at pm Self No Yes   Sig: TAKE 1 TABLET BY MOUTH ONCE DAILY WITH SUPPER   allopurinol (ZYLOPRIM) 300 MG tablet 9/16/2020 at am Self No Yes   Sig: TAKE 1 TABLET BY MOUTH ONCE DAILY   Patient taking differently: Take 300 mg by mouth daily    amLODIPine (NORVASC) 5 MG tablet 9/16/2020 at Unknown time Self No Yes   Sig: Take 1 tablet (5 mg) by mouth daily   amiodarone (PACERONE) 200 MG tablet 9/16/2020 at Unknown time Self No Yes   Sig: TAKE 1 TABLET BY MOUTH ONCE DAILY MONDAY  THROUGH  FRIDAY,  SKIP  SATURDAY  AND  SUNDAY   Patient taking differently: Take 200 mg by mouth five times a week TAKE 1 TABLET BY MOUTH ONCE DAILY MONDAY  THROUGH  Friday;  SKIP  SATURDAY  AND  SUNDAY   cyclobenzaprine (FLEXERIL) 5 MG tablet 9/16/2020 at Unknown time Self No Yes   Sig: Take 1 tablet (5 mg) by mouth 3 times daily as needed for muscle spasms   diphenhydrAMINE (BENADRYL) 25 MG capsule Past Week at Unknown time Self Yes Yes   Sig: Take 25 mg by mouth nightly as needed for itching or allergies   eplerenone (INSPRA) 25 MG tablet 9/16/2020 at Unknown time Self No Yes   Sig: Take 1 tablet (25 mg) by mouth 2 times daily   hydrochlorothiazide (MICROZIDE) 12.5 MG capsule 9/16/2020 at Unknown time Self No Yes   Sig: TAKE 2 CAPSULES BY MOUTH IN THE MORNING   Patient taking differently: Take 25 mg by mouth every morning    rosuvastatin (CRESTOR) 10 MG tablet 9/16/2020 at pm Self No Yes   Sig: Take 1 tablet (10 mg) by mouth daily      Facility-Administered Medications: None     Allergies    Allergies   Allergen Reactions     Ampicillin      rash     Metoprolol      Mild fatigue with Toprol; mildly decreased exercise capacity     Spironolactone Nausea and Difficulty breathing     Vicodin [Hydrocodone-Acetaminophen] Nausea       Social History   I have reviewed this patient's social history and updated it with pertinent information if needed. Reji Aguirre  reports that he quit smoking about 57 years ago. His smoking use included cigarettes. He has never used smokeless tobacco. He reports current alcohol use. He reports that he does not use drugs.    Family History   I have reviewed this patient's family history and updated it with pertinent information if needed.   Family History   Problem Relation Age of Onset     Cerebrovascular Disease Father         d:89, dementia     Respiratory Mother         d:94     Diabetes Sister         b. 1938, IDDM since age 10     Diabetes Brother         b. 1943, type II DM       Review of Systems   10 point review of systems is negative with the exception of HPI and PMH.       Physical Exam   Temp: 98.4  F (36.9  C) Temp src: Oral BP: 135/71(post hydralazine) Pulse: 78   Resp: 16 SpO2: 96 % O2 Device: None (Room air)    Vital Signs with Ranges  Temp:  [98.1  F (36.7  C)-98.7  F (37.1  C)] 98.4  F (36.9  C)  Pulse:  [] 78  Resp:  [15-21] 16  BP: (130-157)/() 135/71  SpO2:  [95 %-99 %] 96 %  0 lbs 0 oz     , Blood pressure 135/71, pulse 78, temperature 98.4  F (36.9  C), temperature source Oral, resp. rate 16, SpO2 96 %.  0 lbs 0 oz  HEENT:  Normocephalic, atraumatic.  PERRLA.  EOM s intact.   Neck:  Supple, non-tender, without lymphadenopathy.  Heart:  No peripheral edema  Lungs:  No SOB  Abdomen:  Soft, non-tender, non-distended.  Skin:  Warm and dry, good capillary refill.  Extremities:  Good radial and dorsalis pedis pulses bilaterally, no edema, cyanosis or clubbing.    NEUROLOGICAL EXAMINATION:     Mental status:  Alert and Oriented x 3, speech is  fluent.  Cranial nerves:  II-XII intact.   Motor:  Strength is 5/5 throughout the upper and lower extremities  Sensation:  intact  Reflexes:   Negative Babinski.  Negative Clonus.    Coordination:  Smooth finger to nose testing.   Negative pronator drift.       Data   All new lab and imaging data was personally reviewed by me.  CT:FINDINGS: There is a left frontal parietal subdural hematoma measuring  0.8 cm in maximum thickness which is similar to that seen on the prior  exam. This is contributing to some very minimal left-to-right midline  shift of approximately 0.3 mm. There is no evidence for parenchymal or  subarachnoid hemorrhage. Mild cerebral atrophy is present. Brain  parenchyma is otherwise normal. There is no evidence for acute  infarct. Soft tissue swelling is again noted over the vertex.                                                                      IMPRESSION: No significant change in left subdural hematoma or mass  effect since exam earlier on same date.  MRI:  CBC RESULTS:   Recent Labs   Lab Test 09/17/20  0447   WBC 13.7*   RBC 4.81   HGB 15.4   HCT 47.9      MCH 32.0   MCHC 32.2   RDW 14.1        Basic Metabolic Panel:  Lab Results   Component Value Date     09/17/2020      Lab Results   Component Value Date    POTASSIUM 3.9 09/17/2020     Lab Results   Component Value Date    CHLORIDE 103 09/17/2020     Lab Results   Component Value Date    HE 9.1 09/17/2020     Lab Results   Component Value Date    CO2 26 09/17/2020     Lab Results   Component Value Date    BUN 21 09/17/2020     Lab Results   Component Value Date    CR 0.99 09/17/2020     Lab Results   Component Value Date     09/17/2020     INR:  Lab Results   Component Value Date    INR 1.25 09/17/2020    INR 1.94 08/16/2017

## 2020-09-18 ENCOUNTER — APPOINTMENT (OUTPATIENT)
Dept: PHYSICAL THERAPY | Facility: CLINIC | Age: 80
DRG: 085 | End: 2020-09-18
Attending: PHYSICIAN ASSISTANT
Payer: MEDICARE

## 2020-09-18 LAB
ANION GAP SERPL CALCULATED.3IONS-SCNC: 5 MMOL/L (ref 3–14)
BASOPHILS # BLD AUTO: 0 10E9/L (ref 0–0.2)
BASOPHILS NFR BLD AUTO: 0.1 %
BUN SERPL-MCNC: 19 MG/DL (ref 7–30)
CALCIUM SERPL-MCNC: 8.9 MG/DL (ref 8.5–10.1)
CHLORIDE SERPL-SCNC: 100 MMOL/L (ref 94–109)
CO2 SERPL-SCNC: 27 MMOL/L (ref 20–32)
CREAT SERPL-MCNC: 0.81 MG/DL (ref 0.66–1.25)
DIFFERENTIAL METHOD BLD: ABNORMAL
EOSINOPHIL # BLD AUTO: 0 10E9/L (ref 0–0.7)
EOSINOPHIL NFR BLD AUTO: 0.1 %
ERYTHROCYTE [DISTWIDTH] IN BLOOD BY AUTOMATED COUNT: 14.4 % (ref 10–15)
GFR SERPL CREATININE-BSD FRML MDRD: 85 ML/MIN/{1.73_M2}
GLUCOSE SERPL-MCNC: 144 MG/DL (ref 70–99)
HCT VFR BLD AUTO: 43 % (ref 40–53)
HGB BLD-MCNC: 14.4 G/DL (ref 13.3–17.7)
IMM GRANULOCYTES # BLD: 0 10E9/L (ref 0–0.4)
IMM GRANULOCYTES NFR BLD: 0.2 %
LYMPHOCYTES # BLD AUTO: 0.8 10E9/L (ref 0.8–5.3)
LYMPHOCYTES NFR BLD AUTO: 4.5 %
MCH RBC QN AUTO: 32.5 PG (ref 26.5–33)
MCHC RBC AUTO-ENTMCNC: 33.5 G/DL (ref 31.5–36.5)
MCV RBC AUTO: 97 FL (ref 78–100)
MONOCYTES # BLD AUTO: 1.5 10E9/L (ref 0–1.3)
MONOCYTES NFR BLD AUTO: 8.3 %
NEUTROPHILS # BLD AUTO: 15.3 10E9/L (ref 1.6–8.3)
NEUTROPHILS NFR BLD AUTO: 86.8 %
NRBC # BLD AUTO: 0 10*3/UL
NRBC BLD AUTO-RTO: 0 /100
PLATELET # BLD AUTO: 138 10E9/L (ref 150–450)
POTASSIUM SERPL-SCNC: 3.7 MMOL/L (ref 3.4–5.3)
RBC # BLD AUTO: 4.43 10E12/L (ref 4.4–5.9)
SODIUM SERPL-SCNC: 132 MMOL/L (ref 133–144)
WBC # BLD AUTO: 17.6 10E9/L (ref 4–11)

## 2020-09-18 PROCEDURE — 97530 THERAPEUTIC ACTIVITIES: CPT | Mod: GP | Performed by: PHYSICAL THERAPIST

## 2020-09-18 PROCEDURE — 99232 SBSQ HOSP IP/OBS MODERATE 35: CPT | Performed by: HOSPITALIST

## 2020-09-18 PROCEDURE — 97161 PT EVAL LOW COMPLEX 20 MIN: CPT | Mod: GP | Performed by: PHYSICAL THERAPIST

## 2020-09-18 PROCEDURE — 25000132 ZZH RX MED GY IP 250 OP 250 PS 637: Mod: GY | Performed by: PHYSICIAN ASSISTANT

## 2020-09-18 PROCEDURE — 25000128 H RX IP 250 OP 636: Performed by: PHYSICIAN ASSISTANT

## 2020-09-18 PROCEDURE — 25000132 ZZH RX MED GY IP 250 OP 250 PS 637: Mod: GY | Performed by: INTERNAL MEDICINE

## 2020-09-18 PROCEDURE — 97116 GAIT TRAINING THERAPY: CPT | Mod: GP | Performed by: PHYSICAL THERAPIST

## 2020-09-18 PROCEDURE — 80048 BASIC METABOLIC PNL TOTAL CA: CPT | Performed by: PHYSICIAN ASSISTANT

## 2020-09-18 PROCEDURE — 85025 COMPLETE CBC W/AUTO DIFF WBC: CPT | Performed by: PHYSICIAN ASSISTANT

## 2020-09-18 PROCEDURE — L0464 TLSO 4MOD SACRO-SCAP PRE: HCPCS

## 2020-09-18 PROCEDURE — 12000000 ZZH R&B MED SURG/OB

## 2020-09-18 PROCEDURE — 36415 COLL VENOUS BLD VENIPUNCTURE: CPT | Performed by: PHYSICIAN ASSISTANT

## 2020-09-18 RX ORDER — AZELAIC ACID 0.15 G/G
0.5 GEL TOPICAL 2 TIMES DAILY
Status: DISCONTINUED | OUTPATIENT
Start: 2020-09-18 | End: 2020-09-19 | Stop reason: HOSPADM

## 2020-09-18 RX ADMIN — OXYCODONE HYDROCHLORIDE 5 MG: 5 TABLET ORAL at 11:10

## 2020-09-18 RX ADMIN — DOCUSATE SODIUM 50 MG AND SENNOSIDES 8.6 MG 2 TABLET: 8.6; 5 TABLET, FILM COATED ORAL at 23:54

## 2020-09-18 RX ADMIN — ACETAMINOPHEN 650 MG: 325 TABLET, FILM COATED ORAL at 19:42

## 2020-09-18 RX ADMIN — ROSUVASTATIN CALCIUM 10 MG: 10 TABLET, FILM COATED ORAL at 19:42

## 2020-09-18 RX ADMIN — HYDROCHLOROTHIAZIDE 25 MG: 12.5 CAPSULE ORAL at 09:04

## 2020-09-18 RX ADMIN — ACETAMINOPHEN 650 MG: 325 TABLET, FILM COATED ORAL at 09:04

## 2020-09-18 RX ADMIN — ACETAMINOPHEN 650 MG: 325 TABLET, FILM COATED ORAL at 00:31

## 2020-09-18 RX ADMIN — ACETAMINOPHEN 650 MG: 325 TABLET, FILM COATED ORAL at 13:34

## 2020-09-18 RX ADMIN — HYDRALAZINE HYDROCHLORIDE 10 MG: 20 INJECTION INTRAMUSCULAR; INTRAVENOUS at 04:18

## 2020-09-18 RX ADMIN — POLYETHYLENE GLYCOL 3350 17 G: 17 POWDER, FOR SOLUTION ORAL at 09:04

## 2020-09-18 RX ADMIN — DOCUSATE SODIUM 50 MG AND SENNOSIDES 8.6 MG 2 TABLET: 8.6; 5 TABLET, FILM COATED ORAL at 09:04

## 2020-09-18 RX ADMIN — EPLERENONE 25 MG: 25 TABLET, FILM COATED ORAL at 09:04

## 2020-09-18 RX ADMIN — CYCLOBENZAPRINE HYDROCHLORIDE 5 MG: 5 TABLET, FILM COATED ORAL at 04:53

## 2020-09-18 RX ADMIN — ALLOPURINOL 300 MG: 300 TABLET ORAL at 09:04

## 2020-09-18 RX ADMIN — ACETAMINOPHEN 325 MG: 325 TABLET, FILM COATED ORAL at 04:04

## 2020-09-18 RX ADMIN — ACETAMINOPHEN 650 MG: 325 TABLET, FILM COATED ORAL at 23:54

## 2020-09-18 RX ADMIN — AMIODARONE HYDROCHLORIDE 200 MG: 200 TABLET ORAL at 09:08

## 2020-09-18 RX ADMIN — OXYCODONE HYDROCHLORIDE 5 MG: 5 TABLET ORAL at 16:06

## 2020-09-18 RX ADMIN — OXYCODONE HYDROCHLORIDE 2.5 MG: 5 TABLET ORAL at 06:24

## 2020-09-18 RX ADMIN — EPLERENONE 25 MG: 25 TABLET, FILM COATED ORAL at 19:42

## 2020-09-18 RX ADMIN — AMLODIPINE BESYLATE 5 MG: 5 TABLET ORAL at 09:04

## 2020-09-18 ASSESSMENT — ACTIVITIES OF DAILY LIVING (ADL)
ADLS_ACUITY_SCORE: 12
ADLS_ACUITY_SCORE: 12
ADLS_ACUITY_SCORE: 11
ADLS_ACUITY_SCORE: 12

## 2020-09-18 NOTE — PROVIDER NOTIFICATION
"Miguel OVERTON paged, \"741-2 TH: Pt's CT last night was stable. Neuros remain unchanged. Can he be q4hr neuro checks? Thanks [phone] ANU Davies\"    ADDENDUM: TORB for q4hr neuro checks.  "

## 2020-09-18 NOTE — PLAN OF CARE
Discharge Planner PT   Patient plan for discharge: home with spouse  Current status: Orders received. Chart reviewed. Evaluation completed and treatment initiated. Pt is a 80 year old male admitted with SDH and T4-5 closed fx after a fall on stairs. Pt must wear Aspen brace when up. Pt lives with spouse in a multi level home and at baseline was independent and did not use a device but pt reports having knee instability a couple of times a day and that is what caused fall. 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  Barriers to return to prior living situation: stairs, some impulsivity  Recommendations for discharge: home with brace on when OOB, use of either cane or walker for balance with gait due to knee instability, supervision on stairs by spouse and assist as needed for ADLs by spouse  Rationale for recommendations: Pt is below basline and at risk for additional fall if knee instability is chronic so walker or cane recommended for mobility. Pt demonstrated mobility to need assist of one at home.       Entered by: Marcie Houston 09/18/2020 4:42 PM

## 2020-09-18 NOTE — PROGRESS NOTES
[facility]    Hospitalist Progress Note    Date of Admission:  9/17/2020    Assessment & Plan     Reji Aguirre is a 80 year old male admitted on 9/17/2020.     Past medical history significant for atrial fib/flutter on Xarelto, HTN, HLP, LBBB, Carotid artery disease, Gout, Primary hyperaldosteronism, history of CM, history of benign bladder tumor who was directly admitted to [facility] after a mechanical fall that resulted in a subdural hematomas.       Mechanical fall with subdural hematomas:  Left frontal and parietal convexity 8 mm subdural with 4 mm midline shift as well as anterolateral/inferior left frontal thin 3 mm subduralnoted on head CT.  Patient received KCentra due to Xarelto.  Neurosurgery was contacted and recommended transfer to [facility] repeat head CT 6 hours later remained stable.  --Neurosurgery consulted  and frequent neurochecks per neurosurgery recommendations.  --Head of bed at 30 degrees.    --Hold PTA Xarelto.    --Patient will have 6-week follow-up in neurosurgery clinic for SDH.  Continue to hold Xarelto until follow-up.     Neck and back pain  T4-5 fracture acute/subacute seen on thoracic CT and MRI  Patient with complaints of neck and back/spine pain.  He is able to move his neck without difficulty.  Noted decreased strength of the right upper extremity which he stated was due to pain.  He denied pain in the shoulder (which has extensive ecchymosis) but instead on his spine/back.    -Cervical CT without evidence of fracture or malalignment.  Noted multilevel degenerative changes most advanced at C5-C6 with mod bilateral neural foraminal stenosis.    -Thoracic CT with diffuse idiopathic skeletal hyperostosis with extensive fusing syndesmophytes throughout the thoracic spine.  Linear lucency through the anterior right lateral T4-T5 syndesmophyte which could be acute or chronic fracture.  Fracture involves the anterior aspect of the vertebral column, no evidence  of posterior involvement but patient at risk for unstable fractures.    -- Patient was fitted for TLSO brace.  Advised to continue to wear TLSO at all times when out of bed.  --Repeat x-ray of thoracic spine AP and lateral in 6 weeks next week--follow-up in neurosurgery clinic following repeat x-ray spine in 6 weeks  --Neurosurgery has signed off.     Posterior scalp laceration:  Laceration repaired in the ED.    --Removal of staples/sutures in 10-14 days.       Chronic Atrial fib/flutter on Xarelto:  PTA medications include amiodarone 200 mg daily except Sat and Sun as well as Xarelto 20 mg daily.    --Resume PTA amiodarone.    --Hold PTA Xarelto till follow-up in neurosurgery clinic.    --Telemetry.       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    --Resume PTA medications.    --PRN IV medications.       HLP:  --Resume PTA Crestor 10 mg daily.       LBBB:  Known LBBB on previous EKG's.  No interventions.       Carotid artery disease:  Extensive calcified atherosclerotic plaque in the internal carotid arteries seen on routine head CT 2/2019.    --Continue PTA Crestor as above and blood pressure management.       Gout:  --Resume PTA Allopurinol 300 mg daily.       Primary aldosteronism:  --Resume blood pressure medications as above.       History of CM:  Noted EF of 30-40% from 2016; most recent ECHO 3/2019 with improved EF of 55-60%.    --Daily weights.    --I's & O's.    --Resume cardiac medications as above.       History of benign bladder tumor:  S/p resection.  No interventions.          Diet:  Regular diet  DVT Prophylaxis: Pneumatic Compression Devices  [place] Catheter: in place, indication:    Code Status: Full code.            Disposition Plan     Expected discharge:  1 to 2 days, pending therapy recommendations and pain control.  Neurosurgery has signed off.      Mary Valenzuela MD  [employer] Page (7am - 6pm,  M-F)    Interval History   Patient has remained stable overnight.  While laying in bed his pain is at a 1 but with any movement it goes up quite a bit.  Oxycodone is helping.  He denies any nausea, vomiting or headache.  Pain is in between his shoulder blades in his mid upper back.    -Data reviewed today: I reviewed all new labs and imaging results over the last 24 hours. I personally reviewed CT scan with result as noted above and MRI scan with result as noted above    Physical Exam   Temp: 98.7  F (37.1  C) Temp src: Oral BP: 130/68 Pulse: 75   Resp: 16 SpO2: 94 % O2 Device: None (Room air)    Vitals:    09/18/20 0452   Weight: 89.4 kg (197 lb)     Vital Signs with Ranges  Temp:  [97.9  F (36.6  C)-98.7  F (37.1  C)] 98.7  F (37.1  C)  Pulse:  [64-77] 75  Resp:  [16-20] 16  BP: (130-145)/(66-80) 130/68  SpO2:  [93 %-96 %] 94 %  I/O last 3 completed shifts:  In: 240 [P.O.:240]  Out: 1100 [Urine:1100]    Constitutional: Alert, appears comfortable, in no acute distress  Respiratory: Non labored breathing, clear to auscultation bilaterally  Cardiovascular: Heart sounds regular rate and rhythm, no murmurs, no leg edema  GI: Abdomen is soft, non distended, non tender. Normal BS  Skin/Integumen: Repaired laceration noted on posterior scalp appears clean and dry  Neuro: alert, converses appropriately, moving all extremities though painful with movement of upper back  Psych: mood and affect appropriate      Medications       allopurinol  300 mg Oral Daily     amiodarone  200 mg Oral Once per day on Mon Tue Wed Thu Fri     amLODIPine  5 mg Oral Daily     azelaic acid   Topical BID     eplerenone  25 mg Oral BID     hydrochlorothiazide  25 mg Oral Daily     rosuvastatin  10 mg Oral QPM     sodium chloride (PF)  3 mL Intracatheter Q8H       Data   Recent Labs   Lab 09/18/20  1009 09/17/20  0447   WBC 17.6* 13.7*   HGB 14.4 15.4   MCV 97 100   * 167   INR  --  1.25*   * 137   POTASSIUM 3.7 3.9   CHLORIDE 100 103    CO2 27 26   BUN 19 21   CR 0.81 0.99   ANIONGAP 5 8   HE 8.9 9.1   * 125*       Imaging  Recent Results (from the past 24 hour(s))   MR Thoracic Spine w/o Contrast    Narrative    MRI THORACIC SPINE WITHOUT CONTRAST  9/17/2020 6:51 PM     HISTORY: Thoracic ankylosis, fracture suspected.    TECHNIQUE: Multiplanar multisequence MRI of the thoracic spine without  contrast.    COMPARISON: Thoracic spine CT from earlier the same day.    FINDINGS:  Flowing anterior osteophytes throughout the thoracic spine  were better depicted on MR. The questioned abnormality between the  anterior osteophyte at T4-T5 on recent CT demonstrates a subtle STIR  and T2 hyperintense line. There is also apparent mild prevertebral  edema at the levels of T3 and T4. Findings are suggestive of an acute  or subacute fracture through this anterior osteophyte. The fracture  appears to extend into the disc space at T4-T5. No significant edema  within the T4 or T5 vertebral bodies. The anterior longitudinal  ligament may be disrupted at the T4-T5 level. The posterior  longitudinal ligament appears intact.    Thoracic spine alignment is within normal limits. No obvious loss of  vertebral body height. No destructive bony lesions. No significant  degenerative disc changes or disc herniation throughout the thoracic  spine. Normal facets. No spinal canal or neural foraminal narrowing  throughout the thoracic spine.    Partially visualized degenerative changes at L1-L2.    No abnormal T2 hyperintense signal or edema within the visualized  spinal cord.    Large T2 hyperintense cystic lesion visualized in the expected  location of the left kidney.      Impression    IMPRESSION:    1. Subtle STIR and T2 hyperintense fracture line through the anterior  osteophytes at the T4-T5 level likely representing an acute or  subacute fracture. The fracture line appears to also extend into the  intervertebral disc at that level. There is no significant  associated  edema within the T4-T5 vertebral bodies and the fracture is not  thought to involve the vertebral bodies. There is probable disruption  of the anterior longitudinal ligament at that level with mild  associated prevertebral edema at the T3 and T4 levels. The posterior  longitudinal ligament appears intact.  2. Thoracic spine alignment is within normal limits. No disc  herniation. No spinal canal or neural foraminal narrowing at any  level.  3. No evidence of spinal cord edema or injury.      STANLEY BARRERA MD

## 2020-09-18 NOTE — PROGRESS NOTES
09/18/20, [place] [address] [facility] 0741/0741-02, male, Height: Data Unavailable, Weight: 197 lbs 0 oz, Ordered by:  Dx:    Other closed fracture of fourth thoracic vertebra, initial encounter (H)  Subdural hematoma (H)  MRN #: [id number].    S:   Patient was seen today at 0741/0741-02 present for the measurement and delivery of a [place] [id number] OTS TLSO (Ref # [id number]).  Patient appears pleasant.   Health History & Progression:   Patient's history of utilizing a TLSO spinal orthosis: no history of utilizing orthoses for ailment being addressed today.  Patient would like to utilize the brace provided today to achieve the following functional goals: ambulating around the home region, sitting in chairs upright and management of pain being treated.      O:   I met with the patient in his room 963-26 and he is laying down alert.  I customized the fit of an [place] 464 TLSO to his body and the fit is adequate.    A:       Patient s ailment recovery in rehabilitation is expected to be managed well with the utilization of the back brace.  Upon fitting the brace the patient vocalized satisfaction with the fit and feel of the orthosis. The trimlines and widths of the brace presented satisfactory after the waist straps were set to size XL.  The patient seems to understand and is able to mitzi the brace correctly.  Patient signed the delivery ticket and was given the [place] receipt information sheet.    Goal:   The OTS spinal brace will be used to reduce pain by restricting mobility of the trunk and to facilitate healing following the injury to the spine and related soft tissue.     P:  Patient was given the verbal and written instructions on how to don/doff/care for the brace. Patient will utilize the orthosis for the duration of rehabilitation/PT in the hospital and at home activities upon discharge for the duration of treatment of patient ailment or until use of orthosis is no  longer necessary or duration of the treatment or unless otherwise specified by the patient's provider. Will follow-up PRN.    Electronically signed by Miguel CHEN

## 2020-09-18 NOTE — PROGRESS NOTES
[facility]    Neurosurgery Progress Note    Date of Service (when I saw the patient): 09/18/2020     Assessment & Plan   Reji Aguirre is a 80 year old male who was admitted on 9/17/2020. He tripped while walking up the stairs last night and struck the back of his head.  He is on Xarelto for atrial fib/flutter.  He presented to the ER, where CT scan demonstrated acute subdural hematoma in the left frontoparietal convexity measuring up to 8 mm in thickness, with 4 mm of left-to-right midline shift.  Repeat head CT stable, measuring with 3 mm of left-to-right shift. He also endoresed mid back pain and thoracic CT and MRI revealed acute/subacute T4-5 fracture. Posterior elements appear intact. Was fit for TLSO. Today, he is lying in bed and notes mid back pain between shoulder blades. Denies radicular pain, weakness, paresthesias, or headaches. He is neuro intact. Plan for 6 week follow up for both SDH and fracture. Continue to hold Xarelto until follow up. NS will sign off.     Active Problems:    Subdural hematoma (H)    Assessment: stable acute left frontoparietal SDH. Acute/subacute T4-5 fracture. Posterior elements appear intact.    Plan:   In regards to his thoracic fracture:  - continue to wear TLSO at all times when out of bed  - Repeat XR of thoracic spine AP and Lateral in 6 weeks.   - Return to clinic following repeat XR spine in 6 weeks   - Call the [facility] for any questions or concerns during interim, at [phone]  - Seek medical attention immediately if any paresthesias, weakness, gait instability, bowel/bladder incontinence, new pain.  In regards to his subdural:  -Follow up in 6 weeks with head CT prior  -Continue to hold Xarelto until follow up    NS will sign off          I have discussed the following assessment and plan with Dr. Crowder who is in agreement with initial plan and will follow up with further consultation recommendations.      Latanya MELÉNDEZ  [facility] Neurosurgery  [facility]  [address]  [address]  [place] [address]    Tel [phone]  Pager [phone]      Interval History   Stable    Physical Exam   Temp: 98.7  F (37.1  C) Temp src: Oral BP: 130/68 Pulse: 75   Resp: 16 SpO2: 94 % O2 Device: None (Room air)    Vitals:    09/18/20 0452   Weight: 197 lb (89.4 kg)     Vital Signs with Ranges  Temp:  [97.9  F (36.6  C)-98.9  F (37.2  C)] 98.7  F (37.1  C)  Pulse:  [64-86] 75  Resp:  [16-20] 16  BP: (129-157)/(66-80) 130/68  SpO2:  [93 %-98 %] 94 %  I/O last 3 completed shifts:  In: 280 [P.O.:280]  Out: 1150 [Urine:1150]     , Blood pressure 130/68, pulse 75, temperature 98.7  F (37.1  C), temperature source Oral, resp. rate 16, weight 197 lb (89.4 kg), SpO2 94 %.  197 lbs 0 oz  HEENT:  Normocephalic, atraumatic.  PERRLA.  EOM s intact.    Neck:  Supple, non-tender, without lymphadenopathy.  Heart:  No peripheral edema  Lungs:  No SOB  Abdomen:  Soft, non-tender, non-distended.   Skin:  Warm and dry, good capillary refill.  Extremities:  Good radial and dorsalis pedis pulses bilaterally, no edema, cyanosis or clubbing.    NEUROLOGICAL EXAMINATION:   Mental status:  Alert and Oriented x 3, speech is fluent.  Cranial nerves:  II-XII intact.   Motor:     Hip Flexor:                Right: 5/5  Left:  5/5  Hip Adductor:             Right:  5/5  Left:  5/5  Hip Abductor:             Right:  5/5  Left:  5/5  Gastroc Soleus:        Right:  5/5  Left:  5/5  Tib/Ant:                      Right:  5/5  Left:  5/5  EHL:                     Right:  5/5  Left:  5/5  Sensation:  intact  Reflexes:  Negative Babinski.  Negative Clonus.    Coordination:  Smooth finger to nose testing.   Negative pronator drift.      Medications       allopurinol  300 mg Oral Daily     amiodarone  200 mg Oral Once per day on Mon Tue Wed Thu Fri     amLODIPine  5 mg Oral Daily     azelaic acid   Topical BID     eplerenone  25 mg Oral BID      hydrochlorothiazide  25 mg Oral Daily     rosuvastatin  10 mg Oral QPM     sodium chloride (PF)  3 mL Intracatheter Q8H       Data   CBC RESULTS:   Recent Labs   Lab Test 09/17/20  0447   WBC 13.7*   RBC 4.81   HGB 15.4   HCT 47.9      MCH 32.0   MCHC 32.2   RDW 14.1        Basic Metabolic Panel:  Lab Results   Component Value Date     09/18/2020      Lab Results   Component Value Date    POTASSIUM 3.7 09/18/2020     Lab Results   Component Value Date    CHLORIDE 100 09/18/2020     Lab Results   Component Value Date    HE PENDING 09/18/2020     Lab Results   Component Value Date    CO2 PENDING 09/18/2020     Lab Results   Component Value Date    BUN PENDING 09/18/2020     Lab Results   Component Value Date    CR PENDING 09/18/2020     Lab Results   Component Value Date    GLC PENDING 09/18/2020     INR:  Lab Results   Component Value Date    INR 1.25 09/17/2020    INR 1.94 08/16/2017

## 2020-09-18 NOTE — PROVIDER NOTIFICATION
Brief update:    Page regarding uncontrolled pain    Ordered low-dose oxycodone.  Note history of nausea with Vicodin in the past.  Consider small amount of oral intake prior to medication dosing, and if nausea despite this, consider premedication with antiemetics.    Jerardo Bradley MD  11:09 PM

## 2020-09-18 NOTE — PROGRESS NOTES
09/18/20 1600   Quick Adds   Type of Visit Initial PT Evaluation   Living Environment   Lives With spouse   Living Arrangements house   Home Accessibility stairs to enter home;stairs within home   Number of Stairs, Main Entrance 2   Stair Railings, Main Entrance railing on left side (ascending)   Number of Stairs, Within Home, Primary 10   Stair Railings, Within Home, Primary railing on right side (ascending)   Transportation Anticipated car, drives self   Self-Care   Usual Activity Tolerance good   Current Activity Tolerance moderate   Regular Exercise Yes   Activity/Exercise Type walking   Equipment Currently Used at Home none   Activity/Exercise/Self-Care Comment Pt has been independent with mobility, ski , gardens. Pt has had hip pain and knee instability seeing OP PT. Independent with ADLs   Functional Level Prior   Ambulation 0-->independent   Transferring 0-->independent   Toileting 0-->independent   Bathing 0-->independent   Communication 0-->understands/communicates without difficulty   Swallowing 0-->swallows foods/liquids without difficulty   Cognition 0 - no cognition issues reported   Fall history within last six months yes   Number of times patient has fallen within last six months 2   General Information   Onset of Illness/Injury or Date of Surgery - Date 09/17/20   Referring Physician Dr. Kuhn   Patient/Family Goals Statement to go home   Pertinent History of Current Problem (include personal factors and/or comorbidities that impact the POC) Pt is a 80 year old male admitted after falling on stairs and sustaining SDH and T4-5 closed fx   Precautions/Limitations fall precautions   General Info Comments Pt was given TLSO   Cognitive Status Examination   Orientation orientation to person, place and time   Level of Consciousness alert   Follows Commands and Answers Questions 75% of the time   Personal Safety and Judgment intact   Memory intact   Cognitive Comment some impulsivity   Range of  "Motion (ROM)   ROM Comment decreased right shoulder flexion, but other extremities WFL   Strength   Strength Comments generalized weakness but functional except right knee reportedly soheila 2x/day   Bed Mobility   Bed Mobility Comments educated on log roll for neutral spine, Jessica with cues   Transfer Skills   Transfer Comments sit to stand: SBA. with TLSO on   Gait   Gait Comments Pt ambulated with SBA/CGA with  brace on and no device. Pt guarded gait with decreased foot clearace and step length   Balance   Balance Comments SBA   Sensory Examination   Sensory Perception no deficits were identified   Coordination   Coordination no deficits were identified   General Therapy Interventions   Planned Therapy Interventions bed mobility training;gait training;strengthening;transfer training;home program guidelines;progressive activity/exercise   Clinical Impression   Criteria for Skilled Therapeutic Intervention yes, treatment indicated   PT Diagnosis impaired gait   Influenced by the following impairments pain, decreased mobility righ tUE, strength right LE, decreased balance, spinal precautions, mild impulsivity   Functional limitations due to impairments fall risk   Clinical Presentation Stable/Uncomplicated   Clinical Presentation Rationale clinical judgment   Clinical Decision Making (Complexity) Low complexity   Therapy Frequency Daily   Predicted Duration of Therapy Intervention (days/wks) 1-2 days   Anticipated Equipment Needs at Discharge front wheeled walker   Anticipated Discharge Disposition Home with Assist   Risk & Benefits of therapy have been explained Yes   Patient, Family & other staff in agreement with plan of care Yes   [facility] [employer] TM \"6 Clicks\"   2016, Trustees of [facility], under license to [employer].  All rights reserved.   6 Clicks Short Forms Basic Mobility Inpatient Short Form   [facility]  \"6 Clicks\" V.2 Basic Mobility Inpatient Short Form   1. Turning from " your back to your side while in a flat bed without using bedrails? 3 - A Little   2. Moving from lying on your back to sitting on the side of a flat bed without using bedrails? 3 - A Little   3. Moving to and from a bed to a chair (including a wheelchair)? 4 - None   4. Standing up from a chair using your arms (e.g., wheelchair, or bedside chair)? 4 - None   5. To walk in hospital room? 3 - A Little   6. Climbing 3-5 steps with a railing? 3 - A Little   Basic Mobility Raw Score (Score out of 24.Lower scores equate to lower levels of function) 20   Total Evaluation Time   Total Evaluation Time (Minutes) 15

## 2020-09-18 NOTE — PLAN OF CARE
Pt admitted for mechanical fall with SDH with mild mass effect & midline shift. A&Ox4. VSS, except elevated BP- PRN Hydralazine given x1, resolved. SBP parameters <140. 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 Neuros intact ex R hand grasp slightly weaker (moderate) but still strong, intermittent RUE 4/5 to 5/5 d/t Right shoulder pain. Taking PRN tylenol, flexeril, and gave oxycodone x1 and cold application for 6/10 pain. Tele: SR with 1st degree AVB and BBB. Regular diet, thin liquids. Takes meds whole with water. Strict BR. Voiding adequately. No BM in 2+ days- Stool softeners given. Scoring green on the Aggression Stop Light Tool. Laceration with small drainage--incisions intact with staples and SUJEY. Plan for ortho consult today. Neurosurg following. Discharge pending.

## 2020-09-19 ENCOUNTER — APPOINTMENT (OUTPATIENT)
Dept: PHYSICAL THERAPY | Facility: CLINIC | Age: 80
DRG: 085 | End: 2020-09-19
Attending: INTERNAL MEDICINE
Payer: MEDICARE

## 2020-09-19 ENCOUNTER — APPOINTMENT (OUTPATIENT)
Dept: OCCUPATIONAL THERAPY | Facility: CLINIC | Age: 80
DRG: 085 | End: 2020-09-19
Attending: HOSPITALIST
Payer: MEDICARE

## 2020-09-19 VITALS
HEART RATE: 88 BPM | TEMPERATURE: 98.2 F | WEIGHT: 197 LBS | RESPIRATION RATE: 16 BRPM | DIASTOLIC BLOOD PRESSURE: 70 MMHG | SYSTOLIC BLOOD PRESSURE: 129 MMHG | BODY MASS INDEX: 29.95 KG/M2 | OXYGEN SATURATION: 91 %

## 2020-09-19 LAB
SODIUM SERPL-SCNC: 133 MMOL/L (ref 133–144)
WBC # BLD AUTO: 12.3 10E9/L (ref 4–11)

## 2020-09-19 PROCEDURE — 99239 HOSP IP/OBS DSCHRG MGMT >30: CPT | Performed by: HOSPITALIST

## 2020-09-19 PROCEDURE — 85048 AUTOMATED LEUKOCYTE COUNT: CPT | Performed by: HOSPITALIST

## 2020-09-19 PROCEDURE — 36415 COLL VENOUS BLD VENIPUNCTURE: CPT | Performed by: HOSPITALIST

## 2020-09-19 PROCEDURE — 97530 THERAPEUTIC ACTIVITIES: CPT | Mod: GP | Performed by: PHYSICAL THERAPIST

## 2020-09-19 PROCEDURE — 97535 SELF CARE MNGMENT TRAINING: CPT | Mod: GO | Performed by: OCCUPATIONAL THERAPIST

## 2020-09-19 PROCEDURE — 97165 OT EVAL LOW COMPLEX 30 MIN: CPT | Mod: GO | Performed by: OCCUPATIONAL THERAPIST

## 2020-09-19 PROCEDURE — 97116 GAIT TRAINING THERAPY: CPT | Mod: GP | Performed by: PHYSICAL THERAPIST

## 2020-09-19 PROCEDURE — 25000132 ZZH RX MED GY IP 250 OP 250 PS 637: Mod: GY | Performed by: PHYSICIAN ASSISTANT

## 2020-09-19 PROCEDURE — 84295 ASSAY OF SERUM SODIUM: CPT | Performed by: HOSPITALIST

## 2020-09-19 RX ORDER — OXYCODONE HYDROCHLORIDE 5 MG/1
2.5-5 TABLET ORAL EVERY 4 HOURS PRN
Qty: 20 TABLET | Refills: 0 | Status: SHIPPED | OUTPATIENT
Start: 2020-09-19 | End: 2020-12-07

## 2020-09-19 RX ORDER — POLYETHYLENE GLYCOL 3350 17 G/17G
17 POWDER, FOR SOLUTION ORAL DAILY PRN
Qty: 10 PACKET | Refills: 0 | Status: SHIPPED | OUTPATIENT
Start: 2020-09-19 | End: 2023-09-05

## 2020-09-19 RX ADMIN — ACETAMINOPHEN 650 MG: 325 TABLET, FILM COATED ORAL at 08:51

## 2020-09-19 RX ADMIN — ACETAMINOPHEN 650 MG: 325 TABLET, FILM COATED ORAL at 04:09

## 2020-09-19 RX ADMIN — DOCUSATE SODIUM 50 MG AND SENNOSIDES 8.6 MG 2 TABLET: 8.6; 5 TABLET, FILM COATED ORAL at 08:51

## 2020-09-19 RX ADMIN — POLYETHYLENE GLYCOL 3350 17 G: 17 POWDER, FOR SOLUTION ORAL at 08:51

## 2020-09-19 RX ADMIN — AZELAIC ACID 0.5 INCH: 0.15 GEL TOPICAL at 08:56

## 2020-09-19 RX ADMIN — EPLERENONE 25 MG: 25 TABLET, FILM COATED ORAL at 08:51

## 2020-09-19 RX ADMIN — ALLOPURINOL 300 MG: 300 TABLET ORAL at 08:51

## 2020-09-19 RX ADMIN — AMLODIPINE BESYLATE 5 MG: 5 TABLET ORAL at 08:51

## 2020-09-19 RX ADMIN — HYDROCHLOROTHIAZIDE 25 MG: 12.5 CAPSULE ORAL at 08:51

## 2020-09-19 ASSESSMENT — ACTIVITIES OF DAILY LIVING (ADL)
ADLS_ACUITY_SCORE: 12
ADLS_ACUITY_SCORE: 12
ADLS_ACUITY_SCORE: 11
PREVIOUS_RESPONSIBILITIES: MEAL PREP;HOUSEKEEPING;LAUNDRY;SHOPPING;YARDWORK;MEDICATION MANAGEMENT;FINANCES;DRIVING;WORK
ADLS_ACUITY_SCORE: 11

## 2020-09-19 NOTE — PLAN OF CARE
Pt here with SDH and T4-5 fx after fall at home while on xarelto for afib. A&Ox4. Neuros intact. VSS. Regular diet, thin liquids. Takes pills whole. Voiding adequately. C/o constipation, PRNs given. Up with A1 w/ GB, walker, and TLSO. Tylenol for back pain. Pt scoring green on the Aggression Stop Light Tool. Plan to follow-up w/ neurosurgery in 6 weeks, holding xarelto until then. Discharge to home w/ spouse this afternoon.  Cell phone, glasses, and clothing discharged w/ patient.  Home gel returned to patient.

## 2020-09-19 NOTE — PROGRESS NOTES
Pt here with fall, SDH, and T4-5 fx. A&Ox4, forgetful. Neuros intact, RUE limited by pain/bruising. VSS. Tele SR w/ 1st degree AVB and BBB. Regular diet, thin liquids. Takes pills whole. Up with A1 w/ GB and walker and brace. Voiding in urinal. C/o constipation, PRNs given, no BM. Tylenol and oxycodone for pain. Pt scoring green on the Aggression Stop Light Tool. Plan to continue pain management and get OT eval tomorrow. Discharge anticipated to home tomorrow if stable. Nursing will continue to monitor.

## 2020-09-19 NOTE — PROGRESS NOTES
"   09/19/20 1300   Quick Adds   Type of Visit Initial Occupational Therapy Evaluation   Living Environment   Lives With spouse   Living Arrangements house   Home Accessibility stairs to enter home;stairs within home   Number of Stairs, Main Entrance 2   Stair Railings, Main Entrance railing on left side (ascending)   Number of Stairs, Within Home, Primary 10   Stair Railings, Within Home, Primary railing on right side (ascending)   Transportation Anticipated car, drives self   Living Environment Comment Pt has a tub/shower   Self-Care   Usual Activity Tolerance good   Current Activity Tolerance moderate   Regular Exercise Yes   Activity/Exercise Type walking  (beneSol )   Equipment Currently Used at Home none   Activity/Exercise/Self-Care Comment Pt has been independent with mobility, ski , gardens. Pt has had hip pain and knee instability seeing OP PT. Independent with ADLs   Functional Level   Ambulation 0-->independent   Transferring 0-->independent   Toileting 0-->independent   Bathing 0-->independent   Dressing 0-->independent   Eating 0-->independent   Communication 0-->understands/communicates without difficulty   Swallowing 0-->swallows foods/liquids without difficulty   Cognition 0 - no cognition issues reported   Fall history within last six months yes   Number of times patient has fallen within last six months 2   Which of the above functional risks had a recent onset or change? transferring;bathing;toileting;dressing;fall history   Prior Functional Level Comment Pt was IND in ADL and IADLs, had a cog screen as part of his physical recently (per his report \"it was great\"), pt manages meds and drives   General Information   Onset of Illness/Injury or Date of Surgery - Date 09/19/20   Referring Physician Mary Valenzuela MD   Patient/Family Goals Statement to get discharged   Additional Occupational Profile Info/Pertinent History of Current Problem Pt is a 81 yo male admitted with SDH and T4-5 " closed fx after a fall on stairs. Pt must wear Aspen brace when up.   Precautions/Limitations fall precautions;spinal precautions  (aspen TLSO on when up)   General Observations brace on when up   Cognitive Status Examination   Orientation orientation to person, place and time   Level of Consciousness alert   Follows Commands (Cognition) WFL   Cognitive Comment Pt reports no change in cognition, wife also states pt appears at baseline and has no concerns, pt able to answer screening questions appropriately, and when made a mistake on clock drawing (repeated number 10 pt identified and corrected). Pt is fatigued at time of eval as he just worked with PT   Visual Perception   Visual Perception Wears glasses   Visual Perception Comments pt reports no changes in vision, was able to trace, and completed  modified trail   Sensory Examination   Sensory Comments pt reports some intermittent tingling in L hand   Pain Assessment   Patient Currently in Pain Yes, see Vital Sign flowsheet   Range of Motion (ROM)   ROM Comment R shoulder tender not tested, otherwise UEs WFL for ADLs   Strength   Strength Comments R side not formally tested due to pain, LUE WFL    Hand Strength   Hand Strength Comments R hand strength slighly less than L but both WFL for ALD   Coordination   Coordination Comments WFL for ADLs   Mobility   Bed Mobility Comments pt declined OOB    Transfer Skills   Transfer Comments Pt declined OOB   Bathing   Level of Coal minimum assist (75% patients effort)   Upper Body Dressing   Level of Coal: Dress Upper Body stand-by assist   Lower Body Dressing   Level of Coal: Dress Lower Body minimum assist (75% patients effort)   Toileting   Level of Coal: Toilet stand-by assist   Instrumental Activities of Daily Living (IADL)   Previous Responsibilities meal prep;housekeeping;laundry;shopping;yardwork;medication management;finances;driving;work   Activities of Daily Living Analysis  "  Impairments Contributing to Impaired Activities of Daily Living pain;strength decreased;balance impaired  (post injury precautions)   General Therapy Interventions   Planned Therapy Interventions ADL retraining;transfer training;home program guidelines   Clinical Impression   Criteria for Skilled Therapeutic Interventions Met yes, treatment indicated   OT Diagnosis reduced IND in ADLs   Influenced by the following impairments spinal precautions/brace use   Assessment of Occupational Performance 1-3 Performance Deficits   Identified Performance Deficits bathing, dressing, IADLs   Clinical Decision Making (Complexity) Low complexity   Therapy Frequency   (1 time)   Predicted Duration of Therapy Intervention (days/wks) 1 time   Anticipated Equipment Needs at Discharge reacher;raised toilet seat  (tub transfer bench)   Anticipated Discharge Disposition Home with Assist   Risks and Benefits of Treatment have been explained. Yes   Patient, Family & other staff in agreement with plan of care Yes   [facility] TM \"6 Clicks\"   2016, Trustees of [employer], under license to [employer].  All rights reserved.   6 Clicks Short Forms Daily Activity Inpatient Short Form   [facility]  \"6 Clicks\" Daily Activity Inpatient Short Form   1. Putting on and taking off regular lower body clothing? 3 - A Little   2. Bathing (including washing, rinsing, drying)? 3 - A Little   3. Toileting, which includes using toilet, bedpan or urinal? 3 - A Little   4. Putting on and taking off regular upper body clothing? 4 - None   5. Taking care of personal grooming such as brushing teeth? 4 - None   6. Eating meals? 4 - None   Daily Activity Raw Score (Score out of 24.Lower scores equate to lower levels of function) 21   Total Evaluation Time   Total Evaluation Time (Minutes) 10     "

## 2020-09-19 NOTE — PLAN OF CARE
Pt here with L SDH and T4-5 fracture post fall. A&O x4, forgetful. Neuros intact. VSS. Tele SR w/ 1st degree AVB and BBB. Regular diet, thin liquids. Takes pills whole. Up with 1 and TLSO brace. Getting prn tylenol for back/shoulder pain. Plan for possible discharge home today.    Behavior & Aggression Tool color: Green    Pt's belongings: Belongings from admission day present in pt's room              Home medications: Y- prescription gel

## 2020-09-19 NOTE — DISCHARGE SUMMARY
Discharge Summary  Hospitalist    Date of Admission:  9/17/2020  Date of Discharge:  9/19/2020  Discharging Provider: Mary Valenzuela MD  Date of Service (when I saw the patient): 09/19/20    Discharge Diagnoses   Mechanical fall with subdural hematomas causing mild cerebral compression and midline shift  Neck and back pain  T4-5 fracture acute/subacute seen on thoracic CT and MRI    History of Present Illness   Please refer H & P for details.      Hospital Course   Reji Aguirre is a 80 year old male admitted on 9/17/2020.     Past medical history significant for atrial fib/flutter on Xarelto, HTN, HLP, LBBB, Carotid artery disease, Gout, Primary hyperaldosteronism, history of CM, history of benign bladder tumor who was directly admitted to [facility] after a mechanical fall that resulted in a subdural hematomas.       Mechanical fall with subdural hematomas:  Left frontal and parietal convexity 8 mm subdural with 4 mm midline shift as well as anterolateral/inferior left frontal thin 3 mm subduralnoted on head CT.  Patient received KCentra due to Xarelto.  Neurosurgery was contacted and recommended transfer to [facility] repeat head CT 6 hours later remained stable.  --Neurosurgery consulted  and frequent neurochecks per neurosurgery recommendations.  --Head of bed at 30 degrees.    --Hold PTA Xarelto.    --Patient will have 6-week follow-up in neurosurgery clinic for SDH.  Continue to hold Xarelto until follow-up.     Neck and back pain  T4-5 fracture acute/subacute seen on thoracic CT and MRI  Patient with complaints of neck and back/spine pain.  He is able to move his neck without difficulty.  Noted decreased strength of the right upper extremity which he stated was due to pain.  He denied pain in the shoulder (which has extensive ecchymosis) but instead on his spine/back.    -Cervical CT without evidence of fracture or malalignment.  Noted multilevel degenerative changes most advanced at C5-C6 with mod  bilateral neural foraminal stenosis.    -Thoracic CT with diffuse idiopathic skeletal hyperostosis with extensive fusing syndesmophytes throughout the thoracic spine.  Linear lucency through the anterior right lateral T4-T5 syndesmophyte which could be acute or chronic fracture.  Fracture involves the anterior aspect of the vertebral column, no evidence of posterior involvement but patient at risk for unstable fractures.    -- Patient was fitted for TLSO brace.  Advised to continue to wear TLSO at all times when out of bed.  --Repeat x-ray of thoracic spine AP and lateral in 6 weeks next week--follow-up in neurosurgery clinic following repeat x-ray spine in 6 weeks  --Neurosurgery has signed off.     Posterior scalp laceration:  Laceration repaired in the ED.    --Removal of staples/sutures in 10-14 days.       Chronic Atrial fib/flutter on Xarelto:  PTA medications include amiodarone 200 mg daily except Sat and Sun as well as Xarelto 20 mg daily.    --Resume PTA amiodarone.    --Hold PTA Xarelto till follow-up in neurosurgery clinic.    --Telemetry.       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    --Resume PTA medications.    --PRN IV medications.       HLP:  --Resume PTA Crestor 10 mg daily.       LBBB:  Known LBBB on previous EKG's.  No interventions.       Carotid artery disease:  Extensive calcified atherosclerotic plaque in the internal carotid arteries seen on routine head CT 2/2019.    --Continue PTA Crestor as above and blood pressure management.       Gout:  --Resume PTA Allopurinol 300 mg daily.       Primary aldosteronism:  --Resume blood pressure medications as above.       History of CM:  Noted EF of 30-40% from 2016; most recent ECHO 3/2019 with improved EF of 55-60%.    --Resume cardiac medications as above.       History of benign bladder tumor:  S/p resection.  No interventions.               Mary Valenzuela MD, MD      Pending Results   These results will be followed up by Hospitalist team.  Unresulted Labs Ordered in the Past 30 Days of this Admission     No orders found from 8/18/2020 to 9/18/2020.          Code Status   Full Code       Primary Care Physician   Igor Christina    Follow-ups Needed After Discharge   Follow-up Appointments     Follow-up and recommended labs and tests       Please follow up at [facility] in 6 weeks for   thoracic fracture and subdural hematoma with imaging prior.  Please call   the clinic at [phone] to schedule your appointment.         Follow-up and recommended labs and tests       Follow up with primary care provider, Igor Christina, in 10 - 14   days for hospital follow- up.  No follow up labs or test are needed.  You will need to have staples removed in 10 to 14 days.  Could combine   with PCP visit.             Physical Exam   Temp: 98.9  F (37.2  C) Temp src: Oral BP: 132/68 Pulse: 73   Resp: 16 SpO2: 91 % O2 Device: None (Room air)    Vitals:    09/18/20 0452   Weight: 89.4 kg (197 lb)     Vital Signs with Ranges  Temp:  [98.3  F (36.8  C)-99.1  F (37.3  C)] 98.9  F (37.2  C)  Pulse:  [69-93] 73  Resp:  [14-18] 16  BP: (116-138)/(67-80) 132/68  SpO2:  [91 %-95 %] 91 %  I/O last 3 completed shifts:  In: 340 [P.O.:340]  Out: 150 [Urine:150]    Constitutional: Alert, appears comfortable, in no acute distress  Respiratory: Non labored breathing, clear to auscultation bilaterally  Cardiovascular: Heart sounds regular rate and rhythm, no murmurs, no leg edema  GI: Abdomen is soft, non distended, non tender. Normal BS  Skin/Integumen: Repaired laceration noted on posterior scalp appears clean and dry  Neuro: alert, converses appropriately, moving all extremities though painful with movement of upper back  Psych: mood and affect appropriate          Discharge Disposition   Discharged to home  Condition at discharge:  Stable    Consultations This Hospital Stay   PHYSICAL THERAPY ADULT IP CONSULT  NEUROSURGERY IP CONSULT  ORTHOSIS BRACE IP CONSULT  OCCUPATIONAL THERAPY ADULT IP CONSULT    Time Spent on this Encounter   IMary MD, personally saw the patient today and spent greater than 30 minutes discharging this patient.    Discharge Orders      CT Head w/o contrast*     XR Thoracic Spine 2 Views     Follow-up and recommended labs and tests     Please follow up at [facility] in 6 weeks for thoracic fracture and subdural hematoma with imaging prior.  Please call the clinic at [phone] to schedule your appointment.     Discharge Instructions    - continue to wear TLSO at all times when out of bed  - Repeat XR of thoracic spine AP and Lateral in 6 weeks.   - Return to clinic following repeat XR spine in 6 weeks   - Call the Spine [facility] clinic for any questions or concerns during interim, at [phone]  - Seek medical attention immediately if any paresthesias, weakness, gait instability, bowel/bladder incontinence, new pain.     Reason for your hospital stay    You were hospitalized with subdural hematomas [bleeding in your skull] and spine fracture secondary to fall.  You are instructed to wear TLSO brace when out of bed.  Follow-up with neurosurgery in 6 weeks.  No Xarelto till seen by neurosurgery.     Activity    Your activity upon discharge: activity as tolerated   brace on when out of bed, use of either cane or walker for balance with gait due to knee instability, supervision on stairs by spouse and assist as needed for activities of daily living by spouse     When to contact your care team    Call your primary doctor if you have any of the following: Worsening headache, lethargy, confusion, blurred vision, weakness or numbness anywhere, nausea, vomiting, back pain     Follow-up and recommended labs and tests     Follow up with primary care provider, Igor Christina, in 10 -  14 days for hospital follow- up.  No follow up labs or test are needed.  You will need to have staples removed in 10 to 14 days.  Could combine with PCP visit.     Full Code     Walker    DME Documentation:   Describe the reason for need to support medical necessity: impaired gait.     I, the undersigned, certify that the above prescribed supplies are medically necessary for this patient and is both reasonable and necessary in reference to accepted standards of medical and necessary in reference to accepted standards of medical practice in the treatment of this patient's condition and is not prescribed as a convenience.     Diet    Follow this diet upon discharge: Orders Placed This Encounter      Regular Diet Adult     Discharge Medications   Current Discharge Medication List      START taking these medications    Details   oxyCODONE (ROXICODONE) 5 MG tablet Take 0.5-1 tablets (2.5-5 mg) by mouth every 4 hours as needed for moderate to severe pain  Qty: 20 tablet, Refills: 0    Associated Diagnoses: Other closed fracture of fourth thoracic vertebra, initial encounter (H)      polyethylene glycol (MIRALAX) 17 g packet Take 17 g by mouth daily as needed for constipation  Qty: 10 packet, Refills: 0    Associated Diagnoses: Other closed fracture of fourth thoracic vertebra, initial encounter (H)         CONTINUE these medications which have NOT CHANGED    Details   allopurinol (ZYLOPRIM) 300 MG tablet TAKE 1 TABLET BY MOUTH ONCE DAILY  Qty: 90 tablet, Refills: 3    Associated Diagnoses: Gout, unspecified cause, unspecified chronicity, unspecified site      amiodarone (PACERONE) 200 MG tablet TAKE 1 TABLET BY MOUTH ONCE DAILY MONDAY  THROUGH  FRIDAY,  SKIP  SATURDAY  AND  SUNDAY  Qty: 90 tablet, Refills: 0    Associated Diagnoses: Atrial fibrillation, unspecified type (H)      amLODIPine (NORVASC) 5 MG tablet Take 1 tablet (5 mg) by mouth daily  Qty: 90 tablet, Refills: 3    Associated Diagnoses: Benign essential  hypertension      azelaic acid (FINACIA) 15 % external gel Apply 0.5 inches topically 2 times daily      cyclobenzaprine (FLEXERIL) 5 MG tablet Take 1 tablet (5 mg) by mouth 3 times daily as needed for muscle spasms  Qty: 30 tablet, Refills: 0    Associated Diagnoses: Acute right-sided low back pain without sciatica      diphenhydrAMINE (BENADRYL) 25 MG capsule Take 25 mg by mouth nightly as needed for itching or allergies      eplerenone (INSPRA) 25 MG tablet Take 1 tablet (25 mg) by mouth 2 times daily  Qty: 180 tablet, Refills: 1    Associated Diagnoses: Benign essential hypertension      hydrochlorothiazide (MICROZIDE) 12.5 MG capsule TAKE 2 CAPSULES BY MOUTH IN THE MORNING  Qty: 180 capsule, Refills: 2    Associated Diagnoses: Persistent atrial fibrillation (H)      rosuvastatin (CRESTOR) 10 MG tablet Take 1 tablet (10 mg) by mouth daily  Qty: 90 tablet, Refills: 1    Associated Diagnoses: Bilateral carotid artery disease, unspecified type (H)      Acetaminophen (TYLENOL PO) Take 500-1,000 mg by mouth every 8 hours as needed for mild pain or fever       ASPIRIN NOT PRESCRIBED (INTENTIONAL) Please choose reason not prescribed, below  Qty: 0 each    Associated Diagnoses: Benign essential hypertension         STOP taking these medications       XARELTO ANTICOAGULANT 20 MG TABS tablet Comments:   Reason for Stopping:             Allergies   Allergies   Allergen Reactions     Ampicillin      rash     Metoprolol      Mild fatigue with Toprol; mildly decreased exercise capacity     Spironolactone Nausea and Difficulty breathing     Vicodin [Hydrocodone-Acetaminophen] Nausea     Data   Most Recent 3 CBC's:  Recent Labs   Lab Test 09/19/20 0738 09/18/20  1009 09/17/20  0447 06/23/20  1237   WBC 12.3* 17.6* 13.7* 7.7   HGB  --  14.4 15.4 16.5   MCV  --  97 100 96   PLT  --  138* 167 171      Most Recent 3 BMP's:  Recent Labs   Lab Test 09/19/20 0738 09/18/20  1009 09/17/20  0447 06/23/20  1237    132* 137 138    POTASSIUM  --  3.7 3.9 3.7   CHLORIDE  --  100 103 103   CO2  --  27 26 25   BUN  --  19 21 20   CR  --  0.81 0.99 1.07   ANIONGAP  --  5 8 10   HE  --  8.9 9.1 9.0   GLC  --  144* 125* 95     Most Recent 2 LFT's:  Recent Labs   Lab Test 06/23/20  1237 10/30/19  0824   AST 45 43   ALT 63 67   ALKPHOS 75 74   BILITOTAL 0.8 0.5     Most Recent INR's and Anticoagulation Dosing History:  Anticoagulation Dose History     Recent Dosing and Labs Latest Ref Rng & Units 8/16/2017 9/17/2020    INR 0.86 - 1.14 1.94(H) 1.25(H)        Most Recent 3 Troponin's:  Recent Labs   Lab Test 06/08/16  1825   TROPI 0.016     Most Recent Cholesterol Panel:  Recent Labs   Lab Test 06/23/20  1237   CHOL 151   LDL 46   HDL 91   TRIG 70     Most Recent 6 Bacteria Isolates From Any Culture (See EPIC Reports for Culture Details):  Recent Labs   Lab Test 08/16/17  1128   CULT <10,000 colonies/mL  Enterobacter cloacae complex  *  <10,000 colonies/mL  Strain 2  Enterobacter cloacae complex  *     Most Recent TSH, T4 and A1c Labs:  Recent Labs   Lab Test 06/23/20  1237   TSH 0.67       Results for orders placed or performed during the hospital encounter of 09/17/20   CT Cervical Spine w/o Contrast    Narrative    CT CERVICAL SPINE WITHOUT CONTRAST   9/17/2020 10:45 AM     HISTORY: Fall, with neck and back pain.     TECHNIQUE: Axial images of the cervical spine were obtained without  intravenous contrast. Multiplanar reformations were performed.  Radiation dose for this scan was reduced using automated exposure  control, adjustment of the mA and/or kV according to patient size, or  iterative reconstruction technique.     COMPARISON: None.    FINDINGS: There is no evidence of fracture.    Alignment: Normal.      Craniocervical junction: Normal.     C1-C2: Degenerative changes. No evidence for any stenosis.    C2-C3: Mild right-sided facet hypertrophy and minimal disc bulging. No  stenosis.     C3-C4: Left-sided uncinate spurring and mild  right-sided and  left-sided facet hypertrophy is present. Prominent anterior  osteophyte. There is no significant stenosis.     C4-C5: Minimal posterior osteophyte formation. No stenosis.     C5-C6: Posterior osteophyte formation and uncinate spurring is present  causing some moderate bilateral neural foraminal stenosis but no  central canal stenosis.     C6-C7: Normal disc, facet joints, spinal canal and neural foramina.     C7-T1: Normal disc, facet joints, spinal canal and neural foramina.      Impression    IMPRESSION:   1. No evidence for fracture or any posterior malalignment.  2. Multilevel degenerative changes most advanced at C5-C6 where there  is moderate bilateral neural foraminal stenosis.    BARBARA CALLAHAN MD   CT Thoracic Spine w/o Contrast    Narrative    CT THORACIC SPINE W/O CONTRAST 9/17/2020 10:51 AM     HISTORY: Fall with neck and back pain    TECHNIQUE: Axial images were obtained through the thoracic spine  without contrast. Coronal and sagittal reconstructions were also  acquired.    FINDINGS: Bridging syndesmophytes are present throughout the thoracic  spine from T1 to L1. There is interruption been anterior  syndesmophytes at T4-T5 which could be due to an acute or subacute to  chronic fracture line. This is nondisplaced. There is no posterior  malalignment or any evidence for any fracture through the posterior  elements or the T4-T5 vertebral bodies. Vertebral body heights are  maintained. No other bony lucencies are present. There is some facet  hypertrophy at multiple levels and some minimal degenerative changes  at several levels but there is no significant central canal stenosis.  There is a large presumed left renal cyst. Some vascular  calcifications also noted.      Impression    IMPRESSION:  1. FINDINGS consistent with diffuse idiopathic skeletal hyperostosis  with extensive fusing syndesmophytes throughout the thoracic spine.  2. Linear lucency through the anterior right lateral  T4-T5  syndesmophyte which could represent acute or chronic fracture. This  fracture involves the anterior aspect of the vertebral column. There  is no definite minimal posterior involvement at this time but the  patient is at risk for unstable fractures given the diffuse idiopathic  skeletal hyperostosis.    BARBARA CALLAHAN MD   CT Head w/o Contrast    Narrative    CT SCAN OF THE HEAD WITHOUT CONTRAST   9/17/2020 10:44 AM     HISTORY: Subdural hematoma, follow-up.    TECHNIQUE:  Axial images of the head and coronal reformations without  IV contrast material.  Radiation dose for this scan was reduced using  automated exposure control, adjustment of the mA and/or kV according  to patient size, or iterative reconstruction technique.    COMPARISON: 0417 on same date of 9/17/2020.    FINDINGS: There is a left frontal parietal subdural hematoma measuring  0.8 cm in maximum thickness which is similar to that seen on the prior  exam. This is contributing to some very minimal left-to-right midline  shift of approximately 0.3 mm. There is no evidence for parenchymal or  subarachnoid hemorrhage. Mild cerebral atrophy is present. Brain  parenchyma is otherwise normal. There is no evidence for acute  infarct. Soft tissue swelling is again noted over the vertex.      Impression    IMPRESSION: No significant change in left subdural hematoma or mass  effect since exam earlier on same date.    BARBARA CALLAHAN MD   MR Thoracic Spine w/o Contrast    Narrative    MRI THORACIC SPINE WITHOUT CONTRAST  9/17/2020 6:51 PM     HISTORY: Thoracic ankylosis, fracture suspected.    TECHNIQUE: Multiplanar multisequence MRI of the thoracic spine without  contrast.    COMPARISON: Thoracic spine CT from earlier the same day.    FINDINGS:  Flowing anterior osteophytes throughout the thoracic spine  were better depicted on MR. The questioned abnormality between the  anterior osteophyte at T4-T5 on recent CT demonstrates a subtle STIR  and T2 hyperintense  line. There is also apparent mild prevertebral  edema at the levels of T3 and T4. Findings are suggestive of an acute  or subacute fracture through this anterior osteophyte. The fracture  appears to extend into the disc space at T4-T5. No significant edema  within the T4 or T5 vertebral bodies. The anterior longitudinal  ligament may be disrupted at the T4-T5 level. The posterior  longitudinal ligament appears intact.    Thoracic spine alignment is within normal limits. No obvious loss of  vertebral body height. No destructive bony lesions. No significant  degenerative disc changes or disc herniation throughout the thoracic  spine. Normal facets. No spinal canal or neural foraminal narrowing  throughout the thoracic spine.    Partially visualized degenerative changes at L1-L2.    No abnormal T2 hyperintense signal or edema within the visualized  spinal cord.    Large T2 hyperintense cystic lesion visualized in the expected  location of the left kidney.      Impression    IMPRESSION:    1. Subtle STIR and T2 hyperintense fracture line through the anterior  osteophytes at the T4-T5 level likely representing an acute or  subacute fracture. The fracture line appears to also extend into the  intervertebral disc at that level. There is no significant associated  edema within the T4-T5 vertebral bodies and the fracture is not  thought to involve the vertebral bodies. There is probable disruption  of the anterior longitudinal ligament at that level with mild  associated prevertebral edema at the T3 and T4 levels. The posterior  longitudinal ligament appears intact.  2. Thoracic spine alignment is within normal limits. No disc  herniation. No spinal canal or neural foraminal narrowing at any  level.  3. No evidence of spinal cord edema or injury.      STANLEY BARRERA MD

## 2020-09-19 NOTE — PLAN OF CARE
Discharge Planner PT   Patient plan for discharge: Home with assist of wife as needed. She is retired.   Current status: Wife present and reports she can assist patient as needed. Patient was able to educate her on how she needs to help carry the walker up and down the steps and wife is familiar with how to assist with doning and doffing brace. Patient able to transfers sup to/from sit with good body mechanics after repeated education. Sit to stand with supervision after cues given. Able to amb with ww without LOB. Issued ww for home.   Barriers to return to prior living situation: none  Recommendations for discharge: home with wife to assist with ADL's as needed, with carrying walker up and down steps and with donning brace if needed.   Rationale for recommendations: Patient is able to demonstrate adequate independence to return home with assist of wife.        Entered by: Ashley Richardson 09/19/2020 1:30 PM    Physical Therapy Discharge Summary    Reason for therapy discharge:    Discharged to home.    Progress towards therapy goal(s). See goals on Care Plan in [facility] electronic health record for goal details.  Goals partially met.  Barriers to achieving goals:   discharge from facility.    Therapy recommendation(s):    No further therapy is recommended.

## 2020-09-19 NOTE — PLAN OF CARE
Discharge Planner OT   Patient plan for discharge: home with assist from wife  Current status: Orders received. Chart reviewed. Evaluation completed and treatment initiated. Pt is a 80 year old male admitted with SDH and T4-5 closed fx after a fall on stairs. Pt must wear Aspen brace when up.     Pt and wife participated in ed for AE options, including tub transfer bench and reacher. Pt declined OOB activity at this time as he just finished working with PT, OT observed their session. Pt completed cognitive screening questions, and pt and wife participated in ed for OT recommendations on wife supervision of meds initially, as well as assist for dressing, and bathing. Pt and wife have no further concerns or questions.   Barriers to return to prior living situation: stairs, tub/shower  Recommendations for discharge: home with assist from wife  Rationale for recommendations: Pt appears at or close to baseline for cognition, pt has support for ADL and IADLs as needed.        Entered by: Divya Miles 09/19/2020 1:26 PM

## 2020-09-21 ENCOUNTER — TELEPHONE (OUTPATIENT)
Dept: INTERNAL MEDICINE | Facility: CLINIC | Age: 80
End: 2020-09-21

## 2020-09-22 NOTE — TELEPHONE ENCOUNTER
ED / Discharge Outreach Protocol    Patient Contact    Attempt # 2    Was call answered?  No.  Left message on voicemail with information to call me back.    Eveline HUTCHINSONN, RN, PHN

## 2020-09-30 ENCOUNTER — OFFICE VISIT (OUTPATIENT)
Dept: INTERNAL MEDICINE | Facility: CLINIC | Age: 80
End: 2020-09-30
Payer: MEDICARE

## 2020-09-30 VITALS
DIASTOLIC BLOOD PRESSURE: 84 MMHG | WEIGHT: 198.8 LBS | HEIGHT: 68 IN | BODY MASS INDEX: 30.13 KG/M2 | TEMPERATURE: 99 F | OXYGEN SATURATION: 96 % | HEART RATE: 80 BPM | SYSTOLIC BLOOD PRESSURE: 138 MMHG

## 2020-09-30 DIAGNOSIS — I10 BENIGN ESSENTIAL HYPERTENSION: ICD-10-CM

## 2020-09-30 DIAGNOSIS — S22.048A OTHER CLOSED FRACTURE OF FOURTH THORACIC VERTEBRA, INITIAL ENCOUNTER (H): ICD-10-CM

## 2020-09-30 DIAGNOSIS — S06.5XAA SUBDURAL HEMATOMA (H): Primary | ICD-10-CM

## 2020-09-30 DIAGNOSIS — I48.91 ATRIAL FIBRILLATION, UNSPECIFIED TYPE (H): ICD-10-CM

## 2020-09-30 DIAGNOSIS — S01.01XD LACERATION OF SCALP, SUBSEQUENT ENCOUNTER: ICD-10-CM

## 2020-09-30 PROCEDURE — 99495 TRANSJ CARE MGMT MOD F2F 14D: CPT | Performed by: INTERNAL MEDICINE

## 2020-09-30 ASSESSMENT — MIFFLIN-ST. JEOR: SCORE: 1591.25

## 2020-09-30 NOTE — PATIENT INSTRUCTIONS
"*  Follow up as planned with the Neurosurgery clinic in a month    *  Make appointment to follow up with the Cardiology Clinic a week after the Neurosurgery clinic ( you are due to see them anyway, your last visit there was 10/28/19)    *  Stay off Xarelto until cleared by Neurosurgery to go back onto it, wait until seeing Cardiology Clinic before resuming Xarelto.     *  I removed 5 of the staples from the scalp easily, but there is a large flap of skin/scab that is still healing and not quit attached.      *  I do not want to remove the final few staples today as the scab and skin wound is still loose and may cause some local bleeding.     *  Schedule a \"nurse only\" appointment in our clinic in one week for removal of the other staples.      *        POST HEAD INJURY/POST CONCUSSION SYNDROME:       *  Tylenol as needed.     *  Consider transition to warm compresses rather than cold.  If you find cold helps you more, then continue to use it, but usually about now is when heat usually becomes more helpful.    *  Don't be surprised if your memory takes a little while to return or if you don't recover memory from the incident.  It is common to have loss of memory around the time of the head injury.     *  You may find subtle changes in personality, which is fairly normal.  Let us know if you have any major changes in mood or difficulties in mental functioning    *  Slow return to activity, do not sign up for too many activities until you feel back to normal.     *  Contact us immediately if you develop very severe headache or significant vomiting, or significant changes in vision.  These can all be signs of worsening head injury.          Post-concussion syndrome is a complex disorder in which various symptoms -- mostly commonly headache, dizziness, and sleep problems,  last for weeks and sometimes months after an intial injury to the brain.  Concussion is a mild traumatic brain injury that usually happens after a " blow to the head. It can also occur with jerky movement of the head or body. You don't have to lose consciousness to get a concussion or post-concussion syndrome. In fact, the risk of post-concussion syndrome doesn't appear to be associated with the severity of the initial injury.  In most people, symptoms occur within the first seven to 10 days and go away within three months. Sometimes, they can persist for a year or more.  The goal of treatment after concussion is to effectively manage your symptoms.  ~ You WILL get better~     GENERAL ADVICE  \  ~~ Avoid activities that trigger your symptoms.  Stop your activity  as soon as you feel the symptoms coming on  ~~ Do not try to push through symptoms or they may get worse or take longer to go away.  ~~ Allow yourself more time to complete activities   ~~ Drink plenty of water throughout the day (8-10 glasses per day)  ~~ Write things down.  At home, at work, whenever there is something that you should remember, even it is simple.     SLEEP  ~~ Sleep is essential to your recovery   ~~ If your headaches are keeping you awake or causing you to wake  frequently, take ibuprofen or tylenol immediately before bedtime~  ~~ Attempt to get 6+ hours of uninterrupted sleep a night.  ~~ Rest (eyes closed, dark room)  as frequently as needed during the day until symptoms improve     SCREEN  ~ Look into justgetflux.com to adjust your screen resolutions~  ~ Increase font~  ~ limit screen activities including computer, TV, e-readers and phones.  ~ Avoid reading if it increases your symptoms.  Audio- books are a great option often available at your public library.     HEADACHE  ~ Take your tylenol (1000 mg) three times a day for now, do not wait until you have a headache~  ~ Take your ibuprofen (600 mg) three times a day now, do not wait until you have a headache (take with food)~  Light sensitivities:   ~~ Avoid florescent lighting when possible  ~~ Always wear sunglasses outside and  even wear them indoors if it is helpful.  ~~ No night driving  Sound sensitivities:  ~ Avoid crowded areas  ~ Avoid multiple conversations at the same time around you  ~ Avoid loud music or loud sounds     NECK PAIN  ~ Applying Ice or heat  ffor up to 20 minutes at a time can help relieve pain  ~ Massage is ok if it doesn't trigger more symptoms~  ~ Gentle stretches are good     DIZZINESS  ~ No driving  ~ Keep a chair at the side of the bed to help get steady prior to getting up and going to the bathroom  ~ No climbing heights or using ladders or stepstools.     FATIGUE  ~ OK to engage in light, aerobic activity 2-3+ times a week.  However, if feeling fatigued stop and rest  ~ Practice good sleep hygiene, as above     ANXIETY, DEPRESSION, OR MOOD SWINGS:  ~~Transient changes in mood, including increased anxiety and depression, are normal following a brain injury. However, if symptoms persist consider seeing a psychologist or psychiatrist.  Counseling and or/ medications may be helpful in treating ongoing anxiety or depression. We would be happy to make a referral for you.       IF YOUR SYMPTOMS ARE SEVERE OR ARE LINGERING LONGER THAN EXPECTED, THEN WE CAN SEND YOU TO OUR CONCUSSION  SERVICE.  THEY WILL CALL YOU TO SET UP A FOLLOW UP APPOINTMENT AND APPOINTMENTS WITH:  PHYSICIAL THERAPY, OCCUPATIONAL THERAPY, BEHAVIORAL OPTOMETRY

## 2020-09-30 NOTE — PROGRESS NOTES
Subjective     Reji Aguirre is a 80 year old male who presents to clinic today for the following health issues:    [facility]         Hospital Follow-up Visit:    Hospital/Nursing Home/IP Rehab Facility: [facility]  Date of Admission: 9/17/2020  Date of Discharge: 9/19/2020  Reason(s) for Admission: other closed fracture of fourth thoracic vertebra, subdural hematoma       Was your hospitalization related to COVID-19? No   Problems taking medications regularly:  None  Medication changes since discharge: discontinued xarelto   Problems adhering to non-medication therapy:  None    Summary of hospitalization:  [facility] discharge summary reviewed  Diagnostic Tests/Treatments reviewed.  Follow up needed: none  Other Healthcare Providers Involved in Patient s Care:         None  Update since discharge: improved. Post Discharge Medication Reconciliation: discharge medications reconciled, continue medications without change.  Plan of care communicated with patient       Since home from hospital, they report that they are feeling better.   Energy level and stamina are slowly improving.    Appetite and intake are improving.   No fevers, no chills  No shortness of breath.   No abdominal pain.   They have not returned to prior routine and activities.    No nausea, no vomiting.   No diplopia.   No significant headache.    No depression, no mood changes.   No problems concentrating.      Sleeping well.     Walked 25 minutes yesterday without incident, but felt more tired today.     Reports no drainage or bleeding from the scalp wound.  Needs staples removed.         2.  Afib:  On amiodarone.  Denies palpitations, denies dizziness.   xarelto on hold until at least neurosurgery follow up.     ** reviewed the information recorded in the patient's EPIC chart (including but not limited to medical history, surgical history, problem list, medication list, and allergy list) and updated information as needed.      "    Review of Systems   Constitutional, HEENT, cardiovascular, pulmonary, gi and gu systems are negative, except as otherwise noted.      Objective    /84   Pulse 80   Temp 99  F (37.2  C) (Temporal)   Ht 1.727 m (5' 8\")   Wt 90.2 kg (198 lb 12.8 oz)   SpO2 96%   BMI 30.23 kg/m    Body mass index is 30.23 kg/m .  Physical Exam     GENERAL alert and no distress  EYES:  Normal sclera,conjunctiva, EOMI  HENT: oral and posterior pharynx without lesions or erythema, facies symmetric  Scalp wound healing not bleeding or oozing, no surroudnign erythema.   Large skin flap and scab present.   5 staples removed without incident.   I chose to leave the remaining 4 staples in place due to the loosening of the flap with removal of the first 5.   NECK: Neck supple. No LAD, without thyroidmegaly.  RESP: Clear to ausculation bilaterally without wheezes or crackles. Normal BS in all fields.  CV: RRR normal S1S2 without murmurs, rubs or gallops.  LYMPH: no cervical lymph adenopathy appreciated  Wearing TLSO brace  EXT:  no lower extremity edema  PSYCH: Alert and oriented times 3; speech- coherent  SKIN:  No obvious significant skin lesions on visible portions of face                (S06.5X9A) Subdural hematoma (H)  (primary encounter diagnosis)  Comment: Symptoms improving and resolving.  Remains off of Xarelto until further notice.  Has follow-up in neurosurgery clinic in approximately 4 weeks with repeat CT scan.  The patient denies any significant neurological symptoms at this time.  Reviewed typical symptoms associated with head injuries such as problems concentrating and focus, headaches, mood changes etc.  Plan:     (S22.048A) Other closed fracture of fourth thoracic vertebra, initial encounter (H)  Comment: Pain control is excellent with just Tylenol.  He continues to wear the TLSO brace for least the next few weeks.  Plan:     (I10) Benign essential hypertension  Comment: This condition is currently controlled on " the current medical regimen.  Continue current therapy.    Plan:     (I48.91) Atrial fibrillation, unspecified type (H)  Comment: Remain off Xarelto until cleared by neurosurgery and resume After discussion with cardiology at his next follow-up appointment there in early November.  Plan:     (S01.01XD) Laceration of scalp, subsequent encounter  Comment: Healing, but large skin flap became looser after the first half of the staples were removed.  I chose to leave the remaining 4 staples in place to allow for better approximation.  He will return for a wound check and staple removal in 1 week time.  Plan:

## 2020-10-07 ENCOUNTER — OFFICE VISIT (OUTPATIENT)
Dept: INTERNAL MEDICINE | Facility: CLINIC | Age: 80
End: 2020-10-07
Payer: MEDICARE

## 2020-10-07 VITALS
BODY MASS INDEX: 29.72 KG/M2 | WEIGHT: 196.1 LBS | RESPIRATION RATE: 16 BRPM | DIASTOLIC BLOOD PRESSURE: 80 MMHG | HEART RATE: 77 BPM | OXYGEN SATURATION: 94 % | HEIGHT: 68 IN | SYSTOLIC BLOOD PRESSURE: 136 MMHG | TEMPERATURE: 98.5 F

## 2020-10-07 DIAGNOSIS — S06.5XAA SUBDURAL HEMATOMA (H): ICD-10-CM

## 2020-10-07 DIAGNOSIS — S01.01XD LACERATION OF SCALP, SUBSEQUENT ENCOUNTER: Primary | ICD-10-CM

## 2020-10-07 DIAGNOSIS — S22.041D CLOSED STABLE BURST FRACTURE OF FOURTH THORACIC VERTEBRA WITH ROUTINE HEALING, SUBSEQUENT ENCOUNTER: ICD-10-CM

## 2020-10-07 PROCEDURE — 99213 OFFICE O/P EST LOW 20 MIN: CPT | Performed by: INTERNAL MEDICINE

## 2020-10-07 ASSESSMENT — MIFFLIN-ST. JEOR: SCORE: 1579

## 2020-10-07 NOTE — PATIENT INSTRUCTIONS
"*  All staples removed.     *  Normal cleaning of the scalp, use regular shampoo.      *  Use comb or brush lightly as to not irritate the laceration.     *  The remaining scab will slowly disintegrate and fall off over the nast few weeks.     *  Follow up with Neurosurgery clinic as planned 10/28 (after the head CT scan)    *  Follow up with the Cardiology Clinic in early November.      *  Do NOT resume Xarelto until told it is safe to do so by Neurosurgery and Cardiology.      *  Continue all other medications at the same doses.  Contact your usual pharmacy if you need refills.     *  Return to see me in 6 months, sooner if needed.    Please get fasting labs done at the [facility] or any other [facility] lab 1-2 days before this appointment (schedule a \"lab appointment\").  If you get the labs done at another clinic, make arrangements with them directly.  The orders will be in place.  Eat nothing for at least 8 hours prior to having these labs drawn.  Use infibond or Call [phone] to schedule the appointment with me and lab appointment.      "

## 2020-10-07 NOTE — PROGRESS NOTES
"Subjective     Reji Aguirre is a 80 year old male who presents to clinic today for the following health issues:    HPI         Chief Complaint   Patient presents with     Suture Removal     patient here to have the remainder of staples removed from the back of his head        I reviewed the information recorded in the patient's EPIC chart (including but not limited to medical history, surgical history, problem list, medication list, and allergy list) and updated information as needed.         Review of Systems   Constitutional, HEENT, cardiovascular, pulmonary, gi and gu systems are negative, except as otherwise noted.      Objective    /80   Pulse 77   Temp 98.5  F (36.9  C) (Temporal)   Resp 16   Ht 1.727 m (5' 8\")   Wt 89 kg (196 lb 1.6 oz)   SpO2 94%   BMI 29.82 kg/m    Body mass index is 29.82 kg/m .  Physical Exam   GENERAL alert and no distress  EYES:  Normal sclera,conjunctiva, EOMI  HENT: oral and posterior pharynx without lesions or erythema, facies symmetric  Scalp.  Large stellate type avulsion type scalp laceration  Skin wound margins are not well approximated, but more intact than last week. .   Removed the final 5 staples esaily.   No bleeding, no drainage.   NECK: Neck supple. No LAD, without thyroidmegaly.  RESP: Clear to ausculation bilaterally without wheezes or crackles. Normal BS in all fields.  CV: RRR normal S1S2 without murmurs, rubs or gallops.  LYMPH: no cervical lymph adenopathy appreciated  MS: extremities- no gross deformities of the visible extremities noted,   EXT:  no lower extremity edema  PSYCH: Alert and oriented times 3; speech- coherent  SKIN:  No obvious significant skin lesions on visible portions of face               (S01.01XD) Laceration of scalp, subsequent encounter  (primary encounter diagnosis)  Comment: re=moved the final 5 staples.   Margins were loose and not held together well, no bleeding, no drainage.   Given the avulsion type appeance of th ecscalp " lacertation, there is good chnace that this will nto heal normally.   Plan:     (S06.5X9A) Subdural hematoma (H)  Comment: resovled,   Plan:     (S22.041D) Closed stable burst fracture of fourth thoracic vertebra with routine healing, subsequent encounter  Comment: conitnue to wear TLSO brace, follow up with neurosurgery   Plan:

## 2020-10-14 ENCOUNTER — TELEPHONE (OUTPATIENT)
Dept: CARDIOLOGY | Facility: CLINIC | Age: 80
End: 2020-10-14

## 2020-10-14 NOTE — TELEPHONE ENCOUNTER
Patient called in requesting input about the status of his Xarelto hold.    He was in [facility] from 9/17-19 for  subdural hematomas sustained from a mechanical fal. At discharge it was neurosurgeries recommendation to hold Xarelto until his CT head which is scheduled for 10/28.    He sees Dr. Perera and Dr. Aj in EP both of whom last saw patient in 2019. Patient is due for an EP JENNIFER in [place] after last seeing Dr. Aj in October 2019.    Patient does not have an return visit scheduled for Neurosurgery at this point and so I gave him the office number for Dr. Crowder. I also gave him our scheduling number to get an EP JENNIFER visit in [place].    Patient called back later this afternoon and told me he has a Neurosurgery visit on 10/28 right after the CT. I told patient to tell neurosurgeon that if they need cardiology input on Xaretlo to have THEM call Dr. Aj as he is managing his afib- as it should be a provider to provider conversation.    Patient verbalized understanding and agreement with this plan.

## 2020-10-19 DIAGNOSIS — I48.19 PERSISTENT ATRIAL FIBRILLATION (H): ICD-10-CM

## 2020-10-20 RX ORDER — HYDROCHLOROTHIAZIDE 12.5 MG/1
CAPSULE ORAL
Qty: 180 CAPSULE | Refills: 3 | Status: SHIPPED | OUTPATIENT
Start: 2020-10-20 | End: 2021-06-25 | Stop reason: DRUGHIGH

## 2020-10-26 PROBLEM — M54.50 ACUTE RIGHT-SIDED LOW BACK PAIN WITHOUT SCIATICA: Status: RESOLVED | Noted: 2020-08-10 | Resolved: 2020-10-26

## 2020-10-26 NOTE — PROGRESS NOTES
Discharge Note    Progress reporting period is from 8-10-20 to Sep 15, 2020.  Patient seen for 5 visits.    SUBJECTIVE  At last visit patient reported his wife needed  to put his socks on, difficulty but independent with rest of LE dressing.  Pain right upper buttock 0-9/10, increase with bending forward, after driving in a car, sitting without lumbar roll.  Sleep not disturbed. No problem going up stairs reciprocally, difficulty going down (has been going backwards). Walking improves symptoms after 15'.  He had difficulty walking/standing after sitting in car.  Doing SHAWN hands on wall 10x/day, 10 reps, holding 5 seconds (consistently relieves pain temporarily) and slouch/overcorrect several times/day - painful.  No progress overall in the last week.   After this visit patient suffered a fall at home and was hospitalized with spinal fracture, head laceration and subdural hematoma and so did not return.  Current status is unknown.  Pain level at last visit: 1/10(right LB/upper buttock ).     Previous pain level was  8/10.   Changes in function:  Yes (See Goal flowsheet attached for changes in current functional level)  Adverse reaction to treatment or activity: None    OBJECTIVE  Antalgic gait - no trunk rotation, small step length, slow dheeraj, slight trunk-flexed posture.    Lumbar flexion AROM 33% with pain, extension 25% with pain.       ASSESSMENT/PLAN  Diagnosis: R LBP   Assessment of Progress: current status is unknown.    Last current status: Pt has not made progress       Recommendations:  Discharge physical therapy chart due to subsequent fall and hospitalization after last visit.      Please refer to the daily flowsheet for treatment today, total treatment time and time spent performing 1:1 timed codes.

## 2020-10-28 ENCOUNTER — HOSPITAL ENCOUNTER (OUTPATIENT)
Dept: GENERAL RADIOLOGY | Facility: CLINIC | Age: 80
End: 2020-10-28
Attending: PHYSICIAN ASSISTANT
Payer: MEDICARE

## 2020-10-28 ENCOUNTER — HOSPITAL ENCOUNTER (OUTPATIENT)
Dept: CT IMAGING | Facility: CLINIC | Age: 80
End: 2020-10-28
Attending: PHYSICIAN ASSISTANT
Payer: MEDICARE

## 2020-10-28 ENCOUNTER — TELEPHONE (OUTPATIENT)
Dept: NEUROSURGERY | Facility: CLINIC | Age: 80
End: 2020-10-28

## 2020-10-28 ENCOUNTER — OFFICE VISIT (OUTPATIENT)
Dept: NEUROSURGERY | Facility: CLINIC | Age: 80
End: 2020-10-28
Attending: PHYSICIAN ASSISTANT
Payer: MEDICARE

## 2020-10-28 VITALS
HEIGHT: 68 IN | BODY MASS INDEX: 29.61 KG/M2 | OXYGEN SATURATION: 95 % | WEIGHT: 195.4 LBS | HEART RATE: 84 BPM | TEMPERATURE: 98.4 F | SYSTOLIC BLOOD PRESSURE: 139 MMHG | DIASTOLIC BLOOD PRESSURE: 83 MMHG

## 2020-10-28 DIAGNOSIS — S22.042G: ICD-10-CM

## 2020-10-28 DIAGNOSIS — S06.5XAA SUBDURAL HEMATOMA (H): ICD-10-CM

## 2020-10-28 DIAGNOSIS — S22.048A OTHER CLOSED FRACTURE OF FOURTH THORACIC VERTEBRA, INITIAL ENCOUNTER (H): ICD-10-CM

## 2020-10-28 DIAGNOSIS — S06.5XAA SUBDURAL HEMATOMA (H): Primary | ICD-10-CM

## 2020-10-28 PROCEDURE — 70450 CT HEAD/BRAIN W/O DYE: CPT

## 2020-10-28 PROCEDURE — G0463 HOSPITAL OUTPT CLINIC VISIT: HCPCS

## 2020-10-28 PROCEDURE — 72070 X-RAY EXAM THORAC SPINE 2VWS: CPT

## 2020-10-28 PROCEDURE — 99214 OFFICE O/P EST MOD 30 MIN: CPT | Performed by: PHYSICIAN ASSISTANT

## 2020-10-28 ASSESSMENT — MIFFLIN-ST. JEOR: SCORE: 1567.89

## 2020-10-28 ASSESSMENT — PAIN SCALES - GENERAL: PAINLEVEL: NO PAIN (0)

## 2020-10-28 NOTE — NURSING NOTE
"Reji Aguirre is a 80 year old male who presents for:  Chief Complaint   Patient presents with     Neurologic Problem     ER F/U Mechanical fall with subdural Hematomas causing mild cerebral compression and midline shift neck and back pain. T4-T5 fracture.         Initial Vitals:  /83 (BP Location: Right arm, Patient Position: Sitting, Cuff Size: Adult Large)   Pulse 84   Temp 98.4  F (36.9  C) (Oral)   Ht 5' 7.5\" (1.715 m)   Wt 195 lb 6.4 oz (88.6 kg)   SpO2 95%   BMI 30.15 kg/m   Estimated body mass index is 30.15 kg/m  as calculated from the following:    Height as of this encounter: 5' 7.5\" (1.715 m).    Weight as of this encounter: 195 lb 6.4 oz (88.6 kg).. Body surface area is 2.05 meters squared. BP completed using cuff size: large  No Pain (0)    Nursing Comments: wants to discuss the head injury, recovery time. Sleeping pill questions    Abelardo Bird CMA    "

## 2020-10-28 NOTE — PROGRESS NOTES
Neurosurgery follow-up    Mr. Aguirre is a 80-year-old male who was seen 6 weeks ago for evaluation of subdural hematoma and T4 anterior syndesmophyte fracture.  He had fallen and presented to the hospital with mid back pain and headaches.  He today states he is feeling much better denies any back pain denies any headaches denies any weakness he states he is walking half an hour every other day he wears his TLSO brace as directed.  He had a CAT scan today and is here for further review of that as well as a recent x-ray of his thoracic spine.    Exam    B/P: 139/83, T: 98.4, P: 84, R: Data Unavailable     Alert and oriented no acute distress  Pronator drift negative bilateral upper extremities  Finger-nose slow and accurate bilaterally  Thoracic spine nontender to palpation    Imaging    Head CT scan demonstrates interval decrease in the left-sided subdural hematoma from approximately 8 mm to 4 mm.  Also there has been resolution of midline shift.    His thoracic x-ray does not show any acute fractures and alignment is unchanged.    Assessment    T4-5 anterior osteophyte fracture  Left-sided subdural hematoma      Plan    Imaging was reviewed with Dr. Melgar.  Patient may resume his Xarelto at the discretion of his medical team.  No further imaging is needed.      He may resume PT for his low back. He should wear the thoracic brace and avoid excessive bending of the thoracic spine.     We would like him to return to clinic in 6 weeks with a repeat thoracic x-ray AP and lateral.  He should continue wearing his thoracic brace as directed.    Total time of 30 minutes was spent with the patient today in face-to-face consultation and coordination regarding the above assessment and plan.

## 2020-10-28 NOTE — LETTER
10/28/2020         RE: Reji Aguirre  [address] [id number]        Dear Colleague,    Thank you for referring your patient, Reji Aguirre, to the [facility] [place]. Please see a copy of my visit note below.    Neurosurgery follow-up    Mr. Aguirre is a 80-year-old male who was seen 6 weeks ago for evaluation of subdural hematoma and T4 anterior syndesmophyte fracture.  He had fallen and presented to the hospital with mid back pain and headaches.  He today states he is feeling much better denies any back pain denies any headaches denies any weakness he states he is walking half an hour every other day he wears his TLSO brace as directed.  He had a CAT scan today and is here for further review of that as well as a recent x-ray of his thoracic spine.    Exam    B/P: 139/83, T: 98.4, P: 84, R: Data Unavailable     Alert and oriented no acute distress  Pronator drift negative bilateral upper extremities  Finger-nose slow and accurate bilaterally  Thoracic spine nontender to palpation    Imaging    Head CT scan demonstrates interval decrease in the left-sided subdural hematoma from approximately 8 mm to 4 mm.  Also there has been resolution of midline shift.    His thoracic x-ray does not show any acute fractures and alignment is unchanged.    Assessment    T4-5 anterior osteophyte fracture  Left-sided subdural hematoma      Plan    Imaging was reviewed with Dr. Melgar.  Patient may resume his Xarelto at the discretion of his medical team.  No further imaging is needed.      He may resume PT for his low back. He should wear the thoracic brace and avoid excessive bending of the thoracic spine.     We would like him to return to clinic in 6 weeks with a repeat thoracic x-ray AP and lateral.  He should continue wearing his thoracic brace as directed.    Total time of 30 minutes was spent with the patient today in face-to-face consultation and coordination regarding the  above assessment and plan.        Again, thank you for allowing me to participate in the care of your patient.        Sincerely,        Rufina Garcia PA-C

## 2020-10-28 NOTE — TELEPHONE ENCOUNTER
Patient would like to start PT but needs clearance from Rufina. Please advise if the patient can start PT for his lumbar. Please advise

## 2020-11-02 ENCOUNTER — NURSE TRIAGE (OUTPATIENT)
Dept: NURSING | Facility: CLINIC | Age: 80
End: 2020-11-02

## 2020-11-02 DIAGNOSIS — S22.042G: Primary | ICD-10-CM

## 2020-11-02 DIAGNOSIS — I48.91 ATRIAL FIBRILLATION, UNSPECIFIED TYPE (H): ICD-10-CM

## 2020-11-02 NOTE — TELEPHONE ENCOUNTER
Elijah calls in to find out if Dr. Christina will let him go back on Xarelto.  His neurology doctor said it would be fine if it was authorized by his provider.  Please let Elijah know at his cell phone [phone]. Just desires a yes or no, he states.    Additional Information    Pharmacy calling with prescription questions and triager unable to answer question    Protocols used: MEDICATION QUESTION CALL-A-[place]

## 2020-11-02 NOTE — TELEPHONE ENCOUNTER
OK to resume Xarelto as per Neurosurgery.   Resume his remaining pills, one per day.    I sent refill prescription electronically to [facility] pharmacy that he can call for when he needs refills.    Continue all other medications at the same doses.  Contact your usual pharmacy if you need refills.     Close encounter when done.

## 2020-11-03 ENCOUNTER — NURSE TRIAGE (OUTPATIENT)
Dept: NURSING | Facility: CLINIC | Age: 80
End: 2020-11-03

## 2020-11-03 DIAGNOSIS — M54.50 ACUTE LEFT-SIDED LOW BACK PAIN WITHOUT SCIATICA: Primary | ICD-10-CM

## 2020-11-03 NOTE — TELEPHONE ENCOUNTER
Called to request for an order for physical therapy on 12/3 at [facility]   This triage nurse will route this to the provider pool per caller's request.  Essence Lerma RN    Reason for Disposition    [1] Caller requesting NON-URGENT health information AND [2] PCP's office is the best resource     Physical therapy order for 12/3    Additional Information    Negative: [1] Caller is not with the adult (patient) AND [2] reporting urgent symptoms    Negative: Lab result questions    Negative: Medication questions    Negative: Caller can't be reached by phone    Negative: Caller has already spoken to PCP or another triager    Negative: RN needs further essential information from caller in order to complete triage    Negative: Requesting regular office appointment    Protocols used: INFORMATION ONLY CALL-A-AH

## 2020-11-16 DIAGNOSIS — I10 BENIGN ESSENTIAL HYPERTENSION: ICD-10-CM

## 2020-11-16 RX ORDER — AMLODIPINE BESYLATE 5 MG/1
5 TABLET ORAL DAILY
Qty: 90 TABLET | Refills: 0 | Status: SHIPPED | OUTPATIENT
Start: 2020-11-16 | End: 2020-12-07

## 2020-11-18 ENCOUNTER — TRANSFERRED RECORDS (OUTPATIENT)
Dept: HEALTH INFORMATION MANAGEMENT | Facility: CLINIC | Age: 80
End: 2020-11-18

## 2020-12-02 ENCOUNTER — HOSPITAL ENCOUNTER (OUTPATIENT)
Dept: GENERAL RADIOLOGY | Facility: CLINIC | Age: 80
End: 2020-12-02
Attending: PHYSICIAN ASSISTANT
Payer: MEDICARE

## 2020-12-02 ENCOUNTER — VIRTUAL VISIT (OUTPATIENT)
Dept: NEUROSURGERY | Facility: CLINIC | Age: 80
End: 2020-12-02
Attending: PHYSICIAN ASSISTANT
Payer: MEDICARE

## 2020-12-02 VITALS — WEIGHT: 192 LBS | BODY MASS INDEX: 29.1 KG/M2 | HEIGHT: 68 IN

## 2020-12-02 DIAGNOSIS — M54.6 ACUTE MIDLINE THORACIC BACK PAIN: Primary | ICD-10-CM

## 2020-12-02 DIAGNOSIS — S22.042G: ICD-10-CM

## 2020-12-02 PROCEDURE — 72074 X-RAY EXAM THORAC SPINE4/>VW: CPT

## 2020-12-02 PROCEDURE — 99441 PR PHYSICIAN TELEPHONE EVALUATION 5-10 MIN: CPT | Performed by: PHYSICIAN ASSISTANT

## 2020-12-02 PROCEDURE — 999N001193 HC VIDEO/TELEPHONE VISIT; NO CHARGE

## 2020-12-02 ASSESSMENT — PAIN SCALES - GENERAL: PAINLEVEL: NO PAIN (0)

## 2020-12-02 ASSESSMENT — MIFFLIN-ST. JEOR: SCORE: 1552.47

## 2020-12-02 NOTE — NURSING NOTE
"Reji Aguirre is a 80 year old male who is being evaluated via a billable telephone visit.      The patient has been notified of following:     \"This telephone visit will be conducted via a call between you and your physician/provider. We have found that certain health care needs can be provided without the need for a physical exam.  This service lets us provide the care you need with a short phone conversation.  If a prescription is necessary we can send it directly to your pharmacy.  If lab work is needed we can place an order for that and you can then stop by our lab to have the test done at a later time.    Telephone visits are billed at different rates depending on your insurance coverage. During this emergency period, for some insurers they may be billed the same as an in-person visit.  Please reach out to your insurance provider with any questions.    If during the course of the call the physician/provider feels a telephone visit is not appropriate, you will not be charged for this service.\"    Patient has given verbal consent for Telephone visit?  Yes    What phone number would you like to be contacted at? [phone].     How would you like to obtain your AVS? Mail a copy Con Booth MA on 12/2/2020 at 8:25 AM      "

## 2020-12-02 NOTE — LETTER
"    12/2/2020         RE: Reji Aguirre  [address] [address]        Dear Colleague,    Thank you for referring your patient, Reji Aguirre, to the [facility] [place]. Please see a copy of my visit note below.    Reji Aguirre is a 80 year old male who is being evaluated via a billable telephone visit.      Due to COVID-19 this visit has been performed over the phone verses face to face.     The patient has been notified of following:     \"This telephone visit will be conducted via a call between you and your physician/provider. We have found that certain health care needs can be provided without the need for a physical exam.  This service lets us provide the care you need with a short phone conversation.  If a prescription is necessary we can send it directly to your pharmacy.  If lab work is needed we can place an order for that and you can then stop by our lab to have the test done at a later time.    If during the course of the call the physician/provider feels a telephone visit is not appropriate, you will not be charged for this service.\"     Reji Aguirre complains of    Chief Complaint   Patient presents with     Follow Up     Thoracic xray 6 wk f/u, xray prior XR 12/2/20     phone visit     PHONE: [phone].        I have reviewed and updated the patient's Past Medical History, Social History, Family History and Medication List.    ALLERGIES  Ampicillin, Metoprolol, Spironolactone, and Vicodin [hydrocodone-acetaminophen]    Additional provider notes:    Reji Aguirre is a 80 year old male who is 3 months status post fall which resulted in T4 anterior syndesmophyte fracture.  He states today that he has no further pain and is doing very well.  Is been wearing his thoracic brace as directed.  His images today are stable with no abnormal alignment.  On his thoracic spine.  Previously his head CT scan demonstrated interval decrease in the subdural " hematoma no further imaging was recommended.  Patient denies any lower extremity symptoms.  He is planning to resume his winter job as a  .    Assessment    T4 anterior syndesmophyte fracture    Plan:    No further further follow-up or imaging is required.  This patient may resume his normal activities without restrictions.  He should wean out of his brace over the next 1 to 2 weeks gradually.  He will follow-up with us as needed.    Phone call duration: 5 minutes  Start Time of 910  End Time 912    Rufina Garcia PA-C    Patient provided verbal consent for the phone visit today.       Again, thank you for allowing me to participate in the care of your patient.        Sincerely,        Rufina Garcia PA-C

## 2020-12-02 NOTE — PROGRESS NOTES
"Reji Aguirre is a 80 year old male who is being evaluated via a billable telephone visit.      Due to COVID-19 this visit has been performed over the phone verses face to face.     The patient has been notified of following:     \"This telephone visit will be conducted via a call between you and your physician/provider. We have found that certain health care needs can be provided without the need for a physical exam.  This service lets us provide the care you need with a short phone conversation.  If a prescription is necessary we can send it directly to your pharmacy.  If lab work is needed we can place an order for that and you can then stop by our lab to have the test done at a later time.    If during the course of the call the physician/provider feels a telephone visit is not appropriate, you will not be charged for this service.\"     Reji Aguirre complains of    Chief Complaint   Patient presents with     Follow Up     Thoracic xray 6 wk f/u, xray prior XR 12/2/20     phone visit     PHONE: [phone].        I have reviewed and updated the patient's Past Medical History, Social History, Family History and Medication List.    ALLERGIES  Ampicillin, Metoprolol, Spironolactone, and Vicodin [hydrocodone-acetaminophen]    Additional provider notes:    Reji Aguirre is a 80 year old male who is 3 months status post fall which resulted in T4 anterior syndesmophyte fracture.  He states today that he has no further pain and is doing very well.  Is been wearing his thoracic brace as directed.  His images today are stable with no abnormal alignment.  On his thoracic spine.  Previously his head CT scan demonstrated interval decrease in the subdural hematoma no further imaging was recommended.  Patient denies any lower extremity symptoms.  He is planning to resume his winter job as a  .    Assessment    T4 anterior syndesmophyte fracture    Plan:    No further further follow-up or imaging is " required.  This patient may resume his normal activities without restrictions.  He should wean out of his brace over the next 1 to 2 weeks gradually.  He will follow-up with us as needed.    Phone call duration: 5 minutes  Start Time of 910  End Time 913    Rufina Garcia PA-C    Patient provided verbal consent for the phone visit today.

## 2020-12-03 ENCOUNTER — THERAPY VISIT (OUTPATIENT)
Dept: PHYSICAL THERAPY | Facility: CLINIC | Age: 80
End: 2020-12-03
Payer: MEDICARE

## 2020-12-03 DIAGNOSIS — M54.50 RIGHT-SIDED LOW BACK PAIN WITHOUT SCIATICA: Primary | ICD-10-CM

## 2020-12-03 PROCEDURE — 97112 NEUROMUSCULAR REEDUCATION: CPT | Mod: GP | Performed by: PHYSICAL THERAPIST

## 2020-12-03 PROCEDURE — 97110 THERAPEUTIC EXERCISES: CPT | Mod: GP | Performed by: PHYSICAL THERAPIST

## 2020-12-03 NOTE — LETTER
DEPARTMENT OF HEALTH AND HUMAN SERVICES  CENTERS FOR MEDICARE & MEDICAID SERVICES    PLAN/UPDATED PLAN OF PROGRESS FOR OUTPATIENT REHABILITATION       PATIENTS NAME:  Reji Aguirre   : 1940  PROVIDER NUMBER:    [id number]  HICN:  [id number]   PROVIDER NAME: [facility] PH[facility]  MEDICAL RECORD NUMBER: [id number]   START OF CARE DATE:  SOC Date: 08/10/20   TYPE:  PT    PRIMARY/TREATMENT DIAGNOSIS: (Pertinent Medical Diagnosis)  Right-sided low back pain without sciatica    VISITS FROM START OF CARE:  Rxs Used: 6     PROGRESS  REPORT  Progress reporting period is from 20 to 9-15-20 5 visits, 6th visit 12-3-20.       SUBJECTIVE  Patient returns after 3 months.  After last visit had fall 20 in which he suffered a scalp laceration, subdural hematoma and T4 fracture and was hospitalized.  Patient has been wearing TLSO hard brace until recent recheck with MD who told him to wean from over 1-2 weeks.  Told patient no restrictions, OK to return to work as .  Did not give lifting restrictions.  New MD order 11-3-20 for L LBP.  Patient has plans to return to work 1-2 days/week.  Overall doing well.  LBP is not intermittent 0-1/10 right LB.  Has been able to get sox/shoes on with minimal pain.  Has been walking 30' 3x/week on level surfaces, once up to 60' with fatigue, no increase pain.  Standing tolerance >20'.  Sleep not disturbed.  Would like to know how to progress with treatment started for LB prior to fall.    Current Pain level: 10.     Initial Pain level: 8/10.   Changes in function:  Yes (See Goal flowsheet attached for changes in current functional level)    OBJECTIVE  Arrived without brace.    Ambulating without AD, minimal trunk rotation.    Good sitting posture without cuing.    Lumbar extension 33%, flexion WNL without sxs.       ASSESSMENT/PLAN  Updated problem list and treatment plan: Diagnosis 1:  R LB/hip pain  Pain -  self  "management, education and home program  Decreased ROM/flexibility - therapeutic exercise and home program  Decreased function - therapeutic activities and home program  STG/LTGs have been met or progress has been made towards goals:  Yes (See Goal flow sheet completed today.)  Assessment of Progress: The patient's condition has potential to improve.  Self Management Plans:  Patient has been instructed in a home treatment program.  I have re-evaluated this patient and find that the nature, scope, duration and intensity of the therapy is appropriate for the medical condition of the patient.  Reji continues to require the following intervention to meet STG and LTG's:  PT    Recommendations:  Patient returns after 3-month absence due to significant injury of fall with subdural hematoma and T4 fracture.  He is doing well in regards to LBP with minimal pain as he has been in a TLSO brace and would like to progress with strength and HEP to manage his LBP.  Plan to continue PT 2 visits/month once daily for 1 month to progress HEP as able.     Caregiver Signature/Credentials _____________________________ Date ________       Treating Provider: Zhanna Mancera PT    I have reviewed and certified the need for these services and plan of treatment while under my care.        PHYSICIAN'S SIGNATURE:   ___________________________  Date___________                           Igor Christina MD    Certification period:  Beginning of Cert date period: 10/20/20 to  End of Cert period date: 01/18/21     Functional Level Progress Report: Please see attached \"Goal Flow sheet for Functional level.\"    ____X____ Continue Services or       ________ DC Services                Service dates: From  SOC Date: 08/10/20 date to present                         "

## 2020-12-03 NOTE — LETTER
DEPARTMENT OF HEALTH AND HUMAN SERVICES  CENTERS FOR MEDICARE & MEDICAID SERVICES    PLAN/UPDATED PLAN OF PROGRESS FOR OUTPATIENT REHABILITATION       PATIENTS NAME:  Reji Aguirre   : 1940  PROVIDER NUMBER:    [id number]  HICN:  [id number]   PROVIDER NAME: [facility] PHYSICAL THERAPY  MEDICAL RECORD NUMBER: [id number]   START OF CARE DATE:  SOC Date: 08/10/20   TYPE:  PT  PRIMARY/TREATMENT DIAGNOSIS: (Pertinent Medical Diagnosis)  Right-sided low back pain without sciatica    VISITS FROM START OF CARE:  Rxs Used: 6     PROGRESS  REPORT  Progress reporting period is from 20 to 9-15-20 5 visits, 6th visit 12-3-20.       SUBJECTIVE  Patient returns after 3 months.  After last visit had fall 20 in which he suffered a scalp laceration, subdural hematoma and T4 fracture and was hospitalized.  Patient has been wearing TLSO hard brace until recent recheck with MD who told him to wean from over 1-2 weeks.  Told patient no restrictions, OK to return to work as .  Did not give lifting restrictions.  New MD order 11-3-20 for L LBP.  Patient has plans to return to work 1-2 days/week.  Overall doing well.  LBP is not intermittent 0-1/10 right LB.  Has been able to get sox/shoes on with minimal pain.  Has been walking 30' 3x/week on level surfaces, once up to 60' with fatigue, no increase pain.  Standing tolerance >20'.  Sleep not disturbed.  Would like to know how to progress with treatment started for LB prior to fall.    Current Pain level: 10.     Initial Pain level: 8/10.   Changes in function:  Yes (See Goal flowsheet attached for changes in current functional level)    OBJECTIVE  Arrived without brace.    Ambulating without AD, minimal trunk rotation.    Good sitting posture without cuing.    Lumbar extension 33%, flexion WNL without sxs.       ASSESSMENT/PLAN  Updated problem list and treatment plan: Diagnosis 1:  R LB/hip pain  Pain -  self  "management, education and home program  Decreased ROM/flexibility - therapeutic exercise and home program  Decreased function - therapeutic activities and home program  STG/LTGs have been met or progress has been made towards goals:  Yes (See Goal flow sheet completed today.)  Assessment of Progress: The patient's condition has potential to improve.  Self Management Plans:  Patient has been instructed in a home treatment program.    I have re-evaluated this patient and find that the nature, scope, duration and intensity of the therapy is appropriate for the medical condition of the patient.  Reji continues to require the following intervention to meet STG and LTG's:  PT    Recommendations:  Patient returns after 3-month absence due to significant injury of fall with subdural hematoma and T4 fracture.  He is doing well in regards to LBP with minimal pain as he has been in a TLSO brace and would like to progress with strength and HEP to manage his LBP.  Plan to continue PT 2 visits/month once daily for 3 visits to progress HEP as able.     Caregiver Signature/Credentials _____________________________ Date ________       Treating Provider: Zhanna Mancera PT    I have reviewed and certified the need for these services and plan of treatment while under my care.        PHYSICIAN'S SIGNATURE:   __________________________  Date___________                           Igor Christina MD    Certification period:  Beginning of Cert date period: 10/20/20 to  End of Cert period date: 01/18/21     Functional Level Progress Report: Please see attached \"Goal Flow sheet for Functional level.\"    ____X____ Continue Services or       ________ DC Services                Service dates: From  SOC Date: 08/10/20 date to present                         "

## 2020-12-06 NOTE — PROGRESS NOTES
HPI:  Reji Aguirre is a 80 year old male who presents for annual cardiology visit.  He is a patient of Dr. jA.  His medical history includes     1. persistent atrial fibrillation.  Treated with amiodarone since 2016 and has maintained sinus rhythm.   2. Tachycardia-induced cardiomyopathy.  Normalization of EF after sinus rhythm maintenance.   3. 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  Was treated at [facility].    4. Left bundle branch block.   5. Concussion (2019)   6. Subdural hematoma (2020)  7. Primary hyperaldosteronism    Diagnostics:  ECHO (3/2019) revealed EF 55-60%, mildly dilated left atrium with mild PHTN      In 2016, he had paroxysmal atrial flutter and fibrillation and an EF of 30%-40%.  He underwent a successful DCCV (7/2016).   Stress test (5/2018) showed no evidence of ischemia.  He was placed on amiodarone.  Due to resistant HTN he went to [facility] and was started on eplerenone as they thought he may have hyperaldosteronism. He was noted to have a HR in the 45-60 bpm    In 2019, he saw Dr. Aj and was bradycardic and Bystolic was stopped and amlodipine started. A 24 hour monitor later showed Average HR 56 bpm while on amiodarone 100 mg 5x per week.     In 9/2020 he had a mechanical fall and was found to have a subdural hematoma and T4 anterior syndesmophyte fracture and was recommended to resume normal activities.      Today he reports feeling better since going to physical therapy.  He denies recurrence of atrial fibrillation and denies chest pain or pressure, dizziness, syncope, angina, dyspnea at rest or with exertion,  palpitations, orthopnea, PND, or edema.  States takes medications as prescribed including Xarelto and denies bleeding, hematuria, hematochezia, epistaxis and signs/symptoms of stroke.         ASSESSMENT AND PLAN    Symptomatic Paroxysmal Atrial fibrillation    Pt is symptomatic while in atrial fibrillation.    For rhythm control he is  taking amiodarone 200 mg 5 day per week.   With no occurrence of atrial fibrillation.  ECG revealed sinus rhythm with ventricular rate of 66 bpm,  ms, QT/QTc 494/504 ms.  This is overall consistent with previous ECGs but will review with Dr. Aj as his QTc is over 500 ms      For anticoagulation for CHADS VASC 3 (age++, HTN) he is currently taking Xarelto 20 mg daily.  Last hgb was 14.4 (9/2020). He has had 2 falls in the past 2 years.  Will send Dr. Aj a message to consider watchman and if he agrees pt will need to see 2 physicians.   A watchman was discussed with the patient at todays visit and he is interested  We discussed that these are being placed on hold during COVID but the work up can be completed. He was given a brochure to read at home    Hypertension    elevated while taking amlodipine, eplerenone, hydrochlorothiazide    Increase amlodipine to 5 mg twice daily     amiodarone therapy    TSH 0.67 (6/2020)    BMP normal (9/2020)    ALT/AST=63/45 (6/20/2020)    Chest x-ray was normal (6/2020)    Plan:  Amiodarone Labs in 6 months and 1 year follow up   Increase amlodipine from 5 mg daily to 5 mg twice daily and follow up in 1 month  Will discuss ECG and possible consideration of a watchman with Dr. Aj    Patient expresses understanding and agreement with the plan.     I appreciate the chance to help with Reji Aguirre Please let me know if you have any questions or concerns.    Sherlyn Castro, APRN, CNP    This note was completed in part using Dragon voice recognition software. Although reviewed after completion, some word and grammatical errors may occur.    Orders Placed This Encounter   Procedures     Follow-Up with Cardiac Advanced Practice Provider     EKG 12-lead complete w/read - Clinics (performed today)     Orders Placed This Encounter   Medications     amLODIPine (NORVASC) 5 MG tablet     Sig: Take 1 tablet (5 mg) by mouth 2 times daily     Dispense:  180 tablet     Refill:  3      Medications Discontinued During This Encounter   Medication Reason     cyclobenzaprine (FLEXERIL) 5 MG tablet Medication Reconciliation Clean Up     oxyCODONE (ROXICODONE) 5 MG tablet Medication Reconciliation Clean Up     amLODIPine (NORVASC) 5 MG tablet          Encounter Diagnoses   Name Primary?     Persistent atrial fibrillation (H) Yes     Benign essential hypertension        CURRENT MEDICATIONS:  Current Outpatient Medications   Medication Sig Dispense Refill     allopurinol (ZYLOPRIM) 300 MG tablet TAKE 1 TABLET BY MOUTH ONCE DAILY (Patient taking differently: Take 300 mg by mouth daily ) 90 tablet 3     amiodarone (PACERONE) 200 MG tablet TAKE 1 TABLET BY MOUTH ONCE DAILY MONDAY  THROUGH  FRIDAY,  SKIP  SATURDAY  AND  SUNDAY (Patient taking differently: Take 200 mg by mouth five times a week TAKE 1 TABLET BY MOUTH ONCE DAILY MONDAY  THROUGH  Friday;  SKIP  SATURDAY  AND  SUNDAY) 90 tablet 0     amLODIPine (NORVASC) 5 MG tablet Take 1 tablet (5 mg) by mouth 2 times daily 180 tablet 3     eplerenone (INSPRA) 25 MG tablet Take 1 tablet (25 mg) by mouth 2 times daily 180 tablet 1     hydrochlorothiazide (MICROZIDE) 12.5 MG capsule TAKE 2 CAPSULES BY MOUTH IN THE MORNING 180 capsule 3     polyethylene glycol (MIRALAX) 17 g packet Take 17 g by mouth daily as needed for constipation 10 packet 0     rivaroxaban ANTICOAGULANT (XARELTO ANTICOAGULANT) 20 MG TABS tablet Take 1 tablet (20 mg) by mouth daily (with dinner) 90 tablet 1     rosuvastatin (CRESTOR) 10 MG tablet Take 1 tablet (10 mg) by mouth daily 90 tablet 1     Acetaminophen (TYLENOL PO) Take 500-1,000 mg by mouth every 8 hours as needed for mild pain or fever        ASPIRIN NOT PRESCRIBED (INTENTIONAL) Please choose reason not prescribed, below 0 each      azelaic acid (FINACIA) 15 % external gel Apply 0.5 inches topically 2 times daily       diphenhydrAMINE (BENADRYL) 25 MG capsule Take 25 mg by mouth nightly as needed for itching or allergies        doxylamine (UNISOM) 25 MG TABS tablet Take 25 mg by mouth At Bedtime         ALLERGIES     Allergies   Allergen Reactions     Ampicillin      rash     Metoprolol      Mild fatigue with Toprol; mildly decreased exercise capacity     Spironolactone Nausea and Difficulty breathing     Vicodin [Hydrocodone-Acetaminophen] Nausea       PAST MEDICAL HISTORY:  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 @ [place]     Benign essential hypertension 2/2/2004     Bradycardia      Cardiomyopathy (H)     tachycardia-induced -resolved     Carotid artery disease (H) 02/10/2019    Extensive calcified atherosclerotic plaque in the internal carotid arteries seen on routine head CT     Diverticulosis of colon (without mention of hemorrhage) 11/16/07    incidental diverticuli noted at colonoscopy     Gout, unspecified      Hyperlipidemia      LBBB (left bundle branch block)      Post concussion syndrome 4/22/2019     Primary aldosteronism (H)      Rosacea      Special screening for malignant neoplasms, colon 1995       PAST SURGICAL HISTORY:  Past Surgical History:   Procedure Laterality Date     ARTHROPLASTY KNEE Left 6/6/2016    Procedure: ARTHROPLASTY KNEE;  Surgeon: Derek Varma MD;  Location: [facility] OR     ARTHROSCOPY SHOULDER ROTATOR CUFF REPAIR  7/9/08    Left shoulder arthroscopic rotator cuff repair with acromioplasty     CARDIOVERSION  07-     COLONOSCOPY N/A 5/1/2019    Procedure: COLONOSCOPY;  Surgeon: Marcello Velasquez MD;  Location:  [facility]     [facility] COLONOSCOPY THRU STOMA W BIOPSY/CAUTERY TUMOR/POLYP/LESION  11/16/07    normal colonoscopy except for incidental diverticuli     ORTHOPEDIC SURGERY  04/11    2nd toe Lt foot     OTHER SURGICAL HISTORY      resection of bladder tumors 2017 @ [facility] NONSPECIFIC PROCEDURE  5/99     left knee arthroscopy       FAMILY HISTORY:  Family History   Problem Relation Age of Onset     Cerebrovascular Disease Father         d:89, dementia     Respiratory Mother         d:94     Diabetes Sister         b. 1938, IDDM since age 10     Diabetes Brother         b. 1943, type II DM       SOCIAL HISTORY:  Social History     Socioeconomic History     Marital status:      Spouse name: None     Number of children: None     Years of education: None     Highest education level: None   Occupational History     None   Social Needs     Financial resource strain: None     Food insecurity     Worry: None     Inability: None     Transportation needs     Medical: None     Non-medical: None   Tobacco Use     Smoking status: Former Smoker     Types: Cigarettes     Quit date: 1963     Years since quittin.4     Smokeless tobacco: Never Used   Substance and Sexual Activity     Alcohol use: Yes     Comment: 2 drinks a daty     Drug use: No     Sexual activity: Yes     Partners: Female   Lifestyle     Physical activity     Days per week: None     Minutes per session: None     Stress: None   Relationships     Social connections     Talks on phone: None     Gets together: None     Attends Restorationist service: None     Active member of club or organization: None     Attends meetings of clubs or organizations: None     Relationship status: None     Intimate partner violence     Fear of current or ex partner: None     Emotionally abused: None     Physically abused: None     Forced sexual activity: None   Other Topics Concern     Parent/sibling w/ CABG, MI or angioplasty before 65F 55M? Not Asked      Service Not Asked     Blood Transfusions Not Asked     Caffeine Concern No     Comment: 1 cup daily     Occupational Exposure Not Asked     Hobby Hazards Not Asked     Sleep Concern Not Asked     Stress Concern Not Asked     Weight Concern Not Asked     Special Diet No     Back Care Not Asked     Exercise Yes      "Comment: skiing     Bike Helmet Not Asked     Seat Belt Not Asked     Self-Exams Not Asked   Social History Narrative     None       Review of Systems:  Skin:  Negative     Eyes:  Positive for glasses  ENT:  Negative    Respiratory:  Negative    Cardiovascular:  Negative    Gastroenterology: Negative    Genitourinary:  not assessed    Musculoskeletal:  Negative    Neurologic:  Negative    Psychiatric:  Negative    Heme/Lymph/Imm:  Negative    Endocrine:  Negative      Physical Exam:  Vitals: BP (!) 156/93   Pulse 69   Ht 1.727 m (5' 8\")   Wt 88.9 kg (196 lb)   BMI 29.80 kg/m      Constitutional:  cooperative, alert and oriented, well developed, well nourished, in no acute distress        Skin:  warm and dry to the touch, no apparent skin lesions or masses noted        Head:  normocephalic, no masses or lesions        Eyes:  pupils equal and round        ENT:  no pallor or cyanosis        Neck:  JVP normal        Chest:  clear to auscultation        Cardiac: regular rhythm   distant heart sounds              Abdomen:  abdomen soft obese      Vascular:                                        Extremities and Back:  no edema        Neurological:  no gross motor deficits          Recent Lab Results:  LIPID RESULTS:  Lab Results   Component Value Date    CHOL 151 06/23/2020    HDL 91 06/23/2020    LDL 46 06/23/2020    TRIG 70 06/23/2020    CHOLHDLRATIO 2.6 10/09/2014       LIVER ENZYME RESULTS:  Lab Results   Component Value Date    AST 45 06/23/2020    ALT 63 06/23/2020       BMP RESULTS:  Lab Results   Component Value Date     09/19/2020    POTASSIUM 3.7 09/18/2020    CHLORIDE 100 09/18/2020    CO2 27 09/18/2020    ANIONGAP 5 09/18/2020     (H) 09/18/2020    BUN 19 09/18/2020    CR 0.81 09/18/2020    GFRESTIMATED 85 09/18/2020    GFRESTBLACK >90 09/18/2020    HE 8.9 09/18/2020        A1C RESULTS:  No results found for: A1C    INR RESULTS:  Lab Results   Component Value Date    INR 1.25 (H) 09/17/2020    " INR 1.94 (H) 08/16/2017           CC  Igor Christina MD  [address] [place] [id number]

## 2020-12-07 ENCOUNTER — OFFICE VISIT (OUTPATIENT)
Dept: CARDIOLOGY | Facility: CLINIC | Age: 80
End: 2020-12-07
Payer: MEDICARE

## 2020-12-07 VITALS
SYSTOLIC BLOOD PRESSURE: 156 MMHG | DIASTOLIC BLOOD PRESSURE: 93 MMHG | HEART RATE: 69 BPM | HEIGHT: 68 IN | WEIGHT: 196 LBS | BODY MASS INDEX: 29.7 KG/M2

## 2020-12-07 DIAGNOSIS — I10 ESSENTIAL HYPERTENSION: ICD-10-CM

## 2020-12-07 DIAGNOSIS — I10 BENIGN ESSENTIAL HYPERTENSION: ICD-10-CM

## 2020-12-07 DIAGNOSIS — Z79.899 ON AMIODARONE THERAPY: ICD-10-CM

## 2020-12-07 DIAGNOSIS — I48.19 PERSISTENT ATRIAL FIBRILLATION (H): Primary | ICD-10-CM

## 2020-12-07 PROCEDURE — 99214 OFFICE O/P EST MOD 30 MIN: CPT | Performed by: NURSE PRACTITIONER

## 2020-12-07 PROCEDURE — 93000 ELECTROCARDIOGRAM COMPLETE: CPT | Performed by: NURSE PRACTITIONER

## 2020-12-07 RX ORDER — AMLODIPINE BESYLATE 5 MG/1
5 TABLET ORAL 2 TIMES DAILY
Qty: 180 TABLET | Refills: 3 | Status: SHIPPED | OUTPATIENT
Start: 2020-12-07 | End: 2021-12-30

## 2020-12-07 SDOH — HEALTH STABILITY: MENTAL HEALTH: HOW OFTEN DO YOU HAVE 6 OR MORE DRINKS ON ONE OCCASION?: NOT ASKED

## 2020-12-07 SDOH — HEALTH STABILITY: MENTAL HEALTH: HOW MANY STANDARD DRINKS CONTAINING ALCOHOL DO YOU HAVE ON A TYPICAL DAY?: NOT ASKED

## 2020-12-07 SDOH — HEALTH STABILITY: MENTAL HEALTH: HOW OFTEN DO YOU HAVE A DRINK CONTAINING ALCOHOL?: NOT ASKED

## 2020-12-07 ASSESSMENT — MIFFLIN-ST. JEOR: SCORE: 1578.55

## 2020-12-07 NOTE — PATIENT INSTRUCTIONS
Please increase your amlodipine 5 mg twice daily     I will discuss a left atrial closure device - watchman with Dr. Aj.  If he is okay moving forward I will have you see him and/or Dr. Perera and then Dr. Salvador

## 2020-12-07 NOTE — LETTER
12/7/2020    Igor Christina MD  [address] [facility] [address]    RE: Reji Aguirre       Dear Colleague,    I had the pleasure of seeing Reji Aguirre in the [facility].    HPI:  Reji Aguirre is a 80 year old male who presents for annual cardiology visit.  He is a patient of Dr. Aj.  His medical history includes     1. persistent atrial fibrillation.  Treated with amiodarone since 2016 and has maintained sinus rhythm.   2. Tachycardia-induced cardiomyopathy.  Normalization of EF after sinus rhythm maintenance.   3. 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  Was treated at [facility].    4. Left bundle branch block.   5. Concussion (2019)   6. Subdural hematoma (2020)  7. Primary hyperaldosteronism    Diagnostics:  ECHO (3/2019) revealed EF 55-60%, mildly dilated left atrium with mild PHTN      In 2016, he had paroxysmal atrial flutter and fibrillation and an EF of 30%-40%.  He underwent a successful DCCV (7/2016).   Stress test (5/2018) showed no evidence of ischemia.  He was placed on amiodarone.  Due to resistant HTN he went to [facility] and was started on eplerenone as they thought he may have hyperaldosteronism. He was noted to have a HR in the 45-60 bpm    In 2019, he saw Dr. Aj and was bradycardic and Bystolic was stopped and amlodipine started. A 24 hour monitor later showed Average HR 56 bpm while on amiodarone 100 mg 5x per week.     In 9/2020 he had a mechanical fall and was found to have a subdural hematoma and T4 anterior syndesmophyte fracture and was recommended to resume normal activities.      Today he reports feeling better since going to physical therapy.  He denies recurrence of atrial fibrillation and denies chest pain or pressure, dizziness, syncope, angina, dyspnea at rest or with exertion,  palpitations, orthopnea, PND, or edema.  States takes medications as prescribed including Xarelto and denies  bleeding, hematuria, hematochezia, epistaxis and signs/symptoms of stroke.         ASSESSMENT AND PLAN    Symptomatic Paroxysmal Atrial fibrillation    Pt is symptomatic while in atrial fibrillation.    For rhythm control he is taking amiodarone 200 mg 5 day per week.   With no occurrence of atrial fibrillation.  ECG revealed sinus rhythm with ventricular rate of 66 bpm,  ms, QT/QTc 494/504 ms.  This is overall consistent with previous ECGs but will review with Dr. Aj as his QTc is over 500 ms      For anticoagulation for CHADS VASC 3 (age++, HTN) he is currently taking Xarelto 20 mg daily.  Last hgb was 14.4 (9/2020). He has had 2 falls in the past 2 years.  Will send Dr. Aj a message to consider watchman and if he agrees pt will need to see 2 physicians.   A watchman was discussed with the patient at todays visit and he is interested  We discussed that these are being placed on hold during COVID but the work up can be completed. He was given a brochure to read at home    Hypertension    elevated while taking amlodipine, eplerenone, hydrochlorothiazide    Increase amlodipine to 5 mg twice daily     amiodarone therapy    TSH 0.67 (6/2020)    BMP normal (9/2020)    ALT/AST=63/45 (6/20/2020)    Chest x-ray was normal (6/2020)    Plan:  Amiodarone Labs in 6 months and 1 year follow up   Increase amlodipine from 5 mg daily to 5 mg twice daily and follow up in 1 month  Will discuss ECG and possible consideration of a watchman with Dr. Aj    Patient expresses understanding and agreement with the plan.     I appreciate the chance to help with Reji Aguirre Please let me know if you have any questions or concerns.    Sherlyn Castro, APRN, CNP    This note was completed in part using Dragon voice recognition software. Although reviewed after completion, some word and grammatical errors may occur.    Orders Placed This Encounter   Procedures     Follow-Up with Cardiac Advanced Practice Provider     EKG  12-lead complete w/read - Clinics (performed today)     Orders Placed This Encounter   Medications     amLODIPine (NORVASC) 5 MG tablet     Sig: Take 1 tablet (5 mg) by mouth 2 times daily     Dispense:  180 tablet     Refill:  3     Medications Discontinued During This Encounter   Medication Reason     cyclobenzaprine (FLEXERIL) 5 MG tablet Medication Reconciliation Clean Up     oxyCODONE (ROXICODONE) 5 MG tablet Medication Reconciliation Clean Up     amLODIPine (NORVASC) 5 MG tablet        Encounter Diagnoses   Name Primary?     Persistent atrial fibrillation (H) Yes     Benign essential hypertension        CURRENT MEDICATIONS:  Current Outpatient Medications   Medication Sig Dispense Refill     allopurinol (ZYLOPRIM) 300 MG tablet TAKE 1 TABLET BY MOUTH ONCE DAILY (Patient taking differently: Take 300 mg by mouth daily ) 90 tablet 3     amiodarone (PACERONE) 200 MG tablet TAKE 1 TABLET BY MOUTH ONCE DAILY MONDAY  THROUGH  FRIDAY,  SKIP  SATURDAY  AND  SUNDAY (Patient taking differently: Take 200 mg by mouth five times a week TAKE 1 TABLET BY MOUTH ONCE DAILY MONDAY  THROUGH  Friday;  SKIP  SATURDAY  AND  SUNDAY) 90 tablet 0     amLODIPine (NORVASC) 5 MG tablet Take 1 tablet (5 mg) by mouth 2 times daily 180 tablet 3     eplerenone (INSPRA) 25 MG tablet Take 1 tablet (25 mg) by mouth 2 times daily 180 tablet 1     hydrochlorothiazide (MICROZIDE) 12.5 MG capsule TAKE 2 CAPSULES BY MOUTH IN THE MORNING 180 capsule 3     polyethylene glycol (MIRALAX) 17 g packet Take 17 g by mouth daily as needed for constipation 10 packet 0     rivaroxaban ANTICOAGULANT (XARELTO ANTICOAGULANT) 20 MG TABS tablet Take 1 tablet (20 mg) by mouth daily (with dinner) 90 tablet 1     rosuvastatin (CRESTOR) 10 MG tablet Take 1 tablet (10 mg) by mouth daily 90 tablet 1     Acetaminophen (TYLENOL PO) Take 500-1,000 mg by mouth every 8 hours as needed for mild pain or fever        ASPIRIN NOT PRESCRIBED (INTENTIONAL) Please choose reason not  prescribed, below 0 each      azelaic acid (FINACIA) 15 % external gel Apply 0.5 inches topically 2 times daily       diphenhydrAMINE (BENADRYL) 25 MG capsule Take 25 mg by mouth nightly as needed for itching or allergies       doxylamine (UNISOM) 25 MG TABS tablet Take 25 mg by mouth At Bedtime         ALLERGIES     Allergies   Allergen Reactions     Ampicillin      rash     Metoprolol      Mild fatigue with Toprol; mildly decreased exercise capacity     Spironolactone Nausea and Difficulty breathing     Vicodin [Hydrocodone-Acetaminophen] Nausea       PAST MEDICAL HISTORY:  Past Medical History:   Diagnosis Date     Atrial fibrillation, unspecified type (H) 05/19/2016    [facility] 2016     Atrial flutter (H)      Benign bladder tumor     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 @ [place]     Benign essential hypertension 2/2/2004     Bradycardia      Cardiomyopathy (H)     tachycardia-induced -resolved     Carotid artery disease (H) 02/10/2019    Extensive calcified atherosclerotic plaque in the internal carotid arteries seen on routine head CT     Diverticulosis of colon (without mention of hemorrhage) 11/16/07    incidental diverticuli noted at colonoscopy     Gout, unspecified      Hyperlipidemia      LBBB (left bundle branch block)      Post concussion syndrome 4/22/2019     Primary aldosteronism (H)      Rosacea      Special screening for malignant neoplasms, colon 1995       PAST SURGICAL HISTORY:  Past Surgical History:   Procedure Laterality Date     ARTHROPLASTY KNEE Left 6/6/2016    Procedure: ARTHROPLASTY KNEE;  Surgeon: Derek Varma MD;  Location:  OR     ARTHROSCOPY SHOULDER ROTATOR CUFF REPAIR  7/9/08    Left shoulder arthroscopic rotator cuff repair with acromioplasty     CARDIOVERSION  07-     COLONOSCOPY N/A 5/1/2019    Procedure: COLONOSCOPY;  Surgeon: Marcello Velasquez MD;  Location:  GI     HC COLONOSCOPY THRU STOMA W BIOPSY/CAUTERY TUMOR/POLYP/LESION   07    normal colonoscopy except for incidental diverticuli     ORTHOPEDIC SURGERY      2nd toe Lt foot     OTHER SURGICAL HISTORY      resection of bladder tumors 2017 @ [facility] NONSPECIFIC PROCEDURE      left knee arthroscopy       FAMILY HISTORY:  Family History   Problem Relation Age of Onset     Cerebrovascular Disease Father         d:89, dementia     Respiratory Mother         d:94     Diabetes Sister         b. 1938, IDDM since age 10     Diabetes Brother         b. 1943, type II DM       SOCIAL HISTORY:  Social History     Socioeconomic History     Marital status:      Spouse name: None     Number of children: None     Years of education: None     Highest education level: None   Occupational History     None   Social Needs     Financial resource strain: None     Food insecurity     Worry: None     Inability: None     Transportation needs     Medical: None     Non-medical: None   Tobacco Use     Smoking status: Former Smoker     Types: Cigarettes     Quit date: 1963     Years since quittin.4     Smokeless tobacco: Never Used   Substance and Sexual Activity     Alcohol use: Yes     Comment: 2 drinks a daty     Drug use: No     Sexual activity: Yes     Partners: Female   Lifestyle     Physical activity     Days per week: None     Minutes per session: None     Stress: None   Relationships     Social connections     Talks on phone: None     Gets together: None     Attends Protestant service: None     Active member of club or organization: None     Attends meetings of clubs or organizations: None     Relationship status: None     Intimate partner violence     Fear of current or ex partner: None     Emotionally abused: None     Physically abused: None     Forced sexual activity: None   Other Topics Concern     Parent/sibling w/ CABG, MI or angioplasty before 65F 55M? Not Asked      Service Not Asked     Blood Transfusions Not Asked     Caffeine Concern No     Comment: 1  "cup daily     Occupational Exposure Not Asked     Hobby Hazards Not Asked     Sleep Concern Not Asked     Stress Concern Not Asked     Weight Concern Not Asked     Special Diet No     Back Care Not Asked     Exercise Yes     Comment: skiing     Bike Helmet Not Asked     Seat Belt Not Asked     Self-Exams Not Asked   Social History Narrative     None       Review of Systems:  Skin:  Negative     Eyes:  Positive for glasses  ENT:  Negative    Respiratory:  Negative    Cardiovascular:  Negative    Gastroenterology: Negative    Genitourinary:  not assessed    Musculoskeletal:  Negative    Neurologic:  Negative    Psychiatric:  Negative    Heme/Lymph/Imm:  Negative    Endocrine:  Negative      Physical Exam:  Vitals: BP (!) 156/93   Pulse 69   Ht 1.727 m (5' 8\")   Wt 88.9 kg (196 lb)   BMI 29.80 kg/m      Constitutional:  cooperative, alert and oriented, well developed, well nourished, in no acute distress        Skin:  warm and dry to the touch, no apparent skin lesions or masses noted        Head:  normocephalic, no masses or lesions        Eyes:  pupils equal and round        ENT:  no pallor or cyanosis        Neck:  JVP normal        Chest:  clear to auscultation        Cardiac: regular rhythm   distant heart sounds              Abdomen:  abdomen soft obese      Vascular:                                        Extremities and Back:  no edema        Neurological:  no gross motor deficits          Recent Lab Results:  LIPID RESULTS:  Lab Results   Component Value Date    CHOL 151 06/23/2020    HDL 91 06/23/2020    LDL 46 06/23/2020    TRIG 70 06/23/2020    CHOLHDLRATIO 2.6 10/09/2014       LIVER ENZYME RESULTS:  Lab Results   Component Value Date    AST 45 06/23/2020    ALT 63 06/23/2020       BMP RESULTS:  Lab Results   Component Value Date     09/19/2020    POTASSIUM 3.7 09/18/2020    CHLORIDE 100 09/18/2020    CO2 27 09/18/2020    ANIONGAP 5 09/18/2020     (H) 09/18/2020    BUN 19 09/18/2020    CR " 0.81 09/18/2020    GFRESTIMATED 85 09/18/2020    GFRESTBLACK >90 09/18/2020    HE 8.9 09/18/2020        A1C RESULTS:  No results found for: A1C    INR RESULTS:  Lab Results   Component Value Date    INR 1.25 (H) 09/17/2020    INR 1.94 (H) 08/16/2017       Thank you for allowing me to participate in the care of your patient.    Sincerely,     ERIKA Guerra [facility]

## 2020-12-07 NOTE — LETTER
12/7/2020    Igor Christina MD  [address] [facility] [address]    RE: Reji Aguirre       Dear Colleague,    I had the pleasure of seeing Reji Aguirre in the [facility].    HPI:  Reji Aguirre is a 80 year old male who presents for annual cardiology visit.  He is a patient of Dr. Aj.  His medical history includes     1. persistent atrial fibrillation.  Treated with amiodarone since 2016 and has maintained sinus rhythm.   2. Tachycardia-induced cardiomyopathy.  Normalization of EF after sinus rhythm maintenance.   3. 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  Was treated at [facility].    4. Left bundle branch block.   5. Concussion (2019)   6. Subdural hematoma (2020)  7. Primary hyperaldosteronism    Diagnostics:  ECHO (3/2019) revealed EF 55-60%, mildly dilated left atrium with mild PHTN      In 2016, he had paroxysmal atrial flutter and fibrillation and an EF of 30%-40%.  He underwent a successful DCCV (7/2016).   Stress test (5/2018) showed no evidence of ischemia.  He was placed on amiodarone.  Due to resistant HTN he went to [facility] and was started on eplerenone as they thought he may have hyperaldosteronism. He was noted to have a HR in the 45-60 bpm    In 2019, he saw Dr. Aj and was bradycardic and Bystolic was stopped and amlodipine started. A 24 hour monitor later showed Average HR 56 bpm while on amiodarone 100 mg 5x per week.     In 9/2020 he had a mechanical fall and was found to have a subdural hematoma and T4 anterior syndesmophyte fracture and was recommended to resume normal activities.      Today he reports feeling better since going to physical therapy.  He denies recurrence of atrial fibrillation and denies chest pain or pressure, dizziness, syncope, angina, dyspnea at rest or with exertion,  palpitations, orthopnea, PND, or edema.  States takes medications as prescribed including Xarelto and denies  bleeding, hematuria, hematochezia, epistaxis and signs/symptoms of stroke.         ASSESSMENT AND PLAN    Symptomatic Paroxysmal Atrial fibrillation    Pt is symptomatic while in atrial fibrillation.    For rhythm control he is taking amiodarone 200 mg 5 day per week.   With no occurrence of atrial fibrillation.  ECG revealed sinus rhythm with ventricular rate of 66 bpm,  ms, QT/QTc 494/504 ms.  This is overall consistent with previous ECGs but will review with Dr. Aj as his QTc is over 500 ms      For anticoagulation for CHADS VASC 3 (age++, HTN) he is currently taking Xarelto 20 mg daily.  Last hgb was 14.4 (9/2020). He has had 2 falls in the past 2 years.  Will send Dr. Aj a message to consider watchman and if he agrees pt will need to see 2 physicians.   A watchman was discussed with the patient at todays visit and he is interested  We discussed that these are being placed on hold during COVID but the work up can be completed. He was given a brochure to read at home    Hypertension    elevated while taking amlodipine, eplerenone, hydrochlorothiazide    Increase amlodipine to 5 mg twice daily     amiodarone therapy    TSH 0.67 (6/2020)    BMP normal (9/2020)    ALT/AST=63/45 (6/20/2020)    Chest x-ray was normal (6/2020)    Plan:  Amiodarone Labs in 6 months and 1 year follow up   Increase amlodipine from 5 mg daily to 5 mg twice daily and follow up in 1 month  Will discuss ECG and possible consideration of a watchman with Dr. Aj    Patient expresses understanding and agreement with the plan.     I appreciate the chance to help with Reji Aguirre Please let me know if you have any questions or concerns.    Sherlyn Castro, APRN, CNP    This note was completed in part using Dragon voice recognition software. Although reviewed after completion, some word and grammatical errors may occur.    Orders Placed This Encounter   Procedures     Follow-Up with Cardiac Advanced Practice Provider     EKG  12-lead complete w/read - Clinics (performed today)     Orders Placed This Encounter   Medications     amLODIPine (NORVASC) 5 MG tablet     Sig: Take 1 tablet (5 mg) by mouth 2 times daily     Dispense:  180 tablet     Refill:  3     Medications Discontinued During This Encounter   Medication Reason     cyclobenzaprine (FLEXERIL) 5 MG tablet Medication Reconciliation Clean Up     oxyCODONE (ROXICODONE) 5 MG tablet Medication Reconciliation Clean Up     amLODIPine (NORVASC) 5 MG tablet          Encounter Diagnoses   Name Primary?     Persistent atrial fibrillation (H) Yes     Benign essential hypertension        CURRENT MEDICATIONS:  Current Outpatient Medications   Medication Sig Dispense Refill     allopurinol (ZYLOPRIM) 300 MG tablet TAKE 1 TABLET BY MOUTH ONCE DAILY (Patient taking differently: Take 300 mg by mouth daily ) 90 tablet 3     amiodarone (PACERONE) 200 MG tablet TAKE 1 TABLET BY MOUTH ONCE DAILY MONDAY  THROUGH  FRIDAY,  SKIP  SATURDAY  AND  SUNDAY (Patient taking differently: Take 200 mg by mouth five times a week TAKE 1 TABLET BY MOUTH ONCE DAILY MONDAY  THROUGH  Friday;  SKIP  SATURDAY  AND  SUNDAY) 90 tablet 0     amLODIPine (NORVASC) 5 MG tablet Take 1 tablet (5 mg) by mouth 2 times daily 180 tablet 3     eplerenone (INSPRA) 25 MG tablet Take 1 tablet (25 mg) by mouth 2 times daily 180 tablet 1     hydrochlorothiazide (MICROZIDE) 12.5 MG capsule TAKE 2 CAPSULES BY MOUTH IN THE MORNING 180 capsule 3     polyethylene glycol (MIRALAX) 17 g packet Take 17 g by mouth daily as needed for constipation 10 packet 0     rivaroxaban ANTICOAGULANT (XARELTO ANTICOAGULANT) 20 MG TABS tablet Take 1 tablet (20 mg) by mouth daily (with dinner) 90 tablet 1     rosuvastatin (CRESTOR) 10 MG tablet Take 1 tablet (10 mg) by mouth daily 90 tablet 1     Acetaminophen (TYLENOL PO) Take 500-1,000 mg by mouth every 8 hours as needed for mild pain or fever        ASPIRIN NOT PRESCRIBED (INTENTIONAL) Please choose reason not  prescribed, below 0 each      azelaic acid (FINACIA) 15 % external gel Apply 0.5 inches topically 2 times daily       diphenhydrAMINE (BENADRYL) 25 MG capsule Take 25 mg by mouth nightly as needed for itching or allergies       doxylamine (UNISOM) 25 MG TABS tablet Take 25 mg by mouth At Bedtime         ALLERGIES     Allergies   Allergen Reactions     Ampicillin      rash     Metoprolol      Mild fatigue with Toprol; mildly decreased exercise capacity     Spironolactone Nausea and Difficulty breathing     Vicodin [Hydrocodone-Acetaminophen] Nausea       PAST MEDICAL HISTORY:  Past Medical History:   Diagnosis Date     Atrial fibrillation, unspecified type (H) 05/19/2016    [facility] 2016     Atrial flutter (H)      Benign bladder tumor     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 @ [place]     Benign essential hypertension 2/2/2004     Bradycardia      Cardiomyopathy (H)     tachycardia-induced -resolved     Carotid artery disease (H) 02/10/2019    Extensive calcified atherosclerotic plaque in the internal carotid arteries seen on routine head CT     Diverticulosis of colon (without mention of hemorrhage) 11/16/07    incidental diverticuli noted at colonoscopy     Gout, unspecified      Hyperlipidemia      LBBB (left bundle branch block)      Post concussion syndrome 4/22/2019     Primary aldosteronism (H)      Rosacea      Special screening for malignant neoplasms, colon 1995       PAST SURGICAL HISTORY:  Past Surgical History:   Procedure Laterality Date     ARTHROPLASTY KNEE Left 6/6/2016    Procedure: ARTHROPLASTY KNEE;  Surgeon: Derek Varma MD;  Location:  OR     ARTHROSCOPY SHOULDER ROTATOR CUFF REPAIR  7/9/08    Left shoulder arthroscopic rotator cuff repair with acromioplasty     CARDIOVERSION  07-     COLONOSCOPY N/A 5/1/2019    Procedure: COLONOSCOPY;  Surgeon: Marcello Velasquez MD;  Location:  GI     HC COLONOSCOPY THRU STOMA W BIOPSY/CAUTERY TUMOR/POLYP/LESION   07    normal colonoscopy except for incidental diverticuli     ORTHOPEDIC SURGERY      2nd toe Lt foot     OTHER SURGICAL HISTORY      resection of bladder tumors 2017 @ [facility] NONSPECIFIC PROCEDURE      left knee arthroscopy       FAMILY HISTORY:  Family History   Problem Relation Age of Onset     Cerebrovascular Disease Father         d:89, dementia     Respiratory Mother         d:94     Diabetes Sister         b. 1938, IDDM since age 10     Diabetes Brother         b. 1943, type II DM       SOCIAL HISTORY:  Social History     Socioeconomic History     Marital status:      Spouse name: None     Number of children: None     Years of education: None     Highest education level: None   Occupational History     None   Social Needs     Financial resource strain: None     Food insecurity     Worry: None     Inability: None     Transportation needs     Medical: None     Non-medical: None   Tobacco Use     Smoking status: Former Smoker     Types: Cigarettes     Quit date: 1963     Years since quittin.4     Smokeless tobacco: Never Used   Substance and Sexual Activity     Alcohol use: Yes     Comment: 2 drinks a daty     Drug use: No     Sexual activity: Yes     Partners: Female   Lifestyle     Physical activity     Days per week: None     Minutes per session: None     Stress: None   Relationships     Social connections     Talks on phone: None     Gets together: None     Attends Zoroastrianism service: None     Active member of club or organization: None     Attends meetings of clubs or organizations: None     Relationship status: None     Intimate partner violence     Fear of current or ex partner: None     Emotionally abused: None     Physically abused: None     Forced sexual activity: None   Other Topics Concern     Parent/sibling w/ CABG, MI or angioplasty before 65F 55M? Not Asked      Service Not Asked     Blood Transfusions Not Asked     Caffeine Concern No     Comment: 1  "cup daily     Occupational Exposure Not Asked     Hobby Hazards Not Asked     Sleep Concern Not Asked     Stress Concern Not Asked     Weight Concern Not Asked     Special Diet No     Back Care Not Asked     Exercise Yes     Comment: skiing     Bike Helmet Not Asked     Seat Belt Not Asked     Self-Exams Not Asked   Social History Narrative     None       Review of Systems:  Skin:  Negative     Eyes:  Positive for glasses  ENT:  Negative    Respiratory:  Negative    Cardiovascular:  Negative    Gastroenterology: Negative    Genitourinary:  not assessed    Musculoskeletal:  Negative    Neurologic:  Negative    Psychiatric:  Negative    Heme/Lymph/Imm:  Negative    Endocrine:  Negative      Physical Exam:  Vitals: BP (!) 156/93   Pulse 69   Ht 1.727 m (5' 8\")   Wt 88.9 kg (196 lb)   BMI 29.80 kg/m      Constitutional:  cooperative, alert and oriented, well developed, well nourished, in no acute distress        Skin:  warm and dry to the touch, no apparent skin lesions or masses noted        Head:  normocephalic, no masses or lesions        Eyes:  pupils equal and round        ENT:  no pallor or cyanosis        Neck:  JVP normal        Chest:  clear to auscultation        Cardiac: regular rhythm   distant heart sounds              Abdomen:  abdomen soft obese      Vascular:                                        Extremities and Back:  no edema        Neurological:  no gross motor deficits          Recent Lab Results:  LIPID RESULTS:  Lab Results   Component Value Date    CHOL 151 06/23/2020    HDL 91 06/23/2020    LDL 46 06/23/2020    TRIG 70 06/23/2020    CHOLHDLRATIO 2.6 10/09/2014       LIVER ENZYME RESULTS:  Lab Results   Component Value Date    AST 45 06/23/2020    ALT 63 06/23/2020       BMP RESULTS:  Lab Results   Component Value Date     09/19/2020    POTASSIUM 3.7 09/18/2020    CHLORIDE 100 09/18/2020    CO2 27 09/18/2020    ANIONGAP 5 09/18/2020     (H) 09/18/2020    BUN 19 09/18/2020    CR " 0.81 09/18/2020    GFRESTIMATED 85 09/18/2020    GFRESTBLACK >90 09/18/2020    HE 8.9 09/18/2020        A1C RESULTS:  No results found for: A1C    INR RESULTS:  Lab Results   Component Value Date    INR 1.25 (H) 09/17/2020    INR 1.94 (H) 08/16/2017           CC  Igor Christina MD  [address] [place] [id number]                    Thank you for allowing me to participate in the care of your patient.      Sincerely,     ERIKA Guerra [facility]    cc:   Igor Christina MD  [address] [place] [id number]

## 2020-12-08 ENCOUNTER — TELEPHONE (OUTPATIENT)
Dept: CARDIOLOGY | Facility: CLINIC | Age: 80
End: 2020-12-08

## 2020-12-09 NOTE — TELEPHONE ENCOUNTER
Dr. Aj,    Please advise on QTc greater than 500 ms while on amiodarone and pursing a work up for a watchman.       Reji Aguirre has been on amiodarone since 2016 with no recurrence of atrial fibrillation while taking amiodarone 200 mg 5x/week.   He was seen this week and his ECG revealed sinus rhythm with LBBB and ventricular rate of 66 bpm,  ms, QT/QTc 494/504 ms.  His QTc has slowly increased to above 500 ms.      In 2019, he fell and had a concussion.  In 9/2020, he fell and suffered a subdural hematoma and T4 anterior syndesmophyte fracture.  He is currently taking Xarelto 20 mg daily.       Plan:    Decrease amiodarone from 200 mg 5x/week to 100 mg 5x/week and follow up with you in 1 month for a repeat ECG and to discuss watchman (as 2 MDs are needed).

## 2020-12-09 NOTE — TELEPHONE ENCOUNTER
He has a wide LBBB, I believe his current QTc is acceptable.    Would not decrease amio by 50% (I have personally never seen it work well at 500 mg/week...).  If your goal is to decrease the dose as much as possible to minimize extracardiac SE, try 200 mg 4 d/week.  But I don't think it needs to be decreased d/t QTc concerns.    Referral for Watchman is reasonable.  [id number] is 3 and he is unstable/falling...    DI

## 2020-12-09 NOTE — TELEPHONE ENCOUNTER
Will ask RNs to call pt to set up an appointment with pt for watchman.  He is seen by Dr. Aj and Dr. Trever Castro, APRN CNP on 12/9/2020 at 3:33 PM

## 2020-12-14 DIAGNOSIS — I77.9 BILATERAL CAROTID ARTERY DISEASE, UNSPECIFIED TYPE (H): ICD-10-CM

## 2020-12-14 RX ORDER — ROSUVASTATIN CALCIUM 10 MG/1
TABLET, COATED ORAL
Qty: 90 TABLET | Refills: 0 | Status: SHIPPED | OUTPATIENT
Start: 2020-12-14 | End: 2021-04-02

## 2020-12-15 NOTE — PROGRESS NOTES
Subjective:  HPI  Physical Exam                    Objective:  System    Physical Exam    General     ROS    Assessment/Plan:    PROGRESS  REPORT    Progress reporting period is from 8-27-20 to 9-15-20 5 visits, 6th visit 12-3-20.       SUBJECTIVE   Patient returns after 3 months.  After last visit had fall 9-17-20 in which he suffered a scalp laceration, subdural hematoma and T4 fracture and was hospitalized.  Patient has been wearing TLSO hard brace until recent recheck with MD who told him to wean from over 1-2 weeks.  Told patient no restrictions, OK to return to work as .  Did not give lifting restrictions.  New MD order 11-3-20 for L LBP.  Patient has plans to return to work 1-2 days/week.  Overall doing well.  LBP is not intermittent 0-1/10 right LB.  Has been able to get sox/shoes on with minimal pain.  Has been walking 30' 3x/week on level surfaces, once up to 60' with fatigue, no increase pain.  Standing tolerance >20'.  Sleep not disturbed.  Would like to know how to progress with treatment started for LB prior to fall.     Current Pain level: 1/10.     Initial Pain level: 8/10.   Changes in function:  Yes (See Goal flowsheet attached for changes in current functional level)    OBJECTIVE  Arrived without brace.    Ambulating without AD, minimal trunk rotation.    Good sitting posture without cuing.    Lumbar extension 33%, flexion WNL without sxs.       ASSESSMENT/PLAN  Updated problem list and treatment plan: Diagnosis 1:  R LB/hip pain    Pain -  self management, education and home program  Decreased ROM/flexibility - therapeutic exercise and home program  Decreased function - therapeutic activities and home program  STG/LTGs have been met or progress has been made towards goals:  Yes (See Goal flow sheet completed today.)  Assessment of Progress: The patient's condition has potential to improve.  Self Management Plans:  Patient has been instructed in a home treatment program.  I have  re-evaluated this patient and find that the nature, scope, duration and intensity of the therapy is appropriate for the medical condition of the patient.  Reji continues to require the following intervention to meet STG and LTG's:  PT    Recommendations:  Patient returns after 3-month absence due to significant injury of fall with subdural hematoma and T4 fracture.  He is doing well in regards to LBP with minimal pain as he has been in a TLSO brace and would like to progress with strength and HEP to manage his LBP.  Plan to continue PT 2 visits/month once daily for 1 month to progress HEP as able.     Please refer to the daily flowsheet for treatment today, total treatment time and time spent performing 1:1 timed codes.

## 2020-12-31 DIAGNOSIS — I48.91 ATRIAL FIBRILLATION, UNSPECIFIED TYPE (H): ICD-10-CM

## 2020-12-31 RX ORDER — AMIODARONE HYDROCHLORIDE 200 MG/1
200 TABLET ORAL
Qty: 90 TABLET | Refills: 0 | Status: SHIPPED | OUTPATIENT
Start: 2020-12-31 | End: 2021-02-09

## 2020-12-31 NOTE — TELEPHONE ENCOUNTER
Prescription(s) sent electronically to specified pharmacy.    Reviewed last cardiology visit 12/7/20

## 2021-01-04 ENCOUNTER — VIRTUAL VISIT (OUTPATIENT)
Dept: INTERNAL MEDICINE | Facility: CLINIC | Age: 81
End: 2021-01-04
Payer: MEDICARE

## 2021-01-04 DIAGNOSIS — S06.5XAA SUBDURAL HEMATOMA (H): ICD-10-CM

## 2021-01-04 DIAGNOSIS — I48.19 PERSISTENT ATRIAL FIBRILLATION (H): ICD-10-CM

## 2021-01-04 DIAGNOSIS — E26.09 OTHER PRIMARY HYPERALDOSTERONISM (H): ICD-10-CM

## 2021-01-04 DIAGNOSIS — I10 BENIGN ESSENTIAL HYPERTENSION: ICD-10-CM

## 2021-01-04 DIAGNOSIS — I77.9 BILATERAL CAROTID ARTERY DISEASE, UNSPECIFIED TYPE (H): ICD-10-CM

## 2021-01-04 DIAGNOSIS — M25.552 HIP PAIN, LEFT: Primary | ICD-10-CM

## 2021-01-04 DIAGNOSIS — I42.9 CARDIOMYOPATHY, UNSPECIFIED TYPE (H): ICD-10-CM

## 2021-01-04 PROCEDURE — 99442 PR PHYSICIAN TELEPHONE EVALUATION 11-20 MIN: CPT | Mod: 95 | Performed by: INTERNAL MEDICINE

## 2021-01-04 RX ORDER — EPLERENONE 25 MG/1
25 TABLET, FILM COATED ORAL 2 TIMES DAILY
Qty: 180 TABLET | Refills: 1 | Status: SHIPPED | OUTPATIENT
Start: 2021-01-04 | End: 2021-07-09

## 2021-01-04 NOTE — PROGRESS NOTES
Reji Aguirre is a 80 year old male who is being evaluated via a billable telephone visit.      What phone number would you like to be contacted at? [phone]  How would you like to obtain your AVS? Nikolay      (W56.327) Hip pain, left  (primary encounter diagnosis)  Comment: sounds like hip flxoer pain on the elft side now.   Referral to PT  Plan: [facility]OPRA[facility] REFERRAL            (I48.19) Persistent atrial fibrillation (H)  Comment: This condition is currently controlled on the current medical regimen.  Continue current therapy.   Follow up planned with EP  Being considered for watchman device, I tried to answer his questions about it.  Continue all medications at the same doses.  Contact your usual pharmacy if you need refills.   Plan:     (I42.9) Cardiomyopathy, unspecified type (H)  Comment: This condition is currently controlled on the current medical regimen.  Continue current therapy.   Prior tachycardiac induced cardiomytopathy resovled with normalization of haert rate and rhythm.   Plan:     (I77.9) Bilateral carotid artery disease, unspecified type (H)  Comment: Discussed secondary risk factor modification and recommended continuing aggressive management of these items.   Plan:     (E26.09) Other primary hyperaldosteronism (H)  Comment: This condition is currently controlled on the current medical regimen.  Continue current therapy.   Plan:     (S06.5X9A) Subdural hematoma (H)  Comment: resovled.   Plan:     (I10) Benign essential hypertension  Comment: he reports BP improved with increase of amlodipine.   Has f.u appt in Cardiology in a month, repeat BP there.   Plan: eplerenone (INSPRA) 25 MG tablet               Subjective     Reji Aguirre is a 80 year old male who presents to clinic today for the following health issues     HPI       Concern - Groin Pain   Onset: x2-3 weeks   Description: dull piercing pain on LT side of groin   Intensity: mild to moderate   Progression of  "Symptoms:  same and waxing and waning    Accompanying Signs & Symptoms: none  Previous history of similar problem: none  Precipitating factors:        Worsened by: sitting to standing    Alleviating factors:        Improved by: tylenol   Therapies tried and outcome: tylenol - reduced pain     The previous right hip and knee pain has resolved with PT    Now having pain in left groin area.   Pain present when sitting and getting up out of chair to walk, or when he lifts leg to try and put on shoes or sock.    First few steps can be 6-8/10, then rapidly goes to 2/10 pain  No new injuries.  Recovering from subdural hematoma, and thoracic fracture.      2. Has history of atrial fibrillation.    No palpitations, no dizziness, no dyspnea on exertion, no shortness of breath.  No racing heart, no fast heart rates.    Taking medications as ordered, no side effects reported.   Patient is taking anticoagulation according to direction, with no reported side effects.  Being considered for Watchman.     3.  Prior Tachycardia-induced cardiomyopathy.  Normalization of EF after sinus rhythm maintenance.     4.  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  Was treated at [facility].  Recent Amlodipine dose increased 12/7/20    5.         Diagnostics:  ECHO (3/2019) revealed EF 55-60%, mildly dilated left atrium with mild PHTN      **I reviewed the information recorded in the patient's EPIC chart (including but not limited to medical history, surgical history, family history, problem list, medication list, and allergy list) and updated the information as indicated based on the patients reported information.         Review of Systems   Constitutional, HEENT, cardiovascular, pulmonary, gi and gu systems are negative, except as otherwise noted.      Objective    Vitals - Patient Reported  Weight (Patient Reported): 88.5 kg (195 lb)  Height (Patient Reported): 172.7 cm (5' 8\")  BMI (Based on Pt Reported Ht/Wt): " 29.65      Vitals:  No vitals were obtained today due to virtual visit.    Physical Exam   healthy, alert and no distress  PSYCH: Alert and oriented times 3; coherent speech, normal   rate and volume, able to articulate logical thoughts, able   to abstract reason, no tangential thoughts, no hallucinations   or delusions  His affect is normal  RESP: No cough, no audible wheezing, able to talk in full sentences  Remainder of exam unable to be completed due to telephone visits                Phone call duration: 15:01 minutes

## 2021-01-05 ENCOUNTER — THERAPY VISIT (OUTPATIENT)
Dept: PHYSICAL THERAPY | Facility: CLINIC | Age: 81
End: 2021-01-05
Payer: MEDICARE

## 2021-01-05 DIAGNOSIS — M25.552 HIP PAIN, LEFT: ICD-10-CM

## 2021-01-05 PROCEDURE — 97110 THERAPEUTIC EXERCISES: CPT | Mod: GP | Performed by: PHYSICAL THERAPIST

## 2021-01-05 PROCEDURE — 97161 PT EVAL LOW COMPLEX 20 MIN: CPT | Mod: GP | Performed by: PHYSICAL THERAPIST

## 2021-01-05 ASSESSMENT — ACTIVITIES OF DAILY LIVING (ADL)
TWISTING/PIVOTING_ON_INVOLVED_LEG: MODERATE DIFFICULTY
WALKING_APPROXIMATELY_10_MINUTES: MODERATE DIFFICULTY
STEPPING_UP_AND_DOWN_CURBS: SLIGHT DIFFICULTY
GOING_DOWN_1_FLIGHT_OF_STAIRS: SLIGHT DIFFICULTY
STANDING_FOR_15_MINUTES: SLIGHT DIFFICULTY
HOS_ADL_ITEM_SCORE_TOTAL: 38
HOS_ADL_SCORE(%): 59.38
WALKING_15_MINUTES_OR_GREATER: MODERATE DIFFICULTY
WALKING_INITIALLY: EXTREME DIFFICULTY
WALKING_UP_STEEP_HILLS: SLIGHT DIFFICULTY
GOING_UP_1_FLIGHT_OF_STAIRS: SLIGHT DIFFICULTY
RECREATIONAL_ACTIVITIES: MODERATE DIFFICULTY
HEAVY_WORK: MODERATE DIFFICULTY
GETTING_INTO_AND_OUT_OF_A_BATHTUB: MODERATE DIFFICULTY
LIGHT_TO_MODERATE_WORK: MODERATE DIFFICULTY
HOS_ADL_COUNT: 16
ROLLING_OVER_IN_BED: SLIGHT DIFFICULTY
WALKING_DOWN_STEEP_HILLS: SLIGHT DIFFICULTY
HOW_WOULD_YOU_RATE_YOUR_CURRENT_LEVEL_OF_FUNCTION_DURING_YOUR_USUAL_ACTIVITIES_OF_DAILY_LIVING_FROM_0_TO_100_WITH_100_BEING_YOUR_LEVEL_OF_FUNCTION_PRIOR_TO_YOUR_HIP_PROBLEM_AND_0_BEING_THE_INABILITY_TO_PERFORM_ANY_OF_YOUR_USUAL_DAILY_ACTIVITIES?: 35
HOS_ADL_HIGHEST_POTENTIAL_SCORE: 64
GETTING_INTO_AND_OUT_OF_AN_AVERAGE_CAR: MODERATE DIFFICULTY

## 2021-01-05 NOTE — PROGRESS NOTES
"[facility] Initial Evaluation  Subjective:  The history is provided by the patient. No  was used.   Therapist Generated HPI Evaluation  Problem details: Patient was seen in this clinic fall 2020 for right LB and right LE pain, then suffered a fall on his stairs and had a subdural hematoma and T4 fracture, was hospitalized and wore a TLSO for 3 months.  At recheck visit here first week in December 2020 he was doing well with minimal intermittent pain right LB, and today states he is no longer having. Today patient presents with PT orders dated 1-4-21 for left hip pain.  He states he had insidious onset anterior left hip pain 2.5 to 3 weeks ago.  Pain is intermittent, ranges 0-4/10, describes as \"sharp\".  He did have an  Incident recently while pushing a full [employer] storage tote forward onto a low shelf he felt pain left LB and a few minutes pain left lateral and anterior hip, but denies LBP otherwise.  Prior to onset of this pain he was walking 3x/week, 60 minutes, 30 minutes and 30 minutes but has stopped due to the left anterior hip pain.  Current left anterior hip symptoms are present with each step of the left leg, flexing the hip in standing or sitting, getting socks on (both sides, left>right) in sitting, going up the stairs with the left LE.  Symptoms decrease with stopping the painful activity.  Patient does not have pain during the night when walking to the bathroom, otherwise is always present with each left step during the day.  Patient estimates he spends 80% of his day sitting, uses a lumbar roll and has been less active.    .                     Pain is the same all the time.  Since onset symptoms are unchanged.         Barriers include:  Stairs.    Patient Health History           General health as reported by patient is excellent.  Pertinent medical history includes: concussions/dizziness.   Red flags:  None as reported by patient.  Medical allergies: none. "   Surgeries include:  Orthopedic surgery. Other surgery history details: left knee.    Current medications:  High blood pressure medication.    Current occupation is Retired - teacher.                                       Objective:    Gait:  Decreased step length, flat-footed gait vs heel/toe  Assistive Devices:  Cane                 Lumbar/SI Evaluation  ROM:    AROM Lumbar:   Flexion:        WNL   Ext:                    33%   Side Bend:        Left:     Right:   Rotation:           Left:     Right:   Side Glide:        Left:     Right:                 Lumbar Dermtomes:  Lumbar dermatomes: decreased sensation to L5 on the left.                Neural Tension/Mobility:      Left side:Slump  negative.     Right side:   Slump  negative.                                                        General     ROS  AROM bilateral knee flexion WNL, hip AROM WFL/equal bilaterally, except for left hip ER mod restricted.     MMT:  4+/5 bilateral hips and knees, ankle DF 4+/5.      Symptoms prior to test movements:  Pain left anterior hip with each step of the left LE up to 4/10  SHAWN with hips against counter:  Decrease pain left anterior hip with walking; further reps, further decrease to 1/10    Trial rep left hip extension in step standing, difficult to isolate hip:  Increase pain left anterior hip to 8/10 with initial steps of the left leg with ambulation    Assessment/Plan:    Patient is a 80 year old male with left side hip complaints. Due to recent issue with lumbar spine, sudden onset of left hip pain with no injury and affect on symptoms after repeated lumbar motion, suspect lumbar spine as source of current left anterior hip pain.  Patient is to do trial of increasing frequency of SHAWN exercise, continue using lumbar roll when sitting but to interrupt sitting more often and slowly restart walking program.  Will reassess in 1 week.   Patient has the following significant findings with corresponding treatment plan.                 Diagnosis 1:  Left hip pain     Pain -  self management, education and home program  Decreased ROM/flexibility - therapeutic exercise and home program  Impaired gait - assistive devices and home program  Decreased function - therapeutic activities and home program    Cumulative Therapy Evaluation is: Low complexity.    Previous and current functional limitations:  (See Goal Flow Sheet for this information)    Short term and Long term goals: (See Goal Flow Sheet for this information)     Communication ability:  Patient appears to be able to clearly communicate and understand verbal and written communication and follow directions correctly.  Treatment Explanation - The following has been discussed with the patient:   RX ordered/plan of care  Anticipated outcomes  Possible risks and side effects  This patient would benefit from PT intervention to resume normal activities.   Rehab potential is good.    Frequency:  1 X week, once daily  Duration:  for 6 weeks  Discharge Plan:  Achieve all LTG.  Independent in home treatment program.  Reach maximal therapeutic benefit.    Please refer to the daily flowsheet for treatment today, total treatment time and time spent performing 1:1 timed codes.

## 2021-01-05 NOTE — LETTER
"DEPARTMENT OF HEALTH AND HUMAN SERVICES  CENTERS FOR MEDICARE & MEDICAID SERVICES    PLAN/UPDATED PLAN OF PROGRESS FOR OUTPATIENT REHABILITATION       PATIENTS NAME:  Reji Aguirre   : 1940  PROVIDER NUMBER:    [id number]  HICN:  [id number]   PROVIDER NAME: [facility]  MEDICAL RECORD NUMBER: [id number]   START OF CARE DATE:  SOC Date: 21   TYPE:  PT  PRIMARY/TREATMENT DIAGNOSIS: (Pertinent Medical Diagnosis)  Hip pain, left    VISITS FROM START OF CARE:  Rxs Used: 1     [facility] Initial Evaluation  Therapist Generated HPI Evaluation  Problem details: Patient was seen in this clinic 2020 for right LB and right LE pain, then suffered a fall on his stairs and had a subdural hematoma and T4 fracture, was hospitalized and wore a TLSO for 3 months.  At recheck visit here first week in 2020 he was doing well with minimal intermittent pain right LB, and today states he is no longer having. Today patient presents with PT orders dated 21 for left hip pain.  He states he had insidious onset anterior left hip pain 2.5 to 3 weeks ago.  Pain is intermittent, ranges 0-4/10, describes as \"sharp\".  He did have an  Incident recently while pushing a full [employer] storage tote forward onto a low shelf he felt pain left LB and a few minutes pain left lateral and anterior hip, but denies LBP otherwise.  Prior to onset of this pain he was walking 3x/week, 60 minutes, 30 minutes and 30 minutes but has stopped due to the left anterior hip pain.  Current left anterior hip symptoms are present with each step of the left leg, flexing the hip in standing or sitting, getting socks on (both sides, left>right) in sitting, going up the stairs with the left LE.  Symptoms decrease with stopping the painful activity.  Patient does not have pain during the night when walking to the bathroom, otherwise is always present with each left step " during the day.  Patient estimates he spends 80% of his day sitting, uses a lumbar roll and has been less active.    .         Pain is the same all the time.  Since onset symptoms are unchanged.  Barriers include:  Stairs.    Patient Health History  General health as reported by patient is excellent.  Pertinent medical history includes: concussions/dizziness.   Red flags:  None as reported by patient.  Medical allergies: none.   Surgeries include:  Orthopedic surgery. Other surgery history details: left knee.    Current medications:  High blood pressure medication.    Current occupation is Retired - teacher.     Objective:  Gait:  Decreased step length, flat-footed gait vs heel/toe  Assistive Devices:  Cane  Lumbar/SI Evaluation  ROM:    AROM Lumbar:   Flexion:        WNL   Ext:                    33%   Side Bend:        Left:     Right:   Rotation:           Left:     Right:   Side Glide:        Left:     Right:   Lumbar Dermtomes:  Lumbar dermatomes: decreased sensation to L5 on the left.  Neural Tension/Mobility:    Left side:Slump  negative.   Right side:   Slump  negative.     ROS  AROM bilateral knee flexion WNL, hip AROM WFL/equal bilaterally, except for left hip ER mod restricted.   MMT:  4+/5 bilateral hips and knees, ankle DF 4+/5.    Symptoms prior to test movements:  Pain left anterior hip with each step of the left LE up to 4/10  SHAWN with hips against counter:  Decrease pain left anterior hip with walking; further reps, further decrease to 1/10  Trial rep left hip extension in step standing, difficult to isolate hip:  Increase pain left anterior hip to 8/10 with initial steps of the left leg with ambulation    Assessment/Plan:    Patient is a 80 year old male with left side hip complaints. Due to recent issue with lumbar spine, sudden onset of left hip pain with no injury and affect on symptoms after repeated lumbar motion, suspect lumbar spine as source of current left anterior hip pain. Patient is to  "do trial of increasing frequency of SHAWN exercise, continue using lumbar roll when sitting but to interrupt sitting more often and slowly restart walking program.  Will reassess in 1 week.   Patient has the following significant findings with corresponding treatment plan.                Diagnosis 1:  Left hip pain   Pain -  self management, education and home program  Decreased ROM/flexibility - therapeutic exercise and home program  Impaired gait - assistive devices and home program  Decreased function - therapeutic activities and home program    Cumulative Therapy Evaluation is: Low complexity.  Previous and current functional limitations:  (See Goal Flow Sheet for this information)    Short term and Long term goals: (See Goal Flow Sheet for this information)   Communication ability:  Patient appears to be able to clearly communicate and understand verbal and written communication and follow directions correctly.  Treatment Explanation - The following has been discussed with the patient:   RX ordered/plan of care  Anticipated outcomes  Possible risks and side effects  This patient would benefit from PT intervention to resume normal activities.   Rehab potential is good.  Frequency:  1 X week, once daily  Duration:  for 6 weeks  Discharge Plan:  Achieve all LTG.  Independent in home treatment program.  Reach maximal therapeutic benefit.    Caregiver Signature/Credentials _____________________________ Date ________       Treating Provider: Zhanna Mancera PT    I have reviewed and certified the need for these services and plan of treatment while under my care.        PHYSICIAN'S SIGNATURE:   _________________________  Date___________                         Igor Christina MD    Certification period:  Beginning of Cert date period: 01/05/21 to  End of Cert period date: 04/04/21     Functional Level Progress Report: Please see attached \"Goal Flow sheet for Functional level.\"    ____X____ Continue Services or    "    ________ DC Services                Service dates: From  SOC Date: 01/05/21 date to present

## 2021-01-06 ENCOUNTER — TELEPHONE (OUTPATIENT)
Dept: CARDIOLOGY | Facility: CLINIC | Age: 81
End: 2021-01-06

## 2021-01-06 ENCOUNTER — OFFICE VISIT (OUTPATIENT)
Dept: CARDIOLOGY | Facility: CLINIC | Age: 81
End: 2021-01-06
Attending: NURSE PRACTITIONER
Payer: MEDICARE

## 2021-01-06 VITALS
SYSTOLIC BLOOD PRESSURE: 135 MMHG | HEIGHT: 68 IN | BODY MASS INDEX: 30.46 KG/M2 | DIASTOLIC BLOOD PRESSURE: 76 MMHG | HEART RATE: 76 BPM | WEIGHT: 201 LBS

## 2021-01-06 DIAGNOSIS — I48.91 ATRIAL FIBRILLATION, UNSPECIFIED TYPE (H): ICD-10-CM

## 2021-01-06 DIAGNOSIS — Z79.899 ON AMIODARONE THERAPY: ICD-10-CM

## 2021-01-06 DIAGNOSIS — I10 BENIGN ESSENTIAL HYPERTENSION: Primary | ICD-10-CM

## 2021-01-06 PROBLEM — M25.552 HIP PAIN, LEFT: Status: ACTIVE | Noted: 2021-01-06

## 2021-01-06 PROCEDURE — 99214 OFFICE O/P EST MOD 30 MIN: CPT | Performed by: NURSE PRACTITIONER

## 2021-01-06 ASSESSMENT — MIFFLIN-ST. JEOR: SCORE: 1601.23

## 2021-01-06 NOTE — PATIENT INSTRUCTIONS
I will discuss watchman with Dr. Aj.  I will have  RNs (Shira Stafford, & Marcelle) call you to set up an appointment.  However you can always call them at [phone]

## 2021-01-06 NOTE — LETTER
1/6/2021    Igor Christina MD  [address] [facility] [address]    RE: Reji Aguirre       Dear Colleague,    I had the pleasure of seeing Reji Aguirre in the [facility].    HPI:  Reji Aguirre is a 80 year old male who who presents to clinic today for hypertension.   He is a patient of Dr. Aj and has a medical history of     1. persistent atrial fibrillation.  Treated with amiodarone since 2016 and has maintained sinus rhythm.   2. Tachycardia-induced cardiomyopathy.  Normalization of EF after sinus rhythm maintenance.   3. 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  Was treated at [facility].    4. Left bundle branch block.   5. Concussion (2019)   6. Subdural hematoma (2020)  7. Primary hyperaldosteronism    Diagnostics:  ECHO (3/2019) revealed EF 55-60%, mildly dilated left atrium with mild PHTN        In 2016, he had paroxysmal atrial flutter and fibrillation and an EF of 30%-40%.  He underwent a successful DCCV (7/2016). Stress test (5/2018) showed no evidence of ischemia.  He was placed on amiodarone.  Due to resistant HTN he went to [facility] and was started on eplerenone as they thought he may have hyperaldosteronism. He was noted to have a HR in the 45-60 bpm     In 2019, he saw Dr. Aj and was bradycardic and Bystolic was stopped and amlodipine started. A 24 hour monitor later showed Average HR 56 bpm while on amiodarone 100 mg 5x per week.      In 9/2020 he had a mechanical fall and was found to have a subdural hematoma and T4 anterior syndesmophyte fracture and was recommended to resume normal activities.      On 12/2020, he was seen with no recurrence of atrial fibrillation while taking amiodarone and his blood pressure was elevated while taking amlodipine hydrochlorothiazide and eplerenone.  His amlodipine was increased to 5 mg twice daily.  And recommended he speak with Dr. Aj regarding a possible watchman  implantation    Today he reports feeling well.  His blood pressure at home is well controlled 130-140 mmHg.  He has not fallen since he was last seen.   Overall he denies chest pain or pressure, dizziness, syncope, angina, dyspnea at rest or with exertion, palpitations, orthopnea, PND, or edema.  He states he takes his medications as prescribed including his Xarelto and denies bleeding, hematuria, hematochezia, epistaxis and signs/symptoms of stroke.         ASSESSMENT AND PLAN     Symptomatic Paroxysmal Atrial fibrillation    Pt is symptomatic while in atrial fibrillation.    For rhythm control he is taking amiodarone 200 mg 5 day per week.   With no occurrence of atrial fibrillation.      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  Last hgb was 14.4 (9/2020). He has had 2 falls in the past 2 years.   Dr. Aj approved considering a watchman and will set up a visit to see Dr. Aj to further discuss and then he will refer to the implanting physician.       In the December visit patient was given a brochure and he has discussed watchman placement with his PCP during the 1/4/2021 visit.     Hypertension    Controlled while taking amlodipine, eplerenone, hydrochlorothiazide       amiodarone therapy    TSH 0.67 (6/2020)    BMP normal (9/2020)    ALT/AST=63/45 (6/20/2020)    Chest x-ray was normal (6/2020)      Plan:    See Dr. Aj regarding watchman implantation    Continue with current medications    Follow-up with amiodarone labs    30 minutes spent on the date of the encounter doing chart review, patient visit and documentation         Patient expresses understanding and agreement with the plan.     I appreciate the chance to help with Reji Aguirre Please let me know if you have any questions or concerns.    Sherlyn Castro, APRN, CNP    This note was completed in part using Dragon voice recognition software. Although reviewed after completion, some word and  grammatical errors may occur.    Orders Placed This Encounter   Procedures     TSH with free T4 reflex     Comprehensive metabolic panel     Follow-Up with Electrophysiologist     Follow-Up with Electrophysiologist     No orders of the defined types were placed in this encounter.    There are no discontinued medications.      Encounter Diagnoses   Name Primary?     Benign essential hypertension Yes     Atrial fibrillation, unspecified type (H)      On amiodarone therapy        CURRENT MEDICATIONS:  Current Outpatient Medications   Medication Sig Dispense Refill     Acetaminophen (TYLENOL PO) Take 500-1,000 mg by mouth every 8 hours as needed for mild pain or fever        allopurinol (ZYLOPRIM) 300 MG tablet TAKE 1 TABLET BY MOUTH ONCE DAILY (Patient taking differently: Take 300 mg by mouth daily ) 90 tablet 3     amiodarone (PACERONE) 200 MG tablet Take 1 tablet (200 mg) by mouth five times a week TAKE 1 TABLET BY MOUTH ONCE DAILY Monday THROUGH FRIDAY;  SKIP SATURDAY AND SUNDAY 90 tablet 0     amLODIPine (NORVASC) 5 MG tablet Take 1 tablet (5 mg) by mouth 2 times daily 180 tablet 3     azelaic acid (FINACIA) 15 % external gel Apply 0.5 inches topically 2 times daily       eplerenone (INSPRA) 25 MG tablet Take 1 tablet (25 mg) by mouth 2 times daily 180 tablet 1     hydrochlorothiazide (MICROZIDE) 12.5 MG capsule TAKE 2 CAPSULES BY MOUTH IN THE MORNING 180 capsule 3     rivaroxaban ANTICOAGULANT (XARELTO ANTICOAGULANT) 20 MG TABS tablet Take 1 tablet (20 mg) by mouth daily (with dinner) 90 tablet 1     rosuvastatin (CRESTOR) 10 MG tablet Take 1 tablet by mouth once daily 90 tablet 0     ASPIRIN NOT PRESCRIBED (INTENTIONAL) Please choose reason not prescribed, below 0 each      diphenhydrAMINE (BENADRYL) 25 MG capsule Take 25 mg by mouth nightly as needed for itching or allergies       doxylamine (UNISOM) 25 MG TABS tablet Take 25 mg by mouth At Bedtime       polyethylene glycol (MIRALAX) 17 g packet Take 17 g by  "mouth daily as needed for constipation (Patient not taking: Reported on 1/6/2021) 10 packet 0       ALLERGIES     Allergies   Allergen Reactions     Ampicillin      rash     Metoprolol      Mild fatigue with Toprol; mildly decreased exercise capacity     Spironolactone Nausea and Difficulty breathing     Vicodin [Hydrocodone-Acetaminophen] Nausea         Review of Systems:  Skin:  Positive for     Eyes:  Positive for glasses  ENT:  Negative    Respiratory:  Negative    Cardiovascular:  Negative    Gastroenterology: Negative    Genitourinary:  Negative    Musculoskeletal:  Positive for    Neurologic:  Negative    Psychiatric:  Negative    Heme/Lymph/Imm:  Positive for allergies  Endocrine:  Negative      Physical Exam:  Vitals: /76   Pulse 76   Ht 1.727 m (5' 8\")   Wt 91.2 kg (201 lb)   BMI 30.56 kg/m      Physical Exam   Constitutional: He is oriented to person, place, and time. He appears well-developed and well-nourished.   HENT:   Head: Normocephalic.   Cardiovascular: Normal rate and regular rhythm.   Pulmonary/Chest: Effort normal and breath sounds normal.   Abdominal: Soft.   Musculoskeletal: Normal range of motion.   Neurological: He is alert and oriented to person, place, and time.   Skin: Skin is warm and dry.   Psychiatric: He has a normal mood and affect. His behavior is normal. Judgment and thought content normal.   Slightly forgetful         Last Comprehensive Metabolic Panel:  Sodium   Date Value Ref Range Status   09/19/2020 133 133 - 144 mmol/L Final     Potassium   Date Value Ref Range Status   09/18/2020 3.7 3.4 - 5.3 mmol/L Final     Chloride   Date Value Ref Range Status   09/18/2020 100 94 - 109 mmol/L Final     Carbon Dioxide   Date Value Ref Range Status   09/18/2020 27 20 - 32 mmol/L Final     Anion Gap   Date Value Ref Range Status   09/18/2020 5 3 - 14 mmol/L Final     Glucose   Date Value Ref Range Status   09/18/2020 144 (H) 70 - 99 mg/dL Final     Urea Nitrogen   Date Value Ref " Range Status   09/18/2020 19 7 - 30 mg/dL Final     Creatinine   Date Value Ref Range Status   09/18/2020 0.81 0.66 - 1.25 mg/dL Final     GFR Estimate   Date Value Ref Range Status   09/18/2020 85 >60 mL/min/[1.73_m2] Final     Comment:     Non  GFR Calc  Starting 12/18/2018, serum creatinine based estimated GFR (eGFR) will be   calculated using the Chronic Kidney Disease Epidemiology Collaboration   (CKD-EPI) equation.       Calcium   Date Value Ref Range Status   09/18/2020 8.9 8.5 - 10.1 mg/dL Final       Lab Results   Component Value Date    WBC 12.3 09/19/2020     Lab Results   Component Value Date    RBC 4.43 09/18/2020     Lab Results   Component Value Date    HGB 14.4 09/18/2020     Lab Results   Component Value Date    HCT 43.0 09/18/2020     No components found for: MCT  Lab Results   Component Value Date    MCV 97 09/18/2020     Lab Results   Component Value Date    MCH 32.5 09/18/2020     Lab Results   Component Value Date    MCHC 33.5 09/18/2020     Lab Results   Component Value Date    RDW 14.4 09/18/2020     Lab Results   Component Value Date     09/18/2020         Thank you for allowing me to participate in the care of your patient.    Sincerely,     ERIKA Guerra [facility]

## 2021-01-06 NOTE — PROGRESS NOTES
HPI:  Reji Aguirre is a 80 year old male who who presents to clinic today for hypertension.   He is a patient of Dr. Aj and has a medical history of     1. persistent atrial fibrillation.  Treated with amiodarone since 2016 and has maintained sinus rhythm.   2. Tachycardia-induced cardiomyopathy.  Normalization of EF after sinus rhythm maintenance.   3. 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  Was treated at [facility].    4. Left bundle branch block.   5. Concussion (2019)   6. Subdural hematoma (2020)  7. Primary hyperaldosteronism    Diagnostics:  ECHO (3/2019) revealed EF 55-60%, mildly dilated left atrium with mild PHTN        In 2016, he had paroxysmal atrial flutter and fibrillation and an EF of 30%-40%.  He underwent a successful DCCV (7/2016). Stress test (5/2018) showed no evidence of ischemia.  He was placed on amiodarone.  Due to resistant HTN he went to [facility] and was started on eplerenone as they thought he may have hyperaldosteronism. He was noted to have a HR in the 45-60 bpm     In 2019, he saw Dr. Aj and was bradycardic and Bystolic was stopped and amlodipine started. A 24 hour monitor later showed Average HR 56 bpm while on amiodarone 100 mg 5x per week.      In 9/2020 he had a mechanical fall and was found to have a subdural hematoma and T4 anterior syndesmophyte fracture and was recommended to resume normal activities.      On 12/2020, he was seen with no recurrence of atrial fibrillation while taking amiodarone and his blood pressure was elevated while taking amlodipine hydrochlorothiazide and eplerenone.  His amlodipine was increased to 5 mg twice daily.  And recommended he speak with Dr. Aj regarding a possible watchman implantation    Today he reports feeling well.  His blood pressure at home is well controlled 130-140 mmHg.  He has not fallen since he was last seen.   Overall he denies chest pain or pressure, dizziness, syncope, angina, dyspnea  at rest or with exertion, palpitations, orthopnea, PND, or edema.  He states he takes his medications as prescribed including his Xarelto and denies bleeding, hematuria, hematochezia, epistaxis and signs/symptoms of stroke.         ASSESSMENT AND PLAN     Symptomatic Paroxysmal Atrial fibrillation    Pt is symptomatic while in atrial fibrillation.    For rhythm control he is taking amiodarone 200 mg 5 day per week.   With no occurrence of atrial fibrillation.      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  Last hgb was 14.4 (9/2020). He has had 2 falls in the past 2 years.   Dr. Aj approved considering a watchman and will set up a visit to see Dr. Aj to further discuss and then he will refer to the implanting physician.       In the December visit patient was given a brochure and he has discussed watchman placement with his PCP during the 1/4/2021 visit.     Hypertension    Controlled while taking amlodipine, eplerenone, hydrochlorothiazide       amiodarone therapy    TSH 0.67 (6/2020)    BMP normal (9/2020)    ALT/AST=63/45 (6/20/2020)    Chest x-ray was normal (6/2020)      Plan:    See Dr. Aj regarding watchman implantation    Continue with current medications    Follow-up with amiodarone labs    30 minutes spent on the date of the encounter doing chart review, patient visit and documentation         Patient expresses understanding and agreement with the plan.     I appreciate the chance to help with Reji Aguirre Please let me know if you have any questions or concerns.    Sherlyn Castro, APRN, CNP    This note was completed in part using Dragon voice recognition software. Although reviewed after completion, some word and grammatical errors may occur.    Orders Placed This Encounter   Procedures     TSH with free T4 reflex     Comprehensive metabolic panel     Follow-Up with Electrophysiologist     Follow-Up with Electrophysiologist     No orders of  the defined types were placed in this encounter.    There are no discontinued medications.      Encounter Diagnoses   Name Primary?     Benign essential hypertension Yes     Atrial fibrillation, unspecified type (H)      On amiodarone therapy        CURRENT MEDICATIONS:  Current Outpatient Medications   Medication Sig Dispense Refill     Acetaminophen (TYLENOL PO) Take 500-1,000 mg by mouth every 8 hours as needed for mild pain or fever        allopurinol (ZYLOPRIM) 300 MG tablet TAKE 1 TABLET BY MOUTH ONCE DAILY (Patient taking differently: Take 300 mg by mouth daily ) 90 tablet 3     amiodarone (PACERONE) 200 MG tablet Take 1 tablet (200 mg) by mouth five times a week TAKE 1 TABLET BY MOUTH ONCE DAILY Monday THROUGH FRIDAY;  SKIP SATURDAY AND SUNDAY 90 tablet 0     amLODIPine (NORVASC) 5 MG tablet Take 1 tablet (5 mg) by mouth 2 times daily 180 tablet 3     azelaic acid (FINACIA) 15 % external gel Apply 0.5 inches topically 2 times daily       eplerenone (INSPRA) 25 MG tablet Take 1 tablet (25 mg) by mouth 2 times daily 180 tablet 1     hydrochlorothiazide (MICROZIDE) 12.5 MG capsule TAKE 2 CAPSULES BY MOUTH IN THE MORNING 180 capsule 3     rivaroxaban ANTICOAGULANT (XARELTO ANTICOAGULANT) 20 MG TABS tablet Take 1 tablet (20 mg) by mouth daily (with dinner) 90 tablet 1     rosuvastatin (CRESTOR) 10 MG tablet Take 1 tablet by mouth once daily 90 tablet 0     ASPIRIN NOT PRESCRIBED (INTENTIONAL) Please choose reason not prescribed, below 0 each      diphenhydrAMINE (BENADRYL) 25 MG capsule Take 25 mg by mouth nightly as needed for itching or allergies       doxylamine (UNISOM) 25 MG TABS tablet Take 25 mg by mouth At Bedtime       polyethylene glycol (MIRALAX) 17 g packet Take 17 g by mouth daily as needed for constipation (Patient not taking: Reported on 1/6/2021) 10 packet 0       ALLERGIES     Allergies   Allergen Reactions     Ampicillin      rash     Metoprolol      Mild fatigue with Toprol; mildly decreased  "exercise capacity     Spironolactone Nausea and Difficulty breathing     Vicodin [Hydrocodone-Acetaminophen] Nausea         Review of Systems:  Skin:  Positive for     Eyes:  Positive for glasses  ENT:  Negative    Respiratory:  Negative    Cardiovascular:  Negative    Gastroenterology: Negative    Genitourinary:  Negative    Musculoskeletal:  Positive for    Neurologic:  Negative    Psychiatric:  Negative    Heme/Lymph/Imm:  Positive for allergies  Endocrine:  Negative      Physical Exam:  Vitals: /76   Pulse 76   Ht 1.727 m (5' 8\")   Wt 91.2 kg (201 lb)   BMI 30.56 kg/m      Physical Exam   Constitutional: He is oriented to person, place, and time. He appears well-developed and well-nourished.   HENT:   Head: Normocephalic.   Cardiovascular: Normal rate and regular rhythm.   Pulmonary/Chest: Effort normal and breath sounds normal.   Abdominal: Soft.   Musculoskeletal: Normal range of motion.   Neurological: He is alert and oriented to person, place, and time.   Skin: Skin is warm and dry.   Psychiatric: He has a normal mood and affect. His behavior is normal. Judgment and thought content normal.   Slightly forgetful         Last Comprehensive Metabolic Panel:  Sodium   Date Value Ref Range Status   09/19/2020 133 133 - 144 mmol/L Final     Potassium   Date Value Ref Range Status   09/18/2020 3.7 3.4 - 5.3 mmol/L Final     Chloride   Date Value Ref Range Status   09/18/2020 100 94 - 109 mmol/L Final     Carbon Dioxide   Date Value Ref Range Status   09/18/2020 27 20 - 32 mmol/L Final     Anion Gap   Date Value Ref Range Status   09/18/2020 5 3 - 14 mmol/L Final     Glucose   Date Value Ref Range Status   09/18/2020 144 (H) 70 - 99 mg/dL Final     Urea Nitrogen   Date Value Ref Range Status   09/18/2020 19 7 - 30 mg/dL Final     Creatinine   Date Value Ref Range Status   09/18/2020 0.81 0.66 - 1.25 mg/dL Final     GFR Estimate   Date Value Ref Range Status   09/18/2020 85 >60 mL/min/[1.73_m2] Final     " Comment:     Non  GFR Calc  Starting 12/18/2018, serum creatinine based estimated GFR (eGFR) will be   calculated using the Chronic Kidney Disease Epidemiology Collaboration   (CKD-EPI) equation.       Calcium   Date Value Ref Range Status   09/18/2020 8.9 8.5 - 10.1 mg/dL Final       Lab Results   Component Value Date    WBC 12.3 09/19/2020     Lab Results   Component Value Date    RBC 4.43 09/18/2020     Lab Results   Component Value Date    HGB 14.4 09/18/2020     Lab Results   Component Value Date    HCT 43.0 09/18/2020     No components found for: MCT  Lab Results   Component Value Date    MCV 97 09/18/2020     Lab Results   Component Value Date    MCH 32.5 09/18/2020     Lab Results   Component Value Date    MCHC 33.5 09/18/2020     Lab Results   Component Value Date    RDW 14.4 09/18/2020     Lab Results   Component Value Date     09/18/2020               CC  Sherlyn Castro, APRN CNP  [address]  [place] [id number]

## 2021-01-11 ENCOUNTER — THERAPY VISIT (OUTPATIENT)
Dept: PHYSICAL THERAPY | Facility: CLINIC | Age: 81
End: 2021-01-11
Attending: INTERNAL MEDICINE
Payer: MEDICARE

## 2021-01-11 DIAGNOSIS — M25.552 HIP PAIN, LEFT: ICD-10-CM

## 2021-01-11 PROCEDURE — 97110 THERAPEUTIC EXERCISES: CPT | Mod: GP | Performed by: PHYSICAL THERAPIST

## 2021-01-11 PROCEDURE — 97530 THERAPEUTIC ACTIVITIES: CPT | Mod: GP | Performed by: PHYSICAL THERAPIST

## 2021-01-18 ENCOUNTER — THERAPY VISIT (OUTPATIENT)
Dept: PHYSICAL THERAPY | Facility: CLINIC | Age: 81
End: 2021-01-18
Payer: MEDICARE

## 2021-01-18 DIAGNOSIS — M25.552 HIP PAIN, LEFT: ICD-10-CM

## 2021-01-18 PROCEDURE — 97110 THERAPEUTIC EXERCISES: CPT | Mod: GP | Performed by: PHYSICAL THERAPIST

## 2021-01-18 PROCEDURE — 97530 THERAPEUTIC ACTIVITIES: CPT | Mod: GP | Performed by: PHYSICAL THERAPIST

## 2021-01-25 ENCOUNTER — THERAPY VISIT (OUTPATIENT)
Dept: PHYSICAL THERAPY | Facility: CLINIC | Age: 81
End: 2021-01-25
Payer: MEDICARE

## 2021-01-25 DIAGNOSIS — M25.552 HIP PAIN, LEFT: ICD-10-CM

## 2021-01-25 PROCEDURE — 97110 THERAPEUTIC EXERCISES: CPT | Mod: GP | Performed by: PHYSICAL THERAPIST

## 2021-01-25 PROCEDURE — 97530 THERAPEUTIC ACTIVITIES: CPT | Mod: GP | Performed by: PHYSICAL THERAPIST

## 2021-01-25 NOTE — PROGRESS NOTES
Subjective:  HPI  Physical Exam                    Objective:  System    Physical Exam    General     ROS    Assessment/Plan:    SUBJECTIVE  Subjective changes as noted by pt:  Pt notes decreased pain and increased flexibility in the left hip     Current pain level: 1/10     Changes in function:  Pt notes increased ease with ambulation and rising from a chair     Adverse reaction to treatment or activity:  None    OBJECTIVE  Changes in objective findings:  Pt demonstrates improved flexibility in left hip flexors. Pt demonstrates improved stride length with ambulation.           ASSESSMENT  Reji continues to require intervention to meet STG and LTG's: PT  Patient's symptoms are resolving.  Response to therapy has shown an improvement in  pain level and flexibility  Progress made towards STG/LTG?  Yes,     PLAN  Current treatment program is being advanced to more complex exercises.    PTA/ATC plan:  N/A    Please refer to the daily flowsheet for treatment today, total treatment time and time spent performing 1:1 timed codes.

## 2021-01-28 ENCOUNTER — TELEPHONE (OUTPATIENT)
Dept: CARDIOLOGY | Facility: CLINIC | Age: 81
End: 2021-01-28

## 2021-01-28 DIAGNOSIS — Z79.899 ON AMIODARONE THERAPY: ICD-10-CM

## 2021-01-28 DIAGNOSIS — I48.91 ATRIAL FIBRILLATION, UNSPECIFIED TYPE (H): ICD-10-CM

## 2021-01-28 LAB
ALBUMIN SERPL-MCNC: 3.7 G/DL (ref 3.4–5)
ALP SERPL-CCNC: 85 U/L (ref 40–150)
ALT SERPL W P-5'-P-CCNC: 96 U/L (ref 0–70)
ANION GAP SERPL CALCULATED.3IONS-SCNC: 6 MMOL/L (ref 3–14)
AST SERPL W P-5'-P-CCNC: 67 U/L (ref 0–45)
BILIRUB SERPL-MCNC: 0.7 MG/DL (ref 0.2–1.3)
BUN SERPL-MCNC: 24 MG/DL (ref 7–30)
CALCIUM SERPL-MCNC: 9.5 MG/DL (ref 8.5–10.1)
CHLORIDE SERPL-SCNC: 104 MMOL/L (ref 94–109)
CO2 SERPL-SCNC: 27 MMOL/L (ref 20–32)
CREAT SERPL-MCNC: 1.06 MG/DL (ref 0.66–1.25)
GFR SERPL CREATININE-BSD FRML MDRD: 66 ML/MIN/{1.73_M2}
GLUCOSE SERPL-MCNC: 93 MG/DL (ref 70–99)
POTASSIUM SERPL-SCNC: 3.7 MMOL/L (ref 3.4–5.3)
PROT SERPL-MCNC: 7.4 G/DL (ref 6.8–8.8)
SODIUM SERPL-SCNC: 137 MMOL/L (ref 133–144)
TSH SERPL DL<=0.005 MIU/L-ACNC: 0.43 MU/L (ref 0.4–4)

## 2021-01-28 PROCEDURE — 80053 COMPREHEN METABOLIC PANEL: CPT | Performed by: NURSE PRACTITIONER

## 2021-01-28 PROCEDURE — 36415 COLL VENOUS BLD VENIPUNCTURE: CPT | Performed by: NURSE PRACTITIONER

## 2021-01-28 PROCEDURE — 84443 ASSAY THYROID STIM HORMONE: CPT | Performed by: NURSE PRACTITIONER

## 2021-01-28 NOTE — TELEPHONE ENCOUNTER
Pt called about his labs tests that were done yesterday.  Pt had CMP and TSH completed with abnormal AST and ALT. Pt is on Amiodarone 200 mg 5 days a week , takes very little Tylenol and Ibuprofen.  Pt is on Crestory, which this writer did not see until pt hung  Up. Pt will be seeing Dr Aj tomorrow and explained the labs will be discussed at that time. Gita

## 2021-01-29 ENCOUNTER — VIRTUAL VISIT (OUTPATIENT)
Dept: CARDIOLOGY | Facility: CLINIC | Age: 81
End: 2021-01-29
Payer: MEDICARE

## 2021-01-29 DIAGNOSIS — I44.7 LBBB (LEFT BUNDLE BRANCH BLOCK): ICD-10-CM

## 2021-01-29 DIAGNOSIS — I48.19 PERSISTENT ATRIAL FIBRILLATION (H): Primary | ICD-10-CM

## 2021-01-29 DIAGNOSIS — R79.89 ABNORMAL LFTS: ICD-10-CM

## 2021-01-29 PROCEDURE — 99214 OFFICE O/P EST MOD 30 MIN: CPT | Mod: 95 | Performed by: INTERNAL MEDICINE

## 2021-01-29 NOTE — PROGRESS NOTES
"Elijah is a 80 year old who is being evaluated via a billable video visit.      How would you like to obtain your AVS? MyChart  If the video visit is dropped, the invitation should be resent by: Text to cell phone: [phone] [place]  Will anyone else be joining your video visit? No      Vitals - Patient Reported  Systolic (Patient Reported): 139  Diastolic (Patient Reported): 75  Weight (Patient Reported): 88 kg (194 lb)  Height (Patient Reported): 172.7 cm (5' 8\")  BMI (Based on Pt Reported Ht/Wt): 29.5  Pulse (Patient Reported): 60    Review Of Systems  Skin: NEGATIVE  Eyes:Ears/Nose/Throat: NEGATIVE  Respiratory: NEGATIVE  Cardiovascular: NEGATIVE  Gastrointestinal: NEGATIVE  Genitourinary:NEGATIVE   Musculoskeletal: NEGATIVE  Neurologic: NEGATIVE  Psychiatric: NEGATIVE  Hematologic/Lymphatic/Immunologic: NEGATIVE  Endocrine:  NEGATIVE    Reviewed by NAMITA Blackwell, 1/29/21          PHYSICIAN NOTE:  This visit was completed via video due to COVID-19 precautions.  The patient provided consent for a video visit.      I had the pleasure evaluating Mr Elijah Aguirre for atrial fibrillation.      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  Persistent atrial fibrillation.  Treated with low-dose amiodarone since 2016 and has maintained sinus rhythm.  Currently on 200 mg 5 days/week.  [id number] score of 3.  B.  Tachycardia-induced cardiomyopathy.  Normalization of EF after sinus rhythm maintenance.  EF 55 to 60% in 03/2019.  C.  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  Treated at [facility].  Medications include eplerenone, Bystolic, hydrochlorothiazide, amlodipine.   D.  Left bundle branch block.   E.  Mild pulmonary hypertension.  F.  Concussion in 2019 with postconcussion syndrome and difficulties with some memory issues since then.     Elijah has been feeling well.  He has remained in sinus rhythm for the past 5 years.  He has tolerated amiodarone " well.  No bleeding issues on Xarelto, however he is concerned about instability, including falls that led to concussions in the recent past.  He expressed interest in discussing the Watchman device.    No chest pain with exertion, unusual dyspnea, syncope or near syncope.  He plans to spend the month of March in [facility].    PHYSICAL EXAMINATION:  General:  no apparent distress, normal body habitus.  ENT/Mouth:  no nasal discharge.  Normal head shape, no apparent injury or laceration.  Eyes:  No observed jaundice.  Neck:  no apparent neck swelling.   Chest/Lungs:  no breathing difficulty while speaking.   Cardiovascular:  reported HR is regular. No reported edema in LE.    Extremities:  no apparent cyanosis.  Skin:  no xanthelasma or apparent rash.  Neurologic:  normal arm motion, no tremor.    Psychiatric:  alert and oriented x3, calm demeanor.  The rest of the comprehensive physical examination is deferred due to public health emergency video visit restrictions.         DIAGNOSTIC STUDIES:  - Labs (1/28/2020): Sodium 137, potassium 3.7, creatinine 1.06, TSH 0.43, ALT 96, AST 67, TSH 0.43.  - Most recent ECG in 12/2020 shows sinus rhythm with LBBB.    IMPRESSION:  1. Persistent atrial fibrillation.  Doing well on low-dose amiodarone and rivaroxaban.  Bradycardia has improved after stopping Bystolic in 10/2019.  No respiratory symptoms, repeat chest x-ray in 6 months.  Thyroid function is okay.  2. History of cardiomyopathy.  LV function has normalized.  3. LBBB.  4. Mildly abnormal AST/ALT.  Unclear if related to amiodarone.  Asymptomatic.  Will repeat hepatic panel in 3 months.    RECOMMENDATIONS:  1. Repeat hepatic panel in 3 months.  2. Reasonable to consider a Watchman in this patient.    3. Cardiac CT with contrast to assess for watchman in early April.   4. Appointment with Dr. Truong after the cardiac CT.    It was my pleasure seeing this delightful gentleman.  Total time spent, including chart prep and review,  was 30 minutes.    Leonor Aj MD, [facility]        Video visit documentation  Start: 4:25 PM  End: 4:42 PM  Application:  Doximity  Patient location: Home  Provider location: [facility]

## 2021-01-29 NOTE — LETTER
"1/29/2021    Igor Christina MD  [address] [facility] [address]    RE: Reji Aguirre       Dear Colleague,    I had the pleasure of seeing Reji Aguirre in the [facility].    Elijah is a 80 year old who is being evaluated via a billable video visit.      How would you like to obtain your AVS? MyChart  If the video visit is dropped, the invitation should be resent by: Text to cell phone: [phone] [place]  Will anyone else be joining your video visit? No      Vitals - Patient Reported  Systolic (Patient Reported): 139  Diastolic (Patient Reported): 75  Weight (Patient Reported): 88 kg (194 lb)  Height (Patient Reported): 172.7 cm (5' 8\")  BMI (Based on Pt Reported Ht/Wt): 29.5  Pulse (Patient Reported): 60    Review Of Systems  Skin: NEGATIVE  Eyes:Ears/Nose/Throat: NEGATIVE  Respiratory: NEGATIVE  Cardiovascular: NEGATIVE  Gastrointestinal: NEGATIVE  Genitourinary:NEGATIVE   Musculoskeletal: NEGATIVE  Neurologic: NEGATIVE  Psychiatric: NEGATIVE  Hematologic/Lymphatic/Immunologic: NEGATIVE  Endocrine:  NEGATIVE    Reviewed by NAMITA Blackwell, 1/29/21      PHYSICIAN NOTE:  This visit was completed via video due to COVID-19 precautions.  The patient provided consent for a video visit.      I had the pleasure evaluating Mr Elijah Aguirre for atrial fibrillation.      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  Persistent atrial fibrillation.  Treated with low-dose amiodarone since 2016 and has maintained sinus rhythm.  Currently on 200 mg 5 days/week.  [id number] score of 3.  B.  Tachycardia-induced cardiomyopathy.  Normalization of EF after sinus rhythm maintenance.  EF 55 to 60% in 03/2019.  C.  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  Treated at [facility].  Medications include eplerenone, Bystolic, hydrochlorothiazide, amlodipine.   D.  Left bundle branch block.   E.  Mild pulmonary " hypertension.  F.  Concussion in 2019 with postconcussion syndrome and difficulties with some memory issues since then.     Elijah has been feeling well.  He has remained in sinus rhythm for the past 5 years.  He has tolerated amiodarone well.  No bleeding issues on Xarelto, however he is concerned about instability, including falls that led to concussions in the recent past.  He expressed interest in discussing the Watchman device.    No chest pain with exertion, unusual dyspnea, syncope or near syncope.  He plans to spend the month of March in [facility].    PHYSICAL EXAMINATION:  General:  no apparent distress, normal body habitus.  ENT/Mouth:  no nasal discharge.  Normal head shape, no apparent injury or laceration.  Eyes:  No observed jaundice.  Neck:  no apparent neck swelling.   Chest/Lungs:  no breathing difficulty while speaking.   Cardiovascular:  reported HR is regular. No reported edema in LE.    Extremities:  no apparent cyanosis.  Skin:  no xanthelasma or apparent rash.  Neurologic:  normal arm motion, no tremor.    Psychiatric:  alert and oriented x3, calm demeanor.  The rest of the comprehensive physical examination is deferred due to public health emergency video visit restrictions.         DIAGNOSTIC STUDIES:  - Labs (1/28/2020): Sodium 137, potassium 3.7, creatinine 1.06, TSH 0.43, ALT 96, AST 67, TSH 0.43.  - Most recent ECG in 12/2020 shows sinus rhythm with LBBB.    IMPRESSION:  1. Persistent atrial fibrillation.  Doing well on low-dose amiodarone and rivaroxaban.  Bradycardia has improved after stopping Bystolic in 10/2019.  No respiratory symptoms, repeat chest x-ray in 6 months.  Thyroid function is okay.  2. History of cardiomyopathy.  LV function has normalized.  3. LBBB.  4. Mildly abnormal AST/ALT.  Unclear if related to amiodarone.  Asymptomatic.  Will repeat hepatic panel in 3 months.    RECOMMENDATIONS:  1. Repeat hepatic panel in 3 months.  2. Reasonable to consider a Watchman in this  patient.    3. Cardiac CT with contrast to assess for watchman in early April.   4. Appointment with Dr. Truong after the cardiac CT.    It was my pleasure seeing this delightful gentleman.  Total time spent, including chart prep and review, was 30 minutes.    Leonor jA MD, [facility]        Video visit documentation  Start: 4:25 PM  End: 4:42 PM  Application:  Doximity  Patient location: Home  Provider location: [facility]        Thank you for allowing me to participate in the care of your patient.    Sincerely,     Leonor Aj MD     [facility]

## 2021-02-02 ENCOUNTER — IMMUNIZATION (OUTPATIENT)
Dept: NURSING | Facility: CLINIC | Age: 81
End: 2021-02-02
Payer: MEDICARE

## 2021-02-02 PROCEDURE — 91300 PR COVID VAC PFIZER DIL RECON 30 MCG/0.3 ML IM: CPT

## 2021-02-02 PROCEDURE — 0001A PR COVID VAC PFIZER DIL RECON 30 MCG/0.3 ML IM: CPT

## 2021-02-03 ENCOUNTER — NURSE TRIAGE (OUTPATIENT)
Dept: NURSING | Facility: CLINIC | Age: 81
End: 2021-02-03

## 2021-02-03 NOTE — TELEPHONE ENCOUNTER
RN Triage:    Calling for specific information about traveling to [facility] after getting the Covid-19 vaccine which triage nurse could not answer.  [facility] and MD were suggested as sources of information.    Keke Mistry RN 02/03/21 9:57 AM  [facility] Nurse Advisor

## 2021-02-04 ENCOUNTER — NURSE TRIAGE (OUTPATIENT)
Dept: NURSING | Facility: CLINIC | Age: 81
End: 2021-02-04

## 2021-02-04 NOTE — TELEPHONE ENCOUNTER
Pt wanted to know about Nucleic acid amplification tests (NAATs) testing in order to travel to [place]. Advised the patient to contact the [place] Dept of Health for more information.      Rocio Rutherford RN  [place] Nurse Advisor  10:32 AM  2/4/2021      Additional Information    General information question, no triage required and triager able to answer question    Protocols used: INFORMATION ONLY CALL-A-AH

## 2021-02-07 DIAGNOSIS — I48.91 ATRIAL FIBRILLATION, UNSPECIFIED TYPE (H): ICD-10-CM

## 2021-02-08 ENCOUNTER — THERAPY VISIT (OUTPATIENT)
Dept: PHYSICAL THERAPY | Facility: CLINIC | Age: 81
End: 2021-02-08
Payer: MEDICARE

## 2021-02-08 DIAGNOSIS — M25.552 HIP PAIN, LEFT: ICD-10-CM

## 2021-02-08 PROCEDURE — 97110 THERAPEUTIC EXERCISES: CPT | Mod: GP | Performed by: PHYSICAL THERAPIST

## 2021-02-08 PROCEDURE — 97530 THERAPEUTIC ACTIVITIES: CPT | Mod: GP | Performed by: PHYSICAL THERAPIST

## 2021-02-08 NOTE — PROGRESS NOTES
Subjective:  HPI  Physical Exam                    Objective:  System    Physical Exam    General     ROS    Assessment/Plan:    DISCHARGE REPORT    Progress reporting period is from 1-5-21 to 2-8-21.       SUBJECTIVE  Subjective changes noted by patient:  Pt notes decreased pain and increased mobility in the left hip. Pt is doing well with exercising 3x/week. .       Current pain level is 1/10  .     Previous pain level was  4/10  .   Changes in function:  Pt notes increased ease with ambulation, stairs and transfers.   Adverse reaction to treatment or activity: None    OBJECTIVE  Changes noted in objective findings:  Pt demonstrates improved flexibility left iliopsoas. MMT left hip flexors 4+/5 without pain.         ASSESSMENT/PLAN  Updated problem list and treatment plan: Diagnosis 1:      Pain -  hot/cold therapy, manual therapy, self management, education and home program  Decreased ROM/flexibility - manual therapy, therapeutic exercise, therapeutic activity and home program  Decreased strength - therapeutic exercise, therapeutic activities and home program  STG/LTGs have been met or progress has been made towards goals:  Yes,   Assessment of Progress: The patient's condition is improving.  Self Management Plans:  Patient has been instructed in a home treatment program.  I have re-evaluated this patient and find that the nature, scope, duration and intensity of the therapy is appropriate for the medical condition of the patient.  Reji continues to require the following intervention to meet STG and LTG's:  PT intervention is no longer required to meet STG/LTG.    Recommendations:  This patient is ready to be discharged from therapy and continue their home treatment program.    Please refer to the daily flowsheet for treatment today, total treatment time and time spent performing 1:1 timed codes.

## 2021-02-09 RX ORDER — AMIODARONE HYDROCHLORIDE 200 MG/1
TABLET ORAL
Qty: 90 TABLET | Refills: 0 | Status: SHIPPED | OUTPATIENT
Start: 2021-02-09 | End: 2021-05-06

## 2021-02-16 DIAGNOSIS — M10.9 GOUT, UNSPECIFIED CAUSE, UNSPECIFIED CHRONICITY, UNSPECIFIED SITE: ICD-10-CM

## 2021-02-16 RX ORDER — ALLOPURINOL 300 MG/1
TABLET ORAL
Qty: 90 TABLET | Refills: 3 | Status: SHIPPED | OUTPATIENT
Start: 2021-02-16 | End: 2021-02-19

## 2021-02-19 DIAGNOSIS — M10.9 GOUT, UNSPECIFIED CAUSE, UNSPECIFIED CHRONICITY, UNSPECIFIED SITE: ICD-10-CM

## 2021-02-19 RX ORDER — ALLOPURINOL 300 MG/1
1 TABLET ORAL DAILY
Qty: 90 TABLET | Refills: 3 | Status: SHIPPED | OUTPATIENT
Start: 2021-02-19 | End: 2022-02-23

## 2021-02-19 NOTE — TELEPHONE ENCOUNTER
Pt is calling.    [facility] is telling him that they did not get his RX for allopurinol. I advised him that it was sent on 02/16/2021, but that I would call the pharmacy and make sure they got it, and if not, call it in for him.     Bianca Riggs RN  [facility] Nurse Advisor  2/19/2021 at 11:53 AM

## 2021-02-20 ENCOUNTER — TELEPHONE (OUTPATIENT)
Dept: NURSING | Facility: CLINIC | Age: 81
End: 2021-02-20

## 2021-02-20 NOTE — TELEPHONE ENCOUNTER
Patient reports that he is leaving for vacation on Thursday for 30 days. He spoke with someone yesterday regarding his Allopurinol RX. New RX was sent to the pharmacy yesterday, patient reports that pharmacy had not received prescription.     Pharmacy called, verified that prescription was received. Insurance will cover medication at this time and pharmacy will get it ready for patient.    Patient called back and given information. He denies further questions or concerns at this time.    Francisca Lamar RN  [facility] Nurse Advisors

## 2021-02-22 ENCOUNTER — NURSE TRIAGE (OUTPATIENT)
Dept: NURSING | Facility: CLINIC | Age: 81
End: 2021-02-22

## 2021-02-22 NOTE — TELEPHONE ENCOUNTER
He can do either test, but he needs the test result before he flies to [place] as he may need to present it on arrival.   If he is getting the test done on 2/24, just make sure that he gets the results back before he leaves.   Check with the airline web sites for the latest info on testing requirements based on the destination.  He still needs to do the covid testing, even if he has both covid vaccines.     Make sure that he gest the 2nd covid vaccine as planned tomorrow.     The full protection does not start until at least 1-2 weeks after the second shot. (he gets around 60% protection after the first one, so he is on his way)      Close encounter when done.

## 2021-02-22 NOTE — TELEPHONE ENCOUNTER
Elijah is scheduled for a saliva covid test. He recently had a tooth pulled. He takes a blood thinner. The site of the wound continues to bleed. He asked if the possible blood that would get mixed in with his saliva for the test would skew the results.  I didn't know the answer.  I gave Elijah the phone number for [facility] public hotline. He'll call them for the answer.    COVID 19 Nurse Triage Plan/Patient Instructions    Please be aware that novel coronavirus (COVID-19) may be circulating in the community. If you develop symptoms such as fever, cough, or SOB or if you have concerns about the presence of another infection including coronavirus (COVID-19), please contact your health care provider or visit [email or web address].     Disposition/Instructions    Home care recommended. Follow home care protocol based instructions.    Thank you for taking steps to prevent the spread of this virus.  o Limit your contact with others.  o Wear a simple mask to cover your cough.  o Wash your hands well and often.    Resources    [facility]: About COVID-19: [email or web address]    CDC: What to Do If You're Sick: www.cdc.gov/coronavirus/2019-ncov/about/steps-when-sick.html    CDC: Ending Home Isolation: www.cdc.gov/coronavirus/2019-ncov/hcp/disposition-in-home-patients.html     CDC: Caring for Someone: www.cdc.gov/coronavirus/2019-ncov/if-you-are-sick/care-for-someone.html     [facility]: Interim Guidance for Hospital Discharge to Home: www.health.[facility].[place]/diseases/coronavirus/hcp/hospdischarge.pdf    [facility] clinical trials (COVID-19 research studies): [email or web address].[email or web address]-clinical-trials     Below are the COVID-19 hotlines at the [place] [facility] ([facility]). Interpreters are available.   o For health questions: Call [phone] or 1-215.563.6504 (7 a.m. to 7 p.m.)  o For questions about schools and childcare: Call [phone] or [phone] (7 a.m. to 7 p.m.)     Additional Information     Information only question and nurse able to answer    Protocols used: INFORMATION ONLY CALL - NO TRIAGE-A-OH

## 2021-02-22 NOTE — TELEPHONE ENCOUNTER
"Pt reports \"I've been advised by [place] [facility] to speak with my PCP re saliva test for covid.\"  Reason for question:  -> \"Continuing to have some oral bleeding due to a tooth extraction.\"  \"Would this affect covid result (if bloody saliva)?\"    Leaves for [place] Feb 25th.  When probing as to whether pt would be willing to simply do the nasal swab for covid testing, pt states \"Oh yes I could.\"  \"Already have an appt for nasal swab at [facility] on Feb 24th.\"  \"Just wonder if saliva test could still work, since did receive the test by mail.\"    Best phone # for patient -> [phone].     Thank you-    Aleshia MELÉNDEZ [facility] Nurse Advisor     Reason for Disposition    [1] Caller requesting NON-URGENT health information AND [2] PCP's office is the best resource    Protocols used: INFORMATION ONLY CALL-A-AH      "

## 2021-02-23 ENCOUNTER — IMMUNIZATION (OUTPATIENT)
Dept: NURSING | Facility: CLINIC | Age: 81
End: 2021-02-23
Attending: INTERNAL MEDICINE
Payer: MEDICARE

## 2021-02-23 PROCEDURE — 0002A PR COVID VAC PFIZER DIL RECON 30 MCG/0.3 ML IM: CPT

## 2021-02-23 PROCEDURE — 91300 PR COVID VAC PFIZER DIL RECON 30 MCG/0.3 ML IM: CPT

## 2021-03-25 PROBLEM — M54.50 RIGHT-SIDED LOW BACK PAIN WITHOUT SCIATICA: Status: RESOLVED | Noted: 2019-12-09 | Resolved: 2021-03-25

## 2021-04-02 DIAGNOSIS — I77.9 BILATERAL CAROTID ARTERY DISEASE, UNSPECIFIED TYPE (H): ICD-10-CM

## 2021-04-02 RX ORDER — ROSUVASTATIN CALCIUM 10 MG/1
TABLET, COATED ORAL
Qty: 90 TABLET | Refills: 0 | Status: SHIPPED | OUTPATIENT
Start: 2021-04-02 | End: 2021-04-09

## 2021-04-06 ENCOUNTER — TRANSFERRED RECORDS (OUTPATIENT)
Dept: HEALTH INFORMATION MANAGEMENT | Facility: CLINIC | Age: 81
End: 2021-04-06

## 2021-04-09 ENCOUNTER — OFFICE VISIT (OUTPATIENT)
Dept: INTERNAL MEDICINE | Facility: CLINIC | Age: 81
End: 2021-04-09
Payer: MEDICARE

## 2021-04-09 VITALS
OXYGEN SATURATION: 96 % | HEART RATE: 63 BPM | HEIGHT: 68 IN | BODY MASS INDEX: 30.23 KG/M2 | DIASTOLIC BLOOD PRESSURE: 78 MMHG | SYSTOLIC BLOOD PRESSURE: 138 MMHG | WEIGHT: 199.5 LBS | TEMPERATURE: 98.1 F

## 2021-04-09 DIAGNOSIS — I10 BENIGN ESSENTIAL HYPERTENSION: ICD-10-CM

## 2021-04-09 DIAGNOSIS — I77.9 BILATERAL CAROTID ARTERY DISEASE, UNSPECIFIED TYPE (H): ICD-10-CM

## 2021-04-09 DIAGNOSIS — E26.09 PRIMARY ALDOSTERONISM (H): ICD-10-CM

## 2021-04-09 DIAGNOSIS — I48.11 LONGSTANDING PERSISTENT ATRIAL FIBRILLATION (H): Primary | ICD-10-CM

## 2021-04-09 DIAGNOSIS — S06.5XAA SUBDURAL HEMATOMA (H): ICD-10-CM

## 2021-04-09 DIAGNOSIS — M10.9 GOUT, UNSPECIFIED CAUSE, UNSPECIFIED CHRONICITY, UNSPECIFIED SITE: ICD-10-CM

## 2021-04-09 LAB
ALBUMIN SERPL-MCNC: 3.9 G/DL (ref 3.4–5)
ALP SERPL-CCNC: 78 U/L (ref 40–150)
ALT SERPL W P-5'-P-CCNC: 67 U/L (ref 0–70)
ANION GAP SERPL CALCULATED.3IONS-SCNC: 3 MMOL/L (ref 3–14)
AST SERPL W P-5'-P-CCNC: 51 U/L (ref 0–45)
BILIRUB SERPL-MCNC: 0.7 MG/DL (ref 0.2–1.3)
BUN SERPL-MCNC: 23 MG/DL (ref 7–30)
CALCIUM SERPL-MCNC: 9.4 MG/DL (ref 8.5–10.1)
CHLORIDE SERPL-SCNC: 103 MMOL/L (ref 94–109)
CHOLEST SERPL-MCNC: 151 MG/DL
CO2 SERPL-SCNC: 29 MMOL/L (ref 20–32)
CREAT SERPL-MCNC: 1.26 MG/DL (ref 0.66–1.25)
GFR SERPL CREATININE-BSD FRML MDRD: 54 ML/MIN/{1.73_M2}
GLUCOSE SERPL-MCNC: 105 MG/DL (ref 70–99)
HDLC SERPL-MCNC: 98 MG/DL
LDLC SERPL CALC-MCNC: 40 MG/DL
NONHDLC SERPL-MCNC: 53 MG/DL
POTASSIUM SERPL-SCNC: 4 MMOL/L (ref 3.4–5.3)
PROT SERPL-MCNC: 7.4 G/DL (ref 6.8–8.8)
SODIUM SERPL-SCNC: 135 MMOL/L (ref 133–144)
TRIGL SERPL-MCNC: 66 MG/DL
TSH SERPL DL<=0.005 MIU/L-ACNC: 0.74 MU/L (ref 0.4–4)
URATE SERPL-MCNC: 4.1 MG/DL (ref 3.5–7.2)

## 2021-04-09 PROCEDURE — 84550 ASSAY OF BLOOD/URIC ACID: CPT | Performed by: INTERNAL MEDICINE

## 2021-04-09 PROCEDURE — 80061 LIPID PANEL: CPT | Performed by: INTERNAL MEDICINE

## 2021-04-09 PROCEDURE — 99214 OFFICE O/P EST MOD 30 MIN: CPT | Performed by: INTERNAL MEDICINE

## 2021-04-09 PROCEDURE — 84443 ASSAY THYROID STIM HORMONE: CPT | Performed by: INTERNAL MEDICINE

## 2021-04-09 PROCEDURE — 36415 COLL VENOUS BLD VENIPUNCTURE: CPT | Performed by: INTERNAL MEDICINE

## 2021-04-09 PROCEDURE — 80053 COMPREHEN METABOLIC PANEL: CPT | Performed by: INTERNAL MEDICINE

## 2021-04-09 RX ORDER — ROSUVASTATIN CALCIUM 10 MG/1
10 TABLET, COATED ORAL DAILY
Qty: 90 TABLET | Refills: 1 | Status: SHIPPED | OUTPATIENT
Start: 2021-04-09 | End: 2021-07-12

## 2021-04-09 ASSESSMENT — MIFFLIN-ST. JEOR: SCORE: 1594.43

## 2021-04-09 NOTE — PROGRESS NOTES
"    Assessment & Plan       (I48.11) Longstanding persistent atrial fibrillation (H)  (primary encounter diagnosis)  Comment: This condition is currently controlled on the current medical regimen.  Continue current therapy.   revieed basic of Watchman device as way to reduce chance of stroke from atrial fibrillation withotu using antocopagulation.   He is interested, but I asked him to review this with his cardiologist in further details.   For now, continue all current medications.   Plan: TSH with free T4 reflex, Comprehensive         metabolic panel            (I77.9) Bilateral carotid artery disease, unspecified type (H)  Comment: Discussed secondary risk factor modification and recommended continuing aggressive management of these items.   No new symptoms   Plan: Lipid panel reflex to direct LDL Fasting, Uric         acid, TSH with free T4 reflex, Comprehensive         metabolic panel, rosuvastatin (CRESTOR) 10 MG         tablet            (I10) Benign essential hypertension  Comment: This condition is currently controlled on the current medical regimen.  Continue current therapy.   Plan: Lipid panel reflex to direct LDL Fasting, Uric         acid, Comprehensive metabolic panel            (M10.9) Gout, unspecified cause, unspecified chronicity, unspecified site  Comment: This condition is currently controlled on the current medical regimen.  Continue current therapy.   Plan: Lipid panel reflex to direct LDL Fasting, Uric         acid, Comprehensive metabolic panel            (E26.09) Primary aldosteronism (H)  Comment: doing well on eplenerone.   Plan: TSH with free T4 reflex, Comprehensive         metabolic panel            (S06.5X9A) Subdural hematoma (H)  Comment: reviewed neurosurgery notes, episode has resovled with no sequelae.   Plan:                    BMI:   Estimated body mass index is 30.33 kg/m  as calculated from the following:    Height as of this encounter: 1.727 m (5' 8\").    Weight as of this " "encounter: 90.5 kg (199 lb 8 oz).           Return in about 6 months (around 10/9/2021) for Medicare Annual Wellness Exam, Blood pressure.    Igor Christina MD  [facility] Center[facility]AMIA Nava is a 80 year old who presents for the following health issues     HPI     Hypertension Follow-up      Do you check your blood pressure regularly outside of the clinic? Yes     Are you following a low salt diet? Yes    Are your blood pressures ever more than 140 on the top number (systolic) OR more   than 90 on the bottom number (diastolic), for example 140/90? Yes    2.  Has history of atrial fibrillation.    No palpitations, no dizziness, no dyspnea on exertion, no shortness of breath.  No racing heart, no fast heart rates.    Taking medications as ordered, no side effects reported.   Patient is taking anticoagulation according to direction, with no reported side effects.     Has questions about possible Watchman device to prevent stroke.     3.  history of gout, none since takign allipurinol    4.  priro subdural hematoma Sept 2020.   reviewed neurosurgery notes  CT scna sshow resolution.   He reports no sequeale or lingering neurological sx.      Has received both covid vaccines since last in clinic.     **I reviewed the information recorded in the patient's EPIC chart (including but not limited to medical history, surgical history, family history, problem list, medication list, and allergy list) and updated the information as indicated based on the patients reported information.           Review of Systems   Constitutional, HEENT, cardiovascular, pulmonary, gi and gu systems are negative, except as otherwise noted.      Objective    /78   Pulse 63   Temp 98.1  F (36.7  C) (Temporal)   Ht 1.727 m (5' 8\")   Wt 90.5 kg (199 lb 8 oz)   SpO2 96%   BMI 30.33 kg/m    Body mass index is 30.33 kg/m .  Physical Exam   GENERAL alert and no distress  EYES:  Normal " sclera,conjunctiva, EOMI  HENT: oral and posterior pharynx without lesions or erythema, facies symmetric  NECK: Neck supple. No LAD, without thyroidmegaly.  RESP: Clear to ausculation bilaterally without wheezes or crackles. Normal BS in all fields.  CV: RRR normal S1S2 without murmurs, rubs or gallops.  LYMPH: no cervical lymph adenopathy appreciated  MS: extremities- no gross deformities of the visible extremities noted,   EXT:  no lower extremity edema  PSYCH: Alert and oriented times 3; speech- coherent  SKIN:  No obvious significant skin lesions on visible portions of face

## 2021-04-09 NOTE — PATIENT INSTRUCTIONS
*  Make sure your dentist knows that you are not taking warfarin for anticoagulation.  That specific anticoagulation requires regular monitoring with a blood test (INR) and this result need to be below a certain threshold to perform procedures.     *  You are NOT taking Warfarin, you are taking Xarelto.    *  Xarelto does NOT require any blood testing or dose adjustments.    *  Stop the Xarelto for 2 complete doses before any invasive procedure or any procedure that may result in blood loss.   Resume the Xarelto at the same dose after the procedure.      *  Continue all medications at the same doses.  Contact your usual pharmacy if you need refills.     *  Discuss the Watchman Device with the Cardiology Clinic staff if you are considering this as an option for you.     Visit the specific product  web site for more information.      *  Return to see me in 6 months, sooner if needed.  Use [employer] or Call [phone] to schedule the appointment with me.

## 2021-04-11 PROBLEM — S06.5XAA SUBDURAL HEMATOMA (H): Status: RESOLVED | Noted: 2020-09-17 | Resolved: 2021-04-11

## 2021-05-05 ENCOUNTER — NURSE TRIAGE (OUTPATIENT)
Dept: INTERNAL MEDICINE | Facility: CLINIC | Age: 81
End: 2021-05-05

## 2021-05-05 ENCOUNTER — HOSPITAL ENCOUNTER (EMERGENCY)
Facility: CLINIC | Age: 81
Discharge: HOME | End: 2021-05-05
Attending: PHYSICIAN ASSISTANT | Admitting: PHYSICIAN ASSISTANT
Payer: MEDICARE

## 2021-05-05 ENCOUNTER — APPOINTMENT (OUTPATIENT)
Dept: ULTRASOUND IMAGING | Facility: CLINIC | Age: 81
End: 2021-05-05
Attending: EMERGENCY MEDICINE
Payer: MEDICARE

## 2021-05-05 VITALS
HEIGHT: 68 IN | TEMPERATURE: 99 F | HEART RATE: 68 BPM | WEIGHT: 194 LBS | OXYGEN SATURATION: 97 % | SYSTOLIC BLOOD PRESSURE: 156 MMHG | DIASTOLIC BLOOD PRESSURE: 79 MMHG | RESPIRATION RATE: 18 BRPM | BODY MASS INDEX: 29.4 KG/M2

## 2021-05-05 DIAGNOSIS — M79.89 LEFT LEG SWELLING: ICD-10-CM

## 2021-05-05 LAB
ALBUMIN SERPL-MCNC: 3.7 G/DL (ref 3.4–5)
ALP SERPL-CCNC: 84 U/L (ref 40–150)
ALT SERPL W P-5'-P-CCNC: 77 U/L (ref 0–70)
ANION GAP SERPL CALCULATED.3IONS-SCNC: 6 MMOL/L (ref 3–14)
AST SERPL W P-5'-P-CCNC: 44 U/L (ref 0–45)
BASOPHILS # BLD AUTO: 0 10E9/L (ref 0–0.2)
BASOPHILS NFR BLD AUTO: 0.4 %
BILIRUB SERPL-MCNC: 0.6 MG/DL (ref 0.2–1.3)
BUN SERPL-MCNC: 25 MG/DL (ref 7–30)
CALCIUM SERPL-MCNC: 9.1 MG/DL (ref 8.5–10.1)
CHLORIDE SERPL-SCNC: 104 MMOL/L (ref 94–109)
CO2 SERPL-SCNC: 27 MMOL/L (ref 20–32)
CREAT SERPL-MCNC: 0.96 MG/DL (ref 0.66–1.25)
DIFFERENTIAL METHOD BLD: NORMAL
EOSINOPHIL # BLD AUTO: 0 10E9/L (ref 0–0.7)
EOSINOPHIL NFR BLD AUTO: 0.3 %
ERYTHROCYTE [DISTWIDTH] IN BLOOD BY AUTOMATED COUNT: 14.2 % (ref 10–15)
GFR SERPL CREATININE-BSD FRML MDRD: 75 ML/MIN/{1.73_M2}
GLUCOSE SERPL-MCNC: 104 MG/DL (ref 70–99)
HCT VFR BLD AUTO: 48.3 % (ref 40–53)
HGB BLD-MCNC: 15.9 G/DL (ref 13.3–17.7)
IMM GRANULOCYTES # BLD: 0 10E9/L (ref 0–0.4)
IMM GRANULOCYTES NFR BLD: 0.6 %
LYMPHOCYTES # BLD AUTO: 1 10E9/L (ref 0.8–5.3)
LYMPHOCYTES NFR BLD AUTO: 13.9 %
MCH RBC QN AUTO: 31.2 PG (ref 26.5–33)
MCHC RBC AUTO-ENTMCNC: 32.9 G/DL (ref 31.5–36.5)
MCV RBC AUTO: 95 FL (ref 78–100)
MONOCYTES # BLD AUTO: 0.8 10E9/L (ref 0–1.3)
MONOCYTES NFR BLD AUTO: 11.2 %
NEUTROPHILS # BLD AUTO: 5.3 10E9/L (ref 1.6–8.3)
NEUTROPHILS NFR BLD AUTO: 73.6 %
NRBC # BLD AUTO: 0 10*3/UL
NRBC BLD AUTO-RTO: 0 /100
NT-PROBNP SERPL-MCNC: 50 PG/ML (ref 0–1800)
PLATELET # BLD AUTO: 202 10E9/L (ref 150–450)
POTASSIUM SERPL-SCNC: 3.6 MMOL/L (ref 3.4–5.3)
PROT SERPL-MCNC: 7.6 G/DL (ref 6.8–8.8)
RBC # BLD AUTO: 5.1 10E12/L (ref 4.4–5.9)
SODIUM SERPL-SCNC: 137 MMOL/L (ref 133–144)
WBC # BLD AUTO: 7.3 10E9/L (ref 4–11)

## 2021-05-05 PROCEDURE — 83880 ASSAY OF NATRIURETIC PEPTIDE: CPT | Performed by: PHYSICIAN ASSISTANT

## 2021-05-05 PROCEDURE — 99284 EMERGENCY DEPT VISIT MOD MDM: CPT | Mod: 25

## 2021-05-05 PROCEDURE — 93971 EXTREMITY STUDY: CPT | Mod: LT

## 2021-05-05 PROCEDURE — 85025 COMPLETE CBC W/AUTO DIFF WBC: CPT | Performed by: PHYSICIAN ASSISTANT

## 2021-05-05 PROCEDURE — 80053 COMPREHEN METABOLIC PANEL: CPT | Performed by: PHYSICIAN ASSISTANT

## 2021-05-05 ASSESSMENT — ENCOUNTER SYMPTOMS
BACK PAIN: 0
SHORTNESS OF BREATH: 0
ABDOMINAL PAIN: 0
COUGH: 0
COLOR CHANGE: 0
CHILLS: 0
FEVER: 0
NECK PAIN: 0

## 2021-05-05 ASSESSMENT — MIFFLIN-ST. JEOR: SCORE: 1569.48

## 2021-05-05 NOTE — ED PROVIDER NOTES
History   Chief Complaint:  Leg Swelling      HPI   Reji Aguirre is a 80 year old male, currently on Xarelto for Afib, with a history of hypertension and cardiomyopathy, who presents to the ED for evaluation of leg swelling. The patient reports bilateral ankle swelling and increased left calf swelling. Denies missing any doses of his Xarelto, but endorses taking them at a later time for 3 days. Went for a walk yesterday and when he returned home, the calf had gotten bigger. Denies shortness of breath, chest pain, new redness, fever, chills, cough, abdominal pain, or any other pain.     Review of Systems   Constitutional: Negative for chills and fever.   Respiratory: Negative for cough and shortness of breath.    Cardiovascular: Positive for leg swelling. Negative for chest pain.   Gastrointestinal: Negative for abdominal pain.   Musculoskeletal: Negative for back pain and neck pain.   Skin: Negative for color change.   All other systems reviewed and are negative.    Allergies:  Ampicillin  Metoprolol  Spironolactone  Vicodin  Amlodipine    Medications:    Allopurinol  Amlodipine  Inspra  Hydrochlorothiazide  Pacerone  Xarelto  Crestor  Aspirin  Unisom    Past Medical History:    Hypertension  Gout  Diverticulitis  Rotator cuff sprain  Peroneal tendonitis  Afib  Cardiomyopathy  Anemia  Carotid artery disease  LBBB  Atrial flutter  Hyperlipidemia  Primary aldosteronism  Benign bladder tumor    Past Surgical History:    Left knee arthroscopy  Toe surgery - left 2nd toe  Rotator cuff repair arthroscopy - left  Knee arthroplasty - left  Cardioversion  Bladder tumor removal  Meniscectomy    Family History:    CVD  Dementia  Diabetes type II  Testicular cancer  Transient ischemic attack  Arthritis  Depression    Social History:  Marital Status:   PCP: Igor Christina  Presents to the ED with self    Physical Exam     Patient Vitals for the past 24 hrs:   BP Temp Temp src Pulse Resp SpO2 Height Weight  "  05/05/21 1245 (!) 156/79 -- -- 68 -- 97 % -- --   05/05/21 1200 -- -- -- 63 -- 94 % -- --   05/05/21 1145 -- -- -- 62 -- 94 % -- --   05/05/21 1131 (!) 157/84 -- -- 70 18 97 % -- --   05/05/21 0957 (!) 157/80 99  F (37.2  C) Oral 70 14 97 % 1.727 m (5' 8\") 88 kg (194 lb)       Physical Exam  General: Alert and interactive. Appears well.   Head: Atraumatic, without obvious lesion, abrasion, hematoma.   Eyes: The pupils are equal and round. No scleral icterus.   ENT: No obvious abnormalities to the ears or nose. Mucous membranes moist.   Neck:Trachea is in the midline. No obvious swelling to the neck. Full range of motion.   CV: Regular rate. Extremities well perfused. DP pulses normal.   Resp: Non-labored, no retractions or accessory muscle use.     GI: Abdomen is not distended.   MS: Moving all extremities well. Left leg is slightly larger than the right. There is some swelling to the left tibialis anterior without erythematous changes. No bruising. No palpable band. No calf swelling nor tenderness.   No bony tenderness to knee or ankle.   Achilles intact.   Skin: No rash.   Neuro: Alert and oriented x 3. Non-focal examination.    Psych: Awake. Alert.  Normal affect. Appropriate interactions.    Emergency Department Course   Imaging:  Radiology findings were communicated with the patient who voiced understanding of the findings.    US Lower Extremity Venous Duplex Left:  No evidence of deep venous thrombosis.     Reading per radiology    Laboratory:  Laboratory findings were communicated with the patient who voiced understanding of the findings.    CBC: WBC: 7.3, HGB: 15.9, PLT: 202    CMP: Glucose 104 (H), ALT: 77 (H), o/w WNL (Creatinine: 0.96)    BNP: 50     Emergency Department Course:    Reviewed:  I reviewed the patient's nursing notes, vitals, past medical records, Care Everywhere.     Assessments:  1128 I first assessed the patient and performed an exam. Discussed plans for care.    1240 I rechecked the " patient and updated them on their results. Discussed plans for discharge.    Disposition:  The patient was discharged to home.       Impression & Plan      Medical Decision Making:   Reji Aguirre is a 80 year old male who presents with left leg swelling and concerns for DVT. He is on Xarelto for atrial fibrillation and has not missed any doses. No chest pain or shortness of breath to suggest PE. Patient has normal DP pulsations and has a well perfused in the extremity, making arterial occlusion less likely. He does have scant bilateral lower extremity edema, but there are no skin changes to suggest cellulitis, abscess, and there is no petechia, purpura, or extensive bruising. He is tender over the anterior shin, near the tibialis anterior musculature.  There is no calf tenderness or swelling. Achilles is intact. No bony tenderness to palpation in the knee or the ankle. No signs of renal dysfunction, electrolyte abnormalities, liver dysfunction, and his BNP is low at 50.  No weight gain or shortness of breath to suggest CHF exacerbation. At this point, I think the patient is stable for follow-up with an Ace bandage, elevation, and ice to the lower extremities. He should follow with primary care and has an appointment scheduled for early next week.  Return for shortness of breath, chest pain, fevers, chills, other worrisome concerns.    Diagnosis:     ICD-10-CM    1. Left leg swelling  M79.89        Scribe Disclosure:  Juliette ARRIOLA, am serving as a scribe on 5/5/2021 at 11:28 AM to personally document services performed by Sophie Durbin PA-C based on my observations and the provider's statements to me.           Sophie Durbin PA-C  05/05/21 7440

## 2021-05-05 NOTE — TELEPHONE ENCOUNTER
"S-(situation): Patient calling experiencing calf swelling in left leg on the right side of the calf. Patient notes both feet and ankles are a little swollen as well. Calf swelling has lasted for past 2 weeks.     B-(background): No hx of dvt. Last OV w/ PCP for longstanding persistent atrial fibrillation 04/09/2021.     A-(assessment): See below    R-(recommendations): Per protocol, advised patient go to ED now to be evaluated for possible DVT. Patient verbalized understanding and agreed with plan. Patient will go to [facility] to be evaluated.       Additional Information    Negative: Chest pain    Negative: Small area of swelling and followed an insect bite to the area    Negative: Followed a knee injury    Negative: Ankle or foot injury    Negative: Pregnant with leg swelling or edema    Negative: Sounds like a life-threatening emergency to the triager    Negative: Difficulty breathing at rest    Negative: Entire foot is cool or blue in comparison to other side    Negative: SEVERE swelling (e.g., swelling extends above knee, entire leg is swollen, weeping fluid)    Negative: Thigh or calf pain and only 1 side and present > 1 hour    Thigh, calf, or ankle swelling in only one leg    Thigh, calf, or ankle swelling in both legs, but one side is definitely more swollen    Answer Assessment - Initial Assessment Questions  1. ONSET: \"When did the swelling start?\" (e.g., minutes, hours, days)      2 weeks ago  2. LOCATION: \"What part of the leg is swollen?\"  \"Are both legs swollen or just one leg?\"      Left calf - right side of left calf. Also has swelling in both feet and ankles.    3. SEVERITY: \"How bad is the swelling?\" (e.g., localized; mild, moderate, severe)   - Localized - small area of swelling localized to one leg   - MILD pedal edema - swelling limited to foot and ankle, pitting edema < 1/4 inch (6 mm) deep, rest and elevation eliminate most or all swelling   - MODERATE edema - swelling of lower leg to knee, " "pitting edema > 1/4 inch (6 mm) deep, rest and elevation only partially reduce swelling   - SEVERE edema - swelling extends above knee, facial or hand swelling present       Localized, no pitting edema, worsens with standing for a long period of time. Rest and elevated eliminates swelling  4. REDNESS: \"Does the swelling look red or infected?\"      no  5. PAIN: \"Is the swelling painful to touch?\" If so, ask: \"How painful is it?\"   (Scale 1-10; mild, moderate or severe)      No, just feels bulbous as if it's holding liquid  6. FEVER: \"Do you have a fever?\" If so, ask: \"What is it, how was it measured, and when did it start?\"       no  7. CAUSE: \"What do you think is causing the leg swelling?\"      When it first started I thought it was due to physical therapy I had been doing for my back and hips. One exercise where I put the \"bad leg\" behind and the \"good leg\" forward and I lunge - this can be straining. Attributes foot and ankle swelling to starting amlodipine.   8. MEDICAL HISTORY: \"Do you have a history of heart failure, kidney disease, liver failure, or cancer?\"      no  9. RECURRENT SYMPTOM: \"Have you had leg swelling before?\" If so, ask: \"When was the last time?\" \"What happened that time?\"      no  10. OTHER SYMPTOMS: \"Do you have any other symptoms?\" (e.g., chest pain, difficulty breathing)        no  11. PREGNANCY: \"Is there any chance you are pregnant?\" \"When was your last menstrual period?\"        NA    Protocols used: LEG SWELLING AND EDEMA-A-CHANTELLE ORTEGA RN    "

## 2021-05-05 NOTE — ED TRIAGE NOTES
A&O x4, ABCs intact. Pt presents with left calf swelling he has had for the past 2 weeks. Pt denies pain, redness or hx of blood clots.

## 2021-05-05 NOTE — ED NOTES
Patient and family member reviewed discharge.  Discussed printed discharge information.  Follow-up appts reviewed.   What s/s warrant return to the ER.  .  Importance of social distancing, good hand hygiene, and wearing a mask when in public spaces.

## 2021-05-06 DIAGNOSIS — I48.91 ATRIAL FIBRILLATION, UNSPECIFIED TYPE (H): ICD-10-CM

## 2021-05-07 RX ORDER — AMIODARONE HYDROCHLORIDE 200 MG/1
TABLET ORAL
Qty: 90 TABLET | Refills: 0 | Status: SHIPPED | OUTPATIENT
Start: 2021-05-07 | End: 2021-07-12

## 2021-05-07 NOTE — TELEPHONE ENCOUNTER
Dr. Christina,    Please add refills as appropriate.    Thank you,    Camryn Alan RN  [facility]

## 2021-05-11 ENCOUNTER — OFFICE VISIT (OUTPATIENT)
Dept: INTERNAL MEDICINE | Facility: CLINIC | Age: 81
End: 2021-05-11
Payer: MEDICARE

## 2021-05-11 VITALS
WEIGHT: 200 LBS | OXYGEN SATURATION: 95 % | BODY MASS INDEX: 30.31 KG/M2 | HEIGHT: 68 IN | SYSTOLIC BLOOD PRESSURE: 124 MMHG | DIASTOLIC BLOOD PRESSURE: 70 MMHG | TEMPERATURE: 98.2 F | HEART RATE: 65 BPM

## 2021-05-11 DIAGNOSIS — I10 BENIGN ESSENTIAL HYPERTENSION: ICD-10-CM

## 2021-05-11 DIAGNOSIS — M79.662 PAIN OF LEFT LOWER LEG: Primary | ICD-10-CM

## 2021-05-11 PROCEDURE — 99213 OFFICE O/P EST LOW 20 MIN: CPT | Performed by: INTERNAL MEDICINE

## 2021-05-11 ASSESSMENT — MIFFLIN-ST. JEOR: SCORE: 1596.69

## 2021-05-11 NOTE — PROGRESS NOTES
"    Assessment & Plan     (M79.662) Pain of left lower leg  (primary encounter diagnosis)  Comment: Recent episode of anterior left lower leg pain and swelling has resolved with ice elevation and compression.  Reviewed ER notes, no evidence for DVT or compartment syndrome.  Suspect the patient had an occult traumatic event causing mild hematoma.  No further investigation required.  Plan:     (I10) Benign essential hypertension  Comment: This condition is currently controlled on the current medical regimen.  Continue current therapy.   Plan:                 BMI:   Estimated body mass index is 30.41 kg/m  as calculated from the following:    Height as of this encounter: 1.727 m (5' 8\").    Weight as of this encounter: 90.7 kg (200 lb).           Return in about 6 months (around 11/11/2021).    Igor Christina MD  [facility] is a 80 year old who presents for the following health issues     HPI     ED/UC Followup:    Facility:  [facility] ED  Date of visit: 5/5/2021  Reason for visit: left leg swelling   Current Status: sx have subsided    Acute onset of pain and mild swelling in the lateral aspect of the left lower upper tibial area.  Seen in emergency room on May 5.  Work-up unrevealing  Reviewed all available labs, documents, and imaging available from [facility]     Given instructions for ice, elevation, compression.  His symptoms have dramatically improved and are almost completely gone.  No numbness, no tingling, no signs or symptoms of compartment syndrome.       2.    Hypertension:  History of hypertension, on medication.  No reported side effects from medications.    Reviewed last 6 BP readings in chart:  BP Readings from Last 6 Encounters:   05/11/21 124/70   05/05/21 (!) 156/79   04/09/21 138/78   01/06/21 135/76   12/07/20 (!) 156/93   10/28/20 139/83     No active cardiac complaints or symptoms with exercise.     **I " "reviewed the information recorded in the patient's EPIC chart (including but not limited to medical history, surgical history, family history, problem list, medication list, and allergy list) and updated the information as indicated based on the patients reported information.         Review of Systems   Constitutional, HEENT, cardiovascular, pulmonary, gi and gu systems are negative, except as otherwise noted.      Objective    /70   Pulse 65   Temp 98.2  F (36.8  C) (Temporal)   Ht 1.727 m (5' 8\")   Wt 90.7 kg (200 lb)   SpO2 95%   BMI 30.41 kg/m    Body mass index is 30.41 kg/m .  Physical Exam   GENERAL alert and no distress  EYES:  Normal sclera,conjunctiva, EOMI  HENT: oral and posterior pharynx without lesions or erythema, facies symmetric  NECK: Neck supple. No LAD, without thyroidmegaly.  RESP: Clear to ausculation bilaterally without wheezes or crackles. Normal BS in all fields.  CV: RRR normal S1S2 without murmurs, rubs or gallops.  LYMPH: no cervical lymph adenopathy appreciated  MS: extremities- no gross deformities of the visible extremities noted,   EXT:  no lower extremity edema  PSYCH: Alert and oriented times 3; speech- coherent  SKIN:  No obvious significant skin lesions on visible portions of face                 "

## 2021-05-11 NOTE — PATIENT INSTRUCTIONS
"*  OK to stop icing, elevation, and compression.     *  Continue all medications at the same doses.  Contact your usual pharmacy if you need refills.       INSOMNIA:        Avoid caffeine within 6 hours of bed, avoid alcohol at night, avoid late meals and late exercise, avoid late physical activity.     Keep your sleep environment cool, dark and quiet with comfortable bedding, pillow, and mattress.      Try gentle background sounds with a sound machine, or small fan.  Try to keep a regular sleep-wake schedule.      Pick an 8 hour window of time to be in bed and night and try to avoid sleep outside of that schedule as much as possible    For stimulus control, avoid being in bed for more than 20 minutes if unable to sleep.  If you do get out of bed, keep the lights dim, read a book that is not particularly entertaining, and return to sleep if you start feeling drowsy.     Practice deep breathing or other relaxation exercises.  Consider using an natalya to help with relaxation like \"Breethe: Sleep and Meditation\", \"Calm\" or \"Darrin: Meditation & Sleep\"    Do not use cell  phone, ipad, or TV or any device that emits \"blue light\". This causes stimulation of the brain.     Do not check email or perform any work related tasks within 30-60 minutes of bedtime, you need to avoid stimulation of the mind.      Trial of herbal sleep aids, these are safe, Follow the directions on the bottle.    --Melatonin, start 3 or 5 mg, increase upwards.    --LEXIE 500-750 mg    Over the counter sleep aids Unison, Doxylamine, or diphenhydramine.  (These can possibly cause urinary troubles)    Most times, recurrent insomnia without an obvious cause is considered more of a symptom than a separate disease and many times can be a symptom of mood disorders such as depression and/or anxiety.      Read the following books for tips on manaing insomnia:  o  \"Say Good Night to Insomnia\" (by Cliff Jensen)  o \"The Sleep Solution:  Why Your Sleep is Broken, and " "How to Fix It\" (by Deepak Hunt)    If none of these measures help relieve insomnia, then return to see me.       "

## 2021-06-11 DIAGNOSIS — I48.91 ATRIAL FIBRILLATION, UNSPECIFIED TYPE (H): ICD-10-CM

## 2021-06-11 RX ORDER — RIVAROXABAN 20 MG/1
TABLET, FILM COATED ORAL
Qty: 90 TABLET | Refills: 0 | Status: SHIPPED | OUTPATIENT
Start: 2021-06-11 | End: 2021-09-09

## 2021-06-11 NOTE — TELEPHONE ENCOUNTER
Xarelto   Routing refill request to provider for review/approval because:  Creatinine clearance not greater than 50 ml/min on file in the past 3 months

## 2021-06-25 ENCOUNTER — OFFICE VISIT (OUTPATIENT)
Dept: INTERNAL MEDICINE | Facility: CLINIC | Age: 81
End: 2021-06-25
Payer: MEDICARE

## 2021-06-25 VITALS
SYSTOLIC BLOOD PRESSURE: 128 MMHG | DIASTOLIC BLOOD PRESSURE: 68 MMHG | BODY MASS INDEX: 30.8 KG/M2 | TEMPERATURE: 97.8 F | HEART RATE: 65 BPM | HEIGHT: 68 IN | OXYGEN SATURATION: 95 % | WEIGHT: 203.2 LBS

## 2021-06-25 DIAGNOSIS — I48.91 ATRIAL FIBRILLATION, UNSPECIFIED TYPE (H): ICD-10-CM

## 2021-06-25 DIAGNOSIS — I10 BENIGN ESSENTIAL HYPERTENSION: ICD-10-CM

## 2021-06-25 DIAGNOSIS — I48.19 PERSISTENT ATRIAL FIBRILLATION (H): ICD-10-CM

## 2021-06-25 DIAGNOSIS — I77.9 BILATERAL CAROTID ARTERY DISEASE, UNSPECIFIED TYPE (H): ICD-10-CM

## 2021-06-25 DIAGNOSIS — R60.0 BILATERAL LOWER EXTREMITY EDEMA: Primary | ICD-10-CM

## 2021-06-25 PROCEDURE — 99213 OFFICE O/P EST LOW 20 MIN: CPT | Performed by: INTERNAL MEDICINE

## 2021-06-25 RX ORDER — HYDROCHLOROTHIAZIDE 25 MG/1
25 TABLET ORAL EVERY MORNING
Qty: 90 TABLET | Refills: 1 | Status: SHIPPED | OUTPATIENT
Start: 2021-06-25 | End: 2021-07-12

## 2021-06-25 ASSESSMENT — MIFFLIN-ST. JEOR: SCORE: 1611.21

## 2021-06-25 NOTE — PROGRESS NOTES
"    Assessment & Plan   Problem List Items Addressed This Visit     Benign essential hypertension    Carotid artery disease (H)      Other Visit Diagnoses     Bilateral lower extremity edema    -  Primary    Atrial fibrillation, unspecified type (H)        Persistent atrial fibrillation (H)                   Lower extremity edema:      *  I suspect increased dietary sodium is the more likely cause that a cardiac cause.  (I.e. herring, salted nuts, etc.)    *  Keep sodium content strictly below 2000 mg per day.   \"wherever sodium goes, water follows\"    READ LABELS!!!    *  Try to keep as active as possible.  The veins of the lower legs rely on the muscles to do the pumping for the veins.  If you are not walking, then you are not pumping      Keep the feet elevated periodically throughout the day.      *  Increase the diuretic Hydrochlorathiazide (HCTZ) to 50 mg ( 4 x 12.5 mg tablets) once per day in the morning for the next few days, then back to your usual 25 mg once daily dose.     *  Consdier compression stockings, place on the lower legs before you get out of bed as they will be hard to put on even after 15 minutes of walking.     */  If you are not better despite these recommendations above, then schedule a virtual telephone visit with me to discuss other options.      Continue all other medications at the same doses.  Contact your usual pharmacy if you need refills.            BMI:   Estimated body mass index is 30.9 kg/m  as calculated from the following:    Height as of this encounter: 1.727 m (5' 8\").    Weight as of this encounter: 92.2 kg (203 lb 3.2 oz).         Return in about 6 months (around 12/25/2021) for Blood pressure.    Igor Christina MD  [facility]    Roberto Nava is a 80 year old who presents for the following health issues     HPI     Musculoskeletal problem/pain  Onset/Duration: couple of months   Description  Location: lower extremities, ankles " "and feet  Joint Swelling: YES  Redness: no  Pain: no  Warmth: no  Intensity:  moderate  Progression of Symptoms:  improving   Accompanying signs and symptoms:   Fevers: no  Numbness/tingling/weakness: no  History  Trauma to the area: no  Recent illness:  no  Previous similar problem: YES- hx of edema   Previous evaluation:  no  Precipitating or alleviating factors:  Aggravating factors include: none  Therapies tried and outcome: compression stockings - helps with sx     lower extremity edema persists, not better, not worse.   Seen ER 5.5.21, ultrasound negative for DVT  Takes Xarelto.     Weight remains stable:    Wt Readings from Last 10 Encounters:   06/25/21 92.2 kg (203 lb 3.2 oz)   05/11/21 90.7 kg (200 lb)   05/05/21 88 kg (194 lb)   04/09/21 90.5 kg (199 lb 8 oz)   01/06/21 91.2 kg (201 lb)   12/07/20 88.9 kg (196 lb)   12/02/20 87.1 kg (192 lb)   10/28/20 88.6 kg (195 lb 6.4 oz)   10/07/20 89 kg (196 lb 1.6 oz)   09/30/20 90.2 kg (198 lb 12.8 oz)       Taking all meds as ordered.     2.   Hypertension:  History of hypertension, on medication.  No reported side effects from medications.    Reviewed last 6 BP readings in chart:  BP Readings from Last 6 Encounters:   06/25/21 128/68   05/11/21 124/70   05/05/21 (!) 156/79   04/09/21 138/78   01/06/21 135/76   12/07/20 (!) 156/93     No active cardiac complaints or symptoms with exercise.     3..  Has history of atrial fibrillation.    No palpitations, no dizziness, no dyspnea on exertion, no shortness of breath.  No racing heart, no fast heart rates.    Taking medications as ordered, no side effects reported.   Patient is taking anticoagulation according to direction, with no reported side effects.     Review of Systems   Constitutional, HEENT, cardiovascular, pulmonary, gi and gu systems are negative, except as otherwise noted.      Objective    /68   Pulse 65   Temp 97.8  F (36.6  C) (Temporal)   Ht 1.727 m (5' 8\")   Wt 92.2 kg (203 lb 3.2 oz)   " SpO2 95%   BMI 30.90 kg/m    Body mass index is 30.9 kg/m .  Physical Exam   GENERAL alert and no distress  EYES:  Normal sclera,conjunctiva, EOMI  HENT: oral and posterior pharynx without lesions or erythema, facies symmetric  NECK: Neck supple. No LAD, without thyroidmegaly.  RESP: Clear to ausculation bilaterally without wheezes or crackles. Normal BS in all fields.  CV: RRR normal S1S2 without murmurs, rubs or gallops.  LYMPH: no cervical lymph adenopathy appreciated  MS: extremities- no gross deformities of the visible extremities noted,   EXT:   bilateral lower extremity edema  PSYCH: Alert and oriented times 3; speech- coherent  SKIN:  No obvious significant skin lesions on visible portions of face

## 2021-06-25 NOTE — PATIENT INSTRUCTIONS
"  *  Follow up with the [facility] later this summer, late August early Sept 2021.      Lower extremity edema:      *  I suspect increased dietary sodium is the more likely cause that a cardiac cause.  (I.e. herring, salted nuts, etc.)    *  Keep sodium content strictly below 2000 mg per day.   \"wherever sodium goes, water follows\"    READ LABELS!!!    *  Try to keep as active as possible.  The veins of the lower legs rely on the muscles to do the pumping for the veins.  If you are not walking, then you are not pumping      Keep the feet elevated periodically throughout the day.      *  Increase the diuretic Hydrochlorathiazide (HCTZ) to 50 mg ( 4 x 12.5 mg tablets) once per day in the morning for the next few days, then back to your usual 25 mg once daily dose.     *  Consdier compression stockings, place on the lower legs before you get out of bed as they will be hard to put on even after 15 minutes of walking.     */  If you are not better despite these recommendations above, then schedule a virtual telephone visit with me to discuss other options.      Continue all other medications at the same doses.  Contact your usual pharmacy if you need refills.     "

## 2021-07-09 DIAGNOSIS — I48.91 ATRIAL FIBRILLATION, UNSPECIFIED TYPE (H): ICD-10-CM

## 2021-07-09 DIAGNOSIS — R60.0 BILATERAL LOWER EXTREMITY EDEMA: ICD-10-CM

## 2021-07-09 DIAGNOSIS — I77.9 BILATERAL CAROTID ARTERY DISEASE, UNSPECIFIED TYPE (H): ICD-10-CM

## 2021-07-09 DIAGNOSIS — I10 BENIGN ESSENTIAL HYPERTENSION: ICD-10-CM

## 2021-07-09 RX ORDER — AMLODIPINE BESYLATE 5 MG/1
5 TABLET ORAL 2 TIMES DAILY
Qty: 180 TABLET | Refills: 3 | Status: CANCELLED | OUTPATIENT
Start: 2021-07-09

## 2021-07-09 NOTE — TELEPHONE ENCOUNTER
Reason for Call:  Medication or medication refill:    Do you use a [facility]?  Name of the pharmacy and phone number for the current request:   [facility] [address] [address] [facility]    Name of the medication requested: eplerenone (INSPRA) 25 MG tablet    Other request: Patient is calling in today asking if he can get a refill on his medication. Patient stated that he wanted to make sure he has enough for his vacation on 07/15/21. Can someone give him a call and let him know if this can be refilled before 07/15/21. Thank you    Can we leave a detailed message on this number? YES    Phone number patient can be reached at: Home number on file [phone] (home)    Best Time: anytime    Call taken on 7/9/2021 at 10:11 AM by Rocio Carmichael

## 2021-07-09 NOTE — TELEPHONE ENCOUNTER
Routing to refill pool.  Left VM message for pt stating these would be refilled next week.    Bianca Marin, MSN, RN

## 2021-07-12 RX ORDER — ROSUVASTATIN CALCIUM 10 MG/1
10 TABLET, COATED ORAL DAILY
Qty: 90 TABLET | Refills: 0 | Status: SHIPPED | OUTPATIENT
Start: 2021-07-12 | End: 2021-12-29

## 2021-07-12 RX ORDER — HYDROCHLOROTHIAZIDE 25 MG/1
25 TABLET ORAL EVERY MORNING
Qty: 90 TABLET | Refills: 0 | Status: SHIPPED | OUTPATIENT
Start: 2021-07-12 | End: 2022-03-31

## 2021-07-12 RX ORDER — EPLERENONE 25 MG/1
25 TABLET, FILM COATED ORAL 2 TIMES DAILY
Qty: 180 TABLET | Refills: 0 | Status: SHIPPED | OUTPATIENT
Start: 2021-07-12 | End: 2021-10-27

## 2021-07-12 RX ORDER — AMIODARONE HYDROCHLORIDE 200 MG/1
TABLET ORAL
Qty: 90 TABLET | Refills: 0 | Status: SHIPPED | OUTPATIENT
Start: 2021-07-12 | End: 2021-10-11

## 2021-07-12 NOTE — TELEPHONE ENCOUNTER
Pt doesn't need Amlodipine refills. Please remove from refills. Pt needs refills for amiodarone. Please make changes. Pt needs refills today. Thank you.

## 2021-08-30 ENCOUNTER — VIRTUAL VISIT (OUTPATIENT)
Dept: INTERNAL MEDICINE | Facility: CLINIC | Age: 81
End: 2021-08-30
Payer: MEDICARE

## 2021-08-30 DIAGNOSIS — I10 BENIGN ESSENTIAL HYPERTENSION: ICD-10-CM

## 2021-08-30 DIAGNOSIS — Z23 HIGH PRIORITY FOR 2019-NCOV VACCINE: Primary | ICD-10-CM

## 2021-08-30 DIAGNOSIS — R60.0 BILATERAL LOWER EXTREMITY EDEMA: ICD-10-CM

## 2021-08-30 PROCEDURE — 99442 PR PHYSICIAN TELEPHONE EVALUATION 11-20 MIN: CPT | Performed by: INTERNAL MEDICINE

## 2021-08-30 NOTE — PROGRESS NOTES
"Elijah is a 81 year old who is being evaluated via a billable telephone visit.      What phone number would you like to be contacted at? [phone]  How would you like to obtain your AVS? MyChart    Assessment & Plan     (Z23) High priority for 2019-nCoV vaccine  (primary encounter diagnosis)  Comment: He deftly should get the Covid vaccine booster, but at this current date, the recommendation is still 8 months after the second Covid vaccine.  He is not immunocompromised.  I have to check to see if his wife is even considered immunocompromised as she is not taking high level immunosuppressive drugs.  Pay attention to the guidelines, these may change in the coming weeks and months.  Get the annual influenza vaccine when available next week, which she can get at any pharmacy.  Plan:     (I10) Benign essential hypertension  Comment: This condition is currently controlled on the current medical regimen.  Continue current therapy.   Plan:     (R60.0) Bilateral lower extremity edema  Comment: This condition is currently controlled on the current medical regimen by report.  Continue current therapy.   Plan:                 BMI:   Estimated body mass index is 30.9 kg/m  as calculated from the following:    Height as of 6/25/21: 1.727 m (5' 8\").    Weight as of 6/25/21: 92.2 kg (203 lb 3.2 oz).           No follow-ups on file.    Igor Christina MD  [facility]    Subjective   Elijah is a 81 year old who presents for the following health issues     HPI     Had first two covid shots - in February. Would like to talk about getting the 3rd booster shot    Patient is questions about a Covid vaccine booster.  Completed both Covid (Pfizer) shots, the last one received on February 23, 2021 without reported side effects.  His wife has lupus, for which she takes low-dose prednisone and hydroxychloroquine.  He wonders if he should get a booster shot earlier than 8 months due to this fact.    He has " not had any specific exposures to Covid.    **I reviewed the information recorded in the patient's EPIC chart (including but not limited to medical history, surgical history, family history, problem list, medication list, and allergy list) and updated the information as indicated based on the patients reported information.           Review of Systems   Constitutional, HEENT, cardiovascular, pulmonary, gi and gu systems are negative, except as otherwise noted.      Objective           Vitals:  No vitals were obtained today due to virtual visit.    Physical Exam   healthy, alert and no distress  PSYCH: Alert and oriented times 3; coherent speech, normal   rate and volume, able to articulate logical thoughts, able   to abstract reason, no tangential thoughts, no hallucinations   or delusions  His affect is normal  RESP: No cough, no audible wheezing, able to talk in full sentences  Remainder of exam unable to be completed due to telephone visits                Phone call duration: 11:30 minutes

## 2021-09-06 DIAGNOSIS — I48.91 ATRIAL FIBRILLATION, UNSPECIFIED TYPE (H): ICD-10-CM

## 2021-09-08 NOTE — TELEPHONE ENCOUNTER
Routing refill request to provider for review/approval because:  Labs out of range:    Creatinine   Date Value Ref Range Status   05/05/2021 0.96 0.66 - 1.25 mg/dL Final           Madeleine Ferrera RN  [facility]

## 2021-09-09 DIAGNOSIS — I48.91 ATRIAL FIBRILLATION, UNSPECIFIED TYPE (H): ICD-10-CM

## 2021-09-09 RX ORDER — RIVAROXABAN 20 MG/1
TABLET, FILM COATED ORAL
Qty: 90 TABLET | Refills: 0 | Status: SHIPPED | OUTPATIENT
Start: 2021-09-09 | End: 2021-09-09

## 2021-09-13 NOTE — TELEPHONE ENCOUNTER
Routing refill request to provider for review/approval because:  Labs out of range:    Creatinine   Date Value Ref Range Status   05/05/2021 0.96 0.66 - 1.25 mg/dL Final           Madeleine Ferrera RN  [facility]

## 2021-10-09 DIAGNOSIS — I48.91 ATRIAL FIBRILLATION, UNSPECIFIED TYPE (H): ICD-10-CM

## 2021-10-11 RX ORDER — AMIODARONE HYDROCHLORIDE 200 MG/1
TABLET ORAL
Qty: 90 TABLET | Refills: 0 | Status: SHIPPED | OUTPATIENT
Start: 2021-10-11 | End: 2022-01-04

## 2021-10-11 NOTE — TELEPHONE ENCOUNTER
Routing refill request to provider for review/approval because:  Drug not on the [facility] refill protocol   Madeleine Ferrera RN  [facility]

## 2021-10-24 ENCOUNTER — HEALTH MAINTENANCE LETTER (OUTPATIENT)
Age: 81
End: 2021-10-24

## 2021-10-27 DIAGNOSIS — I10 BENIGN ESSENTIAL HYPERTENSION: ICD-10-CM

## 2021-10-27 RX ORDER — EPLERENONE 25 MG/1
TABLET, FILM COATED ORAL
Qty: 180 TABLET | Refills: 2 | Status: SHIPPED | OUTPATIENT
Start: 2021-10-27 | End: 2022-06-29

## 2021-11-01 ENCOUNTER — TRANSFERRED RECORDS (OUTPATIENT)
Dept: HEALTH INFORMATION MANAGEMENT | Facility: CLINIC | Age: 81
End: 2021-11-01
Payer: MEDICARE

## 2021-11-05 ENCOUNTER — OFFICE VISIT (OUTPATIENT)
Dept: URGENT CARE | Facility: URGENT CARE | Age: 81
End: 2021-11-05
Payer: MEDICARE

## 2021-11-05 ENCOUNTER — HOSPITAL ENCOUNTER (OUTPATIENT)
Dept: ULTRASOUND IMAGING | Facility: CLINIC | Age: 81
Discharge: HOME | End: 2021-11-05
Attending: FAMILY MEDICINE | Admitting: FAMILY MEDICINE
Payer: MEDICARE

## 2021-11-05 VITALS
DIASTOLIC BLOOD PRESSURE: 82 MMHG | BODY MASS INDEX: 30.59 KG/M2 | SYSTOLIC BLOOD PRESSURE: 155 MMHG | HEART RATE: 71 BPM | OXYGEN SATURATION: 96 % | WEIGHT: 201.2 LBS

## 2021-11-05 DIAGNOSIS — M25.561 ACUTE PAIN OF RIGHT KNEE: ICD-10-CM

## 2021-11-05 DIAGNOSIS — M79.604 PAIN IN RIGHT LEG: ICD-10-CM

## 2021-11-05 DIAGNOSIS — M25.561 ACUTE PAIN OF RIGHT KNEE: Primary | ICD-10-CM

## 2021-11-05 DIAGNOSIS — M71.21 BAKER'S CYST OF KNEE, RIGHT: ICD-10-CM

## 2021-11-05 PROCEDURE — 99214 OFFICE O/P EST MOD 30 MIN: CPT | Performed by: FAMILY MEDICINE

## 2021-11-05 PROCEDURE — 93971 EXTREMITY STUDY: CPT | Mod: RT

## 2021-11-05 NOTE — PATIENT INSTRUCTIONS
Patient Education     Understanding Baker s Cyst (Popliteal Cyst)  A Baker s cyst (popliteal cyst) is a fluid-filled sac that forms behind the knee.  Parts of the knee  The knee is a complex joint that has many parts. The lower end of the thighbone (femur) rotates on the upper end of the shinbone (tibia). There are several small bursae around the knee joint. These are small sacs filled with a special fluid (synovial fluid) that cushions the rest of the joint. Between the bones is a space that also contains this fluid.   What causes a Baker s cyst?  It is caused when extra fluid from the knee joint flows into the small bursa that sits behind the knee. When this sac fills with too much fluid, it s called a Baker s cyst. This might happen when an injury or disease irritates the knee joint.    In adults, other problems with the knee joint often cause the Baker s cyst. Injury or a knee disorder can change the normal structure of the knee joint. This can cause a cyst to form.   The synovial fluid inside the joint space may build up as a result of injury or disease. As the pressure builds up, the fluid may bulge into the back of the knee. This can cause the cyst.   Symptoms of a Baker s cyst  A Baker s cyst often doesn t cause symptoms. A cyst will more often be seen on an imaging test, like MRI, done for other reasons. If you do have symptoms, they may include:     Pain in the back of the knee    Knee stiffness    Sense of swelling or fullness behind the knee, especially when you straighten your leg    A swelling behind the knee that goes away when you bend your knee  These symptoms tend to get worse when standing for a long time, or being active.  Diagnosing a Baker s cyst  Your healthcare provider will ask you about your medical history and your symptoms. He or she will give you a physical exam, which will include a careful exam of your knee. It s important to make sure your symptoms are caused by a Baker s cyst and  not a tumor or a blood clot.   If the cause of your symptoms is not clear, you may have imaging tests, such as:    Ultrasound, to look at the cyst in more detail or to look for a blood clot    X-ray, to get more information about the bones of the joint    MRI, if the diagnosis is still unclear after ultrasound, or if your health care provider is considering surgery  [employer] last reviewed this educational content on 6/1/2020 2000-2021 [employer]. All rights reserved. This information is not intended as a substitute for professional medical care. Always follow your healthcare professional's instructions.           Patient Education     Understanding Baker s Cyst (Popliteal Cyst)  A Baker s cyst (popliteal cyst) is a fluid-filled sac that forms behind the knee.  Parts of the knee  The knee is a complex joint that has many parts. The lower end of the thighbone (femur) rotates on the upper end of the shinbone (tibia). There are several small bursae around the knee joint. These are small sacs filled with a special fluid (synovial fluid) that cushions the rest of the joint. Between the bones is a space that also contains this fluid.   What causes a Baker s cyst?  It is caused when extra fluid from the knee joint flows into the small bursa that sits behind the knee. When this sac fills with too much fluid, it s called a Baker s cyst. This might happen when an injury or disease irritates the knee joint.    In adults, other problems with the knee joint often cause the Baker s cyst. Injury or a knee disorder can change the normal structure of the knee joint. This can cause a cyst to form.   The synovial fluid inside the joint space may build up as a result of injury or disease. As the pressure builds up, the fluid may bulge into the back of the knee. This can cause the cyst.   Symptoms of a Baker s cyst  A Baker s cyst often doesn t cause symptoms. A cyst will more often be seen on an imaging test, like MRI,  done for other reasons. If you do have symptoms, they may include:     Pain in the back of the knee    Knee stiffness    Sense of swelling or fullness behind the knee, especially when you straighten your leg    A swelling behind the knee that goes away when you bend your knee  These symptoms tend to get worse when standing for a long time, or being active.  Diagnosing a Baker s cyst  Your healthcare provider will ask you about your medical history and your symptoms. He or she will give you a physical exam, which will include a careful exam of your knee. It s important to make sure your symptoms are caused by a Baker s cyst and not a tumor or a blood clot.   If the cause of your symptoms is not clear, you may have imaging tests, such as:    Ultrasound, to look at the cyst in more detail or to look for a blood clot    X-ray, to get more information about the bones of the joint    MRI, if the diagnosis is still unclear after ultrasound, or if your health care provider is considering surgery  [employer] last reviewed this educational content on 6/1/2020 2000-2021 [employer]. All rights reserved. This information is not intended as a substitute for professional medical care. Always follow your healthcare professional's instructions.           Patient Education     Rojas s Cyst    You have a Baker s cyst. This is a lump or bulge in the back of your knee. It is caused when extra joint fluid flows into a small sac behind the knee. The extra fluid occurs because arthritis or a torn cartilage irritates the knee joint.  A small Rojas s cyst often has no symptoms. A larger cyst can cause some knee pain or a feeling of pressure behind your knee when you try to fully straighten or bend that joint. A Baker s cyst can leak, leading fluid to move down into your lower leg. This causes swelling, pain, and redness.  Treatment may involve draining the extra fluid. Or medicine may be injected to reduce redness and  swelling. If the extra fluid is caused by a torn cartilage, then surgery to repair the cartilage may be the best treatment option. If arthritis is the cause, and it does not get better with treatment, surgery may need to address the arthritis and cyst.  Home care    If you have knee pain, stay off the affected leg as much as possible until the pain eases.    Apply an ice pack to the painful area for no more than 20 minutes. Do this every 3 to 6 hours for the first 24 to 48 hours. Keep using ice packs 3 to 4 times a day for the next few days, as needed for pain. To make an ice pack, put ice cubes in a sealed zip-lock plastic bag. Wrap the bag in a clean, thin towel or cloth. Never put ice or an ice pack directly on the skin.    If you were given a hook-and-loop knee brace, you may open the brace to apply ice. Unless told otherwise, you may remove the brace to bathe and sleep.    You may use over-the-counter pain medicine to control pain, unless another medicine was prescribed. Talk with your provider before using these medicines if you have chronic liver or kidney disease, or have ever had a stomach ulcer or gastrointestinal bleeding.       If crutches or a walker have been recommended, don t bear full weight on your injured leg until you can do so without pain. Check with your provider before returning to sports or full work duties.    Follow-up care  Follow up with your healthcare provider within 1 to 2 weeks, or as advised.  If X-rays were taken, you will be notified of any new findings that may affect your care.  When to seek medical advice  Call your healthcare provider right away if any of these occur:    Toes or foot become swollen, cold, blue, numb or tingly    Pain or swelling increases    Warmth or redness appears over the knee    Redness, swelling or pain occurs in the calf or lower leg    Fever or chills  [employer]aye last reviewed this educational content on 5/1/2018 2000-2021 [employer].  All rights reserved. This information is not intended as a substitute for professional medical care. Always follow your healthcare professional's instructions.

## 2021-11-05 NOTE — PROGRESS NOTES
Chief Complaint   Patient presents with     Musculoskeletal Problem     rt knee pain behind the knee into the calf muscle     Initial differential diagnosis included but was not restricted to   Differential Diagnosis:  MS Injury Pain: sprain, tendonitis, muscle strain, contusion and osteoarthritis, DVT, baker cyst   Medical Decision Making:    ASSESMENT AND PLAN   Reji was seen today for musculoskeletal problem.    Diagnoses and all orders for this visit:    Acute pain of right knee  -     US Lower Extremity Venous Duplex Right; Future    Pain in right leg   -     US Lower Extremity Venous Duplex Right; Future    Baker's cyst of knee, right      Ultrasound was ordered and scheduled for pt   Reviewed ultrasound result with patient request this was noted patient was notified about the findings in details  Bakers cyst noted     If you have knee pain, stay off the affected leg as much as possible until the pain eases.    Apply an ice pack to the painful area for no more than 20 minutes. Do this every 3 to 6 hours for the first 24 to 48 hours. Keep using ice packs 3 to 4 times a day for the next few days, as needed for pain. To make an ice pack, put ice cubes in a sealed zip-lock plastic bag. Wrap the bag in a clean, thin towel or cloth. Never put ice or an ice pack directly on the skin.    If you were given a hook-and-loop knee brace, you may open the brace to apply ice. Unless told otherwise, you may remove the brace to bathe and sleep.      Tylenol and Rest  Routine discharge counseling was given to the patient and the patient understands that worsening, changing or persistent symptoms should prompt an immediate call or follow up with their primary physician or the emergency department. The importance of appropriate follow up was also discussed with the patient.     I have reviewed the nursing notes.    Review of the result(s) of each unique test - ultrasound    X-Ray was not done.    Time  spent on the date of the  encounter doing chart review, patient visit and documentation     see orders in Epic  Pt verbalized and agreed with the plan and is aware of the worsening symptoms for which would need to follow up .  Pt was stable during time of discharge from the clinic     SUBJECTIVE     Reji Aguirre is a 81 year old male presenting with a chief complaint of    Chief Complaint   Patient presents with     Musculoskeletal Problem     rt knee pain behind the knee into the calf muscle     MS Injury/Pain    Onset of symptoms was 2 day(s) ago.  Location: right knee  Context:           Mechanism: he was doing some exercises on the stationary bike and over last 2 weeks on and off   Denies any acute injury     Patient experienced delayed pain  Course of symptoms is worsening.    Severity moderate  Current and Associated symptoms: Pain, Tenderness and Decreased range of motion  Denies  Pain, Tenderness and Decreased range of motion  Aggravating Factors: walking, stairs and flexion/extension  Therapies to improve symptoms include: Tylenol  This is the first time this type of problem has occurred for this patient.           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 @ [place]     Benign essential hypertension 2/2/2004     Bradycardia      Cardiomyopathy (H)     tachycardia-induced -resolved     Carotid artery disease (H) 02/10/2019    Extensive calcified atherosclerotic plaque in the internal carotid arteries seen on routine head CT     Diverticulosis of colon (without mention of hemorrhage) 11/16/07    incidental diverticuli noted at colonoscopy     Gout, unspecified      Hyperlipidemia      LBBB (left bundle branch block)      Post concussion syndrome 4/22/2019     Primary aldosteronism (H)      Rosacea      Special screening for malignant neoplasms, colon       Current Outpatient Medications   Medication Sig Dispense Refill     Acetaminophen (TYLENOL PO) Take 500-1,000 mg by mouth every 8 hours as needed for mild pain or fever        allopurinol (ZYLOPRIM) 300 MG tablet Take 1 tablet (300 mg) by mouth daily 90 tablet 3     amiodarone (PACERONE) 200 MG tablet TAKE 1 TABLET BY MOUTH ONCE DAILY MONDAY THROUGH FRIDAY. SKIP SATURDAY AND  90 tablet 0     amLODIPine (NORVASC) 5 MG tablet Take 1 tablet (5 mg) by mouth 2 times daily 180 tablet 3     ASPIRIN NOT PRESCRIBED (INTENTIONAL) Please choose reason not prescribed, below 0 each      azelaic acid (FINACIA) 15 % external gel Apply 0.5 inches topically 2 times daily       diphenhydrAMINE (BENADRYL) 25 MG capsule Take 25 mg by mouth nightly as needed for itching or allergies       doxylamine (UNISOM) 25 MG TABS tablet Take 25 mg by mouth At Bedtime       eplerenone (INSPRA) 25 MG tablet Take 1 tablet by mouth twice daily 180 tablet 2     hydrochlorothiazide (HYDRODIURIL) 25 MG tablet Take 1 tablet (25 mg) by mouth every morning 90 tablet 0     polyethylene glycol (MIRALAX) 17 g packet Take 17 g by mouth daily as needed for constipation 10 packet 0     rivaroxaban ANTICOAGULANT (XARELTO ANTICOAGULANT) 20 MG TABS tablet Take 1 tablet (20 mg) by mouth daily 90 tablet 0     rosuvastatin (CRESTOR) 10 MG tablet Take 1 tablet (10 mg) by mouth daily 90 tablet 0     Social History     Tobacco Use     Smoking status: Former Smoker     Types: Cigarettes     Quit date: 1963     Years since quittin.3     Smokeless tobacco: Never Used   Substance Use Topics     Alcohol use: Yes     Comment: 2 drinks a day     Family History   Problem Relation Age of Onset     Cerebrovascular Disease Father         d:89, dementia     Respiratory Mother         d:94     Diabetes Sister         b. 1938, IDDM since age 10     Diabetes Brother         b. 1943, type II DM         ROS:    10 point ROS of systems including Constitutional,  Eyes, Respiratory, Cardiovascular, Gastroenterology, Genitourinary, Integumentary,Psychiatric ,neurological were all negative except for pertinent positives noted in my HPI         OBJECTIVE:    BP (!) 155/82 (BP Location: Right arm, Patient Position: Sitting, Cuff Size: Adult Regular)   Pulse 71   Wt 91.3 kg (201 lb 3.2 oz)   SpO2 96%   BMI 30.59 kg/m    GENERAL APPEARANCE: healthy, alert and no distress  EYES: EOMI,  PERRL, conjunctiva clear  CV: regular rates and rhythm, normal S1 S2, no murmur noted  MS- rt knee there is limited range of motion there is tenderness over the lateral aspect the right knee along with tenderness over the calf area.  SKIN: no suspicious lesions or rashes  PSYCH: mentation appears normal  Physical Exam      (Note was completed, in part, with [employer] voice-recognition software. Documentation reviewed, but some grammatical, spelling, and word errors may remain.)  Dagmar Acevedo MD on 11/5/2021 at 5:07 PM

## 2021-11-15 ENCOUNTER — TRANSFERRED RECORDS (OUTPATIENT)
Dept: HEALTH INFORMATION MANAGEMENT | Facility: CLINIC | Age: 81
End: 2021-11-15
Payer: MEDICARE

## 2021-12-21 ENCOUNTER — NURSE TRIAGE (OUTPATIENT)
Dept: NURSING | Facility: CLINIC | Age: 81
End: 2021-12-21
Payer: MEDICARE

## 2021-12-21 NOTE — TELEPHONE ENCOUNTER
Patient asking how to get his booster and covid shots into an natalya QR code. He got them at  pharmacy.  I advised ask there.    Shanice Tracy RN  [facility] Nurse Advisor

## 2021-12-27 DIAGNOSIS — I77.9 BILATERAL CAROTID ARTERY DISEASE, UNSPECIFIED TYPE (H): ICD-10-CM

## 2021-12-29 RX ORDER — ROSUVASTATIN CALCIUM 10 MG/1
TABLET, COATED ORAL
Qty: 90 TABLET | Refills: 0 | Status: SHIPPED | OUTPATIENT
Start: 2021-12-29 | End: 2022-03-31

## 2021-12-30 DIAGNOSIS — I10 BENIGN ESSENTIAL HYPERTENSION: ICD-10-CM

## 2021-12-30 RX ORDER — AMLODIPINE BESYLATE 5 MG/1
5 TABLET ORAL 2 TIMES DAILY
Qty: 180 TABLET | Refills: 0 | Status: SHIPPED | OUTPATIENT
Start: 2021-12-30 | End: 2022-03-31

## 2022-01-03 DIAGNOSIS — I48.91 ATRIAL FIBRILLATION, UNSPECIFIED TYPE (H): ICD-10-CM

## 2022-01-04 RX ORDER — AMIODARONE HYDROCHLORIDE 200 MG/1
TABLET ORAL
Qty: 90 TABLET | Refills: 0 | Status: SHIPPED | OUTPATIENT
Start: 2022-01-04 | End: 2022-04-04

## 2022-01-05 ENCOUNTER — NURSE TRIAGE (OUTPATIENT)
Dept: NURSING | Facility: CLINIC | Age: 82
End: 2022-01-05
Payer: MEDICARE

## 2022-01-05 NOTE — TELEPHONE ENCOUNTER
Elijah is calling about medication Amiodarone 200 mg tablet and states to take 1 tab by mouth once daily Monday through Friday and to skip Saturday and Sunday.  Patient today has questions on medications.      COVID 19 Nurse Triage Plan/Patient Instructions    Please be aware that novel coronavirus (COVID-19) may be circulating in the community. If you develop symptoms such as fever, cough, or SOB or if you have concerns about the presence of another infection including coronavirus (COVID-19), please contact your health care provider or visit [email or web address].     Disposition/Instructions    Home care recommended. Follow home care protocol based instructions.    Thank you for taking steps to prevent the spread of this virus.  o Limit your contact with others.  o Wear a simple mask to cover your cough.  o Wash your hands well and often.    Resources    [facility]: About COVID-19: [email or web address]    CDC: What to Do If You're Sick: www.cdc.gov/coronavirus/2019-ncov/about/steps-when-sick.html    CDC: Ending Home Isolation: www.cdc.gov/coronavirus/2019-ncov/hcp/disposition-in-home-patients.html     CDC: Caring for Someone: www.cdc.gov/coronavirus/2019-ncov/if-you-are-sick/care-for-someone.html     [facility]: Interim Guidance for Hospital Discharge to Home: www.health.[facility].[place]/diseases/coronavirus/hcp/hospdischarge.pdf    [facility] clinical trials (COVID-19 research studies): [email or web address]-clinical-trials     Below are the COVID-19 hotlines at the [facility] ([facility]). Interpreters are available.   o For health questions: Call 665-117-6551 or 1-843.541.6104 (7 a.m. to 7 p.m.)  o For questions about schools and childcare: Call 005-960-6437 or 1-964.110.6689 (7 a.m. to 7 p.m.)

## 2022-02-23 DIAGNOSIS — I48.91 ATRIAL FIBRILLATION, UNSPECIFIED TYPE (H): ICD-10-CM

## 2022-02-23 DIAGNOSIS — M10.9 GOUT, UNSPECIFIED CAUSE, UNSPECIFIED CHRONICITY, UNSPECIFIED SITE: ICD-10-CM

## 2022-02-23 RX ORDER — ALLOPURINOL 300 MG/1
TABLET ORAL
Qty: 90 TABLET | Refills: 1 | Status: SHIPPED | OUTPATIENT
Start: 2022-02-23 | End: 2022-09-01

## 2022-02-23 NOTE — TELEPHONE ENCOUNTER
Prescription approved per [facility] Refill Protocol.  Oli Miranda RN

## 2022-02-23 NOTE — TELEPHONE ENCOUNTER
Routing refill request to provider for review/approval because:  Creatinine   Date Value Ref Range Status   05/05/2021 0.96 0.66 - 1.25 mg/dL Final   Failed protocol due to:   Serum creatinine less than or equal to 1.4   Creat clearance greater than 50ml/min on file in past 3 month       Oli Miranda RN  [facility] Triage Nurse    Tetracycline Counseling: Patient counseled regarding possible photosensitivity and increased risk for sunburn.  Patient instructed to avoid sunlight, if possible.  When exposed to sunlight, patients should wear protective clothing, sunglasses, and sunscreen.  The patient was instructed to call the office immediately if the following severe adverse effects occur:  hearing changes, easy bruising/bleeding, severe headache, or vision changes.  The patient verbalized understanding of the proper use and possible adverse effects of tetracycline.  All of the patient's questions and concerns were addressed. Patient understands to avoid pregnancy while on therapy due to potential birth defects. Azithromycin Counseling:  I discussed with the patient the risks of azithromycin including but not limited to GI upset, allergic reaction, drug rash, diarrhea, and yeast infections. Minocycline Counseling: Patient advised regarding possible photosensitivity and discoloration of the teeth, skin, lips, tongue and gums.  Patient instructed to avoid sunlight, if possible.  When exposed to sunlight, patients should wear protective clothing, sunglasses, and sunscreen.  The patient was instructed to call the office immediately if the following severe adverse effects occur:  hearing changes, easy bruising/bleeding, severe headache, or vision changes.  The patient verbalized understanding of the proper use and possible adverse effects of minocycline.  All of the patient's questions and concerns were addressed. Benzoyl Peroxide Pregnancy And Lactation Text: This medication is Pregnancy Category C. It is unknown if benzoyl peroxide is excreted in breast milk. Topical Retinoid counseling:  Patient advised to apply a pea-sized amount only at bedtime and wait 30 minutes after washing their face before applying.  If too drying, patient may add a non-comedogenic moisturizer. The patient verbalized understanding of the proper use and possible adverse effects of retinoids.  All of the patient's questions and concerns were addressed. Sarecycline Pregnancy And Lactation Text: This medication is Pregnancy Category D and not consider safe during pregnancy. It is also excreted in breast milk. Doxycycline Pregnancy And Lactation Text: This medication is Pregnancy Category D and not consider safe during pregnancy. It is also excreted in breast milk but is considered safe for shorter treatment courses. Dapsone Pregnancy And Lactation Text: This medication is Pregnancy Category C and is not considered safe during pregnancy or breast feeding. Topical Sulfur Applications Counseling: Topical Sulfur Counseling: Patient counseled that this medication may cause skin irritation or allergic reactions.  In the event of skin irritation, the patient was advised to reduce the amount of the drug applied or use it less frequently.   The patient verbalized understanding of the proper use and possible adverse effects of topical sulfur application.  All of the patient's questions and concerns were addressed. Detail Level: Detailed Erythromycin Pregnancy And Lactation Text: This medication is Pregnancy Category B and is considered safe during pregnancy. It is also excreted in breast milk. Erythromycin Counseling:  I discussed with the patient the risks of erythromycin including but not limited to GI upset, allergic reaction, drug rash, diarrhea, increase in liver enzymes, and yeast infections. Topical Retinoid Pregnancy And Lactation Text: This medication is Pregnancy Category C. It is unknown if this medication is excreted in breast milk. Topical Clindamycin Pregnancy And Lactation Text: This medication is Pregnancy Category B and is considered safe during pregnancy. It is unknown if it is excreted in breast milk. Bactrim Counseling:  I discussed with the patient the risks of sulfa antibiotics including but not limited to GI upset, allergic reaction, drug rash, diarrhea, dizziness, photosensitivity, and yeast infections.  Rarely, more serious reactions can occur including but not limited to aplastic anemia, agranulocytosis, methemoglobinemia, blood dyscrasias, liver or kidney failure, lung infiltrates or desquamative/blistering drug rashes. Bactrim Pregnancy And Lactation Text: This medication is Pregnancy Category D and is known to cause fetal risk.  It is also excreted in breast milk. Benzoyl Peroxide Counseling: Patient counseled that medicine may cause skin irritation and bleach clothing.  In the event of skin irritation, the patient was advised to reduce the amount of the drug applied or use it less frequently.   The patient verbalized understanding of the proper use and possible adverse effects of benzoyl peroxide.  All of the patient's questions and concerns were addressed. Tazorac Counseling:  Patient advised that medication is irritating and drying.  Patient may need to apply sparingly and wash off after an hour before eventually leaving it on overnight.  The patient verbalized understanding of the proper use and possible adverse effects of tazorac.  All of the patient's questions and concerns were addressed. Doxycycline Counseling:  Patient counseled regarding possible photosensitivity and increased risk for sunburn.  Patient instructed to avoid sunlight, if possible.  When exposed to sunlight, patients should wear protective clothing, sunglasses, and sunscreen.  The patient was instructed to call the office immediately if the following severe adverse effects occur:  hearing changes, easy bruising/bleeding, severe headache, or vision changes.  The patient verbalized understanding of the proper use and possible adverse effects of doxycycline.  All of the patient's questions and concerns were addressed. Topical Clindamycin Counseling: Patient counseled that this medication may cause skin irritation or allergic reactions.  In the event of skin irritation, the patient was advised to reduce the amount of the drug applied or use it less frequently.   The patient verbalized understanding of the proper use and possible adverse effects of clindamycin.  All of the patient's questions and concerns were addressed. Isotretinoin Pregnancy And Lactation Text: This medication is Pregnancy Category X and is considered extremely dangerous during pregnancy. It is unknown if it is excreted in breast milk. High Dose Vitamin A Pregnancy And Lactation Text: High dose vitamin A therapy is contraindicated during pregnancy and breast feeding. Azithromycin Pregnancy And Lactation Text: This medication is considered safe during pregnancy and is also secreted in breast milk. High Dose Vitamin A Counseling: Side effects reviewed, pt to contact office should one occur. Sarecycline Counseling: Patient advised regarding possible photosensitivity and discoloration of the teeth, skin, lips, tongue and gums.  Patient instructed to avoid sunlight, if possible.  When exposed to sunlight, patients should wear protective clothing, sunglasses, and sunscreen.  The patient was instructed to call the office immediately if the following severe adverse effects occur:  hearing changes, easy bruising/bleeding, severe headache, or vision changes.  The patient verbalized understanding of the proper use and possible adverse effects of sarecycline.  All of the patient's questions and concerns were addressed. Tazorac Pregnancy And Lactation Text: This medication is not safe during pregnancy. It is unknown if this medication is excreted in breast milk. Spironolactone Pregnancy And Lactation Text: This medication can cause feminization of the male fetus and should be avoided during pregnancy. The active metabolite is also found in breast milk. Dapsone Counseling: I discussed with the patient the risks of dapsone including but not limited to hemolytic anemia, agranulocytosis, rashes, methemoglobinemia, kidney failure, peripheral neuropathy, headaches, GI upset, and liver toxicity.  Patients who start dapsone require monitoring including baseline LFTs and weekly CBCs for the first month, then every month thereafter.  The patient verbalized understanding of the proper use and possible adverse effects of dapsone.  All of the patient's questions and concerns were addressed. Isotretinoin Counseling: Patient should get monthly blood tests, not donate blood, not drive at night if vision affected, not share medication, and not undergo elective surgery for 6 months after tx completed. Side effects reviewed, pt to contact office should one occur. Spironolactone Counseling: Patient advised regarding risks of diarrhea, abdominal pain, hyperkalemia, birth defects (for female patients), liver toxicity and renal toxicity. The patient may need blood work to monitor liver and kidney function and potassium levels while on therapy. The patient verbalized understanding of the proper use and possible adverse effects of spironolactone.  All of the patient's questions and concerns were addressed. Include Pregnancy/Lactation Warning?: No Topical Sulfur Applications Pregnancy And Lactation Text: This medication is Pregnancy Category C and has an unknown safety profile during pregnancy. It is unknown if this topical medication is excreted in breast milk. Birth Control Pills Pregnancy And Lactation Text: This medication should be avoided if pregnant and for the first 30 days post-partum. Birth Control Pills Counseling: Birth Control Pill Counseling: I discussed with the patient the potential side effects of OCPs including but not limited to increased risk of stroke, heart attack, thrombophlebitis, deep venous thrombosis, hepatic adenomas, breast changes, GI upset, headaches, and depression.  The patient verbalized understanding of the proper use and possible adverse effects of OCPs. All of the patient's questions and concerns were addressed.

## 2022-02-24 RX ORDER — RIVAROXABAN 20 MG/1
TABLET, FILM COATED ORAL
Qty: 90 TABLET | Refills: 0 | Status: SHIPPED | OUTPATIENT
Start: 2022-02-24 | End: 2022-06-23

## 2022-03-31 DIAGNOSIS — R60.0 BILATERAL LOWER EXTREMITY EDEMA: ICD-10-CM

## 2022-03-31 DIAGNOSIS — I48.92 ATRIAL FLUTTER, UNSPECIFIED TYPE (H): ICD-10-CM

## 2022-03-31 DIAGNOSIS — E26.09 PRIMARY ALDOSTERONISM (H): ICD-10-CM

## 2022-03-31 DIAGNOSIS — I77.9 BILATERAL CAROTID ARTERY DISEASE, UNSPECIFIED TYPE (H): ICD-10-CM

## 2022-03-31 DIAGNOSIS — Z13.6 CARDIOVASCULAR SCREENING; LDL GOAL LESS THAN 130: ICD-10-CM

## 2022-03-31 DIAGNOSIS — I10 BENIGN ESSENTIAL HYPERTENSION: ICD-10-CM

## 2022-03-31 DIAGNOSIS — M10.9 GOUT, UNSPECIFIED CAUSE, UNSPECIFIED CHRONICITY, UNSPECIFIED SITE: Primary | ICD-10-CM

## 2022-03-31 DIAGNOSIS — I42.9 CARDIOMYOPATHY, UNSPECIFIED TYPE (H): ICD-10-CM

## 2022-03-31 RX ORDER — AMLODIPINE BESYLATE 5 MG/1
5 TABLET ORAL 2 TIMES DAILY
Qty: 180 TABLET | Refills: 0 | Status: SHIPPED | OUTPATIENT
Start: 2022-03-31 | End: 2022-06-29

## 2022-03-31 RX ORDER — HYDROCHLOROTHIAZIDE 25 MG/1
TABLET ORAL
Qty: 90 TABLET | Refills: 0 | Status: SHIPPED | OUTPATIENT
Start: 2022-03-31 | End: 2022-06-29

## 2022-03-31 RX ORDER — ROSUVASTATIN CALCIUM 10 MG/1
TABLET, COATED ORAL
Qty: 90 TABLET | Refills: 0 | Status: SHIPPED | OUTPATIENT
Start: 2022-03-31 | End: 2022-06-23

## 2022-03-31 NOTE — TELEPHONE ENCOUNTER
[facility] Cardiology Refill Guideline reviewed.  Medication meets criteria for refill. Appt is due. Letter is sent to patient.

## 2022-03-31 NOTE — TELEPHONE ENCOUNTER
Routing refill request to provider for review/approval because:  Failed protocol due to:  BP Readings from Last 3 Encounters:   11/05/21 (!) 155/82   06/25/21 128/68   05/11/21 124/70     Tiki Valentine RN

## 2022-04-04 DIAGNOSIS — I48.91 ATRIAL FIBRILLATION, UNSPECIFIED TYPE (H): ICD-10-CM

## 2022-04-05 ENCOUNTER — TRANSFERRED RECORDS (OUTPATIENT)
Dept: HEALTH INFORMATION MANAGEMENT | Facility: CLINIC | Age: 82
End: 2022-04-05
Payer: MEDICARE

## 2022-04-05 RX ORDER — AMIODARONE HYDROCHLORIDE 200 MG/1
TABLET ORAL
Qty: 90 TABLET | Refills: 0 | Status: SHIPPED | OUTPATIENT
Start: 2022-04-05 | End: 2022-06-29

## 2022-05-17 ENCOUNTER — MYC MEDICAL ADVICE (OUTPATIENT)
Dept: CARDIOLOGY | Facility: CLINIC | Age: 82
End: 2022-05-17
Payer: MEDICARE

## 2022-05-17 ENCOUNTER — TELEPHONE (OUTPATIENT)
Dept: CARDIOLOGY | Facility: CLINIC | Age: 82
End: 2022-05-17
Payer: MEDICARE

## 2022-05-17 DIAGNOSIS — I10 BENIGN ESSENTIAL HYPERTENSION: Primary | ICD-10-CM

## 2022-05-17 NOTE — TELEPHONE ENCOUNTER
[facility] Call Center    Phone Message    May a detailed message be left on voicemail: yes     Reason for Call: Other: Pt would like a call back to discuss his BP readings      Action Taken: Message routed to:  [facility] ([facility]): Cardio    Travel Screening: Not Applicable

## 2022-05-17 NOTE — TELEPHONE ENCOUNTER
[facility] Call [facility]    Phone Message    May a detailed message be left on voicemail: yes     Reason for Call: Other: Elijah calling to request a call back from a nurse so he can give his Bp readings.  He states that he does not have access to [employer] at this time.  Please call him back to discuss    Action Taken: Message routed to:  Other: Cardiology    Travel Screening: Not Applicable

## 2022-05-17 NOTE — TELEPHONE ENCOUNTER
Sent patient my chart message asking for his home BP readings.  He was asked to sent them over to us last week.  ANU Arce

## 2022-05-18 NOTE — TELEPHONE ENCOUNTER
4/18/22 154/75/71-1:05pm  4/18/22 133/66/64-1:15pm  4/18/22           2:45pm  4/19/22 128/62/58-10:30am  4/20/22 123/68/40-11:55am  4/21/22 1:53/71/48-11:22am  4/21/22 141/72/42-11:36am  4/23/22 117/64/38-10:35am  4/28/22 157/77/63-?  4/29/22 129/73/56-9:36am  4/29/22 137/74/58-10:37am  5/2/22 119/66/40-10:00am  5/4/22 128/69/60-9:25am  5/5/22 132/66/44-8:35am  5/7/22 143/69/40-7:45am  5/7/22 135/63/38-8:00am  5/10/22 144/67/42-11:20am  5/11/22 1/36/66/40-10:30am  5/11/22 129/71/55-3:25pm  5/14/22 128/67/39-10:10am  5/16/22 133/63/46-11:45am  5/17/22 136/70/51-10:35am  5/18/22 138/71/61.     I am hoping to get my edema down in my ankles.      My meds: Allopurinol 300mg one AM daily,  Amlodipine 5mg two daily one AM one PM,  Amiodarone 200mg Monday-Friday, Xarelto 20mg PM  daily,  Rosauastatin 10mg daily PM,  Hydrochlorothiazide 25mg AM,  Eplerenone 25mg two daily one AM one PM.

## 2022-05-19 NOTE — TELEPHONE ENCOUNTER
I would recommend we     1. Obtain an BMP once available then    (orders are in and can go to any [place])   2. stop amlodipine  3. lisinopril 40 mg daily  4. Repeat BMP 7-10 days after starting lisinopril   (orders are in)  5. Follow up with me the week of June 13   (orders are in)     ERIKA Guerra CNP on 5/19/2022 at 11:03 AM

## 2022-06-20 DIAGNOSIS — I48.91 ATRIAL FIBRILLATION, UNSPECIFIED TYPE (H): ICD-10-CM

## 2022-06-20 DIAGNOSIS — I77.9 BILATERAL CAROTID ARTERY DISEASE, UNSPECIFIED TYPE (H): ICD-10-CM

## 2022-06-22 NOTE — TELEPHONE ENCOUNTER
Routing refill request to provider for review/approval because:  Labs not current:  Direct Oral Anticoagulant Agents Failed        Normal Platelets on file in past 12 months        Recent Labs   Lab Test 05/05/21  1150             Creatinine Clearance greater than 50 ml/min on file in past 3 mos    No lab results found.      Serum creatinine less than or equal to 1.4 on file in past 3 mos        Recent Labs   Lab Test 05/05/21  1150   CR 0.96      LDL Cholesterol Calculated   Date Value Ref Range Status   04/09/2021 40 <100 mg/dL Final     Comment:     Desirable:       <100 mg/dl         Betty Aguilar RN

## 2022-06-23 RX ORDER — ROSUVASTATIN CALCIUM 10 MG/1
TABLET, COATED ORAL
Qty: 90 TABLET | Refills: 0 | Status: SHIPPED | OUTPATIENT
Start: 2022-06-23 | End: 2022-06-29

## 2022-06-23 RX ORDER — RIVAROXABAN 20 MG/1
TABLET, FILM COATED ORAL
Qty: 90 TABLET | Refills: 0 | Status: SHIPPED | OUTPATIENT
Start: 2022-06-23 | End: 2022-06-29

## 2022-06-29 ENCOUNTER — OFFICE VISIT (OUTPATIENT)
Dept: INTERNAL MEDICINE | Facility: CLINIC | Age: 82
End: 2022-06-29
Payer: MEDICARE

## 2022-06-29 VITALS
HEIGHT: 67 IN | HEART RATE: 69 BPM | DIASTOLIC BLOOD PRESSURE: 74 MMHG | BODY MASS INDEX: 30.92 KG/M2 | TEMPERATURE: 98.7 F | OXYGEN SATURATION: 97 % | WEIGHT: 197 LBS | SYSTOLIC BLOOD PRESSURE: 137 MMHG

## 2022-06-29 DIAGNOSIS — I10 BENIGN ESSENTIAL HYPERTENSION: ICD-10-CM

## 2022-06-29 DIAGNOSIS — I48.91 ATRIAL FIBRILLATION, UNSPECIFIED TYPE (H): ICD-10-CM

## 2022-06-29 DIAGNOSIS — R60.0 BILATERAL LOWER EXTREMITY EDEMA: ICD-10-CM

## 2022-06-29 DIAGNOSIS — E78.5 HYPERLIPIDEMIA LDL GOAL <100: ICD-10-CM

## 2022-06-29 DIAGNOSIS — E26.09 PRIMARY ALDOSTERONISM (H): ICD-10-CM

## 2022-06-29 DIAGNOSIS — I42.9 CARDIOMYOPATHY, UNSPECIFIED TYPE (H): ICD-10-CM

## 2022-06-29 DIAGNOSIS — I77.9 BILATERAL CAROTID ARTERY DISEASE, UNSPECIFIED TYPE (H): ICD-10-CM

## 2022-06-29 DIAGNOSIS — Z00.00 ENCOUNTER FOR MEDICARE ANNUAL WELLNESS EXAM: Primary | ICD-10-CM

## 2022-06-29 PROCEDURE — 99214 OFFICE O/P EST MOD 30 MIN: CPT | Mod: 25 | Performed by: INTERNAL MEDICINE

## 2022-06-29 PROCEDURE — G0439 PPPS, SUBSEQ VISIT: HCPCS | Performed by: INTERNAL MEDICINE

## 2022-06-29 RX ORDER — AMIODARONE HYDROCHLORIDE 200 MG/1
TABLET ORAL
Qty: 90 TABLET | Refills: 3 | Status: SHIPPED | OUTPATIENT
Start: 2022-06-29 | End: 2023-09-05

## 2022-06-29 RX ORDER — ROSUVASTATIN CALCIUM 10 MG/1
10 TABLET, COATED ORAL DAILY
Qty: 90 TABLET | Refills: 3 | Status: SHIPPED | OUTPATIENT
Start: 2022-06-29 | End: 2023-09-05

## 2022-06-29 RX ORDER — EPLERENONE 25 MG/1
25 TABLET, FILM COATED ORAL 2 TIMES DAILY
Qty: 180 TABLET | Refills: 3 | Status: SHIPPED | OUTPATIENT
Start: 2022-06-29 | End: 2022-11-10

## 2022-06-29 RX ORDER — HYDROCHLOROTHIAZIDE 25 MG/1
25 TABLET ORAL EVERY MORNING
Qty: 90 TABLET | Refills: 3 | Status: SHIPPED | OUTPATIENT
Start: 2022-06-29 | End: 2023-06-02

## 2022-06-29 RX ORDER — AMLODIPINE BESYLATE 5 MG/1
5 TABLET ORAL 2 TIMES DAILY
Qty: 180 TABLET | Refills: 3 | Status: SHIPPED | OUTPATIENT
Start: 2022-06-29 | End: 2023-06-21

## 2022-06-29 ASSESSMENT — ENCOUNTER SYMPTOMS
SORE THROAT: 0
NERVOUS/ANXIOUS: 0
WEAKNESS: 0
CHILLS: 0
ARTHRALGIAS: 0
PALPITATIONS: 0
HEMATURIA: 0
DIZZINESS: 0
FEVER: 0
PARESTHESIAS: 0
JOINT SWELLING: 0
NAUSEA: 0
CONSTIPATION: 0
HEADACHES: 0
HEMATOCHEZIA: 0
SHORTNESS OF BREATH: 0
DIARRHEA: 0
MYALGIAS: 0
COUGH: 0
EYE PAIN: 0
FREQUENCY: 0
ABDOMINAL PAIN: 0
HEARTBURN: 0
DYSURIA: 0

## 2022-06-29 ASSESSMENT — ACTIVITIES OF DAILY LIVING (ADL): CURRENT_FUNCTION: NO ASSISTANCE NEEDED

## 2022-06-29 NOTE — PROGRESS NOTES
"SUBJECTIVE:   Reji Aguirre is a 81 year old male who presents for Preventive Visit.      Patient has been advised of split billing requirements and indicates understanding: Yes  Are you in the first 12 months of your Medicare coverage?  No    Healthy Habits:     In general, how would you rate your overall health?  Excellent    Frequency of exercise:  2-3 days/week    Duration of exercise:  45-60 minutes    Do you usually eat at least 4 servings of fruit and vegetables a day, include whole grains    & fiber and avoid regularly eating high fat or \"junk\" foods?  Yes    Taking medications regularly:  Yes    Ability to successfully perform activities of daily living:  No assistance needed    Home Safety:  No safety concerns identified    Hearing Impairment:  No hearing concerns    In the past 6 months, have you been bothered by leaking of urine?  No    In general, how would you rate your overall mental or emotional health?  Excellent      PHQ-2 Total Score: 0    Additional concerns today:  No    Do you feel safe in your environment? Yes    Have you ever done Advance Care Planning? (For example, a Health Directive, POLST, or a discussion with a medical provider or your loved ones about your wishes): No, advance care planning information given to patient to review.  Patient declined advance care planning discussion at this time.       Fall risk  Fallen 2 or more times in the past year?: No  Any fall with injury in the past year?: No    Cognitive Screening   1) Repeat 3 items (Leader, Season, Table)    2) Clock draw: NORMAL  3) 3 item recall: Recalls 2 objects   Results: NORMAL clock, 1-2 items recalled: COGNITIVE IMPAIRMENT LESS LIKELY    Mini-CogTM Rikki Harrison. Licensed by the author for use in [facility]; reprinted with permission ([email or web address]). All rights reserved.      Do you have sleep apnea, excessive snoring or daytime drowsiness?: no    Reviewed and updated as needed this visit by clinical " staff   Tobacco  Allergies  Meds  Problems               Reviewed and updated as needed this visit by Provider    Allergies  Meds  Problems              Social History     Tobacco Use     Smoking status: Former Smoker     Types: Cigarettes     Quit date: 1963     Years since quittin.9     Smokeless tobacco: Never Used   Substance Use Topics     Alcohol use: Yes     Comment: 2 drinks a day     If you drink alcohol do you typically have >3 drinks per day or >7 drinks per week? No    Alcohol Use 2022   Prescreen: >3 drinks/day or >7 drinks/week? No   Prescreen: >3 drinks/day or >7 drinks/week? -       1.    Hypertension:  History of hypertension, on medication.  No reported side effects from medications.    Reviewed last 6 BP readings in chart:  BP Readings from Last 6 Encounters:   22 137/74   21 (!) 155/82   21 128/68   21 124/70   21 (!) 156/79   21 138/78     No active cardiac complaints or symptoms with exercise.     2.  Prior of coronary artery disease as listed in medical history.  No current or recent cardiovascaulr symptoms.   No shortness of breath, no episodes of chest pain/pressure, no dyspnea on exertion, no changes in his abiliy to perform physical exertion or tasks.  Takes the same amount of time to perform similar physical tasks.        3/.  Has history of atrial fibrillation.    No palpitations, no dizziness, no dyspnea on exertion, no shortness of breath.  No racing heart, no fast heart rates.    Taking medications as ordered, no side effects reported.   Patient is taking anticoagulation according to direction, with no reported side effects.      4.  history of mild lower extremity edema, stbale currently.     5.  history of prior carotid artery disae, s/p carotid endarterectomy  reviweed last vascualr clinic notes.     6.  Hyperlipidemia:  Has history of hyperlipidemia.    The patient is taking a medication for this.  Denies any significant  "side effects from his medication.      Latest labs reviewed:    Recent Labs   Lab Test 04/09/21  1037 06/23/20  1237 06/21/16  0837 10/09/14  1548   CHOL 151 151   < > 158   HDL 98 91   < > 60   LDL 40 46   < > 70   TRIG 66 70   < > 140   CHOLHDLRATIO  --   --   --  2.6    < > = values in this interval not displayed.        Lab Results   Component Value Date    AST 44 05/05/2021           6.  history of gout attacks in the past. Stable and no attacks since takign allopurinol      Current providers sharing in care for this patient include:   Patient Care Team:  Igor Christina MD as PCP - General  Igor Christina MD as Assigned PCP  Leonor Aj MD as MD (Cardiovascular Disease)  Leonor Aj MD as MD (Cardiovascular Disease)  Leonor Aj MD as Assigned Heart and Vascular Provider    The following health maintenance items are reviewed in Epic and correct as of today:  Health Maintenance Due   Topic Date Due     BMP  11/05/2021     LIPID  04/09/2022     URIC ACID  04/09/2022     ALT  05/05/2022       **I reviewed the information recorded in the patient's EPIC chart (including but not limited to medical history, surgical history, family history, problem list, medication list, and allergy list) and updated the information as indicated based on the patients reported information.             Review of Systems  Constitutional, HEENT, cardiovascular, pulmonary, gi and gu systems are negative, except as otherwise noted.    OBJECTIVE:   /74   Pulse 69   Temp 98.7  F (37.1  C)   Ht 1.702 m (5' 7\")   Wt 89.4 kg (197 lb)   SpO2 97%   BMI 30.85 kg/m   Estimated body mass index is 30.85 kg/m  as calculated from the following:    Height as of this encounter: 1.702 m (5' 7\").    Weight as of this encounter: 89.4 kg (197 lb).  Physical Exam  GENERAL alert and no distress  EYES:  Normal sclera,conjunctiva, EOMI  HENT: oral and posterior pharynx without " lesions or erythema, facies symmetric  NECK: Neck supple. No LAD, without thyroidmegaly.  RESP: Clear to ausculation bilaterally without wheezes or crackles. Normal BS in all fields.  CV: RRR normal S1S2 without murmurs, rubs or gallops.  LYMPH: no cervical lymph adenopathy appreciated  MS: extremities- no gross deformities of the visible extremities noted,   EXT:  no lower extremity edema  PSYCH: Alert and oriented times 3; speech- coherent  SKIN:  No obvious significant skin lesions on visible portions of face     Diagnostic Test Results:  Labs reviewed in Epic    ASSESSMENT / PLAN:     (Z00.00) Encounter for Medicare annual wellness exam  (primary encounter diagnosis)  Comment: Discussed cardiac disease risk factors and cardiac disease risk factor modification, including diabetes screening, blood pressure screening (and management if indicated), and cholesterol screening.   Reviewed immunzation guidelines, including pneumococcal vaccines, annual influenza, and shingles vaccines.   Discussed routine cancer screenings, including skin cancer, colon cancer screening for everyone until age 80, prostate cancer screening in men until age 75, mammogram and PAP/pelvic for women until age 75.   Recommended regular dentist visits to care for remaining teeth.   Recommended regular screening for vision and glaucoma.   Recommended safe driving and accident avoidance.   Plan:     (I77.9) Bilateral carotid artery disease, unspecified type (H)  Comment: This condition is currently controlled on the current medical regimen.  Continue current therapy.   Discussed secondary risk factor modification and recommended continuing aggressive management of these items.   Plan: rosuvastatin (CRESTOR) 10 MG tablet            (I48.91) Atrial fibrillation, unspecified type (H)  Comment: This condition is currently controlled on the current medical regimen.  Continue current therapy.   Plan: amiodarone (PACERONE) 200 MG tablet,          "rivaroxaban ANTICOAGULANT (XARELTO         ANTICOAGULANT) 20 MG TABS tablet            (I42.9) Cardiomyopathy, unspecified type (H)  Comment: This condition is currently controlled on the current medical regimen.  Continue current therapy.   No CHF at [facility].   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  Continue current therapy.   continuie eplenerone.   Plan:     (E78.5) Hyperlipidemia  Comment: Discussed guidelines recommending a statin cholesterol lowering medication for any patient with either diabetes and/or vascular disease, aiming for a LDL goal under 100 for sure, ideally under 70, using whatever dose of statin tolerated.    Plan:     (I10) Benign essential hypertension  Comment: This condition is currently controlled on the current medical regimen.  Continue current therapy.   Plan: amLODIPine (NORVASC) 5 MG tablet, eplerenone         (INSPRA) 25 MG tablet, hydrochlorothiazide         (HYDRODIURIL) 25 MG tablet            (R60.0) Bilateral lower extremity edema  Comment: This condition is currently controlled on the current medical regimen.  Continue current therapy.   Plan: hydrochlorothiazide (HYDRODIURIL) 25 MG tablet               Patient has been advised of split billing requirements and indicates understanding: Yes    COUNSELING:  Reviewed preventive health counseling, as reflected in patient instructions       Regular exercise       Healthy diet/nutrition       Vision screening       Hearing screening       Dental care       Bladder control       Fall risk prevention       Immunizations    up to date              Routine Colon cancer screening no longer indicated after age 80    Estimated body mass index is 30.85 kg/m  as calculated from the following:    Height as of this encounter: 1.702 m (5' 7\").    Weight as of this encounter: 89.4 kg (197 lb).        He reports that he quit smoking about 58 years ago. His " smoking use included cigarettes. He has never used smokeless tobacco.      Appropriate preventive services were discussed with this patient, including applicable screening as appropriate for cardiovascular disease, diabetes, osteopenia/osteoporosis, and glaucoma.  As appropriate for age/gender, discussed screening for colorectal cancer, prostate cancer, breast cancer, and cervical cancer. Checklist reviewing preventive services available has been given to the patient.    Reviewed patients plan of care and provided an AVS. The  for Reji meets the Care Plan requirement. This Care Plan has been established and reviewed with the .    Counseling Resources:  ATP IV Guidelines  Pooled Cohorts Equation Calculator  Breast Cancer Risk Calculator  Breast Cancer: Medication to Reduce Risk  FRAX Risk Assessment  ICSI Preventive Guidelines  Dietary Guidelines for Americans, 2010  USDA's MyPlate  ASA Prophylaxis  Lung CA Screening    Igor Christina MD  [facility]    Identified Health Risks:

## 2022-06-29 NOTE — PATIENT INSTRUCTIONS
" Continue all medications at the same doses.  Contact your usual pharmacy if you need refills.     Return for a \"lab only appointment\" in the next 1-2 weeks to recheck fasting labs.  Please get these labs done in a fasting state with nothing to eat for at least 8 hours prior to getting labs drawn.  You can make this \"lab only appointment\" at any [facility] lab site, contact that clinic site directly to arrange this.  I will make contact either via phone or [employer] regarding the results and any recommendations once I have reviewed the lab results.      Try to keep the sodium intake less than 2000 mg once per day.       Return to see me in 1 year, sooner if needed.  Use [employer] or Call [phone] to schedule the appointment with me.           5 GOALS TO PREVENT VASCULAR DISEASE:     1.  Aggressive blood pressure control, under 130/80 ideally.  Using medications if needed.    Your blood pressure is under good control    BP Readings from Last 4 Encounters:   06/29/22 137/74   11/05/21 (!) 155/82   06/25/21 128/68   05/11/21 124/70       2.  Aggressive LDL cholesterol (\"bad cholesterol\") lowering as indicated.    Your goal is an LDL under 130 for sure, preferably under 100.  (If you have diabetes or previous vascular disease, the the LDL goals would be under 100 for sure, preferably under 70.)    New guidelines identify four high-risk groups who could benefit from statins:   *people with pre-existing heart disease, such as those who have had a heart attack;   *people ages 40 to 75 who have diabetes of any type  *patients ages 40 to 75 with at least a 7.5% risk of developing cardiovascular disease over the next decade, according to a formula described in the guidelines  *patients with the sort of super-high cholesterol that sometimes runs in families, as evidenced by an LDL of 190 milligrams per deciliter or higher    Your cholesterol levels are well controlled.    Recent Labs   Lab Test 04/09/21  1037 " 06/23/20  1237 06/21/16  0837 10/09/14  1548   CHOL 151 151   < > 158   HDL 98 91   < > 60   LDL 40 46   < > 70   TRIG 66 70   < > 140   CHOLHDLRATIO  --   --   --  2.6    < > = values in this interval not displayed.       3.  Aggressive diabetic prevention, screening and/or management.      You do not have diabetes as of the most recent blood tests.     4.  No smoking    5.  Consider daily preventative aspirin over age 50 if you have enough cardiac risk factors to place you at higher risk for the presence of vascular disease.    If you have any reason not to take aspirin such easy bruising or bleeding, stomach problems, other anticoagulant medications, or any other side effects, then you should not take Aspirin.     --Based on your current cardiac disease risk profile and/or age over 75, you do NOT need to take daily preventative aspirin.        Preventive Health Recommendations:   Male Ages 65 and over    Yearly exam:             See your health care provider every year in order to  o   Review health changes.   o   Discuss preventive care.    o   Review your medicines if your doctor has prescribed any.  Talk with your health care provider about whether you should have a test to screen for prostate cancer (PSA).  Every 3 years, have a diabetes test (fasting glucose). If you are at risk for diabetes, you should have this test more often.  Every 5 years, have a cholesterol test. Have this test more often if you are at risk for high cholesterol or heart disease.   Every 10 years, have a colonoscopy. Or, have a yearly FIT test (stool test). These exams will check for colon cancer.  Talk to with your health care provider about screening for Abdominal Aortic Aneurysm if you have a family history of AAA or have a history of smoking.    Shots:   Get a flu shot each year.   Get a tetanus shot every 10 years.   Talk to your doctor about your pneumonia vaccines. There are now two you should receive - Pneumovax (PPSV 23) and  "Prevnar (PCV 13).   Talk to your doctor about a shingles vaccine.   Talk to your doctor about the hepatitis B vaccine.  Nutrition:   Eat at least 5 servings of fruits and vegetables each day.   Eat whole-grain bread, whole-wheat pasta and brown rice instead of white grains and rice.   Talk to your provider about Calcium and Vitamin D.      --Good Grains:  Oats, brown rice, Quinoa (these do not raise the blood sugar as much)     --Bad grains:  Anything made from wheat or white rice     (because these raise the blood sugars significantly, and the possible gluten issue from wheat for some people).      --Proteins:  Aim for \"lean proteins\" including chicken, fish, seafood, pork, turkey, and eggs (in moderation); Eat red meat only occasionally    Lifestyle  Exercise for at least 150 minutes a week (30 minutes a day, 5 days a week). This will help you control your weight and prevent disease.   Limit alcohol to one drink per day.   No smoking.   Wear sunscreen to prevent skin cancer.   See your dentist every six months for an exam and cleaning.   See your eye doctor every 1 to 2 years to screen for conditions such as glaucoma, macular degeneration, cataracts, etc         Patient Education   Personalized Prevention Plan  You are due for the preventive services outlined below.  Your care team is available to assist you in scheduling these services.  If you have already completed any of these items, please share that information with your care team to update in your medical record.  Health Maintenance Due   Topic Date Due    Basic Metabolic Panel  11/05/2021    Cholesterol Lab  04/09/2022    Uric Acid Levels  04/09/2022    Liver Monitoring Lab  05/05/2022        "

## 2022-07-09 ENCOUNTER — LAB (OUTPATIENT)
Dept: LAB | Facility: CLINIC | Age: 82
End: 2022-07-09
Payer: MEDICARE

## 2022-07-09 DIAGNOSIS — I77.9 BILATERAL CAROTID ARTERY DISEASE, UNSPECIFIED TYPE (H): ICD-10-CM

## 2022-07-09 DIAGNOSIS — Z13.6 CARDIOVASCULAR SCREENING; LDL GOAL LESS THAN 130: ICD-10-CM

## 2022-07-09 DIAGNOSIS — I42.9 CARDIOMYOPATHY, UNSPECIFIED TYPE (H): ICD-10-CM

## 2022-07-09 DIAGNOSIS — I10 BENIGN ESSENTIAL HYPERTENSION: ICD-10-CM

## 2022-07-09 DIAGNOSIS — E26.09 PRIMARY ALDOSTERONISM (H): ICD-10-CM

## 2022-07-09 DIAGNOSIS — M10.9 GOUT, UNSPECIFIED CAUSE, UNSPECIFIED CHRONICITY, UNSPECIFIED SITE: ICD-10-CM

## 2022-07-09 LAB
ALBUMIN SERPL-MCNC: 4 G/DL (ref 3.4–5)
ALP SERPL-CCNC: 77 U/L (ref 40–150)
ALT SERPL W P-5'-P-CCNC: 91 U/L (ref 0–70)
ANION GAP SERPL CALCULATED.3IONS-SCNC: 8 MMOL/L (ref 3–14)
AST SERPL W P-5'-P-CCNC: 47 U/L (ref 0–45)
BILIRUB SERPL-MCNC: 0.8 MG/DL (ref 0.2–1.3)
BUN SERPL-MCNC: 19 MG/DL (ref 7–30)
CALCIUM SERPL-MCNC: 9.8 MG/DL (ref 8.5–10.1)
CHLORIDE BLD-SCNC: 102 MMOL/L (ref 94–109)
CHOLEST SERPL-MCNC: 143 MG/DL
CO2 SERPL-SCNC: 27 MMOL/L (ref 20–32)
CREAT SERPL-MCNC: 1.05 MG/DL (ref 0.66–1.25)
ERYTHROCYTE [DISTWIDTH] IN BLOOD BY AUTOMATED COUNT: 14.3 % (ref 10–15)
FASTING STATUS PATIENT QL REPORTED: YES
GFR SERPL CREATININE-BSD FRML MDRD: 72 ML/MIN/1.73M2
GLUCOSE BLD-MCNC: 95 MG/DL (ref 70–99)
HCT VFR BLD AUTO: 50.2 % (ref 40–53)
HDLC SERPL-MCNC: 91 MG/DL
HGB BLD-MCNC: 17 G/DL (ref 13.3–17.7)
LDLC SERPL CALC-MCNC: 40 MG/DL
MCH RBC QN AUTO: 31.5 PG (ref 26.5–33)
MCHC RBC AUTO-ENTMCNC: 33.9 G/DL (ref 31.5–36.5)
MCV RBC AUTO: 93 FL (ref 78–100)
NONHDLC SERPL-MCNC: 52 MG/DL
PLATELET # BLD AUTO: 182 10E3/UL (ref 150–450)
POTASSIUM BLD-SCNC: 3.4 MMOL/L (ref 3.4–5.3)
PROT SERPL-MCNC: 7.5 G/DL (ref 6.8–8.8)
RBC # BLD AUTO: 5.4 10E6/UL (ref 4.4–5.9)
SODIUM SERPL-SCNC: 137 MMOL/L (ref 133–144)
TRIGL SERPL-MCNC: 61 MG/DL
URATE SERPL-MCNC: 4 MG/DL (ref 3.5–7.2)
WBC # BLD AUTO: 7.5 10E3/UL (ref 4–11)

## 2022-07-09 PROCEDURE — 84550 ASSAY OF BLOOD/URIC ACID: CPT

## 2022-07-09 PROCEDURE — 36415 COLL VENOUS BLD VENIPUNCTURE: CPT

## 2022-07-09 PROCEDURE — 80053 COMPREHEN METABOLIC PANEL: CPT

## 2022-07-09 PROCEDURE — 80061 LIPID PANEL: CPT

## 2022-07-09 PROCEDURE — 85027 COMPLETE CBC AUTOMATED: CPT

## 2022-08-31 DIAGNOSIS — M10.9 GOUT, UNSPECIFIED CAUSE, UNSPECIFIED CHRONICITY, UNSPECIFIED SITE: ICD-10-CM

## 2022-09-01 RX ORDER — ALLOPURINOL 300 MG/1
TABLET ORAL
Qty: 90 TABLET | Refills: 2 | Status: SHIPPED | OUTPATIENT
Start: 2022-09-01 | End: 2023-05-15

## 2022-09-01 NOTE — TELEPHONE ENCOUNTER
Prescription approved per [facility] Refill Protocol.  Oli Miranda RN  [facility] Triage Nurse

## 2022-09-04 ENCOUNTER — OFFICE VISIT (OUTPATIENT)
Dept: URGENT CARE | Facility: URGENT CARE | Age: 82
End: 2022-09-04
Payer: MEDICARE

## 2022-09-04 VITALS
SYSTOLIC BLOOD PRESSURE: 137 MMHG | WEIGHT: 197 LBS | BODY MASS INDEX: 30.85 KG/M2 | RESPIRATION RATE: 13 BRPM | TEMPERATURE: 97.5 F | HEART RATE: 61 BPM | OXYGEN SATURATION: 98 % | DIASTOLIC BLOOD PRESSURE: 82 MMHG

## 2022-09-04 DIAGNOSIS — H11.32 SUBCONJUNCTIVAL HEMORRHAGE OF LEFT EYE: Primary | ICD-10-CM

## 2022-09-04 PROCEDURE — 99213 OFFICE O/P EST LOW 20 MIN: CPT | Performed by: INTERNAL MEDICINE

## 2022-09-04 NOTE — PROGRESS NOTES
Assessment & Plan     Subconjunctival hemorrhage of left eye  Explained the pathophysiology, that this is a benign process, will resolve itself.  Reassurance.     Michel Hurtado MD  [facility] VANCE Nava is a 82 year old, presenting for the following health issues:  Blood Shot, Left Eye (Noticed it 2 weeks ago and gradually worsening. )    HPI   Noting a bright red eye on the left side.  Just involving the white portion of the eye.  Occurred without known trauma.  Seemed to just be spontaneous. He woke up with this a week or two ago.  Is on Xarelto.  No eye pain or vision change.      Objective    /82 (BP Location: Left arm, Patient Position: Sitting, Cuff Size: Adult Large)   Pulse 61   Temp 97.5  F (36.4  C) (Tympanic)   Resp 13   Wt 89.4 kg (197 lb)   SpO2 98%   BMI 30.85 kg/m    Body mass index is 30.85 kg/m .  Physical Exam   GENERAL APPEARANCE: healthy, alert and no distress  EYES: bright, cherry red hemorrhage involving the inferior portion of the sclera throughout; does not cross to the anterior chamber,cornea, iris, etc.

## 2022-09-06 ENCOUNTER — LAB (OUTPATIENT)
Dept: LAB | Facility: CLINIC | Age: 82
End: 2022-09-06
Payer: MEDICARE

## 2022-09-06 ENCOUNTER — NURSE TRIAGE (OUTPATIENT)
Dept: INTERNAL MEDICINE | Facility: CLINIC | Age: 82
End: 2022-09-06

## 2022-09-06 DIAGNOSIS — I10 BENIGN ESSENTIAL HYPERTENSION: ICD-10-CM

## 2022-09-06 LAB
ANION GAP SERPL CALCULATED.3IONS-SCNC: 12 MMOL/L (ref 7–15)
BUN SERPL-MCNC: 16.2 MG/DL (ref 8–23)
CALCIUM SERPL-MCNC: 9.6 MG/DL (ref 8.8–10.2)
CHLORIDE SERPL-SCNC: 101 MMOL/L (ref 98–107)
CREAT SERPL-MCNC: 0.97 MG/DL (ref 0.67–1.17)
DEPRECATED HCO3 PLAS-SCNC: 26 MMOL/L (ref 22–29)
GFR SERPL CREATININE-BSD FRML MDRD: 78 ML/MIN/1.73M2
GLUCOSE SERPL-MCNC: 119 MG/DL (ref 70–99)
POTASSIUM SERPL-SCNC: 3.5 MMOL/L (ref 3.4–5.3)
SODIUM SERPL-SCNC: 139 MMOL/L (ref 136–145)

## 2022-09-06 PROCEDURE — 80048 BASIC METABOLIC PNL TOTAL CA: CPT | Performed by: INTERNAL MEDICINE

## 2022-09-06 PROCEDURE — 36415 COLL VENOUS BLD VENIPUNCTURE: CPT | Performed by: INTERNAL MEDICINE

## 2022-09-06 NOTE — TELEPHONE ENCOUNTER
"Patient calling clinic requesting PT referral for severe R hip pain.     Hip pain started mid-July while twisting body to pick berries [place] garden. Hip pain has gradually increased incrementally everyday since.     Takes 1 tab extra strength acetaminophen 1 time daily as needed.   Denies numbness, leg pain, back pain, fevers.     Per disposition, patient needs to be seen within 3 days in office. Patient agrees and is scheduled 9/7 with PCP for evaluation.     1. LOCATION and RADIATION: \"Where is the pain located?\"       R hip, denies radiation  2. QUALITY: \"What does the pain feel like?\"  (e.g., sharp, dull, aching, burning)      Between sharp an  3. SEVERITY: \"How bad is the pain?\" \"What does it keep you from doing?\"   (Scale 1-10; or mild, moderate, severe)    -  MILD (1-3): doesn't interfere with normal activities     -  MODERATE (4-7): interferes with normal activities (e.g., work or school) or awakens from sleep, limping     -  SEVERE (8-10): excruciating pain, unable to do any normal activities, unable to walk      10/10 when reaching down to clean buttocks      2/10 when walking  4. ONSET: \"When did the pain start?\" \"Does it come and go, or is it there all the time?\"      Mid-July      Constant  5. WORK OR EXERCISE: \"Has there been any recent work or exercise that involved this part of the body?\"       No recent work  6. CAUSE: \"What do you think is causing the hip pain?\"       Twisting body incorrectly to reach blue berries in garden  7. AGGRAVATING FACTORS: \"What makes the hip pain worse?\" (e.g., walking, climbing stairs, running)      General leg movement. Especially moving leg from sitting position to standing position.  8. OTHER SYMPTOMS: \"Do you have any other symptoms?\" (e.g., back pain, pain shooting down leg,  fever, rash)      No other symptoms    Reason for Disposition    MODERATE pain (e.g., interferes with normal activities, limping) and present > 3 days    Additional Information    Negative: " Looks like a broken bone or dislocated joint (e.g., crooked or deformed)    Negative: Sounds like a life-threatening emergency to the triager    Negative: Followed a hip injury    Negative: Leg pain is main symptom    Negative: Back pain radiating (shooting) into hip    Negative: SEVERE pain (e.g., excruciating, unable to do any normal activities) and fever    Negative: Can't stand (bear weight) or walk    Negative: Fever and red area (or area very tender to touch)    Negative: Patient sounds very sick or weak to the triager    Negative: SEVERE pain (e.g., excruciating, unable to do any normal activities)    Negative: Red area or streak > 2 inches (or 5 cm)    Negative: Painful rash with multiple small blisters grouped together (i.e., dermatomal distribution or 'band' or 'stripe')    Negative: Looks like a boil, infected sore, deep ulcer, or other infected rash (spreading redness, pus)    Negative: Localized rash is very painful (no fever)    Negative: Numbness in a leg or foot (i.e., loss of sensation)    Negative: Patient wants to be seen    Protocols used: HIP PAIN-A-[place]

## 2022-09-07 ENCOUNTER — ANCILLARY PROCEDURE (OUTPATIENT)
Dept: GENERAL RADIOLOGY | Facility: CLINIC | Age: 82
End: 2022-09-07
Attending: INTERNAL MEDICINE
Payer: MEDICARE

## 2022-09-07 ENCOUNTER — DOCUMENTATION ONLY (OUTPATIENT)
Dept: CARDIOLOGY | Facility: CLINIC | Age: 82
End: 2022-09-07

## 2022-09-07 ENCOUNTER — OFFICE VISIT (OUTPATIENT)
Dept: CARDIOLOGY | Facility: CLINIC | Age: 82
End: 2022-09-07
Attending: NURSE PRACTITIONER

## 2022-09-07 ENCOUNTER — LAB (OUTPATIENT)
Dept: LAB | Facility: CLINIC | Age: 82
End: 2022-09-07
Payer: MEDICARE

## 2022-09-07 ENCOUNTER — OFFICE VISIT (OUTPATIENT)
Dept: INTERNAL MEDICINE | Facility: CLINIC | Age: 82
End: 2022-09-07

## 2022-09-07 VITALS
DIASTOLIC BLOOD PRESSURE: 70 MMHG | SYSTOLIC BLOOD PRESSURE: 126 MMHG | HEIGHT: 67 IN | BODY MASS INDEX: 31.08 KG/M2 | HEART RATE: 77 BPM | WEIGHT: 198 LBS

## 2022-09-07 VITALS
DIASTOLIC BLOOD PRESSURE: 66 MMHG | SYSTOLIC BLOOD PRESSURE: 128 MMHG | TEMPERATURE: 97.9 F | WEIGHT: 198 LBS | OXYGEN SATURATION: 95 % | HEART RATE: 72 BPM | HEIGHT: 67 IN | BODY MASS INDEX: 31.08 KG/M2

## 2022-09-07 DIAGNOSIS — Z79.899 ON AMIODARONE THERAPY: Primary | ICD-10-CM

## 2022-09-07 DIAGNOSIS — M25.551 HIP PAIN, RIGHT: ICD-10-CM

## 2022-09-07 DIAGNOSIS — Z23 NEED FOR IMMUNIZATION AGAINST INFLUENZA: ICD-10-CM

## 2022-09-07 DIAGNOSIS — I48.19 PERSISTENT ATRIAL FIBRILLATION (H): ICD-10-CM

## 2022-09-07 DIAGNOSIS — M62.89 TENSOR FASCIA LATA SYNDROME: ICD-10-CM

## 2022-09-07 DIAGNOSIS — Z23 NEED FOR PROPHYLACTIC VACCINATION AND INOCULATION AGAINST INFLUENZA: ICD-10-CM

## 2022-09-07 DIAGNOSIS — M25.551 HIP PAIN, RIGHT: Primary | ICD-10-CM

## 2022-09-07 DIAGNOSIS — I48.19 PERSISTENT ATRIAL FIBRILLATION (H): Primary | ICD-10-CM

## 2022-09-07 DIAGNOSIS — I10 BENIGN ESSENTIAL HYPERTENSION: ICD-10-CM

## 2022-09-07 LAB — TSH SERPL DL<=0.005 MIU/L-ACNC: 0.74 UIU/ML (ref 0.3–4.2)

## 2022-09-07 PROCEDURE — 73502 X-RAY EXAM HIP UNI 2-3 VIEWS: CPT | Mod: TC | Performed by: RADIOLOGY

## 2022-09-07 PROCEDURE — 90662 IIV NO PRSV INCREASED AG IM: CPT | Performed by: INTERNAL MEDICINE

## 2022-09-07 PROCEDURE — G0008 ADMIN INFLUENZA VIRUS VAC: HCPCS | Performed by: INTERNAL MEDICINE

## 2022-09-07 PROCEDURE — 71046 X-RAY EXAM CHEST 2 VIEWS: CPT | Mod: TC | Performed by: RADIOLOGY

## 2022-09-07 PROCEDURE — 36415 COLL VENOUS BLD VENIPUNCTURE: CPT | Performed by: INTERNAL MEDICINE

## 2022-09-07 PROCEDURE — 99213 OFFICE O/P EST LOW 20 MIN: CPT | Mod: 25 | Performed by: INTERNAL MEDICINE

## 2022-09-07 PROCEDURE — 93000 ELECTROCARDIOGRAM COMPLETE: CPT | Performed by: INTERNAL MEDICINE

## 2022-09-07 PROCEDURE — 84443 ASSAY THYROID STIM HORMONE: CPT | Performed by: INTERNAL MEDICINE

## 2022-09-07 PROCEDURE — 99214 OFFICE O/P EST MOD 30 MIN: CPT | Performed by: INTERNAL MEDICINE

## 2022-09-07 NOTE — PATIENT INSTRUCTIONS
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      Physical therapy at the physical therapy clinic location of your choosing. .      Tylenol as needed (you cannot take  NSAIDS (Advil, Motrin, Aleve, Ibuprofen, or prescription versions of these medications) due to the Xarelto that you take.       If you do not improve despite these conservative measures, then referral to orthopedic clinic.

## 2022-09-07 NOTE — PROGRESS NOTES
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    A. TSH is normal.    B. 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  I could not hear crackles on exam today.  He does not have dyspnea or cough.      He needs full PFTs with DLCO for further evaluation.    Please call Elijah and explain the need for PFT evaluation.    DI

## 2022-09-07 NOTE — PROGRESS NOTES
"  Assessment & Plan   Problem List Items Addressed This Visit    None     Visit Diagnoses     Hip pain, right    -  Primary    Relevant Orders    XR Pelvis w Hip Right G/E 2 Views (Completed)    Physical Therapy Referral    Tensor fascia elijah syndrome        Relevant Orders    Physical Therapy Referral    Need for immunization against influenza        Relevant Orders    INFLUENZA, QUAD, HIGH DOSE, PF, 65YR + (FLUZONE HD) (Completed)    Need for prophylactic vaccination and inoculation against influenza        Relevant Orders    ADMIN INFLUENZA (For MEDICARE Patients ONLY) [] (Completed)              Annual influenza vaccine given today        Get the updated Covid booster vaccine when available starting in a couple of weeks    [facility] Covid Scheduling number: [phone]   (to arrange Covid testing and/or Covid vaccine appointments)          Your right hip pain is mostly due to irritation and strain of the tensor fascia lattae muscles and 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        Physical therapy at the physical therapy clinic location of your choosing. .        Tylenol as needed (you cannot take  NSAIDS (Advil, Motrin, Aleve, Ibuprofen, or prescription versions of these medications) due to the Xarelto that you take.         If you do not improve despite these conservative measures, then referral to orthopedic clinic.            BMI:   Estimated body mass index is 31.01 kg/m  as calculated from the following:    Height as of this encounter: 1.702 m (5' 7\").    Weight as of this encounter: 89.8 kg (198 lb).           Return in about 4 weeks (around 10/5/2022) for for recheck, if the symptoms fail to improve.    Igor Christina MD  [facility]    Roberto Nava is a 82 year old, presenting for the following health issues:  Musculoskeletal Problem (Hip pain-se 9/6/22 Triage note) and Imm/Inj (Flu Shot)    Right hip    History of Present Illness " "      Back Pain:  He presents for follow up of back pain. Patient's back pain is a new problem.    Original cause of back pain: turning/bending  First noticed back pain: more than 1 month ago  Patient feels back pain: dailyLocation of back pain:  Right hip  Description of back pain: sharp  Back pain spreads: nowhere    Since patient first noticed back pain, pain is: gradually worsening  Does back pain interfere with his job:  Not applicable  On a scale of 1-10 (10 being the worst), patient describes pain as:  9  What makes back pain worse: sitting  Acetaminophen: not helpful  Activity or exercise: not helpful  Chiropractor:  Not tried  Heat: not tried  Massage: not tried  Muscle relaxants: not tried  NSAIDS: not helpful  Opioids: not tried  Physical Therapy: not tried  Rest: not helpful  Steroid Injection: not tried  Stretching: not tried  Surgery: not tried  TENS unit: not tried  Topical pain relievers: not tried  Other healthcare providers patient is seeing for back pain: None    He eats 2-3 servings of fruits and vegetables daily.He consumes 0 sweetened beverage(s) daily.He exercises with enough effort to increase his heart rate 30 to 60 minutes per day.  He exercises with enough effort to increase his heart rate 3 or less days per week.   He is taking medications regularly.         **I reviewed the information recorded in the patient's EPIC chart (including but not limited to medical history, surgical history, family history, problem list, medication list, and allergy list) and updated the information as indicated based on the patients reported information.           Review of Systems   Constitutional, HEENT, cardiovascular, pulmonary, gi and gu systems are negative, except as otherwise noted.      Objective    /66   Pulse 72   Temp 97.9  F (36.6  C) (Tympanic)   Ht 1.702 m (5' 7\")   Wt 89.8 kg (198 lb)   SpO2 95%   BMI 31.01 kg/m    Body mass index is 31.01 kg/m .  Physical Exam   GENERAL alert and " no distress  EYES:  Normal sclera,conjunctiva, EOMI  HENT: facies symmetric  MS: extremities- no gross deformities of the visible extremities noted,   PSYCH: Alert and oriented times 3; speech- coherent  SKIN:  No obvious significant skin lesions on visible portions of face   NEURO:  Normal facial movements.  Appears to move normally   HIP: Right hip joint has full range of motion without pain.  No pain with palpation of the right trochanteric bursa.  Pain with palpation of the upper tensor fascia elijah and the right lateral upper hip area, elevation here exactly reproduces pain and discomfort.

## 2022-09-07 NOTE — LETTER
9/7/2022    Igor Christina MD  [address] [facility] [address]    RE: Reji Aguirre       Dear Colleague,     I had the pleasure of seeing Reji Aguirre in the [facility].  PHYSICIAN NOTE:  This visit was completed in person at the [facility].      I had the pleasure evaluating Mr Elijah Aguirre for atrial fibrillation.       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  Persistent atrial fibrillation.  Treated with low-dose amiodarone since 2016 and has maintained sinus rhythm.  Currently on 200 mg 5 days/week.  [id number] score of 3, on rivaroxaban.  B.  Tachycardia-induced cardiomyopathy.  Normalization of EF after sinus rhythm maintenance.  EF 55-60% in 03/2019.  C.  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  Treated at [facility].  Medications include eplerenone, hydrochlorothiazide, amlodipine.  Also used to be on Bystolic, was stopped in 2021, the patient does not recall why,    D.  Left bundle branch block.   E.  Mild pulmonary hypertension.  F.  Concussion in 2019 with postconcussion syndrome.     It has been quite a while since I last saw lEijah in person.  He looked slower today and burdened by severe right hip pain which very much limits his activity.    He has not had AF that he knows of.  When I saw him by video in 01/2021 he had expressed interest regarding the Watchman procedure.  I ordered at cardiac CT for him as well as an appointment with my partners who perform the procedure, but he did not follow-up.  Today, he does not remember the conversation we had about the Watchman.  In fact, he was the one who brought up the Watchman procedure in 2021.    He has had no chest pain, syncope or near syncope orthopnea or PND.      PHYSICAL EXAMINATION:  General:  in no apparent distress, normal body habitus  ENT/Mouth:  no nasal discharge.  Eyes: Injected left conjunctivae.   Neck:  no  thyromegaly or lymphadenopathy.  Chest/Lungs:  patient is not dyspneic.  Lungs CTA, without rales or wheezing.    Cardiovascular: Normal JVP, rate is regular.  Distant heart sounds, no gallop, murmur or rub.    Abdomen:  no abdominal distention.  Soft, negative HJR.    Extremities: Trace right lower extremity edema  Skin:  no xanthelasma.  No facial laceration.  Neurologic:  Alert.  Normal arm motion bilateral, no tremors.    Vascular:  2+ carotids without bruits.        DIAGNOSTIC STUDIES:  Laboratory studies: Sodium 139, potassium 3.5, creatinine 0.97, ALT 91 (mildly elevated chronically), AST 47, bilirubin 0.8, cholesterol 143, LDL 40, HDL 91, triglycerides 60  ECG: Sinus rhythm, first-degree AV block, LBBB  Echocardiogram: His last study was in 2019, results as above      IMPRESSION:  1. Persistent atrial fibrillation.  He has done well on amiodarone 200 mg 5 days/week.  At some point, likely in 2021, Bystolic was stopped possibly because of bradycardia.  He cannot remember.  He is overdue for a chest x-ray and thyroid labs.  He does not have respiratory symptoms and his lungs are clear on auscultation.  2. AV conduction system disease.  ECG is stable.  Because of LBBB and significant first-degree AV conduction delay, he is at risk for progression over time.  He may require a pacemaker in the future.  At the moment he has no symptoms to suggest the need for a pacemaker.  3. Anticoagulation.  He has had no real issues on rivaroxaban.  Today we decided to just continue to coagulation rather than pursue a Watchman.  4. Hypertension.  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  It is well controlled.    RECOMMENDATIONS:  1. TSH/T4 and chest x-ray today.  2. Follow-up in 1 year with TSH. CXR, 24-hour Holter monitor and echocardiogram before the visit.  3. I encouraged the patient to call in the interim should he have concerns about his heart.    It was my pleasure seeing this delightful patient.  Please feel free to  call with any questions.       Leonor Aj MD, [facility]      Encounter Diagnosis   Name Primary?     Benign essential hypertension        CURRENT MEDICATIONS:  Current Outpatient Medications   Medication Sig Dispense Refill     Acetaminophen (TYLENOL PO) Take 500-1,000 mg by mouth every 8 hours as needed for mild pain or fever        allopurinol (ZYLOPRIM) 300 MG tablet Take 1 tablet by mouth once daily 90 tablet 2     amiodarone (PACERONE) 200 MG tablet TAKE 1 TABLET BY MOUTH ONCE DAILY MONDAY THROUGH FRIDAY. SKIP SATURDAY AND SUNDAY 90 tablet 3     amLODIPine (NORVASC) 5 MG tablet Take 1 tablet (5 mg) by mouth 2 times daily 180 tablet 3     ASPIRIN NOT PRESCRIBED (INTENTIONAL) Please choose reason not prescribed, below 0 each      azelaic acid (FINACIA) 15 % external gel Apply 0.5 inches topically 2 times daily       diphenhydrAMINE (BENADRYL) 25 MG capsule Take 25 mg by mouth nightly as needed for itching or allergies       doxylamine (UNISOM) 25 MG TABS tablet Take 25 mg by mouth At Bedtime       eplerenone (INSPRA) 25 MG tablet Take 1 tablet (25 mg) by mouth 2 times daily 180 tablet 3     hydrochlorothiazide (HYDRODIURIL) 25 MG tablet Take 1 tablet (25 mg) by mouth every morning 90 tablet 3     polyethylene glycol (MIRALAX) 17 g packet Take 17 g by mouth daily as needed for constipation 10 packet 0     rivaroxaban ANTICOAGULANT (XARELTO ANTICOAGULANT) 20 MG TABS tablet Take 1 tablet (20 mg) by mouth daily 90 tablet 3     rosuvastatin (CRESTOR) 10 MG tablet Take 1 tablet (10 mg) by mouth daily 90 tablet 3       ALLERGIES     Allergies   Allergen Reactions     Ampicillin      rash     Metoprolol      Mild fatigue with Toprol; mildly decreased exercise capacity     Spironolactone Nausea and Difficulty breathing     Vicodin [Hydrocodone-Acetaminophen] Nausea       PAST MEDICAL HISTORY:  Past Medical History:   Diagnosis Date     Atrial fibrillation, unspecified type (H) 05/19/2016    DCC 2016     Atrial flutter  (H)      Benign bladder tumor     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 @ [place]     Benign essential hypertension 2/2/2004     Bradycardia      Cardiomyopathy (H)     tachycardia-induced -resolved     Carotid artery disease (H) 02/10/2019    Extensive calcified atherosclerotic plaque in the internal carotid arteries seen on routine head CT     Diverticulosis of colon (without mention of hemorrhage) 11/16/07    incidental diverticuli noted at colonoscopy     Gout, unspecified      Hyperlipidemia      LBBB (left bundle branch block)      Post concussion syndrome 4/22/2019     Primary aldosteronism (H)      Rosacea      Special screening for malignant neoplasms, colon 1995       PAST SURGICAL HISTORY:  Past Surgical History:   Procedure Laterality Date     ARTHROPLASTY KNEE Left 6/6/2016    Procedure: ARTHROPLASTY KNEE;  Surgeon: Derek Varma MD;  Location:  OR     ARTHROSCOPY SHOULDER ROTATOR CUFF REPAIR  7/9/08    Left shoulder arthroscopic rotator cuff repair with acromioplasty     CARDIOVERSION  07-     COLONOSCOPY N/A 5/1/2019    Procedure: COLONOSCOPY;  Surgeon: Marcello Velasquez MD;  Location:  GI     ORTHOPEDIC SURGERY  04/11    2nd toe Lt foot     OTHER SURGICAL HISTORY      resection of bladder tumors 2017 @ [facility] NONSPECIFIC PROCEDURE  5/99    left knee arthroscopy     [facility] COLONOSCOPY THRU STOMA W BIOPSY/CAUTERY TUMOR/POLYP/LESION  11/16/07    normal colonoscopy except for incidental diverticuli       FAMILY HISTORY:  Family History   Problem Relation Age of Onset     Cerebrovascular Disease Father         d:89, dementia     Respiratory Mother         d:94     Diabetes Sister         b. 1938, IDDM since age 10     Diabetes Brother         b. 1943, type II DM       SOCIAL HISTORY:  Social History     Socioeconomic History     Marital status:      Spouse name: None     Number of children: None     Years of education: None     Highest education  "level: None   Tobacco Use     Smoking status: Former Smoker     Types: Cigarettes     Quit date: 1963     Years since quittin.1     Smokeless tobacco: Never Used   Substance and Sexual Activity     Alcohol use: Yes     Comment: 2 drinks a day     Drug use: No     Sexual activity: Yes     Partners: Female   Other Topics Concern     Caffeine Concern No     Comment: 1 cup daily     Special Diet No     Exercise Yes     Comment: skiing       Review of Systems:  Skin:          Eyes:         ENT:         Respiratory:          Cardiovascular:         Gastroenterology:        Genitourinary:         Musculoskeletal:         Neurologic:         Psychiatric:         Heme/Lymph/Imm:         Endocrine:           Physical Exam:  Vitals: /70   Pulse 77   Ht 1.702 m (5' 7\")   Wt 89.8 kg (198 lb)   BMI 31.01 kg/m      Constitutional:           Skin:             Head:           Eyes:           Lymph:      ENT:           Neck:           Respiratory:            Cardiac:                                                           GI:           Extremities and Muscular Skeletal:                 Neurological:           Psych:           CC  Sherlyn Castro, APRN CNP  [address]  [place] [address]  Thank you for allowing me to participate in the care of your patient.      Sincerely,     Leonor Aj MD     [facility]  "

## 2022-09-07 NOTE — PROGRESS NOTES
PHYSICIAN NOTE:  This visit was completed in person at the [facility].      I had the pleasure evaluating Mr Elijah Aguirre for atrial fibrillation.       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  Persistent atrial fibrillation.  Treated with low-dose amiodarone since 2016 and has maintained sinus rhythm.  Currently on 200 mg 5 days/week.  [id number] score of 3, on rivaroxaban.  B.  Tachycardia-induced cardiomyopathy.  Normalization of EF after sinus rhythm maintenance.  EF 55-60% in 03/2019.  C.  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  Treated at [facility].  Medications include eplerenone, hydrochlorothiazide, amlodipine.  Also used to be on Bystolic, was stopped in 2021, the patient does not recall why,    D.  Left bundle branch block.   E.  Mild pulmonary hypertension.  F.  Concussion in 2019 with postconcussion syndrome.     It has been quite a while since I last saw Elijah in person.  He looked slower today and burdened by severe right hip pain which very much limits his activity.    He has not had AF that he knows of.  When I saw him by video in 01/2021 he had expressed interest regarding the Watchman procedure.  I ordered at cardiac CT for him as well as an appointment with my partners who perform the procedure, but he did not follow-up.  Today, he does not remember the conversation we had about the Watchman.  In fact, he was the one who brought up the Watchman procedure in 2021.    He has had no chest pain, syncope or near syncope orthopnea or PND.      PHYSICAL EXAMINATION:  General:  in no apparent distress, normal body habitus  ENT/Mouth:  no nasal discharge.  Eyes: Injected left conjunctivae.   Neck:  no thyromegaly or lymphadenopathy.  Chest/Lungs:  patient is not dyspneic.  Lungs CTA, without rales or wheezing.    Cardiovascular: Normal JVP, rate is regular.  Distant heart sounds, no gallop, murmur or rub.     Abdomen:  no abdominal distention.  Soft, negative HJR.    Extremities: Trace right lower extremity edema  Skin:  no xanthelasma.  No facial laceration.  Neurologic:  Alert.  Normal arm motion bilateral, no tremors.    Vascular:  2+ carotids without bruits.        DIAGNOSTIC STUDIES:  Laboratory studies: Sodium 139, potassium 3.5, creatinine 0.97, ALT 91 (mildly elevated chronically), AST 47, bilirubin 0.8, cholesterol 143, LDL 40, HDL 91, triglycerides 60  ECG: Sinus rhythm, first-degree AV block, LBBB  Echocardiogram: His last study was in 2019, results as above      IMPRESSION:  1. Persistent atrial fibrillation.  He has done well on amiodarone 200 mg 5 days/week.  At some point, likely in 2021, Bystolic was stopped possibly because of bradycardia.  He cannot remember.  He is overdue for a chest x-ray and thyroid labs.  He does not have respiratory symptoms and his lungs are clear on auscultation.  2. AV conduction system disease.  ECG is stable.  Because of LBBB and significant first-degree AV conduction delay, he is at risk for progression over time.  He may require a pacemaker in the future.  At the moment he has no symptoms to suggest the need for a pacemaker.  3. Anticoagulation.  He has had no real issues on rivaroxaban.  Today we decided to just continue to coagulation rather than pursue a Watchman.  4. Hypertension.  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  It is well controlled.    RECOMMENDATIONS:  1. TSH/T4 and chest x-ray today.  2. Follow-up in 1 year with TSH. CXR, 24-hour Holter monitor and echocardiogram before the visit.  3. I encouraged the patient to call in the interim should he have concerns about his heart.    It was my pleasure seeing this delightful patient.  Please feel free to call with any questions.       Leonor Aj MD, [facility]      Encounter Diagnosis   Name Primary?     Benign essential hypertension        CURRENT MEDICATIONS:  Current Outpatient Medications   Medication  Sig Dispense Refill     Acetaminophen (TYLENOL PO) Take 500-1,000 mg by mouth every 8 hours as needed for mild pain or fever        allopurinol (ZYLOPRIM) 300 MG tablet Take 1 tablet by mouth once daily 90 tablet 2     amiodarone (PACERONE) 200 MG tablet TAKE 1 TABLET BY MOUTH ONCE DAILY MONDAY THROUGH FRIDAY. SKIP SATURDAY AND SUNDAY 90 tablet 3     amLODIPine (NORVASC) 5 MG tablet Take 1 tablet (5 mg) by mouth 2 times daily 180 tablet 3     ASPIRIN NOT PRESCRIBED (INTENTIONAL) Please choose reason not prescribed, below 0 each      azelaic acid (FINACIA) 15 % external gel Apply 0.5 inches topically 2 times daily       diphenhydrAMINE (BENADRYL) 25 MG capsule Take 25 mg by mouth nightly as needed for itching or allergies       doxylamine (UNISOM) 25 MG TABS tablet Take 25 mg by mouth At Bedtime       eplerenone (INSPRA) 25 MG tablet Take 1 tablet (25 mg) by mouth 2 times daily 180 tablet 3     hydrochlorothiazide (HYDRODIURIL) 25 MG tablet Take 1 tablet (25 mg) by mouth every morning 90 tablet 3     polyethylene glycol (MIRALAX) 17 g packet Take 17 g by mouth daily as needed for constipation 10 packet 0     rivaroxaban ANTICOAGULANT (XARELTO ANTICOAGULANT) 20 MG TABS tablet Take 1 tablet (20 mg) by mouth daily 90 tablet 3     rosuvastatin (CRESTOR) 10 MG tablet Take 1 tablet (10 mg) by mouth daily 90 tablet 3       ALLERGIES     Allergies   Allergen Reactions     Ampicillin      rash     Metoprolol      Mild fatigue with Toprol; mildly decreased exercise capacity     Spironolactone Nausea and Difficulty breathing     Vicodin [Hydrocodone-Acetaminophen] Nausea       PAST MEDICAL HISTORY:  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 @ [place]     Benign essential hypertension 2/2/2004     Bradycardia      Cardiomyopathy (H)      tachycardia-induced -resolved     Carotid artery disease (H) 02/10/2019    Extensive calcified atherosclerotic plaque in the internal carotid arteries seen on routine head CT     Diverticulosis of colon (without mention of hemorrhage) 07    incidental diverticuli noted at colonoscopy     Gout, unspecified      Hyperlipidemia      LBBB (left bundle branch block)      Post concussion syndrome 2019     Primary aldosteronism (H)      Rosacea      Special screening for malignant neoplasms, colon        PAST SURGICAL HISTORY:  Past Surgical History:   Procedure Laterality Date     ARTHROPLASTY KNEE Left 2016    Procedure: ARTHROPLASTY KNEE;  Surgeon: Derek Varma MD;  Location: [facility] OR     ARTHROSCOPY SHOULDER ROTATOR CUFF REPAIR  08    Left shoulder arthroscopic rotator cuff repair with acromioplasty     CARDIOVERSION  2016     COLONOSCOPY N/A 2019    Procedure: COLONOSCOPY;  Surgeon: Marcello Velasquez MD;  Location:  GI     ORTHOPEDIC SURGERY      2nd toe Lt foot     OTHER SURGICAL HISTORY      resection of bladder tumors  @ [facility] NONSPECIFIC PROCEDURE      left knee arthroscopy     [facility] COLONOSCOPY THRU STOMA W BIOPSY/CAUTERY TUMOR/POLYP/LESION  07    normal colonoscopy except for incidental diverticuli       FAMILY HISTORY:  Family History   Problem Relation Age of Onset     Cerebrovascular Disease Father         d:89, dementia     Respiratory Mother         d:94     Diabetes Sister         b. 1938, IDDM since age 10     Diabetes Brother         b. 1943, type II DM       SOCIAL HISTORY:  Social History     Socioeconomic History     Marital status:      Spouse name: None     Number of children: None     Years of education: None     Highest education level: None   Tobacco Use     Smoking status: Former Smoker     Types: Cigarettes     Quit date: 1963     Years since quittin.1     Smokeless tobacco: Never Used   Substance and  "Sexual Activity     Alcohol use: Yes     Comment: 2 drinks a day     Drug use: No     Sexual activity: Yes     Partners: Female   Other Topics Concern     Caffeine Concern No     Comment: 1 cup daily     Special Diet No     Exercise Yes     Comment: skiing       Review of Systems:  Skin:          Eyes:         ENT:         Respiratory:          Cardiovascular:         Gastroenterology:        Genitourinary:         Musculoskeletal:         Neurologic:         Psychiatric:         Heme/Lymph/Imm:         Endocrine:           Physical Exam:  Vitals: /70   Pulse 77   Ht 1.702 m (5' 7\")   Wt 89.8 kg (198 lb)   BMI 31.01 kg/m      Constitutional:           Skin:             Head:           Eyes:           Lymph:      ENT:           Neck:           Respiratory:            Cardiac:                                                           GI:           Extremities and Muscular Skeletal:                 Neurological:           Psych:           CC  Sherlyn Castro, APRN CNP  [address]  [place] [address]              "

## 2022-09-08 ENCOUNTER — THERAPY VISIT (OUTPATIENT)
Dept: PHYSICAL THERAPY | Facility: CLINIC | Age: 82
End: 2022-09-08
Attending: INTERNAL MEDICINE
Payer: MEDICARE

## 2022-09-08 DIAGNOSIS — M25.551 HIP PAIN, RIGHT: ICD-10-CM

## 2022-09-08 DIAGNOSIS — M54.50 CHRONIC RIGHT-SIDED LOW BACK PAIN WITHOUT SCIATICA: Primary | ICD-10-CM

## 2022-09-08 DIAGNOSIS — G89.29 CHRONIC RIGHT-SIDED LOW BACK PAIN WITHOUT SCIATICA: Primary | ICD-10-CM

## 2022-09-08 DIAGNOSIS — M62.89 TENSOR FASCIA LATA SYNDROME: ICD-10-CM

## 2022-09-08 PROCEDURE — 97110 THERAPEUTIC EXERCISES: CPT | Mod: GP | Performed by: PHYSICAL THERAPIST

## 2022-09-08 PROCEDURE — 97140 MANUAL THERAPY 1/> REGIONS: CPT | Mod: GP | Performed by: PHYSICAL THERAPIST

## 2022-09-08 PROCEDURE — 97161 PT EVAL LOW COMPLEX 20 MIN: CPT | Mod: GP | Performed by: PHYSICAL THERAPIST

## 2022-09-08 ASSESSMENT — ACTIVITIES OF DAILY LIVING (ADL)
GOING_DOWN_1_FLIGHT_OF_STAIRS: MODERATE DIFFICULTY
HOW_WOULD_YOU_RATE_YOUR_CURRENT_LEVEL_OF_FUNCTION_DURING_YOUR_USUAL_ACTIVITIES_OF_DAILY_LIVING_FROM_0_TO_100_WITH_100_BEING_YOUR_LEVEL_OF_FUNCTION_PRIOR_TO_YOUR_HIP_PROBLEM_AND_0_BEING_THE_INABILITY_TO_PERFORM_ANY_OF_YOUR_USUAL_DAILY_ACTIVITIES?: 50
GOING_UP_1_FLIGHT_OF_STAIRS: MODERATE DIFFICULTY
PUTTING_ON_SOCKS_AND_SHOES: MODERATE DIFFICULTY
HOS_ADL_COUNT: 5
ROLLING_OVER_IN_BED: MODERATE DIFFICULTY
HOS_ADL_HIGHEST_POTENTIAL_SCORE: 20
HOS_ADL_ITEM_SCORE_TOTAL: 10
GETTING_INTO_AND_OUT_OF_AN_AVERAGE_CAR: MODERATE DIFFICULTY
WALKING_INITIALLY: MODERATE DIFFICULTY

## 2022-09-08 NOTE — PROGRESS NOTES
CC:  Tamia Schwartz is here today for [facility]   Patient states that for the past 8 months when she has her period, she has pain in her left hip. She cannot walk when this pain is present without ibuprofen. She would like a referral to OB for a pap.    Medications: medications verified and updated  Refills needed today?  NO  denies Latex allergy or sensitivity  Patient would like communication of their results via:      Cell Phone:   Telephone Information:   Mobile [phone]     Okay to leave a message containing results? Yes  Tobacco history: verified  Advanced directives: No        Patient's current [facility] status: Inactive - Patient declined.  Health Maintenance Due   Topic Date Due   • Varicella Vaccine (1 of 2 - 13+ 2-dose series) 11/01/2007   • HPV (Female) Vaccine (1 - Female 3-dose series) 11/01/2009   • DTaP/Tdap/Td Vaccine (1 - Tdap) 11/01/2013   • Cervical Cancer Screening  11/01/2015     Patient is due for topics as listed above but is not proceeding with Cervical cancer screening at this time. Patient is up to date on immunizations, will update records.                 Physical Therapy Initial Evaluation  Subjective:  The history is provided by the patient. No  was used.   Patient Health History  Reji Aguirre being seen for R hip pain.     Date of Onset: approximately one month.   Problem occurred: picking blueberries   Pain is reported as 8/10 on pain scale.  General health as reported by patient is excellent.  Pertinent medical history includes: none.   Other pertinent conditions: see epic.  Medical allergies: other. Other medical allergies details: ampicillen.       Current medications:  High blood pressure medication.    Current occupation is Retired teacher.   Primary job tasks include:  Computer work.                  Therapist Generated HPI Evaluation  Problem details: Pt presents with complaints of R posterolateral hip pain. Pt reported onset of sx's approximately one month ago. Pt [place] over a period of 5 days was picking blueberries. Pt reported he was in various awkward positions and noted discomfort by the end of the 5 days. Pt reported sx's have progressively worsened. Pt reported office visit with primary MD on 8-7-22; referral to PT generated. Pt reported no previous issues with the R hip.         Type of problem:  Lumbar.    This is a new condition.  Condition occurred with:  Repetition/overuse.  Where condition occurred: in the community.  Patient reports pain:  Other (R posterolateral hip/buttock).  Pain is described as aching and is constant.  Pain radiates to:  No radiation. Pain is worse during the day and worse during the night.  Since onset symptoms are unchanged.  Symptoms are exacerbated by bending and other (transitional movements)  and relieved by rest.  Special tests included:  X-ray (moderate degenerative changes R hip).                            Objective:    Gait:  Pt ambulated to the clinic without assistive device. Pt noted to move cautiously from sit to stand; reported transferring weight to L  "LE to avoid weight bearing on the R. Difficulty noted with transitional movements onto the plinth due to pain  Assistive Devices:  None                 Lumbar/SI Evaluation  ROM:    AROM Lumbar:   Flexion:        Fingertips to mid lower leg; reports of increased \"hip\" pain with return to standing  Ext:                    WFL's; provocation of \"hip\" pain with EIS   Side Bend:        Left:     Right:   Rotation:           Left:     Right:   Side Glide:        Left:     Right:                   Neural Tension/Mobility:      Left side:SLR  negative.     Right side:   SLR  negative.   Lumbar Palpation:      Tenderness present at Right: Quadratus Lumborum and Erector Spinae    Lumbar Provocation:      Right positive with: PROM hip (provocation of R hip sx's with passive flexion and IR)                                      Hip Evaluation    Hip Strength:        Abduction:  Right: 5/5     Pain:strong/pain free      External Rotation:  Right: 5/5    Pain: strong/pain free                           General     ROS    Assessment/Plan:    Patient is a 82 year old male with lumbar complaints.    Patient has the following significant findings with corresponding treatment plan.                Diagnosis 1:   R low back pain  Pain -  manual therapy, self management, education and home program  Decreased ROM/flexibility - manual therapy and therapeutic exercise  Decreased function - therapeutic activities    Therapy Evaluation Codes:   1) History comprised of:   Personal factors that impact the plan of care:      Age.    Comorbidity factors that impact the plan of care are:      Osteoarthritis.     Medications impacting care: None.  2) Examination of Body Systems comprised of:   Body structures and functions that impact the plan of care:      Lumbar spine.   Activity limitations that impact the plan of care are:      Bending, Dressing, Sleeping and transitional movements.  3) Clinical presentation characteristics " are:   Stable/Uncomplicated.  4) Decision-Making    Low complexity using standardized patient assessment instrument and/or measureable assessment of functional outcome.  Cumulative Therapy Evaluation is: Low complexity.    Previous and current functional limitations:  (See Goal Flow Sheet for this information)    Short term and Long term goals: (See Goal Flow Sheet for this information)     Communication ability:  Patient appears to be able to clearly communicate and understand verbal and written communication and follow directions correctly.  Treatment Explanation - The following has been discussed with the patient:   RX ordered/plan of care  Anticipated outcomes  Possible risks and side effects  This patient would benefit from PT intervention to resume normal activities.   Rehab potential is good.    Frequency:  1 X week, once daily  Duration:  for 6 weeks  Discharge Plan:  Independent in home treatment program.  Reach maximal therapeutic benefit.    Please refer to the daily flowsheet for treatment today, total treatment time and time spent performing 1:1 timed codes.

## 2022-09-08 NOTE — PROGRESS NOTES
[facility]    OUTPATIENT Physical Therapy ORTHOPEDIC EVALUATION  PLAN OF TREATMENT FOR OUTPATIENT REHABILITATION  (COMPLETE FOR INITIAL CLAIMS ONLY)  Patient's Last Name, First Name, M.I.  YOB: 1940  Reji Aguirre    Provider s Name:  [facility]   Medical Record No.  [id number]   Start of Care Date:  09/08/22   Onset Date:    (MD office visit 8-7-22)   Type:     _X__PT   ___OT Medical Diagnosis:    Encounter Diagnoses   Name Primary?    Hip pain, right     Tensor fascia elijah syndrome         Treatment Diagnosis:  R low back/upper buttock pain        Goals:     09/08/22 0500   Body Part   Goals listed below are for R low back/hip   Goal #1   Goal #1 self cares/transfers/bed mobility   Previous Functional Level No restrictions   Current Functional Level   (pain level up to 8/10 during transitional movements)   STG Target Performance   (decrease pain to 5/10 during transitional movements)   Rationale for independent self care such as dressing, personal hygiene, bathing   Due Date 10/06/22   LTG Target Performance   (decrease pain level during transitional movements to 0-1/10)   Rationale independence in self cares   Due Date 10/26/22         Therapy Frequency:  1x/week  Predicted Duration of Therapy Intervention:  6 weeks    Pricila Bolivar, PT                 I CERTIFY THE NEED FOR THESE SERVICES FURNISHED UNDER        THIS PLAN OF TREATMENT AND WHILE UNDER MY CARE     (Physician attestation of this document indicates review and certification of the therapy plan).                     Certification Date From:  09/08/22   Certification Date To:  10/26/22    Referring Provider:  Igor Christina    Initial Assessment        See Epic Evaluation SOC Date: 09/08/22

## 2022-09-08 NOTE — PROGRESS NOTES
9/8/22 Spoke w pt and explained results of TSH lab and chest xray. Explained recommendation for PFT with DLCO . Pt is in agreement and voiced understanding. Order placed, message sent to scheduling to contact osmin Garcia 850 am

## 2022-09-12 ENCOUNTER — TELEPHONE (OUTPATIENT)
Dept: CARDIOLOGY | Facility: CLINIC | Age: 82
End: 2022-09-12

## 2022-09-12 NOTE — TELEPHONE ENCOUNTER
9/12/22 Pt LVM on [facility] RN line stating he was calling back as requested. Cannot see any reason for requested callback in pts chart.   LM and requested callback. Jose 1240 pm

## 2022-09-12 NOTE — TELEPHONE ENCOUNTER
9/1/22 connected w pt who realized he was listening to an old message from last week requesting he call us. There is nothing further at this time  Pt voiced understanding and agreement with plan.   Jose 150 pm

## 2022-09-14 ENCOUNTER — TELEPHONE (OUTPATIENT)
Dept: CARDIOLOGY | Facility: CLINIC | Age: 82
End: 2022-09-14

## 2022-09-14 NOTE — TELEPHONE ENCOUNTER
[facility] Call Center    Phone Message    May a detailed message be left on voicemail: no     Reason for Call: Other: Elijah called because he is interested in having the watchman procedure done, he would like the PC code so he can contcat his insurance provider and ensure this procedure is covered. Please reach out to Elijah at [phone].     Action Taken: Other: [facility]    Travel Screening: Not Applicable

## 2022-09-16 ENCOUNTER — IMMUNIZATION (OUTPATIENT)
Dept: NURSING | Facility: CLINIC | Age: 82
End: 2022-09-16
Payer: MEDICARE

## 2022-09-16 PROCEDURE — 0124A COVID-19,PF,PFIZER BOOSTER BIVALENT: CPT

## 2022-09-16 PROCEDURE — 91312 COVID-19,PF,PFIZER BOOSTER BIVALENT: CPT

## 2022-09-20 ENCOUNTER — THERAPY VISIT (OUTPATIENT)
Dept: PHYSICAL THERAPY | Facility: CLINIC | Age: 82
End: 2022-09-20
Payer: MEDICARE

## 2022-09-20 DIAGNOSIS — M54.50 CHRONIC RIGHT-SIDED LOW BACK PAIN WITHOUT SCIATICA: Primary | ICD-10-CM

## 2022-09-20 DIAGNOSIS — G89.29 CHRONIC RIGHT-SIDED LOW BACK PAIN WITHOUT SCIATICA: Primary | ICD-10-CM

## 2022-09-20 PROCEDURE — 97110 THERAPEUTIC EXERCISES: CPT | Mod: GP | Performed by: PHYSICAL THERAPIST

## 2022-09-20 PROCEDURE — 97140 MANUAL THERAPY 1/> REGIONS: CPT | Mod: GP | Performed by: PHYSICAL THERAPIST

## 2022-09-20 NOTE — TELEPHONE ENCOUNTER
Spoke to pt who asked that he would like the code sent to his MyChart. Message has been sent. Kasey

## 2022-09-21 ENCOUNTER — TELEPHONE (OUTPATIENT)
Dept: CARDIOLOGY | Facility: CLINIC | Age: 82
End: 2022-09-21

## 2022-09-21 NOTE — TELEPHONE ENCOUNTER
[facility] Call Center    Phone Message    May a detailed message be left on voicemail: yes     Reason for Call: Other: Patient called looking to speak with a member of his care team in regards to a watchman procedure. Please call patient back to further discuss, thank you.    Action Taken: Message routed to:  Other: Cardiology    Travel Screening: Not Applicable

## 2022-10-04 ENCOUNTER — THERAPY VISIT (OUTPATIENT)
Dept: PHYSICAL THERAPY | Facility: CLINIC | Age: 82
End: 2022-10-04
Payer: MEDICARE

## 2022-10-04 DIAGNOSIS — M54.50 CHRONIC RIGHT-SIDED LOW BACK PAIN WITHOUT SCIATICA: Primary | ICD-10-CM

## 2022-10-04 DIAGNOSIS — G89.29 CHRONIC RIGHT-SIDED LOW BACK PAIN WITHOUT SCIATICA: Primary | ICD-10-CM

## 2022-10-04 PROCEDURE — 97140 MANUAL THERAPY 1/> REGIONS: CPT | Mod: GP | Performed by: PHYSICAL THERAPIST

## 2022-10-04 PROCEDURE — 97110 THERAPEUTIC EXERCISES: CPT | Mod: GP | Performed by: PHYSICAL THERAPIST

## 2022-10-06 ENCOUNTER — HOSPITAL ENCOUNTER (OUTPATIENT)
Dept: RESPIRATORY THERAPY | Facility: CLINIC | Age: 82
Discharge: HOME | End: 2022-10-06
Attending: INTERNAL MEDICINE | Admitting: INTERNAL MEDICINE
Payer: MEDICARE

## 2022-10-06 DIAGNOSIS — Z79.899 ON AMIODARONE THERAPY: ICD-10-CM

## 2022-10-06 PROCEDURE — 94010 BREATHING CAPACITY TEST: CPT

## 2022-10-06 PROCEDURE — 94729 DIFFUSING CAPACITY: CPT

## 2022-10-06 PROCEDURE — 94726 PLETHYSMOGRAPHY LUNG VOLUMES: CPT

## 2022-10-06 NOTE — PROGRESS NOTES
Patient completed pulmonary function testing with spirometry, lung volumes and diffusion. Good patient effort and cooperation.The results of this test met the ATS standards for acceptability and repeatability, except for DLCO. DLCO did not meet ATS due to IV<90%VC. The average of two consistent results were recorded. Hgb result of 17.0 from 7/9/22 was used for DLCO.

## 2022-10-07 LAB
DLCOCOR-%PRED-PRE: 97 %
DLCOCOR-PRE: 20.91 ML/MIN/MMHG
DLCOUNC-%PRED-PRE: 103 %
DLCOUNC-PRE: 22.2 ML/MIN/MMHG
DLCOUNC-PRED: 21.37 ML/MIN/MMHG
ERV-%PRED-PRE: 201 %
ERV-PRE: 0.92 L
ERV-PRED: 0.46 L
EXPTIME-PRE: 7.76 SEC
FEF2575-%PRED-PRE: 91 %
FEF2575-PRE: 1.66 L/SEC
FEF2575-PRED: 1.81 L/SEC
FEFMAX-%PRED-PRE: 96 %
FEFMAX-PRE: 6.02 L/SEC
FEFMAX-PRED: 6.23 L/SEC
FEV1-%PRED-PRE: 91 %
FEV1-PRE: 2.28 L
FEV1FEV6-PRE: 74 %
FEV1FEV6-PRED: 76 %
FEV1FVC-PRE: 74 %
FEV1FVC-PRED: 75 %
FEV1SVC-PRE: 71 %
FEV1SVC-PRED: 66 %
FIFMAX-PRE: 2.99 L/SEC
FRCPLETH-%PRED-PRE: 88 %
FRCPLETH-PRE: 3.14 L
FRCPLETH-PRED: 3.56 L
FVC-%PRED-PRE: 92 %
FVC-PRE: 3.09 L
FVC-PRED: 3.35 L
GAW-%PRED-PRE: 59 %
GAW-PRE: 0.61 L/S/CMH2O
GAW-PRED: 1.03 L/S/CMH2O
IC-%PRED-PRE: 68 %
IC-PRE: 2.29 L
IC-PRED: 3.35 L
RVPLETH-%PRED-PRE: 80 %
RVPLETH-PRE: 2.21 L
RVPLETH-PRED: 2.75 L
SGAW-%PRED-PRE: 248 %
SGAW-PRE: 0.21 1/CMH2O*S
SGAW-PRED: 0.08 1/CMH2O*S
SRAW-%PRED-PRE: 101 %
SRAW-PRE: 4.83 CMH2O*S
SRAW-PRED: 4.76 CMH2O*S
TLCPLETH-%PRED-PRE: 85 %
TLCPLETH-PRE: 5.43 L
TLCPLETH-PRED: 6.31 L
VA-%PRED-PRE: 88 %
VA-PRE: 4.8 L
VC-%PRED-PRE: 84 %
VC-PRE: 3.21 L
VC-PRED: 3.81 L

## 2022-10-21 ENCOUNTER — OFFICE VISIT (OUTPATIENT)
Dept: INTERNAL MEDICINE | Facility: CLINIC | Age: 82
End: 2022-10-21
Payer: MEDICARE

## 2022-10-21 VITALS
WEIGHT: 196 LBS | SYSTOLIC BLOOD PRESSURE: 116 MMHG | HEIGHT: 67 IN | BODY MASS INDEX: 30.76 KG/M2 | TEMPERATURE: 98.4 F | OXYGEN SATURATION: 95 % | DIASTOLIC BLOOD PRESSURE: 64 MMHG | HEART RATE: 56 BPM

## 2022-10-21 DIAGNOSIS — M10.9 GOUT, UNSPECIFIED CAUSE, UNSPECIFIED CHRONICITY, UNSPECIFIED SITE: ICD-10-CM

## 2022-10-21 DIAGNOSIS — I42.9 CARDIOMYOPATHY, UNSPECIFIED TYPE (H): ICD-10-CM

## 2022-10-21 DIAGNOSIS — E26.09 PRIMARY ALDOSTERONISM (H): ICD-10-CM

## 2022-10-21 DIAGNOSIS — G89.29 CHRONIC RIGHT-SIDED LOW BACK PAIN WITHOUT SCIATICA: Primary | ICD-10-CM

## 2022-10-21 DIAGNOSIS — Z13.6 CARDIOVASCULAR SCREENING; LDL GOAL LESS THAN 130: ICD-10-CM

## 2022-10-21 DIAGNOSIS — I48.91 ATRIAL FIBRILLATION, UNSPECIFIED TYPE (H): ICD-10-CM

## 2022-10-21 DIAGNOSIS — I10 BENIGN ESSENTIAL HYPERTENSION: ICD-10-CM

## 2022-10-21 DIAGNOSIS — E78.5 HYPERLIPIDEMIA LDL GOAL <100: ICD-10-CM

## 2022-10-21 DIAGNOSIS — M54.50 CHRONIC RIGHT-SIDED LOW BACK PAIN WITHOUT SCIATICA: Primary | ICD-10-CM

## 2022-10-21 PROCEDURE — 99213 OFFICE O/P EST LOW 20 MIN: CPT | Performed by: INTERNAL MEDICINE

## 2022-10-21 ASSESSMENT — PAIN SCALES - GENERAL: PAINLEVEL: MILD PAIN (2)

## 2022-10-21 NOTE — PATIENT INSTRUCTIONS
"  The hip pain is better, continue to do the stretches and exercise recommended by PT.      Consider using a foam roller or similar massage device as needed.     Return to see me in June 2023, sooner if needed.  Use [employer] or Call [phone] to schedule the appointment with me.        Loose stools once per day is no a problem, usually associated with diet changes.  I do not recommend using anti diarrhea tablets because this may produce a fair amount of constipation.      Consider fiber supplements (fiber capsules) or increased dietary fiber (liek whole grains, vegetables)     Consider a \"pro-biotic\" supplement to help support \"friendly\" bacteria in the intestinal tract that can be adversely affected by antibiotics and poor diet.  There are many different probiotics to choose from, take whichever version is most convenient for you.  You can get these from any pharmacy and most grocery stores.    Examples of pro-biotics are:  Align, Culturelle, GoodBelly juice, Activia yogurt, or any pro-biotic from [employer] or Fresh Thyme (choose the \"middle one).  Most regular yogurts will not give much probiotic effect since they are pasteurized.  "

## 2022-10-21 NOTE — PROGRESS NOTES
Assessment & Plan     (M54.50,  G89.29) Chronic right-sided low back pain without sciatica  (primary encounter diagnosis)  Comment: much imrpoved with PT.   bubba peterse same exercises.   Plan:     (I48.91) Atrial fibrillation, unspecified type (H)  Comment: This condition is currently controlled on the current medical regimen.  Continue current therapy.   He decieded not to pursue Watchman device.   Plan: Lipid panel reflex to direct LDL Fasting,         Comprehensive metabolic panel, CBC with         platelets, Uric acid, DISCONTINUED: rivaroxaban        ANTICOAGULANT (XARELTO) 20 MG TABS tablet            (I42.9) Cardiomyopathy, unspecified type (H)  Comment: This condition is currently controlled on the current medical regimen.  Continue current therapy.   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  Continue current therapy.   Plan:     (I10) Benign essential hypertension  Comment: This condition is currently controlled on the current medical regimen.  Continue current therapy.   Plan: Lipid panel reflex to direct LDL Fasting,         Comprehensive metabolic panel, CBC with         platelets, Uric acid            (M10.9) Gout, unspecified cause, unspecified chronicity, unspecified site  Comment: This condition is currently controlled on the current medical regimen.  Continue current therapy.   Plan: Comprehensive metabolic panel, CBC with         platelets, Uric acid            (E78.5) Hyperlipidemia LDL goal <100  Comment: This condition is currently controlled on the current medical regimen.  Continue current therapy.   Discussed guidelines recommending a statin cholesterol lowering medication for any patient with either diabetes and/or vascular disease, aiming for a LDL goal under 100 for sure, ideally under 70, using whatever dose of statin tolerated.    Plan: Lipid panel reflex to direct LDL Fasting,         Comprehensive  "metabolic panel, CBC with         platelets, Uric acid            (Z13.6) CARDIOVASCULAR SCREENING; LDL GOAL LESS THAN 130  Comment: Discussed cardiac disease risk factors and cardiac disease risk factor modification.   Plan: Lipid panel reflex to direct LDL Fasting,         Comprehensive metabolic panel, CBC with         platelets, Uric acid                        BMI:   Estimated body mass index is 30.7 kg/m  as calculated from the following:    Height as of this encounter: 1.702 m (5' 7\").    Weight as of this encounter: 88.9 kg (196 lb).           Return in about 8 months (around 6/21/2023) for Medicare Annual Wellness Exam, Blood pressure, with pre-visit fasting labs.    Igor Christina MD  [facility] MARILYSan [facility] Healthcare CorporationJAMIA Nava is a 82 year old, presenting for the following health issues:  Musculoskeletal Problem (Hip pain/)      History of Present Illness       Hypertension: He presents for follow up of hypertension.  He does not check blood pressure  regularly outside of the clinic. Outpatient blood pressures have not been over 140/90. He follows a low salt diet.         Concern - hip pain  Onset: 1 st week of August  Description: right hip-contorted body in strange way while picking blueberries  Intensity: mild  Progression of Symptoms:  same and intermittent  Accompanying Signs & Symptoms: none  Previous history of similar problem: none  Precipitating factors:        Worsened by: none  Alleviating factors:        Improved by: none  Therapies tried and outcome:  none     Saw PT  Symptoms are MUCH better, now only 1/10    2.  Has history of atrial fibrillation.    No palpitations, no dizziness, no dyspnea on exertion, no shortness of breath.  No racing heart, no fast heart rates.    Taking medications as ordered, no side effects reported.   Patient is taking anticoagulation according to direction, with no reported side effects.     Considered the wathcman device, but now does " "not want it.       3.  Has one loose stool per day on most days for the past 2 months,, never more than thisno abdominal pain, no N/V, only once per day.  No fevers, no chills, no bloating.         **I reviewed the information recorded in the patient's EPIC chart (including but not limited to medical history, surgical history, family history, problem list, medication list, and allergy list) and updated the information as indicated based on the patients reported information.         Review of Systems   Constitutional, HEENT, cardiovascular, pulmonary, gi and gu systems are negative, except as otherwise noted.      Objective    /64   Pulse 56   Temp 98.4  F (36.9  C) (Tympanic)   Ht 1.702 m (5' 7\")   Wt 88.9 kg (196 lb)   SpO2 95%   BMI 30.70 kg/m    Body mass index is 30.7 kg/m .  Physical Exam   GENERAL alert and no distress  EYES:  Normal sclera,conjunctiva, EOMI  HENT: facies symmetric  MS: extremities- no gross deformities of the visible extremities noted,   PSYCH: Alert and oriented times 3; speech- coherent  SKIN:  No obvious significant skin lesions on visible portions of face   NEURO:  Normal facial movements.  Appears to move normally                     "

## 2022-11-10 ENCOUNTER — TELEPHONE (OUTPATIENT)
Dept: INTERNAL MEDICINE | Facility: CLINIC | Age: 82
End: 2022-11-10

## 2022-11-10 DIAGNOSIS — I10 BENIGN ESSENTIAL HYPERTENSION: ICD-10-CM

## 2022-11-10 RX ORDER — EPLERENONE 25 MG/1
25 TABLET, FILM COATED ORAL 2 TIMES DAILY
Qty: 180 TABLET | Refills: 1
Start: 2022-11-10 | End: 2023-01-26

## 2022-11-10 NOTE — TELEPHONE ENCOUNTER
Patient states he got a lot of information from the insurance company regarding this medication so he will drop it off at the  for Dr. Christina to review and will call insurance with the below questions    Oli Miranda RN

## 2022-11-10 NOTE — TELEPHONE ENCOUNTER
"CC: Patient reporting that he received a call from insurance company and was instructed to reach out to his PCP regarding below medication     eplerenone (INSPRA) 25 MG tablet 180 tablet 3 6/29/2022  No   Sig - Route: Take 1 tablet (25 mg) by mouth 2 times daily - Oral   Sent to pharmacy as: Eplerenone 25 MG Oral Tablet (INSPRA)   Class: E-Prescribe   Order: [id number]   E-Prescribing Status: Receipt confirmed by pharmacy (6/29/2022  3:28 PM CDT)     Patient states medication will not be covered by insurance after 12/31/22 due to \"safety issues\" per insurance company     PCP please advise if patient should schedule VV to further discuss medication or if any medication change is warranted?    Callback to patient [phone] - ok to leave detailed VM     Miesha Ramirez RN  [facility]    "

## 2022-11-15 ENCOUNTER — VIRTUAL VISIT (OUTPATIENT)
Dept: INTERNAL MEDICINE | Facility: CLINIC | Age: 82
End: 2022-11-15
Payer: MEDICARE

## 2022-11-15 DIAGNOSIS — E26.09 BENIGN SECONDARY HYPERTENSION DUE TO PRIMARY ALDOSTERONISM (H): Primary | ICD-10-CM

## 2022-11-15 DIAGNOSIS — I48.91 ATRIAL FIBRILLATION, UNSPECIFIED TYPE (H): ICD-10-CM

## 2022-11-15 DIAGNOSIS — I15.2 BENIGN SECONDARY HYPERTENSION DUE TO PRIMARY ALDOSTERONISM (H): Primary | ICD-10-CM

## 2022-11-15 DIAGNOSIS — E26.09 PRIMARY ALDOSTERONISM (H): ICD-10-CM

## 2022-11-15 DIAGNOSIS — I10 BENIGN ESSENTIAL HYPERTENSION: ICD-10-CM

## 2022-11-15 PROCEDURE — 99442 PR PHYSICIAN TELEPHONE EVALUATION 11-20 MIN: CPT | Mod: 95 | Performed by: INTERNAL MEDICINE

## 2022-11-15 NOTE — PROGRESS NOTES
DISCHARGE REPORT    Progress reporting period is from 9-8-22 to 10-4-22. Pt completed 3 sessions of PT.       SUBJECTIVE  Subjective changes noted by patient: Reports he is about 90% improved. [place]; walked a lot without issue although walked at slow pace.    Current pain level is (.5-1/10).     Previous pain level was up to 8/10.   Changes in function:  Yes (See Goal flowsheet attached for changes in current functional level)  Adverse reaction to treatment or activity: None    OBJECTIVE  Objective: LROM: FIS: mild R sided sx's at end range, no provocation of sx's upon return to upright; EIS: provocation of R sided sx's at end range; R SB: provocation of R sided sx's with initiation of movement.     ASSESSMENT/PLAN  Updated problem list and treatment plan: Diagnosis 1:  R low back/upper buttock pain   STG/LTGs have been met or progress has been made towards goals:  Yes (See Goal flow sheet completed today.)  Assessment of Progress: The patient has met all of their long term goals.  Self Management Plans:  Patient is independent in a home treatment program.  I have re-evaluated this patient and find that the nature, scope, duration and intensity of the therapy is appropriate for the medical condition of the patient.  Reji continues to require the following intervention to meet STG and LTG's:  PT intervention is no longer required to meet STG/LTG.    Recommendations:  This patient is ready to be discharged from therapy and continue their home treatment program.    Please refer to the daily flowsheet for treatment today, total treatment time and time spent performing 1:1 timed codes.

## 2022-11-15 NOTE — LETTER
2022      Elijah Aguirre  [address] [address]      Subject:  Request for formulary exception for eplerenone      To whom it may concern:    I am writing on behalf of Reji Aguirre ( 1940), a patient of mine for over 20 years who has difficult to control hypertension due to primary aldosteronism as diagnosed by hypertension specialist at the [facility].   As you know, optimal treatment in this situation consists of mineralocorticoid receptor blockade with spironolactone or eplerenone.  He was tried on spironolactone but unable to tolerate it due to significant GI upset and nausea.  He was placed on eplerenone in addition to his other anti-hypertensive medications and achieved excellent hypertension control without side effects.    Unfortunately eplerenone is reportedly scheduled for removal from his formulary, leaving only spironolactone as the only other covered option, which he is unable to tolerate.  Because eplerenone is a medical necessity for my patient and because there are no other comparable  mineralocorticoid receptor antagonists available, I am asking that he be granted a formulary exception for full coverage for eplerenone.      Thank you in advance for assisting Mr. Aguirre in his drug coverage.     If you have any questions or concerns, please call the clinic at the number listed above.     Sincerely,      Igor Christina M.D.  Dept. of Internal Medicine  [facility]

## 2022-11-15 NOTE — PROGRESS NOTES
"Elijah is a 82 year old who is being evaluated via a billable telephone visit.      What phone number would you like to be contacted at? [phone]  How would you like to obtain your AVS? 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   He was tried on spironolactone first, but was unable to tolerate this medication due to immediate significant GI side effects.  He was then changed to eplerenone with good control of HTN without reported side effects.    I am not aware of any other mineralocorticoid receptor antagonists.   I would consider eplenerone vital to the ongoign manegemnt of his hypertension and will write a letter asking for formulary exception for this medication.     Plan:     (E26.09) Primary aldosteronism (H)  Comment: Optimal treatment consists of mineralocorticoid receptor blockade with spironolactone or eplerenone.   Plan:     (I10) Benign essential hypertension  Comment: This condition is currently controlled on the current medical regimen.  Continue current therapy.   Plan:     (I48.91) Atrial fibrillation, unspecified type (H)  Comment: This condition is currently controlled on the current medical regimen.  Continue current therapy.   Plan:           BMI:   Estimated body mass index is 30.7 kg/m  as calculated from the following:    Height as of 10/21/22: 1.702 m (5' 7\").    Weight as of 10/21/22: 88.9 kg (196 lb).           Return in about 7 months (around 6/15/2023) for Medicare Annual Wellness Exam, Blood pressure, Lab Work.    Igor Christina MD  [facility]        Subjective   Elijah is a 82 year old, presenting for the following health issues:  Recheck Medication and Hypertension    History of Present Illness       Hypertension: He presents for follow up " of hypertension.  He does check blood pressure  regularly outside of the clinic. Outpatient blood pressures have not been over 140/90. He does not follow a low salt diet.       Has long standing history of HTN, difficult to find the right regiemen to control his BP.   Saw Hypertension specialist at [place] who finally diagnosed him with primary aldosteronism as significant cuase for his HTN.   Was tried on spironolactone, but he had immediate (GI) side effects.  He was then placed on eplenerone with good result in BP and tolerability.   He has received notice that eplenerone will not be on formulary next year and he wonder what to do.         2.  Has history of atrial fibrillation.    No palpitations, no dizziness, no dyspnea on exertion, no shortness of breath.  No racing heart, no fast heart rates.    Taking medications as ordered, no side effects reported.   Patient is taking anticoagulation according to direction, with no reported side effects.       **I reviewed the information recorded in the patient's EPIC chart (including but not limited to medical history, surgical history, family history, problem list, medication list, and allergy list) and updated the information as indicated based on the patients reported information.         Review of Systems   Constitutional, HEENT, cardiovascular, pulmonary, gi and gu systems are negative, except as otherwise noted.      Objective             Physical Exam   healthy, alert and no distress  PSYCH: Alert and oriented times 3; coherent speech, normal   rate and volume, able to articulate logical thoughts, able   to abstract reason, no tangential thoughts, no hallucinations   or delusions  His affect is normal  RESP: No cough, no audible wheezing, able to talk in full sentences  Remainder of exam unable to be completed due to telephone visits                Phone call duration: 14:45 minutes

## 2022-12-01 ENCOUNTER — TRANSFERRED RECORDS (OUTPATIENT)
Dept: HEALTH INFORMATION MANAGEMENT | Facility: CLINIC | Age: 82
End: 2022-12-01

## 2022-12-14 ENCOUNTER — NURSE TRIAGE (OUTPATIENT)
Dept: NURSING | Facility: CLINIC | Age: 82
End: 2022-12-14

## 2022-12-14 NOTE — TELEPHONE ENCOUNTER
Triage Call:    Pt calling to report that for the last week or so he has had a pain in his left hip that is getting better; not worse  Pain: 1-2, was an 8-9 initially  Pain in his foot arch that went away  There was and is a black and blue wendy on his shin that is 6 inches, thin and going away  Pt reports numbness of his foot and leg    Taking:   Acetaminophen   Xarelto    Disposition: See in office today or tomorrow. Pt was given the care advice and was transferred to scheduling.     Yeny Cox RN  [facility] Nurse Advisor 11:06 AM 12/14/2022      Reason for Disposition    Numbness in a leg or foot (i.e., loss of sensation)    Additional Information    Negative: Looks like a broken bone or dislocated joint (e.g., crooked or deformed)    Negative: Sounds like a life-threatening emergency to the triager    Negative: Followed a hip injury    Negative: Leg pain is main symptom    Negative: Back pain radiating (shooting) into hip    Negative: SEVERE pain (e.g., excruciating, unable to do any normal activities) and fever    Negative: Can't stand (bear weight) or walk    Negative: Fever and red area (or area very tender to touch)    Negative: Patient sounds very sick or weak to the triager    Negative: SEVERE pain (e.g., excruciating, unable to do any normal activities)    Negative: Red area or streak > 2 inches (or 5 cm)    Negative: Painful rash with multiple small blisters grouped together (i.e., dermatomal distribution or 'band' or 'stripe')    Negative: Looks like a boil, infected sore, deep ulcer, or other infected rash (spreading redness, pus)    Negative: Localized rash is very painful (no fever)    Protocols used: HIP PAIN-A-OH

## 2022-12-15 ENCOUNTER — OFFICE VISIT (OUTPATIENT)
Dept: FAMILY MEDICINE | Facility: CLINIC | Age: 82
End: 2022-12-15
Payer: MEDICARE

## 2022-12-15 VITALS
HEIGHT: 67 IN | RESPIRATION RATE: 20 BRPM | BODY MASS INDEX: 31.86 KG/M2 | SYSTOLIC BLOOD PRESSURE: 139 MMHG | TEMPERATURE: 98.1 F | OXYGEN SATURATION: 96 % | DIASTOLIC BLOOD PRESSURE: 81 MMHG | WEIGHT: 203 LBS | HEART RATE: 69 BPM

## 2022-12-15 DIAGNOSIS — M25.552 HIP PAIN, LEFT: Primary | ICD-10-CM

## 2022-12-15 PROCEDURE — 99213 OFFICE O/P EST LOW 20 MIN: CPT | Performed by: FAMILY MEDICINE

## 2022-12-15 ASSESSMENT — ENCOUNTER SYMPTOMS
APPETITE CHANGE: 0
MYALGIAS: 0
BACK PAIN: 1
AGITATION: 0
DIFFICULTY URINATING: 0
DYSURIA: 0
JOINT SWELLING: 0
ARTHRALGIAS: 1
ACTIVITY CHANGE: 0
COUGH: 0
SHORTNESS OF BREATH: 0

## 2022-12-15 NOTE — PROGRESS NOTES
"  Assessment & Plan     Hip pain, left  Likely secondary to osteoarthritis   Exam normal , pain now improved .    3 weeks ago fell on the knee , knee pain now improved   Hip pain now improved      BMI:   Estimated body mass index is 31.79 kg/m  as calculated from the following:    Height as of this encounter: 1.702 m (5' 7\").    Weight as of this encounter: 92.1 kg (203 lb).   Weight management plan: Discussed healthy diet and exercise guidelines        Return in about 3 months (around 3/15/2023) for Follow up, patient will call, with pcp.    Brisa Krishna MD  [facility] is a 82 year old, presenting for the following health issues:  Hip Pain (Left hip pain x9 days, fell @ 3weeks)      History of Present Illness       Reason for visit:  Left hip, shin, ankle pain  Symptom onset:  1-2 weeks ago  Symptoms include:  Slight pain now, getting better  Symptom intensity:  Mild  Symptom progression:  Improving  Had these symptoms before:  No  What makes it worse:  No  What makes it better:  Briana consumes 1 sweetened beverage(s) daily.He exercises with enough effort to increase his heart rate 30 to 60 minutes per day.  He exercises with enough effort to increase his heart rate 3 or less days per week.   He is taking medications regularly.         Review of Systems   Constitutional: Negative for activity change and appetite change.   Respiratory: Negative for cough and shortness of breath.    Cardiovascular: Negative for chest pain.   Genitourinary: Negative for difficulty urinating and dysuria.   Musculoskeletal: Positive for arthralgias and back pain. Negative for joint swelling and myalgias.   Psychiatric/Behavioral: Negative for agitation and behavioral problems.            Objective    /81 (BP Location: Right arm, Patient Position: Chair, Cuff Size: Adult Large)   Pulse 69   Temp 98.1  F (36.7  C) (Oral)   Resp 20   Ht 1.702 m (5' 7\")   Wt 92.1 kg (203 lb)   SpO2 96%  "  BMI 31.79 kg/m    Body mass index is 31.79 kg/m .  Physical Exam  Musculoskeletal:         General: Normal range of motion.      Comments: Normal flexion and extension left hip .   Skin:     General: Skin is warm.   Neurological:      General: No focal deficit present.   Psychiatric:         Mood and Affect: Mood normal.

## 2023-01-04 NOTE — TELEPHONE ENCOUNTER
Per office note on 10/21/22 with Dr Igor Christina pt was did not to proceed with the watchman. [email or web address]

## 2023-01-24 DIAGNOSIS — I10 BENIGN ESSENTIAL HYPERTENSION: ICD-10-CM

## 2023-01-24 RX ORDER — EPLERENONE 25 MG/1
25 TABLET, FILM COATED ORAL 2 TIMES DAILY
Qty: 180 TABLET | Refills: 1 | OUTPATIENT
Start: 2023-01-24

## 2023-01-24 NOTE — TELEPHONE ENCOUNTER
"  Pt is calling to refill his Eplerenone. Pt reports that he has been \"x'd off the list at his pharmacy and his doctor needs to approve any additional refills\".     Pt was unable to describe what that means, but he states he needs the \"doctors approval for more refills\".     Unsure if this is a standard refill or if there is a more complex issue with this medication. Writer is routing this refill request to patient's PCP.       Yeny Cox RN  [facility] Nurse Advisor 9:57 AM 1/24/2023      "

## 2023-01-24 NOTE — TELEPHONE ENCOUNTER
Medication pended for review. Will route to refill pool to run medication through [facility] protocol.     Tiki Valentine RN

## 2023-01-26 RX ORDER — EPLERENONE 25 MG/1
25 TABLET, FILM COATED ORAL 2 TIMES DAILY
Qty: 180 TABLET | Refills: 1 | Status: SHIPPED | OUTPATIENT
Start: 2023-01-26 | End: 2023-05-03

## 2023-02-07 ENCOUNTER — LAB (OUTPATIENT)
Dept: URGENT CARE | Facility: URGENT CARE | Age: 83
End: 2023-02-07
Payer: MEDICARE

## 2023-02-07 ENCOUNTER — NURSE TRIAGE (OUTPATIENT)
Dept: NURSING | Facility: CLINIC | Age: 83
End: 2023-02-07
Payer: MEDICARE

## 2023-02-07 DIAGNOSIS — Z20.822 SUSPECTED COVID-19 VIRUS INFECTION: ICD-10-CM

## 2023-02-07 LAB — SARS-COV-2 RNA RESP QL NAA+PROBE: NEGATIVE

## 2023-02-07 PROCEDURE — 99207 PR NO CHARGE LOS: CPT

## 2023-02-07 PROCEDURE — U0003 INFECTIOUS AGENT DETECTION BY NUCLEIC ACID (DNA OR RNA); SEVERE ACUTE RESPIRATORY SYNDROME CORONAVIRUS 2 (SARS-COV-2) (CORONAVIRUS DISEASE [COVID-19]), AMPLIFIED PROBE TECHNIQUE, MAKING USE OF HIGH THROUGHPUT TECHNOLOGIES AS DESCRIBED BY CMS-2020-01-R: HCPCS

## 2023-02-07 PROCEDURE — U0005 INFEC AGEN DETEC AMPLI PROBE: HCPCS

## 2023-02-07 NOTE — TELEPHONE ENCOUNTER
Patient is calling and states that he needs a covid test.  Wife is positive and patient just wants to test.  Patient is requesting to speak with scheduling.      Reason for Disposition    Information only question and nurse able to answer    Additional Information    Negative: Nursing judgment    Negative: Nursing judgment    Negative: Nursing judgment    Negative: Nursing judgment    Protocols used: INFORMATION ONLY CALL - NO TRIAGE-A-OH

## 2023-03-24 ENCOUNTER — TELEPHONE (OUTPATIENT)
Dept: INTERNAL MEDICINE | Facility: CLINIC | Age: 83
End: 2023-03-24
Payer: MEDICARE

## 2023-03-24 NOTE — TELEPHONE ENCOUNTER
Patient called     States he wrote down on calendar today to check with clinic for appointment for vaccine     Thinks it was related to COVID-19 and possibly another vaccine for this     Advised per his health maintenance he is up-to-date on COVID19 vaccines and nothing is recommended at this time     Larisa MENDEZ Triage RN  [facility]

## 2023-04-18 DIAGNOSIS — I10 BENIGN ESSENTIAL HYPERTENSION: ICD-10-CM

## 2023-04-19 ENCOUNTER — TRANSFERRED RECORDS (OUTPATIENT)
Dept: HEALTH INFORMATION MANAGEMENT | Facility: CLINIC | Age: 83
End: 2023-04-19
Payer: MEDICARE

## 2023-04-19 RX ORDER — EPLERENONE 25 MG/1
25 TABLET, FILM COATED ORAL 2 TIMES DAILY
Qty: 180 TABLET | Refills: 1 | OUTPATIENT
Start: 2023-04-19

## 2023-04-25 ENCOUNTER — TELEPHONE (OUTPATIENT)
Dept: INTERNAL MEDICINE | Facility: CLINIC | Age: 83
End: 2023-04-25
Payer: MEDICARE

## 2023-04-25 DIAGNOSIS — I10 BENIGN ESSENTIAL HYPERTENSION: ICD-10-CM

## 2023-04-25 NOTE — TELEPHONE ENCOUNTER
Pt called requesting refill of medication but states he was told pharmacy informed him there is an insurance issue. Nurse reached out to the pharmacy and spoke to Divya. She confirms they have a refill but need PA per insurance. Nurse notified pt of this. Starting PA encounter.    Prior Authorization Retail Medication Request    Medication/Dose: Eplerenone (Inspra) 25 mg tablet  ICD code (if different than what is on RX):    Previously Tried and Failed:    Rationale:      Insurance Name:    Insurance ID:        Pharmacy Information (if different than what is on RX)  Name:    Phone:      Carline LEPE RN  [facility]

## 2023-04-28 NOTE — TELEPHONE ENCOUNTER
Juana from [facility] pharmacy calling to check on the status of the prior authorization for patient's Eplerenone. Writer offered to forward a message to the clinic, but she wanted to be transferred directly to the clinic.     Writer transferred caller to the clinic.     Yeny Cox RN  [facility] Nurse Advisor 9:19 AM 4/28/2023

## 2023-05-03 ENCOUNTER — NURSE TRIAGE (OUTPATIENT)
Dept: NURSING | Facility: CLINIC | Age: 83
End: 2023-05-03
Payer: MEDICARE

## 2023-05-03 NOTE — TELEPHONE ENCOUNTER
Prior Authorization Approval    Authorization Effective Date: 2/2/2023  Authorization Expiration Date: 5/3/2024  Medication: Eplerenone (Inspra) 25 mg tablet- APPROVED   Approved Dose/Quantity:   Reference #:     Insurance Company: [employer] - Phone [phone] Fax [phone]  Expected CoPay:       CoPay Card Available:      Foundation Assistance Needed:    Which Pharmacy is filling the prescription (Not needed for infusion/clinic administered): [facility] [address] [place] [address] - [id number] [place] [address]  Pharmacy Notified: Yes  Patient Notified: **Instructed pharmacy to notify patient when script is ready to /ship.**

## 2023-05-03 NOTE — TELEPHONE ENCOUNTER
Central Prior Authorization Team  Phone: [phone]    PA Initiation    Medication: Eplerenone (Inspra) 25 mg tablet- APPROVED   Insurance Company: [employer] - Phone [phone] Fax [phone]  Pharmacy Filling the Rx: [facility] [address] [place] [id number] [facility]  Filling Pharmacy Phone: [phone]  Filling Pharmacy Fax:    Start Date: 5/3/2023

## 2023-05-03 NOTE — TELEPHONE ENCOUNTER
[facility] calling to check status of prior Auth for patients Epierenone.   Writer connected to clinic at this time.     Carmen Draper RN  [facility] Triage Nurse Advisor   5/3/2023 9:28 AM     Reason for Disposition    Pharmacy calling with prescription question and triager unable to answer question    Protocols used: MEDICATION QUESTION CALL-A-OH

## 2023-05-13 DIAGNOSIS — M10.9 GOUT, UNSPECIFIED CAUSE, UNSPECIFIED CHRONICITY, UNSPECIFIED SITE: ICD-10-CM

## 2023-05-15 RX ORDER — ALLOPURINOL 300 MG/1
TABLET ORAL
Qty: 90 TABLET | Refills: 0 | Status: SHIPPED | OUTPATIENT
Start: 2023-05-15 | End: 2023-08-28

## 2023-05-30 ENCOUNTER — PATIENT OUTREACH (OUTPATIENT)
Dept: CARE COORDINATION | Facility: CLINIC | Age: 83
End: 2023-05-30
Payer: MEDICARE

## 2023-05-31 ENCOUNTER — VIRTUAL VISIT (OUTPATIENT)
Dept: INTERNAL MEDICINE | Facility: CLINIC | Age: 83
End: 2023-05-31
Payer: MEDICARE

## 2023-05-31 DIAGNOSIS — N13.8 BPH WITH OBSTRUCTION/LOWER URINARY TRACT SYMPTOMS: Primary | ICD-10-CM

## 2023-05-31 DIAGNOSIS — N40.1 BPH WITH OBSTRUCTION/LOWER URINARY TRACT SYMPTOMS: Primary | ICD-10-CM

## 2023-05-31 PROCEDURE — 99442 PR PHYSICIAN TELEPHONE EVALUATION 11-20 MIN: CPT | Mod: 95 | Performed by: INTERNAL MEDICINE

## 2023-05-31 RX ORDER — TAMSULOSIN HYDROCHLORIDE 0.4 MG/1
0.4 CAPSULE ORAL DAILY
Qty: 90 CAPSULE | Refills: 0 | Status: SHIPPED | OUTPATIENT
Start: 2023-05-31 | End: 2023-09-05

## 2023-05-31 NOTE — PATIENT INSTRUCTIONS
"  BENIGN PROSTATIC HYPERTROPHY (ENLARGED PROSTATE):       *  Try the over the counter herbal supplement Saw Palmetto to see if this helps reduce prostate related symptoms.     * The decision for prescription medications for the prostate are totally based on the degree of symptoms, such as how many trips tot he bathroom at night, the degree of urgency to urinate, and the degree of hesitancy (difficulties in starting and stopping the stream of urine).      *  Minimizing prostate related symptoms include:       --Minimize intake of caffeine and alcohol (especially in the evenings)   --Avoid antihistamine (Benadryl) because they can affect the bladder muscles.    --Careful fluid management close to bedtime, try to minimize fluid within 2-3 hours of sleep   --Do not ignore the urge to urinate, do not \"hold \" the urine as this can cause bladder injury    *  If you continue to have troublesome symptoms despite these conservative measures, then you may require a prescription medication.  Sometimes, even prescription medications are not enough and patients may require surgery.       Trial of the medication Tamsulosin (generic Flomax) 0.4 mg once per day (usually in the evening) to help relax the bladder and prostate to allow you to pass urine easier which should help reduce any urgency to urinate and reduce night time trips to the bathroom.  The main side effects are dizziness, low blood pressure.     If this medication produces noticeable relief in your symptoms, then just contact me for a refill in 3 months.       If your symptoms do NOT noticeably improve, then referral to Urology clinic to evaluate your urinary troubles if you do not noticeably improve with the Tamsulosin.        [facility]   [address]   [facility] [address]   Phone: [phone]   Fax: [phone]       "

## 2023-05-31 NOTE — PROGRESS NOTES
"Elijah is a 82 year old who is being evaluated via a billable telephone visit.      What phone number would you like to be contacted at? [phone]  How would you like to obtain your AVS? Nikolay    Distant Location (provider location):  On-site    Assessment & Plan     (N40.1,  N13.8) BPH with obstruction/lower urinary tract symptoms  (primary encounter diagnosis)  Comment:   Plan: tamsulosin (FLOMAX) 0.4 MG capsule, Adult         Urology  Referral        *  Try the over the counter herbal supplement Saw Palmetto to see if this helps reduce prostate related symptoms.     * The decision for prescription medications for the prostate are totally based on the degree of symptoms, such as how many trips tot he bathroom at night, the degree of urgency to urinate, and the degree of hesitancy (difficulties in starting and stopping the stream of urine).      *  Minimizing prostate related symptoms include:       --Minimize intake of caffeine and alcohol (especially in the evenings)   --Avoid antihistamine (Benadryl) because they can affect the bladder muscles.    --Careful fluid management close to bedtime, try to minimize fluid within 2-3 hours of sleep   --Do not ignore the urge to urinate, do not \"hold \" the urine as this can cause bladder injury    *  If you continue to have troublesome symptoms despite these conservative measures, then you may require a prescription medication.  Sometimes, even prescription medications are not enough and patients may require surgery.       Trial of the medication Tamsulosin (generic Flomax) 0.4 mg once per day (usually in the evening) to help relax the bladder and prostate to allow you to pass urine easier which should help reduce any urgency to urinate and reduce night time trips to the bathroom.  The main side effects are dizziness, low blood pressure.       If this medication produces noticeable relief in your symptoms, then just contact me for a refill in 3 months.         " If your symptoms do NOT noticeably improve, then referral to Urology clinic to evaluate your urinary troubles if you do not noticeably improve with the Tamsulosin.        [facility]   [address]   [facility] [address]   Phone: [phone]   Fax: [phone]         Igor Christina MD  [facility] VANCE Nava is a 82 year old, presenting for the following health issues:  Urinary Problem (Per appointment notes)        5/31/2023     8:43 AM   Additional Questions   Roomed by Zayra PISANO   No symptoms says he feels great.  Genitourinary - Male  Onset/Duration: 2 months  Description:   Dysuria (painful urination): No}  Hematuria (blood in urine): No  Frequency: No  Waking at night to urinate: YES-1x  Hesitancy (delay in urine): No  Retention (unable to empty): No  Decrease in urinary flow: No  Incontinence: No  Progression of Symptoms:  same and constant  Accompanying Signs & Symptoms:  Fever: No  Back/Flank pain: No  Urethral discharge: No  Testicle lumps/masses/pain: No  Nausea and/or vomiting: No  Abdominal pain: No  History:   History of frequent UTI s: No  History of kidney stones: No  History of hernias: No  Personal or Family history of Prostate problems: No  Sexually active: YES  Precipitating or alleviating factors: seems to happen when standing up  Therapies tried and outcome: none    Reports more urgency to urinate over past few years.    Nocturia x 1-2.  Stream of urine steaedily slower.   Mostly feels he emptys bladder.   No incontinence or accidents      **I reviewed the information recorded in the patient's EPIC chart (including but not limited to medical history, surgical history, family history, problem list, medication list, and allergy list) and updated the information as indicated based on the patients reported information.           Review of Systems   Constitutional, HEENT, cardiovascular, pulmonary,  "gi and gu systems are negative, except as otherwise noted.      Objective    Vitals - Patient Reported  Weight (Patient Reported): 87.1 kg (192 lb)  Height (Patient Reported): 172.7 cm (5' 8\")  BMI (Based on Pt Reported Ht/Wt): 29.19  Pain Score: No Pain (0)      Vitals:  No vitals were obtained today due to virtual visit.    Physical Exam   healthy, alert and no distress  PSYCH: Alert and oriented times 3; coherent speech, normal   rate and volume, able to articulate logical thoughts, able   to abstract reason, no tangential thoughts, no hallucinations   or delusions  His affect is normal  RESP: No cough, no audible wheezing, able to talk in full sentences  Remainder of exam unable to be completed due to telephone visits                Phone call duration: 15:15 minutes    "

## 2023-06-02 DIAGNOSIS — I10 BENIGN ESSENTIAL HYPERTENSION: ICD-10-CM

## 2023-06-02 DIAGNOSIS — R60.0 BILATERAL LOWER EXTREMITY EDEMA: ICD-10-CM

## 2023-06-02 RX ORDER — HYDROCHLOROTHIAZIDE 25 MG/1
TABLET ORAL
Qty: 90 TABLET | Refills: 2 | Status: SHIPPED | OUTPATIENT
Start: 2023-06-02 | End: 2023-09-05

## 2023-06-19 ENCOUNTER — THERAPY VISIT (OUTPATIENT)
Dept: PHYSICAL THERAPY | Facility: CLINIC | Age: 83
End: 2023-06-19
Payer: MEDICARE

## 2023-06-19 DIAGNOSIS — M79.672 PAIN OF LEFT HEEL: Primary | ICD-10-CM

## 2023-06-19 DIAGNOSIS — M79.672 PAIN OF LEFT HEEL: ICD-10-CM

## 2023-06-19 PROCEDURE — 97110 THERAPEUTIC EXERCISES: CPT | Mod: GP | Performed by: PHYSICAL THERAPIST

## 2023-06-19 PROCEDURE — 97161 PT EVAL LOW COMPLEX 20 MIN: CPT | Mod: GP | Performed by: PHYSICAL THERAPIST

## 2023-06-19 NOTE — PROGRESS NOTES
PHYSICAL THERAPY EVALUATION  Type of Visit: Evaluation    See electronic medical record for Abuse and Falls Screening details.    Subjective      Presenting condition or subjective complaint: Pt presents with resolving complaints of L heel pain. Pt reported onset of sx's in April of 2023. Pt reported no precipitating incident. Pt reported he researched  sx's online and began stretching and use of ice for pain relief. Pt reported sx's are now almost resolved.  Date of onset: 4-1-23    Relevant medical history: Concussions   Dates & types of surgery: Foot/toe amuputation: 2015.    Prior diagnostic imaging/testing results: -- (none)     Prior therapy history for the same diagnosis, illness or injury: No      Prior Level of Function   Transfers: Independent  Ambulation: Independent  ADL: Independent    Living Environment  Social support: With a significant other or spouse   Type of home: House   Stairs to enter the home: Yes 2 Is there a railing: No   Ramp: No   Stairs inside the home: Yes 10 Is there a railing: Yes   Help at home: None  Equipment owned:       Employment: Not Applicable Retired  Hobbies/Interests: Cabinet building/wood carving.    Patient goals for therapy: Walk without pain in the L heel.    Pain assessment:      Objective   FOOT/ANKLE EVALUATION  PAIN: Pain Level at Rest: 0/10  Pain Level with Use: 1/10  Pain Location: medial aspect calcaneus  Pain is Exacerbated By: walking  INTEGUMENTARY (edema, incisions): generalized swelling noted bilateral lower legs; pt reported edema a side affect of one of his medications  GAIT:   Weightbearing Status: full weight bearing  Assistive Device(s): None  Gait Deviations: Base of support increased  Push off decreased L, Push off decreased R  WEIGHT BEARING ALIGNMENT: Foot/Ankle WB Alignment:Pes planus L, Pes planus R  ROM: AROM: L DF: neutral; PF: 30 degrees; R DF: 4 degrees; PF: 36 degrees; reduced INV/EV B.  STRENGTH: DF/PF: WFL's; decreased INV/EV strength  B  FLEXIBILITY: decreased gastroc flexibility B  PALPATION: localized tenderness medical aspect of calcaneus at plantar fascia origin      Assessment & Plan   CLINICAL IMPRESSIONS   Medical Diagnosis: L heel pain    Treatment Diagnosis: L heel pain   Impression/Assessment: Patient is a 82 year old male with L heel complaints.  The following significant findings have been identified: Pain, Decreased ROM/flexibility, Decreased strength, Impaired gait, Impaired muscle performance and Decreased activity tolerance. These impairments interfere with their ability to perform recreational activities, household mobility and community mobility as compared to previous level of function.     Clinical Decision Making (Complexity):   Clinical Presentation: Stable/Uncomplicated  Clinical Presentation Rationale: based on medical and personal factors listed in PT evaluation  Clinical Decision Making (Complexity): Low complexity    PLAN OF CARE  Treatment Interventions:  Interventions: Therapeutic Exercise, Self-Care/Home Management    Long Term Goals     PT Goal 1  Goal Identifier: Instruct in HEP to address lingerging L heel sx's.  Goal Description: Goal met.  Rationale:  (To ambulate without pain.)  Target Date: 06/19/23      Frequency of Treatment: 1x visit  Duration of Treatment:      Recommended Referrals to Other Professionals:   Education Assessment:        Risks and benefits of evaluation/treatment have been explained.   Patient/Family/caregiver agrees with Plan of Care.     Evaluation Time:            Signing Clinician: Pricila Bolivar, PT      [facility]                                                                                   OUTPATIENT PHYSICAL THERAPY      PLAN OF TREATMENT FOR OUTPATIENT REHABILITATION   Patient's Last Name, First Name, Reji Vanegas YOB: 1940   Provider's Name   [facility]   Medical Record No.  [id number]      Onset Date:  (April 2023)  Start of Care Date:       Medical Diagnosis:  L heel pain      PT Treatment Diagnosis:  L heel pain Plan of Treatment  Frequency/Duration: 1x visit/      Certification date from   to           See note for plan of treatment details and functional goals     Pricila Bolivar, PT                             Referring Provider:  Igor Christina      Initial Assessment  See Epic Evaluation-

## 2023-07-10 DIAGNOSIS — I10 BENIGN ESSENTIAL HYPERTENSION: ICD-10-CM

## 2023-07-10 RX ORDER — EPLERENONE 25 MG/1
TABLET, FILM COATED ORAL
Qty: 180 TABLET | Refills: 0 | Status: SHIPPED | OUTPATIENT
Start: 2023-07-10 | End: 2023-09-05

## 2023-08-10 PROBLEM — M79.672 PAIN OF LEFT HEEL: Status: RESOLVED | Noted: 2023-06-19 | Resolved: 2023-08-10

## 2023-08-20 ENCOUNTER — HEALTH MAINTENANCE LETTER (OUTPATIENT)
Age: 83
End: 2023-08-20

## 2023-08-27 DIAGNOSIS — M10.9 GOUT, UNSPECIFIED CAUSE, UNSPECIFIED CHRONICITY, UNSPECIFIED SITE: ICD-10-CM

## 2023-08-28 RX ORDER — ALLOPURINOL 300 MG/1
TABLET ORAL
Qty: 90 TABLET | Refills: 0 | Status: SHIPPED | OUTPATIENT
Start: 2023-08-28 | End: 2023-09-05

## 2023-08-29 ASSESSMENT — ENCOUNTER SYMPTOMS
PALPITATIONS: 0
JOINT SWELLING: 0
NAUSEA: 0
CHILLS: 0
SORE THROAT: 0
ARTHRALGIAS: 0
MYALGIAS: 0
NERVOUS/ANXIOUS: 0
DIZZINESS: 0
COUGH: 0
FEVER: 0
EYE PAIN: 0
HEMATURIA: 0
DYSURIA: 0
ABDOMINAL PAIN: 0
HEADACHES: 0
HEARTBURN: 0
SHORTNESS OF BREATH: 0
PARESTHESIAS: 0
CONSTIPATION: 0
WEAKNESS: 0
HEMATOCHEZIA: 0
DIARRHEA: 0
FREQUENCY: 0

## 2023-08-29 ASSESSMENT — ACTIVITIES OF DAILY LIVING (ADL): CURRENT_FUNCTION: NO ASSISTANCE NEEDED

## 2023-09-05 ENCOUNTER — OFFICE VISIT (OUTPATIENT)
Dept: INTERNAL MEDICINE | Facility: CLINIC | Age: 83
End: 2023-09-05
Payer: MEDICARE

## 2023-09-05 VITALS
DIASTOLIC BLOOD PRESSURE: 56 MMHG | BODY MASS INDEX: 29.88 KG/M2 | OXYGEN SATURATION: 95 % | SYSTOLIC BLOOD PRESSURE: 120 MMHG | TEMPERATURE: 98.5 F | HEART RATE: 53 BPM | WEIGHT: 190.4 LBS | HEIGHT: 67 IN

## 2023-09-05 DIAGNOSIS — I15.2 BENIGN SECONDARY HYPERTENSION DUE TO PRIMARY ALDOSTERONISM (H): ICD-10-CM

## 2023-09-05 DIAGNOSIS — I77.9 BILATERAL CAROTID ARTERY DISEASE, UNSPECIFIED TYPE (H): ICD-10-CM

## 2023-09-05 DIAGNOSIS — R60.0 BILATERAL LOWER EXTREMITY EDEMA: ICD-10-CM

## 2023-09-05 DIAGNOSIS — I48.91 ATRIAL FIBRILLATION, UNSPECIFIED TYPE (H): ICD-10-CM

## 2023-09-05 DIAGNOSIS — M10.9 GOUT, UNSPECIFIED CAUSE, UNSPECIFIED CHRONICITY, UNSPECIFIED SITE: ICD-10-CM

## 2023-09-05 DIAGNOSIS — N13.8 BPH WITH OBSTRUCTION/LOWER URINARY TRACT SYMPTOMS: ICD-10-CM

## 2023-09-05 DIAGNOSIS — Z00.00 ENCOUNTER FOR MEDICARE ANNUAL WELLNESS EXAM: Primary | ICD-10-CM

## 2023-09-05 DIAGNOSIS — I10 BENIGN ESSENTIAL HYPERTENSION: ICD-10-CM

## 2023-09-05 DIAGNOSIS — E26.09 BENIGN SECONDARY HYPERTENSION DUE TO PRIMARY ALDOSTERONISM (H): ICD-10-CM

## 2023-09-05 DIAGNOSIS — N40.1 BPH WITH OBSTRUCTION/LOWER URINARY TRACT SYMPTOMS: ICD-10-CM

## 2023-09-05 DIAGNOSIS — I42.9 CARDIOMYOPATHY, UNSPECIFIED TYPE (H): ICD-10-CM

## 2023-09-05 DIAGNOSIS — E78.5 HYPERLIPIDEMIA LDL GOAL <100: ICD-10-CM

## 2023-09-05 PROCEDURE — 99214 OFFICE O/P EST MOD 30 MIN: CPT | Mod: 25 | Performed by: INTERNAL MEDICINE

## 2023-09-05 PROCEDURE — G0439 PPPS, SUBSEQ VISIT: HCPCS | Performed by: INTERNAL MEDICINE

## 2023-09-05 RX ORDER — HYDROCHLOROTHIAZIDE 25 MG/1
25 TABLET ORAL EVERY MORNING
Qty: 90 TABLET | Refills: 3 | Status: SHIPPED | OUTPATIENT
Start: 2023-09-05 | End: 2024-09-09

## 2023-09-05 RX ORDER — EPLERENONE 25 MG/1
25 TABLET, FILM COATED ORAL 2 TIMES DAILY
Qty: 180 TABLET | Refills: 3 | Status: SHIPPED | OUTPATIENT
Start: 2023-09-05 | End: 2024-09-09

## 2023-09-05 RX ORDER — AMLODIPINE BESYLATE 5 MG/1
5 TABLET ORAL 2 TIMES DAILY
Qty: 180 TABLET | Refills: 3 | Status: SHIPPED | OUTPATIENT
Start: 2023-09-05 | End: 2024-09-09

## 2023-09-05 RX ORDER — AMIODARONE HYDROCHLORIDE 200 MG/1
TABLET ORAL
Qty: 90 TABLET | Refills: 3 | Status: SHIPPED | OUTPATIENT
Start: 2023-09-05 | End: 2024-09-09

## 2023-09-05 RX ORDER — ROSUVASTATIN CALCIUM 10 MG/1
10 TABLET, COATED ORAL DAILY
Qty: 90 TABLET | Refills: 3 | Status: SHIPPED | OUTPATIENT
Start: 2023-09-05 | End: 2024-09-13

## 2023-09-05 RX ORDER — TAMSULOSIN HYDROCHLORIDE 0.4 MG/1
0.4 CAPSULE ORAL DAILY
Qty: 90 CAPSULE | Refills: 0 | Status: SHIPPED | OUTPATIENT
Start: 2023-09-05 | End: 2024-01-03

## 2023-09-05 RX ORDER — ALLOPURINOL 300 MG/1
1 TABLET ORAL DAILY
Qty: 90 TABLET | Refills: 3 | Status: SHIPPED | OUTPATIENT
Start: 2023-09-05 | End: 2024-09-09

## 2023-09-05 ASSESSMENT — ENCOUNTER SYMPTOMS
CHILLS: 0
HEADACHES: 0
FREQUENCY: 0
MYALGIAS: 0
COUGH: 0
NERVOUS/ANXIOUS: 0
HEMATURIA: 0
HEARTBURN: 0
ABDOMINAL PAIN: 0
PARESTHESIAS: 0
FEVER: 0
DYSURIA: 0
DIARRHEA: 0
ARTHRALGIAS: 0
DIZZINESS: 0
HEMATOCHEZIA: 0
NAUSEA: 0
SHORTNESS OF BREATH: 0
EYE PAIN: 0
PALPITATIONS: 0
WEAKNESS: 0
SORE THROAT: 0
JOINT SWELLING: 0
CONSTIPATION: 0

## 2023-09-05 ASSESSMENT — PAIN SCALES - GENERAL: PAINLEVEL: NO PAIN (0)

## 2023-09-05 ASSESSMENT — ACTIVITIES OF DAILY LIVING (ADL): CURRENT_FUNCTION: NO ASSISTANCE NEEDED

## 2023-09-05 NOTE — PATIENT INSTRUCTIONS
"   Return for a \"lab only appointment\" in the next 1-2 weeks to recheck fasting labs.  Please get these labs done in a fasting state with nothing to eat for at least 8 hours prior to getting labs drawn.  You can make this \"lab only appointment\" at any [facility] lab site, contact that clinic site directly to arrange this.  I will make contact either via phone or [employer] regarding the results and any recommendations once I have reviewed the lab results.      Continue all medications at the same doses.  Contact your usual pharmacy if you need refills.      Covid vaccines are now recommended annually.  Get the most updated Covid vaccine when it becomes available, consider getting this at the same time as the annual influenza vaccine.      Wait to get the annual influenza vaccine until early October t give you better protection later in the winter.      Return to see he Cardiology Clinic for annual follow up on the atrial fibrillation and to discuss the possibility of the Watchman Device as an alternative to anticoagulation to protect you from strokes related to atrial fibrillation.       Return to see me in 1 year, sooner if needed.  Please get fasting labs done at the [facility] or any other [facility] lab 1-2 days before this appointment (schedule a \"lab appointment\" for this).  If you get the labs done at another [facility], make arrangements with them directly.  The orders will be in place.  Eat nothing for at least 8 hours prior to having these labs drawn.  Use [employer] or Call [phone] to schedule the appointment with me and lab appointment.         *  Try the over the counter herbal supplement Saw Palmetto to see if this helps reduce prostate related symptoms.      * The decision for prescription medications for the prostate are totally based on the degree of symptoms, such as how many trips tot he bathroom at night, the degree of urgency to urinate, and the degree of " "hesitancy (difficulties in starting and stopping the stream of urine).       *  Minimizing prostate related symptoms include:                                        --Minimize intake of caffeine and alcohol (especially in the evenings)              --Avoid antihistamine (Benadryl) because they can affect the bladder muscles.               --Careful fluid management close to bedtime, try to minimize fluid within 2-3 hours of sleep              --Do not ignore the urge to urinate, do not \"hold \" the urine as this can cause bladder injury     *  If you continue to have troublesome symptoms despite these conservative measures, then you may require a prescription medication.  Sometimes, even prescription medications are not enough and patients may require surgery.          Consider Trial of the medication Tamsulosin (generic Flomax) 0.4 mg once per day (usually in the evening) to help relax the bladder and prostate to allow you to pass urine easier which should help reduce any urgency to urinate and reduce night time trips to the bathroom.  The main side effects are dizziness, low blood pressure.      If this medication produces noticeable relief in your symptoms, then just contact me for a refill in 3 months.        If your symptoms do NOT noticeably improve, then referral to Urology clinic to evaluate your urinary troubles if you do not noticeably improve with the Tamsulosin.          [facility]   [address] [facility] [address]   Phone: [phone]   Fax: [phone]        5 GOALS TO PREVENT VASCULAR DISEASE:     1.  Aggressive blood pressure control, under 130/80 ideally.  Using medications if needed.    Your blood pressure is under good control    BP Readings from Last 4 Encounters:   09/05/23 120/56   12/15/22 139/81   10/21/22 116/64   09/07/22 128/66       2.  Aggressive LDL cholesterol (\"bad cholesterol\") lowering as indicated.    Your goal is an LDL under 130 for sure, " preferably under 100.  (If you have diabetes or previous vascular disease, the the LDL goals would be under 100 for sure, preferably under 70.)    New guidelines identify four high-risk groups who could benefit from statins:   *people with pre-existing heart disease, such as those who have had a heart attack;   *people ages 40 to 75 who have diabetes of any type  *patients ages 40 to 75 with at least a 7.5% risk of developing cardiovascular disease over the next decade, according to a formula described in the guidelines  *patients with the sort of super-high cholesterol that sometimes runs in families, as evidenced by an LDL of 190 milligrams per deciliter or higher    Your cholesterol levels are well controlled.    Recent Labs   Lab Test 07/09/22  1212 04/09/21  1037   CHOL 143 151   HDL 91 98   LDL 40 40   TRIG 61 66       3.  Aggressive diabetic prevention, screening and/or management.      You do not have diabetes as of the most recent blood tests.     4.  No smoking    5.  Consider daily preventative aspirin over age 50 if you have enough cardiac risk factors to place you at higher risk for the presence of vascular disease.    If you have any reason not to take aspirin such easy bruising or bleeding, stomach problems, other anticoagulant medications, or any other side effects, then you should not take Aspirin.     --Based on prior side effects from aspirin or other medications for which aspirin would cause potential problems, you should NOT take daily preventative aspirin.          Preventive Health Recommendations:   Male Ages 75 and over    Yearly exam:             See your health care provider every year in order to  o   Review health changes.   o   Discuss preventive care.    o   Review your medicines if your doctor has prescribed any.  Regular screening for diabetes. If you are at risk for diabetes, you should have this test more often.  At least every 5 years, have a cholesterol test. Have this test more  "often if you are at risk for high cholesterol or heart disease.   Routine colon cancer screening no longer indicated over age 80.  (If you ever need a colonoscopy over age 80, we would perform it but it would be a \"diagnostic\" colonoscopy)  Routine prostate cancer screening no longer indicated over age 75.  Talk to with your health care provider about screening for Abdominal Aortic Aneurysm if you have a family history of AAA or have a history of smoking.    Shots:   Get an annual influenza vaccine (flu shot) each year.   Get a tetanus shot every 10 years.   Everyone over 65 should make sure to receive pneumonia vaccines to prevent the most common type of bacterial pneumonia: Pneumococcal pneumonia. There are now two you should receive - Pneumovax (PPSV 23) and Prevnar (PCV 20).   Talk to your doctor about a shingles vaccine.   Talk to your doctor about the hepatitis B vaccine.  Covid vaccines are now recommended annually.  Consider getting this at the same time as the annual influenza vaccine.   Nutrition:   Eat at least 5 servings of fruits and vegetables each day.   Eat whole-grain bread, whole-wheat pasta and brown rice instead of white grains and rice.   Talk to your provider about Calcium and Vitamin D.      --Good Grains:  Oats, brown rice, Quinoa (these do not raise the blood sugar as much)     --Bad grains:  Anything made from wheat or white rice     (because these raise the blood sugars significantly, and the possible gluten issue from wheat for some people).      --Proteins:  Aim for \"lean proteins\" including chicken, fish, seafood, pork, turkey, and eggs (in moderation); Eat red meat only occasionally    Lifestyle  Exercise for at least 150 minutes a week (30 minutes a day, 5 days a week). This will help you control your weight and prevent disease.   Limit alcohol to one drink per day.   No smoking.   Wear sunscreen to prevent skin cancer.   See your dentist every six months for an exam and cleaning. "   See your eye doctor every 1 to 2 years to screen for conditions such as glaucoma, macular degeneration, cataracts, etc           Patient Education   Personalized Prevention Plan  You are due for the preventive services outlined below.  Your care team is available to assist you in scheduling these services.  If you have already completed any of these items, please share that information with your care team to update in your medical record.  Health Maintenance Due   Topic Date Due    Diptheria Tetanus Pertussis (DTAP/TDAP/TD) Vaccine (1 - Tdap) 06/06/2015    COVID-19 Vaccine (6 - Pfizer series) 01/16/2023    Basic Metabolic Panel  03/06/2023    ANNUAL REVIEW OF HM ORDERS  03/31/2023    Annual Wellness Visit  06/29/2023    Liver Monitoring Lab  07/09/2023    Uric Acid Levels  07/09/2023    Flu Vaccine (1) 09/01/2023

## 2023-09-05 NOTE — PROGRESS NOTES
"SUBJECTIVE:   Elijah is a 83 year old who presents for Preventive Visit.      9/5/2023    10:27 AM   Additional Questions   Roomed by Zayra OCAMPO CMA       Are you in the first 12 months of your Medicare coverage?  No    Healthy Habits:     In general, how would you rate your overall health?  Excellent    Frequency of exercise:  2-3 days/week    Duration of exercise:  30-45 minutes    Do you usually eat at least 4 servings of fruit and vegetables a day, include whole grains    & fiber and avoid regularly eating high fat or \"junk\" foods?  Yes    Taking medications regularly:  Yes    Barriers to taking medications:  None    Medication side effects:  None    Ability to successfully perform activities of daily living:  No assistance needed    Home Safety:  No safety concerns identified    Hearing Impairment:  No hearing concerns    In the past 6 months, have you been bothered by leaking of urine?  No    In general, how would you rate your overall mental or emotional health?  Excellent    Have you ever done Advance Care Planning? (For example, a Health Directive, POLST, or a discussion with a medical provider or your loved ones about your wishes): No, advance care planning information given to patient to review.  Patient plans to discuss their wishes with loved ones or provider.         Fall risk  Fallen 2 or more times in the past year?: No  Any fall with injury in the past year?: No    Cognitive Screening   1) Repeat 3 items (Leader, Season, Table)    2) Clock draw: NORMAL  3) 3 item recall: Recalls 3 objects  Results: 3 items recalled: COGNITIVE IMPAIRMENT LESS LIKELY    Mini-CogTM Copyright TOBY [employer]. Licensed by the author for use in [facility]; reprinted with permission (terri@.[facility]). All rights reserved.      Do you have sleep apnea, excessive snoring or daytime drowsiness? : no    Reviewed and updated as needed this visit by clinical staff   Tobacco  Allergies  Meds  Problems             Reviewed and " updated as needed this visit by Provider    Allergies  Meds  Problems            Social History     Tobacco Use    Smoking status: Former     Types: Cigarettes     Start date: 10/5/1958     Quit date: 1963     Years since quittin.1    Smokeless tobacco: Never   Substance Use Topics    Alcohol use: Yes     Comment: One drink per day         2023     9:39 AM   Alcohol Use   Prescreen: >3 drinks/day or >7 drinks/week? No          No data to display              Do you have a current opioid prescription? No  Do you use any other controlled substances or medications that are not prescribed by a provider? Alcohol      Has history of atrial fibrillation.    No palpitations, no dizziness, no dyspnea on exertion, no shortness of breath.  No racing heart, no fast heart rates.    Taking medications as ordered, no side effects reported.   Patient is taking anticoagulation according to direction, with no reported side effects.       Hypertension:  History of hypertension, on medication.  No reported side effects from medications.    Reviewed last 6 BP readings in chart:  BP Readings from Last 6 Encounters:   23 120/56   12/15/22 139/81   10/21/22 116/64   22 128/66   22 126/70   22 137/82     No active cardiac complaints or symptoms with exercise.       history of gout, no attacks sincetakingallourinol.        He complains of urinary hesitancy, weak stream, double and incomplete voiding, intermittent urinary frequency and nocturia times 1.   We had virtual visit may 2023, I recommended trial of tamsulosin, but henever took it.      AUA ([employer]) SYMPTOM SCORE (2023):  Incomplete emptyin  Frequency:  0  Intermittency:  0  Urgency:  1  Weak stream:  1  Strainin  Nocturia:  1  Total AUA Bother Score:  3  (0-7 mild symptoms; 8-19 moderate symptoms; 20-35 severe symptoms)    Quality of life: mostly satisfied          Current providers sharing in care for  this patient include:   Patient Care Team:  Igor Christina MD as PCP - General  Igor Christina MD as Assigned PCP  Leonor Aj MD as MD (Cardiovascular Disease)  Leonor Aj MD as MD (Cardiovascular Disease)  Leonor Aj MD as Assigned Heart and Vascular Provider    The following health maintenance items are reviewed in Epic and correct as of today:  Health Maintenance   Topic Date Due    DTAP/TDAP/TD IMMUNIZATION (1 - Tdap) 06/06/2015    COVID-19 Vaccine (6 - Pfizer series) 01/16/2023    BMP  03/06/2023    ALT  07/09/2023    URIC ACID  07/09/2023    INFLUENZA VACCINE (1) 09/01/2023    MEDICARE ANNUAL WELLNESS VISIT  09/05/2024    ANNUAL REVIEW OF HM ORDERS  09/05/2024    FALL RISK ASSESSMENT  09/05/2024    ADVANCE CARE PLANNING  09/05/2028    PHQ-2 (once per calendar year)  Completed    HEMOGLOBIN  Completed    Pneumococcal Vaccine: 65+ Years  Completed    ZOSTER IMMUNIZATION  Completed    IPV IMMUNIZATION  Aged Out    HPV IMMUNIZATION  Aged Out    MENINGITIS IMMUNIZATION  Aged Out    COLORECTAL CANCER SCREENING  Discontinued       **I reviewed the information recorded in the patient's EPIC chart (including but not limited to medical history, surgical history, family history, problem list, medication list, and allergy list) and updated the information as indicated based on the patients reported information.           Review of Systems   Constitutional:  Negative for chills and fever.   HENT:  Negative for congestion, ear pain, hearing loss and sore throat.    Eyes:  Negative for pain and visual disturbance.   Respiratory:  Negative for cough and shortness of breath.    Cardiovascular:  Negative for chest pain, palpitations and peripheral edema.   Gastrointestinal:  Negative for abdominal pain, constipation, diarrhea, heartburn, hematochezia and nausea.   Genitourinary:  Negative for dysuria, frequency, genital sores, hematuria, impotence, penile  "discharge and urgency.   Musculoskeletal:  Negative for arthralgias, joint swelling and myalgias.   Skin:  Negative for rash.   Neurological:  Negative for dizziness, weakness, headaches and paresthesias.   Psychiatric/Behavioral:  Negative for mood changes. The patient is not nervous/anxious.      Constitutional, HEENT, cardiovascular, pulmonary, gi and gu systems are negative, except as otherwise noted.    OBJECTIVE:   /56   Pulse 53   Temp 98.5  F (36.9  C) (Oral)   Ht 1.702 m (5' 7\")   Wt 86.4 kg (190 lb 6.4 oz)   SpO2 95%   BMI 29.82 kg/m   Estimated body mass index is 29.82 kg/m  as calculated from the following:    Height as of this encounter: 1.702 m (5' 7\").    Weight as of this encounter: 86.4 kg (190 lb 6.4 oz).  Physical Exam  GENERAL alert and no distress  EYES:  Normal sclera,conjunctiva, EOMI  HENT: oral and posterior pharynx without lesions or erythema, facies symmetric  NECK: Neck supple. No LAD, without thyroidmegaly.  RESP: Clear to ausculation bilaterally without wheezes or crackles. Normal BS in all fields.  CV: RRR normal S1S2 without murmurs, rubs or gallops.  LYMPH: no cervical lymph adenopathy appreciated  MS: extremities- no gross deformities of the visible extremities noted,   EXT:  no lower extremity edema  PSYCH: Alert and oriented times 3; speech- coherent  SKIN:  No obvious significant skin lesions on visible portions of face     Diagnostic Test Results:  Labs reviewed in Epic    ASSESSMENT / PLAN:     (Z00.00) Encounter for Medicare annual wellness exam  (primary encounter diagnosis)  Comment: Discussed cardiac disease risk factors and cardiac disease risk factor modification, including diabetes screening, blood pressure screening (and management if indicated), and cholesterol screening.   Reviewed immunzation guidelines, including pneumococcal vaccines, annual influenza, and shingles vaccines.   Discussed routine cancer screenings, including skin cancer, colon cancer " screening for everyone until age 80, prostate cancer screening in men until age 75, mammogram and PAP/pelvic for women until age 75.   Recommended regular dentist visits to care for remaining teeth.   Recommended regular screening for vision and glaucoma.   Recommended safe driving and accident avoidance.   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  Continue current therapy.   Plan: REVIEW OF HEALTH MAINTENANCE PROTOCOL ORDERS            (E78.5) Hyperlipidemia LDL goal <100  Comment: This condition is currently controlled on the current medical regimen.  Continue current therapy.   Discussed guidelines recommending a statin cholesterol lowering medication for any patient with either diabetes and/or vascular disease, aiming for a LDL goal under 100 for sure, ideally under 70, using whatever dose of statin tolerated.    Plan: REVIEW OF HEALTH MAINTENANCE PROTOCOL ORDERS            (I77.9) Bilateral carotid artery disease, unspecified type (H)  Comment: This condition is currently controlled on the current medical regimen.  Continue current therapy.   No new symptoms.  Discussed secondary risk factor modification and recommended continuing aggressive management of these items.   Plan: REVIEW OF HEALTH MAINTENANCE PROTOCOL ORDERS,         rosuvastatin (CRESTOR) 10 MG tablet            (I48.91) Atrial fibrillation, unspecified type (H)  Comment: This condition is currently controlled on the current medical regimen.  Continue current therapy.   He is at increased risk for stroke from atrial fibrillation due to his elevated SCY0SN2-HAMs score.  Patient was educated on the possibility of the Watchman device but deferred last year.  He is potentially interested depending on the cost for him as an alternative to Xarelto anticoagulation.  I  encouraged him to return to cardiology clinic for his annual evaluation and discuss the pros and cons to Watchman device with them in further detail.  Plan: REVIEW OF HEALTH MAINTENANCE PROTOCOL ORDERS,         rivaroxaban ANTICOAGULANT (XARELTO         ANTICOAGULANT) 20 MG TABS tablet, amiodarone         (PACERONE) 200 MG tablet            (I10) Benign essential hypertension  Comment: This condition is currently controlled on the current medical regimen.  Continue current therapy.   Plan: REVIEW OF HEALTH MAINTENANCE PROTOCOL ORDERS,         hydrochlorothiazide (HYDRODIURIL) 25 MG tablet,        eplerenone (INSPRA) 25 MG tablet, amLODIPine         (NORVASC) 5 MG tablet            (R60.0) Bilateral lower extremity edema  Comment: This condition is currently controlled on the current medical regimen.  Continue current therapy.   Plan: REVIEW OF HEALTH MAINTENANCE PROTOCOL ORDERS,         hydrochlorothiazide (HYDRODIURIL) 25 MG tablet            (M10.9) Gout, unspecified cause, unspecified chronicity, unspecified site  Comment: no attacks since taking allopurinol.  Continue same medicine same dose.  Plan: REVIEW OF HEALTH MAINTENANCE PROTOCOL ORDERS,         allopurinol (ZYLOPRIM) 300 MG tablet            (I42.9) Cardiomyopathy, unspecified type (H)  Comment: Mild cardiomyopathy, no recent congestive heart failure symptoms.  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      * The decision for prescription medications for the prostate are totally based on the degree of symptoms, such as how many trips tot he bathroom at night, the degree of urgency to urinate, and the degree of hesitancy (difficulties in starting and stopping the stream of urine).       *  Minimizing prostate related symptoms include:           "                              --Minimize intake of caffeine and alcohol (especially in the evenings)              --Avoid antihistamine (Benadryl) because they can affect the bladder muscles.               --Careful fluid management close to bedtime, try to minimize fluid within 2-3 hours of sleep              --Do not ignore the urge to urinate, do not \"hold \" the urine as this can cause bladder injury     *  If you continue to have troublesome symptoms despite these conservative measures, then you may require a prescription medication.  Sometimes, even prescription medications are not enough and patients may require surgery.          Consider Trial of the medication Tamsulosin (generic Flomax) 0.4 mg once per day (usually in the evening) to help relax the bladder and prostate to allow you to pass urine easier which should help reduce any urgency to urinate and reduce night time trips to the bathroom.  The main side effects are dizziness, low blood pressure.      If this medication produces noticeable relief in your symptoms, then just contact me for a refill in 3 months.        If your symptoms do NOT noticeably improve, then referral to Urology clinic to evaluate your urinary troubles if you do not noticeably improve with the Tamsulosin.          [facility] [address]   Phone: [phone]   Fax: [phone]      Plan: tamsulosin (FLOMAX) 0.4 MG capsule               Patient has been advised of split billing requirements and indicates understanding: Yes      COUNSELING:  Reviewed preventive health counseling, as reflected in patient instructions       Regular exercise       Healthy diet/nutrition       Vision screening       Hearing screening       Dental care       Bladder control       Fall risk prevention       Immunizations  up to date  Covid vaccines are now recommended annually.  Get the most updated Covid vaccine when it becomes available, consider " "getting this at the same time as the annual influenza vaccine.            Colon cancer screening no longer indicated       Prostate cancer screening no longer indiacted      BMI:   Estimated body mass index is 29.82 kg/m  as calculated from the following:    Height as of this encounter: 1.702 m (5' 7\").    Weight as of this encounter: 86.4 kg (190 lb 6.4 oz).         He reports that he quit smoking about 60 years ago. His smoking use included cigarettes. He started smoking about 64 years ago. He has never used smokeless tobacco.      Appropriate preventive services were discussed with this patient, including applicable screening as appropriate for cardiovascular disease, diabetes, osteopenia/osteoporosis, and glaucoma.  As appropriate for age/gender, discussed screening for colorectal cancer, prostate cancer, breast cancer, and cervical cancer. Checklist reviewing preventive services available has been given to the patient.    Reviewed patients plan of care and provided an AVS. The  for Reji meets the Care Plan requirement. This Care Plan has been established and reviewed with the .          Igor Christina MD  [facility]    Identified Health Risks:    "

## 2023-09-11 ENCOUNTER — LAB (OUTPATIENT)
Dept: LAB | Facility: CLINIC | Age: 83
End: 2023-09-11
Payer: MEDICARE

## 2023-09-11 DIAGNOSIS — M10.9 GOUT: ICD-10-CM

## 2023-09-11 DIAGNOSIS — E78.5 HYPERLIPIDEMIA LDL GOAL <100: ICD-10-CM

## 2023-09-11 DIAGNOSIS — I15.2 BENIGN SECONDARY HYPERTENSION DUE TO PRIMARY ALDOSTERONISM (H): Primary | ICD-10-CM

## 2023-09-11 DIAGNOSIS — E26.09 BENIGN SECONDARY HYPERTENSION DUE TO PRIMARY ALDOSTERONISM (H): Primary | ICD-10-CM

## 2023-09-11 LAB
ALT SERPL W P-5'-P-CCNC: 48 U/L (ref 0–70)
ANION GAP SERPL CALCULATED.3IONS-SCNC: 14 MMOL/L (ref 7–15)
BUN SERPL-MCNC: 19.7 MG/DL (ref 8–23)
CALCIUM SERPL-MCNC: 9.4 MG/DL (ref 8.8–10.2)
CHLORIDE SERPL-SCNC: 101 MMOL/L (ref 98–107)
CREAT SERPL-MCNC: 1.08 MG/DL (ref 0.67–1.17)
DEPRECATED HCO3 PLAS-SCNC: 24 MMOL/L (ref 22–29)
EGFRCR SERPLBLD CKD-EPI 2021: 69 ML/MIN/1.73M2
GLUCOSE SERPL-MCNC: 103 MG/DL (ref 70–99)
POTASSIUM SERPL-SCNC: 4.1 MMOL/L (ref 3.4–5.3)
SODIUM SERPL-SCNC: 139 MMOL/L (ref 136–145)
URATE SERPL-MCNC: 4.4 MG/DL (ref 3.4–7)

## 2023-09-11 PROCEDURE — 80048 BASIC METABOLIC PNL TOTAL CA: CPT

## 2023-09-11 PROCEDURE — 84460 ALANINE AMINO (ALT) (SGPT): CPT

## 2023-09-11 PROCEDURE — 84550 ASSAY OF BLOOD/URIC ACID: CPT

## 2023-09-11 PROCEDURE — 36415 COLL VENOUS BLD VENIPUNCTURE: CPT

## 2023-09-18 ENCOUNTER — TELEPHONE (OUTPATIENT)
Dept: INTERNAL MEDICINE | Facility: CLINIC | Age: 83
End: 2023-09-18
Payer: MEDICARE

## 2023-09-18 DIAGNOSIS — M25.559 HIP PAIN, UNSPECIFIED LATERALITY: Primary | ICD-10-CM

## 2023-09-18 NOTE — TELEPHONE ENCOUNTER
Referral to physical therapy placed, he will be contacted to arrange an appointment at the location of his convenience.  He may request a specific therapist, but no guarantee that he will be able to see that provider specifically.  If he has not heard from the scheduling office within 2 business days, please call [phone] for [facility] physical therapy scheduling.    Close encounter when done.

## 2023-09-18 NOTE — TELEPHONE ENCOUNTER
Order/Referral Request    Who is requesting: patient    Orders being requested: physical therapy    Reason service is needed/diagnosis: hip pain    When are orders needed by: asap    Has this been discussed with Provider: Yes    Does patient have a preference on a Group/Provider/Facility? Prefers Pricila Mcclure at  PT    Does patient have an appointment scheduled?: No    Where to send orders: Place orders within Epic    Could we send this information to you in [facility] or would you prefer to receive a phone call?:   Patient would prefer a phone call   Okay to leave a detailed message?: Yes at Cell number on file:    Telephone Information:   Mobile [phone]

## 2023-09-29 ASSESSMENT — ACTIVITIES OF DAILY LIVING (ADL)
HOW_WOULD_YOU_RATE_YOUR_CURRENT_LEVEL_OF_FUNCTION_DURING_YOUR_USUAL_ACTIVITIES_OF_DAILY_LIVING_FROM_0_TO_100_WITH_100_BEING_YOUR_LEVEL_OF_FUNCTION_PRIOR_TO_YOUR_HIP_PROBLEM_AND_0_BEING_THE_INABILITY_TO_PERFORM_ANY_OF_YOUR_USUAL_DAILY_ACTIVITIES?: 100
STEPPING UP AND DOWN CURBS: NO DIFFICULTY AT ALL
WALKING_APPROXIMATELY_10_MINUTES: NO DIFFICULTY AT ALL
GETTING_INTO_AND_OUT_OF_A_BATHTUB: NO DIFFICULTY AT ALL
LIGHT_TO_MODERATE_WORK: NO DIFFICULTY AT ALL
WALKING_UP_STEEP_HILLS: NO DIFFICULTY AT ALL
RECREATIONAL ACTIVITIES: NO DIFFICULTY AT ALL
WALKING_INITIALLY: NO DIFFICULTY AT ALL
TWISTING/PIVOTING ON INVOLVED LEG: NO DIFFICULTY AT ALL
ROLLING OVER IN BED: NO DIFFICULTY AT ALL
SITTING FOR 15 MINUTES: NO DIFFICULTY AT ALL
STANDING FOR 15 MINUTES: NO DIFFICULTY AT ALL
HOS_ADL_HIGHEST_POTENTIAL_SCORE: 68
WALKING_15_MINUTES_OR_GREATER: NO DIFFICULTY AT ALL
HOS_ADL_SCORE(%): 100
WALKING_DOWN_STEEP_HILLS: NO DIFFICULTY AT ALL
HOS_ADL_ITEM_SCORE_TOTAL: 68
DEEP SQUATTING: NO DIFFICULTY AT ALL
GOING UP 1 FLIGHT OF STAIRS: NO DIFFICULTY AT ALL
GOING DOWN 1 FLIGHT OF STAIRS: NO DIFFICULTY AT ALL
PUTTING ON SOCKS AND SHOES: EXTREME DIFFICULTY
HEAVY_WORK: NO DIFFICULTY AT ALL
GETTING INTO AND OUT OF AN AVERAGE CAR: NO DIFFICULTY AT ALL

## 2023-10-05 ENCOUNTER — THERAPY VISIT (OUTPATIENT)
Dept: PHYSICAL THERAPY | Facility: CLINIC | Age: 83
End: 2023-10-05
Attending: INTERNAL MEDICINE
Payer: MEDICARE

## 2023-10-05 DIAGNOSIS — M25.559 HIP PAIN, UNSPECIFIED LATERALITY: ICD-10-CM

## 2023-10-05 PROCEDURE — 97161 PT EVAL LOW COMPLEX 20 MIN: CPT | Mod: GP | Performed by: PHYSICAL THERAPIST

## 2023-10-05 PROCEDURE — 97110 THERAPEUTIC EXERCISES: CPT | Mod: GP | Performed by: PHYSICAL THERAPIST

## 2023-10-05 NOTE — PROGRESS NOTES
PHYSICAL THERAPY EVALUATION  Type of Visit: Evaluation    See electronic medical record for Abuse and Falls Screening details.    Subjective       Presenting condition or subjective complaint: Pt present with complaints of R posterior buttock pain. Pt reported onset of sx's this past summer however, aggravated with recent return trip home this past Sunday. Pt reported he had spent 6 hours in his [place] and when he went to get out of his car, noted severe pain in the buttock region as he took ~10 steps forwards. Pt reported similar sx's last summer which resolved with several PT sessions.  Date of onset: 09/18/23    Relevant medical history:     Dates & types of surgery: Foot/toe amputation: 2015    Prior diagnostic imaging/testing results:       Prior therapy history for the same diagnosis, illness or injury: Yes PT summer of 2022.    Prior Level of Function  Transfers: Independent  Ambulation: Independent  ADL: Independent  IADL: Yard work    Living Environment  Social support: With a significant other or spouse   Type of home: House   Stairs to enter the home: Yes 2 Is there a railing: No   Ramp: No   Stairs inside the home: Yes 10 Is there a railing: Yes   Help at home: None  Equipment owned:       Employment: Not Applicable Retired  Hobbies/Interests: Cabinet building/wood carving.    Patient goals for therapy: Move without pain.       Objective   LUMBAR SPINE EVALUATION  PAIN: Pain Level at Rest: 2/10  Pain Location: R posterior buttock  Pain Frequency: intermittent  Pain is Exacerbated By: transitional movements    POSTURE:  slightly forward flexed posture  GAIT:   Weightbearing Status:  full weight bearing  Assistive Device(s): None  Gait Deviations: WFL  ROM:  LROM: flexion: WFL's, mild sx provocation with return to standing; extension: moderate loss with sx provocation at end range.  PROM: R hip: WFL's for flexion/IR/ER  NEURAL TENSION:  SLR R: negative.  PALPATION:   moderate+ tenderness R QL  SPINAL SEGMENTAL CONCLUSIONS:       Assessment & Plan   CLINICAL IMPRESSIONS  Medical Diagnosis: Hip pain    Treatment Diagnosis: R buttock pain   Impression/Assessment: Patient is a 83 year old male with R posterior buttock complaints.  The following significant findings have been identified: Pain, Decreased ROM/flexibility, Decreased joint mobility, Decreased activity tolerance, and Impaired posture. These impairments interfere with their ability to perform self care tasks, recreational activities, household chores, and household mobility as compared to previous level of function.     Clinical Decision Making (Complexity):  Clinical Presentation: Stable/Uncomplicated  Clinical Presentation Rationale: based on medical and personal factors listed in PT evaluation  Clinical Decision Making (Complexity): Low complexity    PLAN OF CARE  Treatment Interventions:  Interventions: Manual Therapy, Neuromuscular Re-education, Therapeutic Activity, Therapeutic Exercise, Self-Care/Home Management    Long Term Goals     PT Goal 1  Goal Identifier: Transitional movements  Goal Description: Perform transitional movements without pain.  Rationale: to maximize safety and independence with performance of ADLs and functional tasks;to maximize safety and independence with self cares  Target Date: 11/16/23      Frequency of Treatment: 1x/week  Duration of Treatment: 6 weeks    Education Assessment:        Risks and benefits of evaluation/treatment have been explained.   Patient/Family/caregiver agrees with Plan of Care.     Evaluation Time:     PT Eval, Low Complexity Minutes (84059): 25     Signing Clinician: Pricila Bolivar PT      [facility]                                                                                   OUTPATIENT PHYSICAL THERAPY      PLAN OF TREATMENT FOR OUTPATIENT REHABILITATION   Patient's Last Name, First Name, Reji Vanegas Date of Birth:   1940   Provider's Name   [facility]   Medical Record No.  [id number]     Onset Date: 09/18/23  Start of Care Date: 10/05/23     Medical Diagnosis:  Hip pain      PT Treatment Diagnosis:  R buttock pain Plan of Treatment  Frequency/Duration: 1x/week/ 6 weeks    Certification date from 10/05/23 to 11/16/23         See note for plan of treatment details and functional goals     Pricila Bolivar, PT                         I CERTIFY THE NEED FOR THESE SERVICES FURNISHED UNDER        THIS PLAN OF TREATMENT AND WHILE UNDER MY CARE     (Physician attestation of this document indicates review and certification of the therapy plan).                Referring Provider:  Igor Christina      Initial Assessment  See Epic Evaluation- Start of Care Date: 10/05/23

## 2023-10-18 ENCOUNTER — IMMUNIZATION (OUTPATIENT)
Dept: NURSING | Facility: CLINIC | Age: 83
End: 2023-10-18
Payer: MEDICARE

## 2023-10-18 PROCEDURE — 91320 SARSCV2 VAC 30MCG TRS-SUC IM: CPT

## 2023-10-18 PROCEDURE — 90480 ADMN SARSCOV2 VAC 1/ONLY CMP: CPT

## 2023-10-18 PROCEDURE — G0008 ADMIN INFLUENZA VIRUS VAC: HCPCS

## 2023-10-18 PROCEDURE — 90662 IIV NO PRSV INCREASED AG IM: CPT

## 2023-11-21 ENCOUNTER — TELEPHONE (OUTPATIENT)
Dept: INTERNAL MEDICINE | Facility: CLINIC | Age: 83
End: 2023-11-21
Payer: MEDICARE

## 2023-11-21 DIAGNOSIS — M25.559 HIP PAIN, UNSPECIFIED LATERALITY: Primary | ICD-10-CM

## 2023-11-21 DIAGNOSIS — M79.18 BUTTOCK PAIN: ICD-10-CM

## 2023-11-21 NOTE — TELEPHONE ENCOUNTER
Order/Referral Request    Who is requesting: Patient    Orders being requested: physical therapy    Reason service is needed/diagnosis: patient wants a new physical therapy referral to see Dr. Pricila Bolivar    When are orders needed by: ASAP    Has this been discussed with Provider: Yes    Does patient have a preference on a Group/Provider/Facility? [place]    Does patient have an appointment scheduled?: No    Where to send orders: Place orders within Epic    Could we send this information to you in [employer] or would you prefer to receive a phone call?:   Patient would like to be contacted via [employer]

## 2023-11-21 NOTE — TELEPHONE ENCOUNTER
Patient called the clinic to follow up on his message. Patient is wanting this referral placed ASAP. Routing to PCP to review and advise.     Patient would like a call back once this is done.    Heather WHITE RN  [facility] Triage Team

## 2023-11-21 NOTE — TELEPHONE ENCOUNTER
I am not sure why he needs on the referral, the referral I placed 2 months ago in September should still be valid for his care.      Referral placed again to physical therapy, specifying his preference to see Pricila Bolivar.  He should be contacted to arrange an appointment.     If he has not heard from the scheduling office within 2 business days, please call [phone].     Close encounter when done.

## 2023-11-30 ENCOUNTER — TRANSFERRED RECORDS (OUTPATIENT)
Dept: HEALTH INFORMATION MANAGEMENT | Facility: CLINIC | Age: 83
End: 2023-11-30

## 2023-12-07 ENCOUNTER — THERAPY VISIT (OUTPATIENT)
Dept: PHYSICAL THERAPY | Facility: CLINIC | Age: 83
End: 2023-12-07
Attending: INTERNAL MEDICINE
Payer: MEDICARE

## 2023-12-07 DIAGNOSIS — M79.18 BUTTOCK PAIN: ICD-10-CM

## 2023-12-07 DIAGNOSIS — M25.559 HIP PAIN, UNSPECIFIED LATERALITY: ICD-10-CM

## 2023-12-07 PROCEDURE — 97110 THERAPEUTIC EXERCISES: CPT | Mod: GP | Performed by: PHYSICAL THERAPIST

## 2023-12-07 PROCEDURE — 97161 PT EVAL LOW COMPLEX 20 MIN: CPT | Mod: GP | Performed by: PHYSICAL THERAPIST

## 2023-12-07 ASSESSMENT — ACTIVITIES OF DAILY LIVING (ADL)
STANDING_FOR_15_MINUTES: NO DIFFICULTY AT ALL
HOS_ADL_HIGHEST_POTENTIAL_SCORE: 56
GETTING_INTO_AND_OUT_OF_AN_AVERAGE_CAR: NO DIFFICULTY AT ALL
WALKING_UP_STEEP_HILLS: EXTREME DIFFICULTY
GOING_DOWN_1_FLIGHT_OF_STAIRS: NO DIFFICULTY AT ALL
DEEP_SQUATTING: SLIGHT DIFFICULTY
SITTING_FOR_15_MINUTES: NO DIFFICULTY AT ALL
STEPPING_UP_AND_DOWN_CURBS: MODERATE DIFFICULTY
HOS_ADL_SCORE(%): 69.64
WALKING_INITIALLY: NO DIFFICULTY AT ALL
RECREATIONAL_ACTIVITIES: EXTREME DIFFICULTY
PUTTING_ON_SOCKS_AND_SHOES: EXTREME DIFFICULTY
HOS_ADL_COUNT: 14
ROLLING_OVER_IN_BED: SLIGHT DIFFICULTY
HOS_ADL_ITEM_SCORE_TOTAL: 39
HEAVY_WORK: EXTREME DIFFICULTY
WALKING_DOWN_STEEP_HILLS: NO DIFFICULTY AT ALL
GOING_UP_1_FLIGHT_OF_STAIRS: EXTREME DIFFICULTY
TWISTING/PIVOTING_ON_INVOLVED_LEG: SLIGHT DIFFICULTY
LIGHT_TO_MODERATE_WORK: NO DIFFICULTY AT ALL

## 2023-12-07 NOTE — PROGRESS NOTES
PHYSICAL THERAPY EVALUATION  Type of Visit: Evaluation    See electronic medical record for Abuse and Falls Screening details.    Subjective       Presenting condition or subjective complaint: Pt presents with complaints of L posterolateral hip pain. Pt reported onset of sx's mid November. Pt reported performing a lot of raking the previous day without onset of sx's however noted significant pain the following morning. Pt reported extreme difficulty with walking due to pain during weight bearing. Pt reported he is unable to lean forwards to put on his sock/shoes due to pain; currently his wife needs to assist.   Date of onset: 11/18/23    Relevant medical history:     Dates & types of surgery:      Prior diagnostic imaging/testing results:       Prior therapy history for the same diagnosis, illness or injury:        Prior Level of Function  Transfers: Independent  Ambulation: Independent  ADL: Assistive person      Living Environment  Social support:     Type of home:     Stairs to enter the home:         Ramp:     Stairs inside the home:         Help at home:    Equipment owned:       Employment:      Hobbies/Interests:      Patient goals for therapy:         Objective   HIP EVALUATION  PAIN: Pain Level at Rest: 0/10  Pain Level with Use: 5/10  Pain Location: L posterolateral buttock  Pain Frequency: intermittent  Pain is Exacerbated By: walking, bending forwards to put on socks/shoes  GAIT:   Weightbearing Status: WBAT  Assistive Device(s): None  Gait Deviations: WFL   ROM:  LROM: flexion: fingertips to mid lower leg, no provocation of sx's. Extension: moderate to significant loss, provocation of sx's mid to end range of movement.   PROM L hip: flexion: WFL's;  IR: moderate loss, provocation of sx's; ER: WFL's, provocation of sx's.   STRENGTH:  Hip abduction: 3/5, provocation of sx's. ER: 5/5 without provocation of sx's.  LE FLEXIBILITY:  hamstring tightness B  FUNCTIONAL TESTS:  Difficulty performing sit to stand  without UE assistance.  PALPATION:  tenderness to GM (medius) insertion; tenderness to posterior buttock       Assessment & Plan   CLINICAL IMPRESSIONS  Medical Diagnosis: L hip pain    Treatment Diagnosis: L buttock pain   Impression/Assessment: Patient is a 83 year old male with L posterolateral buttock complaints. Suspect etiology related to lumbar spine but has overlying L weakness of L buttock musculature.  The following significant findings have been identified: Pain, Decreased ROM/flexibility, Decreased strength, Impaired muscle performance, and Decreased activity tolerance. These impairments interfere with their ability to perform self care tasks, household mobility, and community mobility as compared to previous level of function.     Clinical Decision Making (Complexity):  Clinical Presentation: Stable/Uncomplicated  Clinical Presentation Rationale: based on medical and personal factors listed in PT evaluation  Clinical Decision Making (Complexity): Low complexity    PLAN OF CARE  Treatment Interventions:  Interventions: Manual Therapy, Neuromuscular Re-education, Therapeutic Activity, Therapeutic Exercise, Self-Management/HEP    Long Term Goals     PT Goal 1  Goal Identifier: Dressing  Goal Description: Dress lower body with pain 0/10.  Rationale: to maximize safety and independence with self cares;to maximize safety and independence with performance of ADLs and functional tasks  Target Date: 02/01/24      Frequency of Treatment: 1x/week for 4 weeks, tapering to every other week for 4 weeks  Duration of Treatment: 8 weeks    Education Assessment:        Risks and benefits of evaluation/treatment have been explained.   Patient/Family/caregiver agrees with Plan of Care.     Evaluation Time:     PT Eval, Low Complexity Minutes (44339): 30       Signing Clinician: Pricila Bolivar PT      [facility]                                                                                    OUTPATIENT PHYSICAL THERAPY      PLAN OF TREATMENT FOR OUTPATIENT REHABILITATION   Patient's Last Name, First Name, Reji Vanegas YOB: 1940   Provider's Name   [facility]   Medical Record No.  [id number]     Onset Date: 11/18/23  Start of Care Date: 12/07/23     Medical Diagnosis:  L hip pain      PT Treatment Diagnosis:  L buttock pain Plan of Treatment  Frequency/Duration: 1x/week for 4 weeks, tapering to every other week for 4 weeks/ 8 weeks    Certification date from 12/07/23 to 02/01/24         See note for plan of treatment details and functional goals     Pricila Bolivar, PT                         I CERTIFY THE NEED FOR THESE SERVICES FURNISHED UNDER        THIS PLAN OF TREATMENT AND WHILE UNDER MY CARE     (Physician attestation of this document indicates review and certification of the therapy plan).              Referring Provider:  Igor Christina    Initial Assessment  See Epic Evaluation- Start of Care Date: 12/07/23

## 2023-12-18 ENCOUNTER — THERAPY VISIT (OUTPATIENT)
Dept: PHYSICAL THERAPY | Facility: CLINIC | Age: 83
End: 2023-12-18
Payer: MEDICARE

## 2023-12-18 DIAGNOSIS — M25.552 HIP PAIN, LEFT: Primary | ICD-10-CM

## 2023-12-18 PROCEDURE — 97110 THERAPEUTIC EXERCISES: CPT | Mod: GP | Performed by: PHYSICAL THERAPIST

## 2024-01-02 ENCOUNTER — THERAPY VISIT (OUTPATIENT)
Dept: PHYSICAL THERAPY | Facility: CLINIC | Age: 84
End: 2024-01-02
Payer: MEDICARE

## 2024-01-02 DIAGNOSIS — M25.552 HIP PAIN, LEFT: Primary | ICD-10-CM

## 2024-01-02 PROCEDURE — 97110 THERAPEUTIC EXERCISES: CPT | Mod: GP | Performed by: PHYSICAL THERAPIST

## 2024-01-03 ENCOUNTER — OFFICE VISIT (OUTPATIENT)
Dept: INTERNAL MEDICINE | Facility: CLINIC | Age: 84
End: 2024-01-03
Payer: MEDICARE

## 2024-01-03 VITALS
BODY MASS INDEX: 30.1 KG/M2 | WEIGHT: 191.8 LBS | HEIGHT: 67 IN | HEART RATE: 56 BPM | SYSTOLIC BLOOD PRESSURE: 122 MMHG | DIASTOLIC BLOOD PRESSURE: 64 MMHG | TEMPERATURE: 98.2 F | OXYGEN SATURATION: 96 %

## 2024-01-03 DIAGNOSIS — E26.09 BENIGN SECONDARY HYPERTENSION DUE TO PRIMARY ALDOSTERONISM (H): ICD-10-CM

## 2024-01-03 DIAGNOSIS — I77.9 BILATERAL CAROTID ARTERY DISEASE, UNSPECIFIED TYPE (H): ICD-10-CM

## 2024-01-03 DIAGNOSIS — N40.1 BPH WITH OBSTRUCTION/LOWER URINARY TRACT SYMPTOMS: Primary | ICD-10-CM

## 2024-01-03 DIAGNOSIS — I15.2 BENIGN SECONDARY HYPERTENSION DUE TO PRIMARY ALDOSTERONISM (H): ICD-10-CM

## 2024-01-03 DIAGNOSIS — I42.9 CARDIOMYOPATHY, UNSPECIFIED TYPE (H): ICD-10-CM

## 2024-01-03 DIAGNOSIS — I48.11 LONGSTANDING PERSISTENT ATRIAL FIBRILLATION (H): ICD-10-CM

## 2024-01-03 DIAGNOSIS — N13.8 BPH WITH OBSTRUCTION/LOWER URINARY TRACT SYMPTOMS: Primary | ICD-10-CM

## 2024-01-03 LAB
ALBUMIN UR-MCNC: NEGATIVE MG/DL
APPEARANCE UR: CLEAR
BILIRUB UR QL STRIP: NEGATIVE
COLOR UR AUTO: YELLOW
GLUCOSE UR STRIP-MCNC: NEGATIVE MG/DL
HGB UR QL STRIP: NEGATIVE
KETONES UR STRIP-MCNC: NEGATIVE MG/DL
LEUKOCYTE ESTERASE UR QL STRIP: NEGATIVE
NITRATE UR QL: NEGATIVE
PH UR STRIP: 6.5 [PH] (ref 5–7)
SP GR UR STRIP: 1.02 (ref 1–1.03)
UROBILINOGEN UR STRIP-ACNC: 0.2 E.U./DL

## 2024-01-03 PROCEDURE — 99213 OFFICE O/P EST LOW 20 MIN: CPT | Performed by: INTERNAL MEDICINE

## 2024-01-03 PROCEDURE — 81003 URINALYSIS AUTO W/O SCOPE: CPT | Performed by: INTERNAL MEDICINE

## 2024-01-03 ASSESSMENT — PAIN SCALES - GENERAL: PAINLEVEL: NO PAIN (0)

## 2024-01-03 NOTE — PROGRESS NOTES
"  Assessment & Plan     (N40.1,  N13.8) BPH with obstruction/lower urinary tract symptoms  (primary encounter diagnosis)  Comment: Ongoing BPH symptoms.  Tamsulosin did not have much effect after 3 months.  Based on his ongoing symptoms, refer to urology for further evaluation which would like to include cystoscopy.  Plan: Adult Urology  Referral, UA         Macroscopic with reflex to Microscopic and         Culture - Clinic Collect            (I48.11) Longstanding persistent atrial fibrillation (H)  Comment: This condition is currently controlled on the current medical regimen.  Continue current therapy.   Plan:     (I77.9) Bilateral carotid artery disease, unspecified type (H24)  Comment: This condition is currently controlled on the current medical regimen.  Continue current therapy.   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  Continue current therapy.   Plan:     (I42.9) Cardiomyopathy, unspecified type (H)  Comment: This condition is currently controlled on the current medical regimen.  Continue current therapy.   Plan:                 BMI:   Estimated body mass index is 30.04 kg/m  as calculated from the following:    Height as of this encounter: 1.702 m (5' 7\").    Weight as of this encounter: 87 kg (191 lb 12.8 oz).         Igor Christina MD  [employer]          Roberto Nava is a 83 year old, presenting for the following health issues:  Prostate Check        1/3/2024    10:19 AM   Additional Questions   Roomed by Zayra OCAMPO CMA       Description: Prostate Concern  Dysuria (painful urination): No}  Hematuria (blood in urine): No  Frequency: YES  Waking at night to urinate: YES-once  Hesitancy (delay in urine): No  Retention (unable to empty): No  Decrease in urinary flow: YES  Incontinence: No  Progression of Symptoms:  " same  Accompanying Signs & Symptoms:  Fever: No  Back/Flank pain: No  Urethral discharge: No  Testicle lumps/masses/pain: No  Nausea and/or vomiting: No  Abdominal pain: No  History:   History of frequent UTI s: No  History of kidney stones: No  History of hernias: No  Personal or Family history of Prostate problems: No  Personal history of benign bladder tumor  Sexually active: YES-little    AUA ([employer] UR[employer] ASSOCIATION) SYMPTOM SCORE (1/3/2024):  Incomplete emptyin  Frequency:  4  Intermittency:  0  Urgency:  4  Weak stream:  4  Strainin  Nocturia:  1  Total AUA Bother Score:  13  (0-7 mild symptoms; 8-19 moderate symptoms; 20-35 severe symptoms)    Quality of life: delighted        Cystoscopy at [facility]:     Associated Order(s): URO Cystoscopy (general)   Pre-Procedure Diagnose(s): Lesion Bladder   Post-Procedure Diagnose(s): Lesion Bladder   URO Cystoscopy (general)  Date/Time: 2019 1:00 PM  Performed by: Arpan Muller M.D.  Authorized by: Portia Diego M.D.    Additional procedures performed: cystoscopy    PROCEDURE DETAILS  Cystoscopy demonstrated a normal anterior urethra. Prostatic urethra demonstrated changes from the previous partial TURP. On the trigone there is obvious inflammatory changes post TURP. These are identical to the changes seen on endoscopic photos from a year ago. Remainder of the bladder was unremarkable. Recommend recheck on a p.r.n. basis only.     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           Review of Systems  "  Constitutional, HEENT, cardiovascular, pulmonary, gi and gu systems are negative, except as otherwise noted.      Objective    /64   Pulse 56   Temp 98.2  F (36.8  C) (Tympanic)   Ht 1.702 m (5' 7\")   Wt 87 kg (191 lb 12.8 oz)   SpO2 96%   BMI 30.04 kg/m    Body mass index is 30.04 kg/m .  Physical Exam   GENERAL alert and no distress  EYES:  Normal sclera,conjunctiva, EOMI  HENT: facies symmetric  MS: extremities- no gross deformities of the visible extremities noted,   PSYCH: Alert and oriented times 3; speech- coherent  SKIN:  No obvious significant skin lesions on visible portions of face   NEURO:  Normal facial movements.  Appears to move normally     Results for orders placed or performed in visit on 01/03/24   UA Macroscopic with reflex to Microscopic and Culture - Clinic Collect     Status: Normal    Specimen: Urine, Clean Catch   Result Value Ref Range    Color Urine Yellow Colorless, Straw, Light Yellow, Yellow    Appearance Urine Clear Clear    Glucose Urine Negative Negative mg/dL    Bilirubin Urine Negative Negative    Ketones Urine Negative Negative mg/dL    Specific Gravity Urine 1.020 1.003 - 1.035    Blood Urine Negative Negative    pH Urine 6.5 5.0 - 7.0    Protein Albumin Urine Negative Negative mg/dL    Urobilinogen Urine 0.2 0.2, 1.0 E.U./dL    Nitrite Urine Negative Negative    Leukocyte Esterase Urine Negative Negative    Narrative    Microscopic not indicated                      "

## 2024-01-03 NOTE — PATIENT INSTRUCTIONS
"    BENIGN PROSTATIC HYPERTROPHY (ENLARGED PROSTATE):       *  Try the over the counter herbal supplement Saw Palmetto to see if this helps reduce prostate related symptoms.     * The decision for prescription medications for the prostate are totally based on the degree of symptoms, such as how many trips tot he bathroom at night, the degree of urgency to urinate, and the degree of hesitancy (difficulties in starting and stopping the stream of urine).      *  Minimizing prostate related symptoms include:       --Minimize intake of caffeine and alcohol (especially in the evenings)   --Avoid antihistames (Benadryl) because they can affect the bladder muscles.    --Careful fluid management close to bedtime, try to minimize fluid within 2-3 hours of sleep   --Do not ignore the urge to urinate, do not \"hold \" the urine as this can cause bladder injury     Because you continue to have troubles and tamsulosin had no effect: Referral to urology clinic for further evaluation (including cystoscopy)     --[facility] -  Multiple locations ([place], [place], etc.) main scheduling number [phone]     Stop the Xarelto (rivaroxaban) for 3 doses prior to the urology visit so they can perform a cystoscopy in case needed.       "
no

## 2024-01-16 ENCOUNTER — THERAPY VISIT (OUTPATIENT)
Dept: PHYSICAL THERAPY | Facility: CLINIC | Age: 84
End: 2024-01-16
Payer: MEDICARE

## 2024-01-16 DIAGNOSIS — M25.552 HIP PAIN, LEFT: Primary | ICD-10-CM

## 2024-01-16 PROCEDURE — 97110 THERAPEUTIC EXERCISES: CPT | Mod: GP | Performed by: PHYSICAL THERAPIST

## 2024-01-16 ASSESSMENT — ACTIVITIES OF DAILY LIVING (ADL)
GOING_DOWN_1_FLIGHT_OF_STAIRS: NO DIFFICULTY AT ALL
WALKING_APPROXIMATELY_10_MINUTES: NO DIFFICULTY AT ALL
HEAVY_WORK: NO DIFFICULTY AT ALL
DEEP_SQUATTING: NO DIFFICULTY AT ALL
STEPPING_UP_AND_DOWN_CURBS: NO DIFFICULTY AT ALL
TWISTING/PIVOTING_ON_INVOLVED_LEG: NO DIFFICULTY AT ALL
HOS_ADL_COUNT: 17
WALKING_UP_STEEP_HILLS: NO DIFFICULTY AT ALL
STANDING_FOR_15_MINUTES: NO DIFFICULTY AT ALL
HOS_ADL_ITEM_SCORE_TOTAL: 68
HOS_ADL_SCORE(%): 100
SITTING_FOR_15_MINUTES: NO DIFFICULTY AT ALL
PUTTING_ON_SOCKS_AND_SHOES: NO DIFFICULTY AT ALL
GETTING_INTO_AND_OUT_OF_AN_AVERAGE_CAR: NO DIFFICULTY AT ALL
GOING_UP_1_FLIGHT_OF_STAIRS: NO DIFFICULTY AT ALL
WALKING_INITIALLY: NO DIFFICULTY AT ALL
LIGHT_TO_MODERATE_WORK: NO DIFFICULTY AT ALL
WALKING_DOWN_STEEP_HILLS: NO DIFFICULTY AT ALL
GETTING_INTO_AND_OUT_OF_A_BATHTUB: NO DIFFICULTY AT ALL
ROLLING_OVER_IN_BED: NO DIFFICULTY AT ALL
RECREATIONAL_ACTIVITIES: NO DIFFICULTY AT ALL
WALKING_15_MINUTES_OR_GREATER: NO DIFFICULTY AT ALL
HOS_ADL_HIGHEST_POTENTIAL_SCORE: 68

## 2024-01-18 ENCOUNTER — TRANSFERRED RECORDS (OUTPATIENT)
Dept: HEALTH INFORMATION MANAGEMENT | Facility: CLINIC | Age: 84
End: 2024-01-18
Payer: MEDICARE

## 2024-03-08 PROBLEM — M25.552 HIP PAIN, LEFT: Status: RESOLVED | Noted: 2021-01-06 | Resolved: 2024-03-08

## 2024-03-08 NOTE — PROGRESS NOTES
DISCHARGE  Reason for Discharge: Patient has met all goals.  Pt has completed 4 sessions of PT. Pt reported absence of sx's by last visit; did not need further PT. Pt will be discharged.      Discharge Plan: Patient to continue home program.   01/16/24 0500   Appointment Info   Signing clinician's name / credentials Pricila Bolivar, PT   Total/Authorized Visits 6   Visits Used 4   Medical Diagnosis L hip pain   PT Tx Diagnosis L buttock pain   Quick Adds Certification   Progress Note/Certification   Start of Care Date 12/07/23   Onset of illness/injury or Date of Surgery 11/18/23   Therapy Frequency 1x/week for 4 weeks, tapering to every other week for 4 weeks   Predicted Duration 8 weeks   Certification date from 12/07/23   Certification date to 02/01/24   PT Goal 1   Goal Identifier Dressing   Goal Description Dress lower body with pain 0/10.   Rationale to maximize safety and independence with self cares;to maximize safety and independence with performance of ADLs and functional tasks   Target Date 02/01/24   Date Met 01/16/24   Subjective Report   Subjective Report Pt reported absence of L buttock sx's. Able to put on sock/tie shoe without difficulty. Able to cut toe-nails without difficulty.   Objective Measures   Objective Measures Objective Measure 1;Objective Measure 2;Objective Measure 3   Objective Measure 1   Objective Measure LROM   Objective Measure 2   Objective Measure PROM hips   Details R hip: moderate loss flexion and IR without pain complaints. L hip: moderate loss flexion without pain complaints.   Objective Measure 3   Objective Measure MMT   Details Hip abduction: L: 4/5.   Treatment Interventions (PT)   Interventions Therapeutic Procedure/Exercise   Therapeutic Procedure/Exercise   Therapeutic Procedures: strength, endurance, ROM, flexibility minutes (70607) 20   Therapeutic Procedures Ther Proc 2;Ther Proc 3   Ther Proc 1 SHAWN   Ther Proc 1 - Details Verbal review. Reported not  performing. Discussed return to ex secondary to significant ROM loss.  (HEP)   Ther Proc 2 Hip abduction   Ther Proc 2 - Details Reviewed in supine with use of yellow TB. Performing 30 reps. To continue.   Ther Proc 3 Modifed pretzel stretch   Ther Proc 3 - Details Reviewed in hooklying. Cueing required to completed correctly.   Skilled Intervention Instructed in directional movement to assist in reduction of sx's preventing self cares.   Total Session Time   Timed Code Treatment Minutes 20   Total Treatment Time (sum of timed and untimed services) 20         Referring Provider:  Igor Christina

## 2024-03-11 ENCOUNTER — TRANSFERRED RECORDS (OUTPATIENT)
Dept: HEALTH INFORMATION MANAGEMENT | Facility: CLINIC | Age: 84
End: 2024-03-11

## 2024-05-31 ENCOUNTER — TELEPHONE (OUTPATIENT)
Dept: INTERNAL MEDICINE | Facility: CLINIC | Age: 84
End: 2024-05-31
Payer: MEDICARE

## 2024-05-31 DIAGNOSIS — M25.552 HIP PAIN, LEFT: Primary | ICD-10-CM

## 2024-05-31 NOTE — TELEPHONE ENCOUNTER
Pt states just needs a referral for pt, says if provider can call [facility] pt and let them know he can see dr plunkett, please advise when pt when complete   [phone] may leave a message

## 2024-05-31 NOTE — TELEPHONE ENCOUNTER
I entered a referral for physical therapy, indicating that he prefers to see Pricila Bolivar if possible.   He will be contacted to arrange an appointment.     Close encounter when done.

## 2024-06-04 ENCOUNTER — THERAPY VISIT (OUTPATIENT)
Dept: PHYSICAL THERAPY | Facility: CLINIC | Age: 84
End: 2024-06-04
Attending: INTERNAL MEDICINE
Payer: MEDICARE

## 2024-06-04 DIAGNOSIS — M25.552 HIP PAIN, LEFT: ICD-10-CM

## 2024-06-04 PROCEDURE — 97161 PT EVAL LOW COMPLEX 20 MIN: CPT | Mod: GP | Performed by: PHYSICAL THERAPIST

## 2024-06-04 PROCEDURE — 97110 THERAPEUTIC EXERCISES: CPT | Mod: GP | Performed by: PHYSICAL THERAPIST

## 2024-06-04 ASSESSMENT — ACTIVITIES OF DAILY LIVING (ADL)
HOW_WOULD_YOU_RATE_YOUR_CURRENT_LEVEL_OF_FUNCTION_DURING_YOUR_USUAL_ACTIVITIES_OF_DAILY_LIVING_FROM_0_TO_100_WITH_100_BEING_YOUR_LEVEL_OF_FUNCTION_PRIOR_TO_YOUR_HIP_PROBLEM_AND_0_BEING_THE_INABILITY_TO_PERFORM_ANY_OF_YOUR_USUAL_DAILY_ACTIVITIES?: 50
PLEASE_INDICATE_YOR_PRIMARY_REASON_FOR_REFERRAL_TO_THERAPY:: HIP
STANDING FOR 15 MINUTES: NO DIFFICULTY AT ALL
SPORTS_TOTAL_ITEM_SCORE: INCOMPLETE
HOS_ADL_ITEM_SCORE_TOTAL: 4
ADL_COUNT: 1
ADL_TOTAL_ITEM_SCORE: INCOMPLETE
HOW_WOULD_YOU_RATE_YOUR_CURRENT_LEVEL_OF_FUNCTION?: ABNORMAL
SPORTS_COUNT: 1
HOW_WOULD_YOU_RATE_YOUR_CURRENT_LEVEL_OF_FUNCTION_DURING_YOUR_USUAL_ACTIVITIES_OF_DAILY_LIVING_FROM_0_TO_100_WITH_100_BEING_YOUR_LEVEL_OF_FUNCTION_PRIOR_TO_YOUR_HIP_PROBLEM_AND_0_BEING_THE_INABILITY_TO_PERFORM_ANY_OF_YOUR_USUAL_DAILY_ACTIVITIES?: 50
SPORTS_HIGHEST_POTENTIAL_SCORE: 4
ADL_HIGHEST_POTENTIAL_SCORE: 4
STANDING_FOR_15_MINUTES: NO DIFFICULTY AT ALL
ADL_SCORE(%): INCOMPLETE
HOS_ADL_HIGHEST_POTENTIAL_SCORE: 4
SPORTS_SCORE(%): INCOMPLETE
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
## 2024-06-10 ENCOUNTER — THERAPY VISIT (OUTPATIENT)
Dept: PHYSICAL THERAPY | Facility: CLINIC | Age: 84
End: 2024-06-10
Attending: INTERNAL MEDICINE
Payer: MEDICARE

## 2024-06-10 DIAGNOSIS — M54.40 CHRONIC LEFT-SIDED LOW BACK PAIN WITH SCIATICA: Primary | ICD-10-CM

## 2024-06-10 DIAGNOSIS — G89.29 CHRONIC LEFT-SIDED LOW BACK PAIN WITH SCIATICA: Primary | ICD-10-CM

## 2024-06-10 PROCEDURE — 97110 THERAPEUTIC EXERCISES: CPT | Mod: GP | Performed by: PHYSICAL THERAPIST

## 2024-06-17 ENCOUNTER — TELEPHONE (OUTPATIENT)
Dept: INTERNAL MEDICINE | Facility: CLINIC | Age: 84
End: 2024-06-17

## 2024-06-17 ENCOUNTER — THERAPY VISIT (OUTPATIENT)
Dept: PHYSICAL THERAPY | Facility: CLINIC | Age: 84
End: 2024-06-17
Attending: INTERNAL MEDICINE
Payer: MEDICARE

## 2024-06-17 DIAGNOSIS — G89.29 CHRONIC LEFT-SIDED LOW BACK PAIN WITH SCIATICA: Primary | ICD-10-CM

## 2024-06-17 DIAGNOSIS — M54.40 CHRONIC LEFT-SIDED LOW BACK PAIN WITH SCIATICA: Primary | ICD-10-CM

## 2024-06-17 PROCEDURE — 97110 THERAPEUTIC EXERCISES: CPT | Mod: GP | Performed by: PHYSICAL THERAPIST

## 2024-06-17 ASSESSMENT — ACTIVITIES OF DAILY LIVING (ADL)
PUTTING_ON_SOCKS_AND_SHOES: NO DIFFICULTY AT ALL
HOS_ADL_COUNT: 16
TWISTING/PIVOTING_ON_INVOLVED_LEG: NO DIFFICULTY AT ALL
HOW_WOULD_YOU_RATE_YOUR_CURRENT_LEVEL_OF_FUNCTION_DURING_YOUR_USUAL_ACTIVITIES_OF_DAILY_LIVING_FROM_0_TO_100_WITH_100_BEING_YOUR_LEVEL_OF_FUNCTION_PRIOR_TO_YOUR_HIP_PROBLEM_AND_0_BEING_THE_INABILITY_TO_PERFORM_ANY_OF_YOUR_USUAL_DAILY_ACTIVITIES?: 95
RECREATIONAL_ACTIVITIES: NO DIFFICULTY AT ALL
GOING_UP_1_FLIGHT_OF_STAIRS: MODERATE DIFFICULTY
GETTING_INTO_AND_OUT_OF_AN_AVERAGE_CAR: SLIGHT DIFFICULTY
WALKING_DOWN_STEEP_HILLS: NO DIFFICULTY AT ALL
GETTING_INTO_AND_OUT_OF_A_BATHTUB: NO DIFFICULTY AT ALL
HOS_ADL_HIGHEST_POTENTIAL_SCORE: 64
WALKING_UP_STEEP_HILLS: NO DIFFICULTY AT ALL
SITTING_FOR_15_MINUTES: NO DIFFICULTY AT ALL
HOS_ADL_SCORE(%): 92.19
HEAVY_WORK: SLIGHT DIFFICULTY
HOS_ADL_ITEM_SCORE_TOTAL: 59
WALKING_INITIALLY: NO DIFFICULTY AT ALL
ROLLING_OVER_IN_BED: NO DIFFICULTY AT ALL
GOING_DOWN_1_FLIGHT_OF_STAIRS: NO DIFFICULTY AT ALL
WALKING_15_MINUTES_OR_GREATER: NO DIFFICULTY AT ALL
STANDING_FOR_15_MINUTES: NO DIFFICULTY AT ALL
STEPPING_UP_AND_DOWN_CURBS: SLIGHT DIFFICULTY
WALKING_APPROXIMATELY_10_MINUTES: NO DIFFICULTY AT ALL
LIGHT_TO_MODERATE_WORK: NO DIFFICULTY AT ALL

## 2024-06-17 NOTE — TELEPHONE ENCOUNTER
Order/Referral Request    Who is requesting: Patient    Orders being requested: Psychiatrist    Reason service is needed/diagnosis: Mental Health    When are orders needed by: ASAP    Has this been discussed with Provider: No    Does patient have a preference on a Group/Provider/Facility? [place]    Does patient have an appointment scheduled?: No    Where to send orders: Place orders within Epic    Could we send this information to you in [facility] or would you prefer to receive a phone call?:   Patient would prefer a phone call   Okay to leave a detailed message?: Yes at Cell number on file:    Telephone Information:   Mobile [phone]

## 2024-06-17 NOTE — PROGRESS NOTES
06/17/24 0500   Appointment Info   Signing clinician's name / credentials Baljeet Ann PT   Total/Authorized Visits 8   Visits Used 3   Medical Diagnosis Hip pain, left   PT Tx Diagnosis Hip pain, left   Quick Adds Certification   Progress Note/Certification   Start of Care Date 06/04/24   Onset of illness/injury or Date of Surgery 05/21/24   Therapy Frequency 1 x week tapering to every other week   Predicted Duration 8 week   Certification date from 06/04/24   Certification date to 07/30/24   Progress Note Completed Date 06/04/24       Present No   GOALS   PT Goals 2   PT Goal 1   Goal Identifier STG   Goal Description Pt will be able to ambulate up steps in reciprocal pattern   Rationale to maximize safety and independence with performance of ADLs and functional tasks;to maximize safety and independence within the home;to maximize safety and independence within the community   Goal Progress met   Target Date 07/02/24   Date Met 06/17/24   PT Goal 2   Goal Identifier ltg   Goal Description Pt will be able to ambulate up and down steps in reciprocal pattern   Rationale to maximize safety and independence with performance of ADLs and functional tasks;to maximize safety and independence within the home;to maximize safety and independence within the community   Goal Progress met   Target Date 07/30/24   Date Met 06/17/24   Subjective Report   Subjective Report Better.  Functioning at 95% of whre he wants to be.   Objective Measures   Objective Measures Objective Measure 1;Objective Measure 2   Objective Measure 1   Details LROM: flexion 75% with end range tightness noted in left buttock/hip.  Ext 75% with ERP.   Treatment Interventions (PT)   Interventions Therapeutic Procedure/Exercise;Therapeutic Activity;Neuromuscular Re-education;Manual Therapy   Therapeutic Procedure/Exercise   Therapeutic Procedures: strength, endurance, ROM, flexibility minutes (75142) 23   Ther Proc 1 Education   Ther  Proc 1 - Details educated regarding role of therapeutic exercise, POC, expected goal in therapy, nature of condition   PTRx Ther Proc 1 Posterior Pelvic Tilt   PTRx Ther Proc 1 - Details 10 reps with mc's and vc's to activate abds   PTRx Ther Proc 2 Knee Bends   PTRx Ther Proc 2 - Details x 12 reps vc's to keep back straight and sit back like sitting into a chair   PTRx Ther Proc 3 Bridging #1   PTRx Ther Proc 3 - Details 15 reps with vc's to activate glutes   PTRx Ther Proc 4 Standing Extension at Counter Supported   PTRx Ther Proc 4 - Details 10 reps vc's to hold backward bending position for 1-2 seconds   PTRx Ther Proc 5 Sitting Flexion   PTRx Ther Proc 5 - Details Hold each rep for 10 seconds 3-5 reps   PTRx Ther Proc 6 Hip AROM Standing Extension   PTRx Ther Proc 6 - Details 15 reps with vc's to activate glutes   Skilled Intervention VC's and MC's   Patient Response/Progress tolerated well   Education   Learner/Method Patient;Listening;Demonstration;Pictures/Video   Education Comments Pt educated on plan of care   Plan   Plan for next session DC next visit if progress continues   Total Session Time   Timed Code Treatment Minutes 23   Total Treatment Time (sum of timed and untimed services) 23         PLAN  Pt to continue with HEP.    Beginning/End Dates of Progress Note Reporting Period:  06/04/24 to 06/17/2024    Referring Provider:  Igor Christina

## 2024-06-18 NOTE — TELEPHONE ENCOUNTER
I am happy to try and help him, but I need more information in order to help him, or at least a general diagnosis for the referral (I.e. depression, anxiety, etc.)    A referral to a psychiatrist may take a couple of weeks and I may be able to help him before he gets in to see someone.      I recommend a virtual video visit or telephone visit tomorrow or Thursday in a virtual spot or else same day/next day spot.      If he just wants a referral without discussion, then let me know and I will put one through.

## 2024-06-18 NOTE — TELEPHONE ENCOUNTER
Left VM message to return call re: response to message he sent to Dr. Christina. Gave clinic number. Will await call back. Zayra Owens, CMA
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
## 2024-06-19 ENCOUNTER — MYC MEDICAL ADVICE (OUTPATIENT)
Dept: INTERNAL MEDICINE | Facility: CLINIC | Age: 84
End: 2024-06-19

## 2024-06-19 ENCOUNTER — VIRTUAL VISIT (OUTPATIENT)
Dept: INTERNAL MEDICINE | Facility: CLINIC | Age: 84
End: 2024-06-19
Payer: MEDICARE

## 2024-06-19 DIAGNOSIS — F43.10 PTSD (POST-TRAUMATIC STRESS DISORDER): Primary | ICD-10-CM

## 2024-06-19 DIAGNOSIS — Z62.810 HISTORY OF SEXUAL ABUSE IN CHILDHOOD: ICD-10-CM

## 2024-06-19 PROCEDURE — 99442 PR PHYSICIAN TELEPHONE EVALUATION 11-20 MIN: CPT | Mod: 93 | Performed by: INTERNAL MEDICINE
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
## 2024-06-28 ENCOUNTER — TRANSFERRED RECORDS (OUTPATIENT)
Dept: HEALTH INFORMATION MANAGEMENT | Facility: CLINIC | Age: 84
End: 2024-06-28
Payer: MEDICARE

## 2024-07-10 ENCOUNTER — TELEPHONE (OUTPATIENT)
Dept: INTERNAL MEDICINE | Facility: CLINIC | Age: 84
End: 2024-07-10
Payer: MEDICARE

## 2024-07-10 DIAGNOSIS — I10 BENIGN ESSENTIAL HYPERTENSION: ICD-10-CM

## 2024-07-10 RX ORDER — EPLERENONE 25 MG/1
25 TABLET, FILM COATED ORAL 2 TIMES DAILY
Qty: 180 TABLET | Refills: 3 | OUTPATIENT
Start: 2024-07-10

## 2024-07-10 NOTE — TELEPHONE ENCOUNTER
Prior Authorization Approval    Authorization Effective Date: 4/11/2024  Authorization Expiration Date: 7/10/2025  Medication: eplerenone (INSPRA) 25 MG tablet  Approved Dose/Quantity:   Reference #:     Insurance Company: [employer] - Phone [phone] Fax [phone]  Expected CoPay:       CoPay Card Available:      Foundation Assistance Needed:    Which Pharmacy is filling the prescription (Not needed for infusion/clinic administered): [facility] [address] [place] [id number] [facility]  Pharmacy Notified:  yes  Patient Notified:  yes- Pharmacy will contact patient when ready to /ship

## 2024-07-10 NOTE — TELEPHONE ENCOUNTER
Pt requesting status update on PA for eplerenone. Discussed with pharmacy;     Pt is requesting to send as high priority for vacation refill (7/15-9/1)    Routing to PA team; please advise.     Prior Authorization Specialty Medication Request    Medication/Dose: eplerenone (INSPRA) 25 MG tablet  Diagnosis and ICD code (if different than what is on RX):  Benign essential hypertension [I10]   New/renewal/insurance change PA/secondary ins. PA:  Previously Tried and Failed:      Important Lab Values:   Rationale:     Insurance   Primary: Medicare  Insurance ID:  [id number]    Secondary (if applicable):[employer]  Insurance ID:  [id number]    Pharmacy Information (if different than what is on RX)  Name:    Phone:    Fax:

## 2024-07-10 NOTE — TELEPHONE ENCOUNTER
Central Prior Authorization Team   Phone: [phone]    PA Initiation    Medication: eplerenone (INSPRA) 25 MG tablet  Insurance Company: [employer] - Phone [phone] Fax [phone]  Pharmacy Filling the Rx: [facility] [address] [place] [address]  Filling Pharmacy Phone: [phone]  Filling Pharmacy Fax:    Start Date: 7/10/2024

## 2024-08-06 ENCOUNTER — MYC MEDICAL ADVICE (OUTPATIENT)
Dept: INTERNAL MEDICINE | Facility: CLINIC | Age: 84
End: 2024-08-06
Payer: MEDICARE

## 2024-08-06 DIAGNOSIS — G89.29 CHRONIC LEFT-SIDED LOW BACK PAIN WITHOUT SCIATICA: ICD-10-CM

## 2024-08-06 DIAGNOSIS — M54.50 CHRONIC LEFT-SIDED LOW BACK PAIN WITHOUT SCIATICA: ICD-10-CM

## 2024-08-06 DIAGNOSIS — G89.29 CHRONIC LEFT HIP PAIN: Primary | ICD-10-CM

## 2024-08-06 DIAGNOSIS — M25.552 CHRONIC LEFT HIP PAIN: Primary | ICD-10-CM

## 2024-08-12 ENCOUNTER — PATIENT OUTREACH (OUTPATIENT)
Dept: CARE COORDINATION | Facility: CLINIC | Age: 84
End: 2024-08-12
Payer: MEDICARE

## 2024-08-30 NOTE — PROGRESS NOTES
PHYSICAL THERAPY EVALUATION  Type of Visit: Evaluation       Fall Risk Screen:  Fall screen completed by: PT  Have you fallen 2 or more times in the past year?: No  Have you fallen and had an injury in the past year?: Yes  Timed Up and Go score (seconds): 18  Is patient a fall risk?: Yes    Subjective  Pt. complains of bilateral low back pain and left hip pain that has been present since falling [place] a [place] 6 days ago.  Mechanism/History of injury/symptoms: Falling at a family cabin 6 days ago.   Patient s chief complaints: Bilateral low back pain with left hip pain while sleeping at night.  Symptoms are exacerbated by sleeping and laying down.  Symptoms are relieved by stretching and movement.   Current function restrictions: Sleeping and walking greater than 20 minutes.  Previous functional status as reported by patient: Unlimited.        Presenting condition or subjective complaint: walking  Date of onset: 08/28/24    Relevant medical history: Concussions; Hearing problems   Dates & types of surgery:      Prior diagnostic imaging/testing results: Other     Prior therapy history for the same diagnosis, illness or injury: No      Prior Level of Function  Transfers: Independent  Ambulation: Independent  ADL: Independent  IADL:     Living Environment  Social support: With a significant other or spouse   Type of home: House; Multi-level   Stairs to enter the home:         Ramp: No   Stairs inside the home: Yes 20 Is there a railing: Yes     Help at home: None  Equipment owned:       Employment: No    Hobbies/Interests: [employer]    Patient goals for therapy: walk    Pain assessment: Pain present 2-3/10 today     Objective   Lumbar range of motion: Flexion 50% with endrange pain and tightness, extension 50% with endrange pain and tightness, right and left sidebending 50% with endrange pain and tightness  Repeated lumbar flexion in sitting decreased symptoms  No palpable tenderness noted  General hypomobility  noted in lumbar spine  Decreased lumbar lordosis noted in standing  Bilateral hip passive range of motion 80 degrees of flexion endrange tightness  Bilateral hip strength 3 out of 5 generally for all movements  Tight piriformis and hamstrings bilaterally    Assessment & Plan   CLINICAL IMPRESSIONS  Medical Diagnosis: Chronic left hip pain  Chronic left-sided low back pain without sciatica    Treatment Diagnosis: Chronic left hip pain  Chronic left-sided low back pain without sciatica   Impression/Assessment: Patient is a 84 year old male with LBP and left hip complaints.  The following significant findings have been identified: Pain, Decreased ROM/flexibility, Decreased joint mobility, Decreased strength, Impaired muscle performance, and Decreased activity tolerance. These impairments interfere with their ability to perform self care tasks, recreational activities, household mobility, community mobility, and sleeping  as compared to previous level of function.     Clinical Decision Making (Complexity):  Clinical Presentation: Stable/Uncomplicated  Clinical Presentation Rationale: based on medical and personal factors listed in PT evaluation  Clinical Decision Making (Complexity): Low complexity    PLAN OF CARE  Treatment Interventions:  Interventions: Manual Therapy, Neuromuscular Re-education, Therapeutic Activity, Therapeutic Exercise    Long Term Goals     PT Goal 1  Goal Description: Pt will be able to sleep 75% of the night.  Rationale: to maximize safety and independence with performance of ADLs and functional tasks;to maximize safety and independence within the home;to maximize safety and independence within the community  Target Date: 11/12/24  PT Goal 2  Goal Description: Pt will be able to tolerate walking for 45 minutes  Rationale: to maximize safety and independence with performance of ADLs and functional tasks;to maximize safety and independence within the home;to maximize safety and independence within  the community  Target Date: 11/12/24      Frequency of Treatment: 1 x week  Duration of Treatment: 8 week    Recommended Referrals to Other Professionals:   Education Assessment:   Learner/Method: Patient;Listening;Demonstration;Pictures/Video  Education Comments: Pt educated on plan of care    Risks and benefits of evaluation/treatment have been explained.   Patient/Family/caregiver agrees with Plan of Care.     Evaluation Time:     PT Eval, Low Complexity Minutes (63134): 15       Signing Clinician: JOSE Garza [facility]                                                                                   OUTPATIENT PHYSICAL THERAPY      PLAN OF TREATMENT FOR OUTPATIENT REHABILITATION   Patient's Last Name, First Name, MEDHATArianeFLAKOAriane  TimothyReji URRUTIA YOB: 1940   Provider's Name   [facility]   Medical Record No.  [id number]     Onset Date: 08/28/24  Start of Care Date: 09/03/24     Medical Diagnosis:  Chronic left hip pain  Chronic left-sided low back pain without sciatica      PT Treatment Diagnosis:  Chronic left hip pain  Chronic left-sided low back pain without sciatica Plan of Treatment  Frequency/Duration: 1 x week/ 8 week    Certification date from 09/03/24 to 10/29/24         See note for plan of treatment details and functional goals     Michael Ann, PT                         I CERTIFY THE NEED FOR THESE SERVICES FURNISHED UNDER        THIS PLAN OF TREATMENT AND WHILE UNDER MY CARE     (Physician attestation of this document indicates review and certification of the therapy plan).              Referring Provider:  Igor Christina    Initial Assessment  See Epic Evaluation- Start of Care Date: 09/03/24

## 2024-09-03 ENCOUNTER — THERAPY VISIT (OUTPATIENT)
Dept: PHYSICAL THERAPY | Facility: CLINIC | Age: 84
End: 2024-09-03
Attending: INTERNAL MEDICINE
Payer: MEDICARE

## 2024-09-03 DIAGNOSIS — M54.50 CHRONIC LEFT-SIDED LOW BACK PAIN WITHOUT SCIATICA: ICD-10-CM

## 2024-09-03 DIAGNOSIS — G89.29 CHRONIC LEFT HIP PAIN: ICD-10-CM

## 2024-09-03 DIAGNOSIS — G89.29 CHRONIC LEFT-SIDED LOW BACK PAIN WITHOUT SCIATICA: ICD-10-CM

## 2024-09-03 DIAGNOSIS — M25.552 CHRONIC LEFT HIP PAIN: ICD-10-CM

## 2024-09-03 PROCEDURE — 97110 THERAPEUTIC EXERCISES: CPT | Mod: GP | Performed by: PHYSICAL THERAPIST

## 2024-09-03 PROCEDURE — 97161 PT EVAL LOW COMPLEX 20 MIN: CPT | Mod: GP | Performed by: PHYSICAL THERAPIST

## 2024-09-03 ASSESSMENT — ACTIVITIES OF DAILY LIVING (ADL)
ABILITY_TO_PERFORM_ACTIVITY_WITH_YOUR_NORMAL_TECHNIQUE: MODERATE DIFFICULTY
LANDING: MODERATE DIFFICULTY
SPORTS_SCORE(%): 0
ADL_HIGHEST_POTENTIAL_SCORE: 4
HOS_ADL_HIGHEST_POTENTIAL_SCORE: 4
SPORTS_COUNT: 9
ADL_SCORE(%): INCOMPLETE
ADL_COUNT: 1
ADL_TOTAL_ITEM_SCORE: INCOMPLETE
LOW_IMPACT_ACTIVITIES_LIKE_FAST_WALKING: MODERATE DIFFICULTY
PLEASE_INDICATE_YOR_PRIMARY_REASON_FOR_REFERRAL_TO_THERAPY:: HIP
CUTTING/LATERAL_MOVEMENTS: SLIGHT DIFFICULTY
JUMPING: SLIGHT DIFFICULTY
HOW_WOULD_YOU_RATE_YOUR_CURRENT_LEVEL_OF_FUNCTION?: ABNORMAL
HOS_ADL_ITEM_SCORE_TOTAL: 4
STANDING_FOR_15_MINUTES: NO DIFFICULTY AT ALL
SWINGING_OBJECTS_LIKE_A_GOLF_CLUB: NO DIFFICULTY AT ALL
STARTING_AND_STOPPING_QUICKLY: SLIGHT DIFFICULTY
SPORTS_HIGHEST_POTENTIAL_SCORE: 36
HOW_WOULD_YOU_RATE_YOUR_CURRENT_LEVEL_OF_FUNCTION_DURING_YOUR_USUAL_ACTIVITIES_OF_DAILY_LIVING_FROM_0_TO_100_WITH_100_BEING_YOUR_LEVEL_OF_FUNCTION_PRIOR_TO_YOUR_HIP_PROBLEM_AND_0_BEING_THE_INABILITY_TO_PERFORM_ANY_OF_YOUR_USUAL_DAILY_ACTIVITIES?: 7
SPORTS_TOTAL_ITEM_SCORE: 0

## 2024-09-09 ENCOUNTER — OFFICE VISIT (OUTPATIENT)
Dept: INTERNAL MEDICINE | Facility: CLINIC | Age: 84
End: 2024-09-09
Payer: MEDICARE

## 2024-09-09 VITALS
DIASTOLIC BLOOD PRESSURE: 56 MMHG | HEART RATE: 48 BPM | BODY MASS INDEX: 30.06 KG/M2 | WEIGHT: 191.5 LBS | HEIGHT: 67 IN | SYSTOLIC BLOOD PRESSURE: 126 MMHG | OXYGEN SATURATION: 95 % | RESPIRATION RATE: 11 BRPM | TEMPERATURE: 98 F

## 2024-09-09 DIAGNOSIS — Z13.6 CARDIOVASCULAR SCREENING; LDL GOAL LESS THAN 130: ICD-10-CM

## 2024-09-09 DIAGNOSIS — Z00.00 MEDICARE ANNUAL WELLNESS VISIT, SUBSEQUENT: Primary | ICD-10-CM

## 2024-09-09 DIAGNOSIS — R60.0 BILATERAL LOWER EXTREMITY EDEMA: ICD-10-CM

## 2024-09-09 DIAGNOSIS — M10.9 GOUT, UNSPECIFIED CAUSE, UNSPECIFIED CHRONICITY, UNSPECIFIED SITE: ICD-10-CM

## 2024-09-09 DIAGNOSIS — I77.9 BILATERAL CAROTID ARTERY DISEASE, UNSPECIFIED TYPE (H): ICD-10-CM

## 2024-09-09 DIAGNOSIS — I48.92 ATRIAL FLUTTER, UNSPECIFIED TYPE (H): ICD-10-CM

## 2024-09-09 DIAGNOSIS — E26.09 BENIGN SECONDARY HYPERTENSION DUE TO PRIMARY ALDOSTERONISM (H): ICD-10-CM

## 2024-09-09 DIAGNOSIS — I15.2 BENIGN SECONDARY HYPERTENSION DUE TO PRIMARY ALDOSTERONISM (H): ICD-10-CM

## 2024-09-09 DIAGNOSIS — E26.09 PRIMARY ALDOSTERONISM (H): ICD-10-CM

## 2024-09-09 DIAGNOSIS — I42.9 CARDIOMYOPATHY, UNSPECIFIED TYPE (H): ICD-10-CM

## 2024-09-09 DIAGNOSIS — E78.5 HYPERLIPIDEMIA LDL GOAL <100: ICD-10-CM

## 2024-09-09 DIAGNOSIS — I48.91 ATRIAL FIBRILLATION, UNSPECIFIED TYPE (H): ICD-10-CM

## 2024-09-09 DIAGNOSIS — I10 BENIGN ESSENTIAL HYPERTENSION: ICD-10-CM

## 2024-09-09 LAB
ALT SERPL W P-5'-P-CCNC: 37 U/L (ref 0–70)
ANION GAP SERPL CALCULATED.3IONS-SCNC: 14 MMOL/L (ref 7–15)
AST SERPL W P-5'-P-CCNC: 41 U/L (ref 0–45)
BUN SERPL-MCNC: 21.2 MG/DL (ref 8–23)
CALCIUM SERPL-MCNC: 9.5 MG/DL (ref 8.8–10.4)
CHLORIDE SERPL-SCNC: 102 MMOL/L (ref 98–107)
CHOLEST SERPL-MCNC: 138 MG/DL
CREAT SERPL-MCNC: 0.96 MG/DL (ref 0.67–1.17)
EGFRCR SERPLBLD CKD-EPI 2021: 78 ML/MIN/1.73M2
ERYTHROCYTE [DISTWIDTH] IN BLOOD BY AUTOMATED COUNT: 14 % (ref 10–15)
FASTING STATUS PATIENT QL REPORTED: NO
FASTING STATUS PATIENT QL REPORTED: NO
GLUCOSE SERPL-MCNC: 94 MG/DL (ref 70–99)
HCO3 SERPL-SCNC: 24 MMOL/L (ref 22–29)
HCT VFR BLD AUTO: 47.7 % (ref 40–53)
HDLC SERPL-MCNC: 81 MG/DL
HGB BLD-MCNC: 16.1 G/DL (ref 13.3–17.7)
LDLC SERPL CALC-MCNC: 36 MG/DL
MCH RBC QN AUTO: 31.5 PG (ref 26.5–33)
MCHC RBC AUTO-ENTMCNC: 33.8 G/DL (ref 31.5–36.5)
MCV RBC AUTO: 93 FL (ref 78–100)
NONHDLC SERPL-MCNC: 57 MG/DL
PLATELET # BLD AUTO: 185 10E3/UL (ref 150–450)
POTASSIUM SERPL-SCNC: 4.1 MMOL/L (ref 3.4–5.3)
RBC # BLD AUTO: 5.11 10E6/UL (ref 4.4–5.9)
SODIUM SERPL-SCNC: 140 MMOL/L (ref 135–145)
TRIGL SERPL-MCNC: 103 MG/DL
URATE SERPL-MCNC: 3.8 MG/DL (ref 3.4–7)
WBC # BLD AUTO: 6.6 10E3/UL (ref 4–11)

## 2024-09-09 PROCEDURE — 84460 ALANINE AMINO (ALT) (SGPT): CPT | Performed by: INTERNAL MEDICINE

## 2024-09-09 PROCEDURE — 84450 TRANSFERASE (AST) (SGOT): CPT | Performed by: INTERNAL MEDICINE

## 2024-09-09 PROCEDURE — 99214 OFFICE O/P EST MOD 30 MIN: CPT | Mod: 25 | Performed by: INTERNAL MEDICINE

## 2024-09-09 PROCEDURE — 85027 COMPLETE CBC AUTOMATED: CPT | Performed by: INTERNAL MEDICINE

## 2024-09-09 PROCEDURE — G0439 PPPS, SUBSEQ VISIT: HCPCS | Performed by: INTERNAL MEDICINE

## 2024-09-09 PROCEDURE — 80048 BASIC METABOLIC PNL TOTAL CA: CPT | Performed by: INTERNAL MEDICINE

## 2024-09-09 PROCEDURE — 80061 LIPID PANEL: CPT | Performed by: INTERNAL MEDICINE

## 2024-09-09 PROCEDURE — 84550 ASSAY OF BLOOD/URIC ACID: CPT | Performed by: INTERNAL MEDICINE

## 2024-09-09 PROCEDURE — 36415 COLL VENOUS BLD VENIPUNCTURE: CPT | Performed by: INTERNAL MEDICINE

## 2024-09-09 RX ORDER — AMLODIPINE BESYLATE 5 MG/1
5 TABLET ORAL 2 TIMES DAILY
Qty: 180 TABLET | Refills: 3 | Status: SHIPPED | OUTPATIENT
Start: 2024-09-09

## 2024-09-09 RX ORDER — EPLERENONE 25 MG/1
25 TABLET, FILM COATED ORAL 2 TIMES DAILY
Qty: 180 TABLET | Refills: 3 | Status: SHIPPED | OUTPATIENT
Start: 2024-09-09

## 2024-09-09 RX ORDER — AMIODARONE HYDROCHLORIDE 200 MG/1
TABLET ORAL
Qty: 90 TABLET | Refills: 0 | Status: SHIPPED | OUTPATIENT
Start: 2024-09-09

## 2024-09-09 RX ORDER — SULFACETAMIDE SODIUM 100 MG/ML
LOTION TOPICAL 2 TIMES DAILY
COMMUNITY
Start: 2023-11-30

## 2024-09-09 RX ORDER — ALLOPURINOL 300 MG/1
1 TABLET ORAL DAILY
Qty: 90 TABLET | Refills: 3 | Status: SHIPPED | OUTPATIENT
Start: 2024-09-09

## 2024-09-09 RX ORDER — HYDROCHLOROTHIAZIDE 25 MG/1
25 TABLET ORAL EVERY MORNING
Qty: 90 TABLET | Refills: 3 | Status: SHIPPED | OUTPATIENT
Start: 2024-09-09

## 2024-09-09 RX ORDER — ALFUZOSIN HYDROCHLORIDE 10 MG/1
10 TABLET, EXTENDED RELEASE ORAL DAILY
COMMUNITY
Start: 2024-06-24

## 2024-09-09 RX ORDER — FINASTERIDE 5 MG/1
5 TABLET, FILM COATED ORAL DAILY
COMMUNITY
Start: 2024-06-24

## 2024-09-09 ASSESSMENT — PAIN SCALES - GENERAL: PAINLEVEL: NO PAIN (0)

## 2024-09-09 NOTE — PROGRESS NOTES
Preventive Care Visit  [facility]  Igor Christina MD, Internal Medicine  Sep 9, 2024      Assessment & Plan     (Z00.00) Medicare annual wellness visit, subsequent  (primary encounter diagnosis)  Comment: Discussed cardiac disease risk factors and cardiac disease risk factor modification, including diabetes screening, blood pressure screening (and management if indicated), and cholesterol screening.   Reviewed immunzation guidelines, including pneumococcal vaccines, annual influenza, and shingles vaccines.   Discussed routine cancer screenings, including skin cancer, colon cancer screening for everyone until age 80, prostate cancer screening in men until age 75, mammogram and PAP/pelvic for women until age 75.   Recommended regular dentist visits to care for remaining teeth.   Recommended regular screening for vision and glaucoma.   Recommended safe driving and accident avoidance.    Counseling:    Reviewed preventive health counseling, as reflected in patient instructions       Regular exercise       Healthy diet/nutrition       Vision screening       Hearing screening       Immunizations  up to date  Plan to get the annual influenza vaccine each fall for sure by the end of October for the best protection throughout the winter flu season.   Get this from any pharmacy or flu shot clinic.    Plan to get an updated Covid booster each fall at least by the end of October for the best protection throughout the winter season.   Get this from any pharmacy or Covid vaccine clinic.              Routine Colorectal cancer screening no longer indicated       Routine Prostate cancer screening no longer indicated        Patient has been advised of split billing requirements and indicates understanding: Yes   Plan:     (E78.5) Hyperlipidemia LDL goal <100  Comment: This condition is currently controlled on the current medical regimen.  Continue current therapy.   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  Continue current therapy.   Plan: REVIEW OF HEALTH MAINTENANCE PROTOCOL ORDERS,         Uric acid, CBC with platelets, Basic metabolic         panel            (M10.9) Gout, unspecified cause, unspecified chronicity, unspecified site  Comment: This condition is currently controlled on the current medical regimen.  Continue current therapy. No attacks since taking allpurinol  Plan: REVIEW OF HEALTH MAINTENANCE PROTOCOL ORDERS,         Uric acid, CBC with platelets, Basic metabolic         panel, allopurinol (ZYLOPRIM) 300 MG tablet            (Z13.6) CARDIOVASCULAR SCREENING; LDL GOAL LESS THAN 130  Comment: Discussed cardiac disease risk factors and cardiac disease risk factor modification.   Plan: REVIEW OF HEALTH MAINTENANCE PROTOCOL ORDERS,         Uric acid, CBC with platelets, Basic metabolic         panel, AST, ALT, Lipid panel reflex to direct         LDL Fasting            (I42.9) Cardiomyopathy, unspecified type (H)  Comment: This condition is currently controlled on the current medical regimen.  Continue current therapy.   No CHF sx.   Plan: REVIEW OF HEALTH MAINTENANCE PROTOCOL ORDERS,         Uric acid, CBC with platelets, Basic metabolic         panel, AST, ALT, Lipid panel reflex to direct         LDL Fasting            (I48.92) Atrial flutter, unspecified type (H)  Comment: This condition is currently controlled on the current medical regimen.  Continue current therapy.   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  Continue current therapy.   Plan: REVIEW OF HEALTH MAINTENANCE PROTOCOL ORDERS,         Uric acid, CBC with platelets, Basic metabolic         panel, AST, ALT, Lipid panel reflex to direct         LDL Fasting            (I77.9) Bilateral carotid artery disease, unspecified type (H24)  Comment: This condition is currently controlled on the current medical regimen.  Continue current therapy.   Discussed secondary risk factor modification and recommended continuing aggressive management of these items.   Plan: REVIEW OF HEALTH MAINTENANCE PROTOCOL ORDERS,         Uric acid, CBC with platelets, Basic metabolic         panel, AST, ALT, Lipid panel reflex to direct         LDL Fasting            (I48.91) Atrial fibrillation, unspecified type (H)  Comment: This condition is currently controlled on the current medical regimen.  Continue current therapy.   Patient invesitfated potential Watchman device 2 years ago, but did not contact his insurance to determien coverage.   He is again interested in Watchman device as alternative to anticoagulation medication to reduce risk of stroke from atrial fibrillation.  Gave him the necessary info to contact his insrunace.   Asked him to return to see Cardiology Clinic anyway, can discuss this further with them.   Plan: rivaroxaban ANTICOAGULANT (XARELTO         ANTICOAGULANT) 20 MG TABS tablet, Adult         Cardiology Eval [facility] Referral, amiodarone         (PACERONE) 200 MG tablet            (I10) Benign essential hypertension  Comment: This condition is currently controlled on the current medical regimen.  Continue current therapy.   Plan: amLODIPine (NORVASC) 5 MG tablet, eplerenone         (INSPRA) 25 MG tablet, hydrochlorothiazide         (HYDRODIURIL) 25 MG tablet            (R60.0) Bilateral lower extremity edema  Comment: This  "condition is currently controlled on the current medical regimen.  Continue current therapy.   Plan: hydrochlorothiazide (HYDRODIURIL) 25 MG tablet               Patient has been advised of split billing requirements and indicates understanding: Yes        BMI  Estimated body mass index is 29.99 kg/m  as calculated from the following:    Height as of this encounter: 1.702 m (5' 7\").    Weight as of this encounter: 86.9 kg (191 lb 8 oz).       Counseling  Appropriate preventive services were addressed with this patient via screening, questionnaire, or discussion as appropriate for fall prevention, nutrition, physical activity, Tobacco-use cessation, social engagement, weight loss and cognition.  Checklist reviewing preventive services available has been given to the patient.  Reviewed patient's diet, addressing concerns and/or questions.   He is at risk for lack of exercise and has been provided with information to increase physical activity for the benefit of his well-being.           Roberto Nava is a 84 year old, presenting for the following:  Medicare Visit        9/9/2024     9:05 AM   Additional Questions   Roomed by Zayra OCAMPO CMA     Health Care Directive  Patient does not have a Health Care Directive or Living Will: Discussed advance care planning with patient; however, patient declined at this time.    HPI      1.)  Hypertension:  History of hypertension, on medication.  No reported side effects from medications.    Reviewed last 6 BP readings in chart:  BP Readings from Last 6 Encounters:   09/09/24 126/56   01/03/24 122/64   09/05/23 120/56   12/15/22 139/81   10/21/22 116/64   09/07/22 128/66     No active cardiac complaints or symptoms with exercise.     2.)  Has history of atrial fibrillation.    No recent reports of significant palpitations, dizziness, presyncope, dyspnea on exertion, or difficulties with exertion.  .   Taking medications as ordered, no side effects reported.   Patient is taking " anticoagulation according to direction, with no reported side effects.     3.)  chronic left hip pain, working with PT, feels it helps.     4.)  history of gout.  No attacks since taking allopurinol. He has not experienced any significant side effects of this medication.           9/4/2024   General Health   How would you rate your overall physical health? Excellent   Feel stress (tense, anxious, or unable to sleep) Not at all            9/4/2024   Nutrition   Diet: Regular (no restrictions)            9/4/2024   Exercise   Days per week of moderate/strenous exercise 3 days   Average minutes spent exercising at this level 40 min            9/4/2024   Social Factors   Frequency of gathering with friends or relatives Three times a week   Worry food won't last until get money to buy more No   Food not last or not have enough money for food? No   Do you have housing? (Housing is defined as stable permanent housing and does not include staying ouside in a car, in a tent, in an abandoned building, in an overnight shelter, or couch-surfing.) Yes   Are you worried about losing your housing? No   Lack of transportation? No   Unable to get utilities (heat,electricity)? No            9/4/2024   Fall Risk   Fallen 2 or more times in the past year? No    No   Trouble with walking or balance? No    No       Multiple values from one day are sorted in reverse-chronological order          9/4/2024   Activities of Daily Living- Home Safety   Needs help with the following daily activites None of the above   Safety concerns in the home None of the above            9/4/2024   Dental   Dentist two times every year? Yes            9/4/2024   Hearing Screening   Hearing concerns? None of the above            9/4/2024   Driving Risk Screening   Patient/family members have concerns about driving No            9/4/2024   General Alertness/Fatigue Screening   Have you been more tired than usual lately? No            9/4/2024   Urinary  Incontinence Screening   Bothered by leaking urine in past 6 months No            2024   TB Screening   Were you born outside of the [place]? No            Today's PHQ-2 Score:       2024     9:42 AM   PHQ-2 (  [employer])   Q1: Little interest or pleasure in doing things 0   Q2: Feeling down, depressed or hopeless 0   PHQ-2 Score 0   Q1: Little interest or pleasure in doing things Not at all   Q2: Feeling down, depressed or hopeless Not at all   PHQ-2 Score 0           2024   Substance Use   Alcohol more than 3/day or more than 7/wk No   Do you have a current opioid prescription? No   How severe/bad is pain from 1 to 10? 0/10 (No Pain)   Do you use any other substances recreationally? (!) PRESCRIPTION DRUGS        Social History     Tobacco Use    Smoking status: Former     Current packs/day: 0.00     Types: Cigarettes     Start date: 10/5/1958     Quit date: 1963     Years since quittin.1    Smokeless tobacco: Never   Vaping Use    Vaping status: Never Used   Substance Use Topics    Alcohol use: Yes     Comment: One drink per day    Drug use: No                 Reviewed and updated as needed this visit by Provider                      **I reviewed the information recorded in the patient's EPIC chart (including but not limited to medical history, surgical history, family history, problem list, medication list, and allergy list) and updated the information as indicated based on the patients reported information.         Current providers sharing in care for this patient include:  Patient Care Team:  Igor Christina MD as PCP - General  Igor Christina MD as Assigned PCP  Leonor Aj MD as MD (Cardiovascular Disease)  Leonor Aj MD as MD (Cardiovascular Disease)    The following health maintenance items are reviewed in Epic and correct as of today:  Health Maintenance   Topic Date Due    DTAP/TDAP/TD IMMUNIZATION (1 - Tdap) 2015    LIPID   "07/09/2023    BMP  03/11/2024    INFLUENZA VACCINE (1) 09/01/2024    COVID-19 Vaccine (7 - 2023-24 season) 09/01/2024    ALT  09/11/2024    URIC ACID  09/11/2024    MEDICARE ANNUAL WELLNESS VISIT  09/09/2025    ANNUAL REVIEW OF HM ORDERS  09/09/2025    FALL RISK ASSESSMENT  09/09/2025    ADVANCE CARE PLANNING  09/09/2029    PHQ-2 (once per calendar year)  Completed    HEMOGLOBIN  Completed    Pneumococcal Vaccine: 65+ Years  Completed    ZOSTER IMMUNIZATION  Completed    HPV IMMUNIZATION  Aged Out    MENINGITIS IMMUNIZATION  Aged Out    RSV MONOCLONAL ANTIBODY  Aged Out    COLORECTAL CANCER SCREENING  Discontinued         Review of Systems  Constitutional, neuro, ENT, endocrine, pulmonary, cardiac, gastrointestinal, genitourinary, musculoskeletal, integument and psychiatric systems are negative, except as otherwise noted.     Objective    Exam  /56   Pulse (!) 48   Temp 98  F (36.7  C) (Oral)   Resp 11   Ht 1.702 m (5' 7\")   Wt 86.9 kg (191 lb 8 oz)   SpO2 95%   BMI 29.99 kg/m     Estimated body mass index is 29.99 kg/m  as calculated from the following:    Height as of this encounter: 1.702 m (5' 7\").    Weight as of this encounter: 86.9 kg (191 lb 8 oz).    Physical Exam  GENERAL alert and no distress  EYES:  Normal sclera,conjunctiva, EOMI  HENT: oral and posterior pharynx without lesions or erythema, facies symmetric  NECK: Neck supple. No LAD, without thyroidmegaly.  RESP: Clear to ausculation bilaterally without wheezes or crackles. Normal BS in all fields.  CV: RRR normal S1S2 without murmurs, rubs or gallops.  LYMPH: no cervical lymph adenopathy appreciated  MS: extremities- no gross deformities of the visible extremities noted,   EXT:  no lower extremity edema  PSYCH: Alert and oriented times 3; speech- coherent  SKIN:  No obvious significant skin lesions on visible portions of face   ABD:  small umbilical hernia, nontender, easily reduced.         9/9/2024   Mini Cog   Clock Draw Score 2 " Normal   3 Item Recall 3 objects recalled   Mini Cog Total Score 5                 Signed Electronically by: Igor Christina MD

## 2024-09-09 NOTE — PATIENT INSTRUCTIONS
"     Continue all medications at the same doses.  Contact your usual pharmacy if you need refills.      Follow up with Cardiology Clinic.  Your last visit there was Fall 2022.      Watchman device information:  The CPT code that your insurance company probably wants is 05346.       Discuss the Watchman device as an alternative to anticoagulation to prevent the risk of stroke from atrial fibrillation.       Plan to get the annual influenza vaccine each fall for sure by the end of October for the best protection throughout the winter flu season.   Get this from any pharmacy or flu shot clinic.       Plan to get an updated Covid booster each fall at least by the end of October for the best protection throughout the winter season.   Get this from any pharmacy or Covid vaccine clinic.        All other vaccines needed are up to date.       You will need an updated tetanus vaccine next year in late 2025.         Return to see me in 1 year, schedule a follow up visit sooner if needed for anything else.  Use [employer] [facility] or Call [phone] to schedule the appointment with me.        5 GOALS TO PREVENT VASCULAR DISEASE:     1.  Aggressive blood pressure control, under 130/80 ideally.  Using medications if needed.    Your blood pressure is under good control    BP Readings from Last 4 Encounters:   09/09/24 126/56   01/03/24 122/64   09/05/23 120/56   12/15/22 139/81       2.  Aggressive LDL cholesterol (\"bad cholesterol\") lowering as indicated.    Your goal is an LDL under 130 for sure, preferably under 100.  (If you have diabetes or previous vascular disease, the the LDL goals would be under 100 for sure, preferably under 70.)    New guidelines identify four high-risk groups who could benefit from statins:   *people with pre-existing heart disease, such as those who have had a heart attack;   *people ages 40 to 75 who have diabetes of any type  *patients ages 40 to 75 with at least a 7.5% risk of developing cardiovascular " "disease over the next decade, according to a formula described in the guidelines  *patients with the sort of super-high cholesterol that sometimes runs in families, as evidenced by an LDL of 190 milligrams per deciliter or higher    Your cholesterol levels are well controlled.    Recent Labs   Lab Test 07/09/22  1212 04/09/21  1037   CHOL 143 151   HDL 91 98   LDL 40 40   TRIG 61 66       3.  Aggressive diabetic prevention, screening and/or management.      You do not have diabetes as of the most recent blood tests.     4.  No smoking    5.  Consider daily preventative aspirin over age 50 if you have enough cardiac risk factors to place you at higher risk for the presence of vascular disease.    If you have any reason not to take aspirin such easy bruising or bleeding, stomach problems, other anticoagulant medications, or any other side effects, then you should not take Aspirin.     --Based on prior side effects from aspirin or other medications for which aspirin would cause potential problems, you should NOT take daily preventative aspirin.      Preventive Health Recommendations:   Male Ages 75 and over    Yearly exam:             See your health care provider every year in order to  o   Review health changes.   o   Discuss preventive care.    o   Review your medicines if your doctor has prescribed any.  Regular screening for diabetes. If you are at risk for diabetes, you should have this test more often.  At least every 5 years, have a cholesterol test. Have this test more often if you are at risk for high cholesterol or heart disease.   Routine colon cancer screening no longer indicated over age 80.  (If you ever need a colonoscopy over age 80, we would perform it but it would be a \"diagnostic\" colonoscopy)  Routine prostate cancer screening no longer indicated over age 75.  Talk to with your health care provider about screening for Abdominal Aortic Aneurysm if you have a family history of AAA or have a history " of smoking.        Vaccine recommendations:   Get a flu shot each fall.  Middle October is the optimal time to receive the flu shot.   Covid vaccines are now recommended annually.  Get the most updated Covid vaccine when it becomes available, consider getting this at the same time as the annual influenza vaccine.   Get a tetanus shot every 10 years. (Get this vaccine from a pharmacist in a pharmacy when you are over 65 on Medicare insurance)  Everyone over 65 should make sure to receive pneumonia vaccines to prevent the most common type of bacterial pneumonia: Pneumococcal pneumonia. There are now two you should receive - Pneumovax (PPSV 23) and Prevnar (PCV 20)  Strongly consider the Shingrix shingles vaccine to give you the best chance of avoiding future shingles infection (as many as 1 and 3 adults over age 50 may develop this condition in their lifetime).      --If you have Medicare insurance, investigate the cost and coverage for Shingrix shingles vaccines with a pharmacist at a pharmacy.  They can tell you the coverage and cost and then give it to you if the price is acceptable.    --Medicare sometimes does not cover these Shingrix shingles vaccines, and with Medicare insurance it is usually cheaper to receive this shingles vaccine from a pharmacist in a pharmacy rather than in our clinic.    --At this time, you only need the 2 Shingrix vaccines and then you are done.    Consider vaccination against Respiratory Syncytial Virus (RSV) infections, especially if you have active lung disease, history of smoking, and/or cardiac conditions.  The respiratory syncytial virus (RSV), is a common upper respiratory virus that causes severe inflammation in the airways, more than most upper respiratory viruses.   This vaccine is available at most pharmacies and clinics.  At this time, the RSV vaccine is a one time vaccine.    --If you are on Medicare insurance, you need to specifically get this vaccine from a pharmacist in a  "pharmacy for the best cost and to confirm coverage, it will be less expensive to get this there rather than from our clinic.         Nutrition:   Eat at least 5 servings of fruits and vegetables each day.   Eat whole-grain bread, whole-wheat pasta and brown rice instead of white grains and rice.   Talk to your provider about Calcium and Vitamin D.      --Good Grains:  Oats, brown rice, Quinoa (these do not raise the blood sugar as much)     --Bad grains:  Anything made from wheat or white rice     (because these raise the blood sugars significantly, and the possible gluten issue from wheat for some people).      --Proteins:  Aim for \"lean proteins\" including chicken, fish, seafood, pork, turkey, and eggs (in moderation); Eat red meat only occasionally    Lifestyle  Exercise for at least 150 minutes a week (30 minutes a day, 5 days a week). This will help you control your weight and prevent disease.   Limit alcohol to one drink per day.   No smoking.   Wear sunscreen to prevent skin cancer.   See your dentist every six months for an exam and cleaning.   See your eye doctor every 1 to 2 years to screen for conditions such as glaucoma, macular degeneration, cataracts, etc                     "

## 2024-09-13 DIAGNOSIS — I77.9 BILATERAL CAROTID ARTERY DISEASE, UNSPECIFIED TYPE (H): ICD-10-CM

## 2024-09-13 RX ORDER — ROSUVASTATIN CALCIUM 10 MG/1
10 TABLET, COATED ORAL DAILY
Qty: 90 TABLET | Refills: 3 | Status: SHIPPED | OUTPATIENT
Start: 2024-09-13

## 2024-09-17 ENCOUNTER — THERAPY VISIT (OUTPATIENT)
Dept: PHYSICAL THERAPY | Facility: CLINIC | Age: 84
End: 2024-09-17
Payer: MEDICARE

## 2024-09-17 DIAGNOSIS — M25.552 PAIN OF LEFT HIP: ICD-10-CM

## 2024-09-17 DIAGNOSIS — G89.29 CHRONIC LEFT-SIDED LOW BACK PAIN WITH LEFT-SIDED SCIATICA: Primary | ICD-10-CM

## 2024-09-17 DIAGNOSIS — M54.42 CHRONIC LEFT-SIDED LOW BACK PAIN WITH LEFT-SIDED SCIATICA: Primary | ICD-10-CM

## 2024-09-17 PROCEDURE — 97110 THERAPEUTIC EXERCISES: CPT | Mod: GP | Performed by: PHYSICAL THERAPIST

## 2024-09-30 ENCOUNTER — THERAPY VISIT (OUTPATIENT)
Dept: PHYSICAL THERAPY | Facility: CLINIC | Age: 84
End: 2024-09-30
Payer: MEDICARE

## 2024-09-30 DIAGNOSIS — G89.29 CHRONIC LEFT-SIDED LOW BACK PAIN WITH LEFT-SIDED SCIATICA: Primary | ICD-10-CM

## 2024-09-30 DIAGNOSIS — M25.552 PAIN OF LEFT HIP: ICD-10-CM

## 2024-09-30 DIAGNOSIS — M54.42 CHRONIC LEFT-SIDED LOW BACK PAIN WITH LEFT-SIDED SCIATICA: Primary | ICD-10-CM

## 2024-09-30 PROCEDURE — 97530 THERAPEUTIC ACTIVITIES: CPT | Mod: GP | Performed by: PHYSICAL THERAPIST

## 2024-09-30 PROCEDURE — 97110 THERAPEUTIC EXERCISES: CPT | Mod: GP | Performed by: PHYSICAL THERAPIST

## 2024-10-04 ENCOUNTER — TRANSFERRED RECORDS (OUTPATIENT)
Dept: HEALTH INFORMATION MANAGEMENT | Facility: CLINIC | Age: 84
End: 2024-10-04
Payer: MEDICARE

## 2024-10-07 ENCOUNTER — THERAPY VISIT (OUTPATIENT)
Dept: PHYSICAL THERAPY | Facility: CLINIC | Age: 84
End: 2024-10-07
Payer: MEDICARE

## 2024-10-07 DIAGNOSIS — M54.42 CHRONIC LEFT-SIDED LOW BACK PAIN WITH LEFT-SIDED SCIATICA: Primary | ICD-10-CM

## 2024-10-07 DIAGNOSIS — M25.552 PAIN OF LEFT HIP: ICD-10-CM

## 2024-10-07 DIAGNOSIS — G89.29 CHRONIC LEFT-SIDED LOW BACK PAIN WITH LEFT-SIDED SCIATICA: Primary | ICD-10-CM

## 2024-10-07 PROCEDURE — 97110 THERAPEUTIC EXERCISES: CPT | Mod: GP | Performed by: PHYSICAL THERAPIST

## 2024-10-14 ENCOUNTER — THERAPY VISIT (OUTPATIENT)
Dept: PHYSICAL THERAPY | Facility: CLINIC | Age: 84
End: 2024-10-14
Payer: MEDICARE

## 2024-10-14 DIAGNOSIS — M54.42 CHRONIC LEFT-SIDED LOW BACK PAIN WITH LEFT-SIDED SCIATICA: Primary | ICD-10-CM

## 2024-10-14 DIAGNOSIS — M25.552 PAIN OF LEFT HIP: ICD-10-CM

## 2024-10-14 DIAGNOSIS — G89.29 CHRONIC LEFT-SIDED LOW BACK PAIN WITH LEFT-SIDED SCIATICA: Primary | ICD-10-CM

## 2024-10-14 PROCEDURE — 97110 THERAPEUTIC EXERCISES: CPT | Mod: GP | Performed by: PHYSICAL THERAPIST

## 2024-10-21 ENCOUNTER — THERAPY VISIT (OUTPATIENT)
Dept: PHYSICAL THERAPY | Facility: CLINIC | Age: 84
End: 2024-10-21
Payer: MEDICARE

## 2024-10-21 DIAGNOSIS — M54.42 CHRONIC LEFT-SIDED LOW BACK PAIN WITH LEFT-SIDED SCIATICA: Primary | ICD-10-CM

## 2024-10-21 DIAGNOSIS — G89.29 CHRONIC LEFT-SIDED LOW BACK PAIN WITH LEFT-SIDED SCIATICA: Primary | ICD-10-CM

## 2024-10-21 DIAGNOSIS — M25.552 PAIN OF LEFT HIP: ICD-10-CM

## 2024-10-21 PROCEDURE — 97110 THERAPEUTIC EXERCISES: CPT | Mod: GP | Performed by: PHYSICAL THERAPIST

## 2024-10-28 ENCOUNTER — VIRTUAL VISIT (OUTPATIENT)
Dept: INTERNAL MEDICINE | Facility: CLINIC | Age: 84
End: 2024-10-28
Payer: MEDICARE

## 2024-10-28 DIAGNOSIS — R60.0 LOWER LEG EDEMA: Primary | ICD-10-CM

## 2024-10-28 PROCEDURE — 99442 PR PHYSICIAN TELEPHONE EVALUATION 11-20 MIN: CPT | Mod: 93 | Performed by: INTERNAL MEDICINE

## 2024-10-28 RX ORDER — FUROSEMIDE 20 MG/1
20 TABLET ORAL EVERY MORNING
Qty: 30 TABLET | Refills: 0 | Status: SHIPPED | OUTPATIENT
Start: 2024-10-28 | End: 2024-11-04

## 2024-10-28 NOTE — PROGRESS NOTES
"Elijah is a 84 year old who is being evaluated via a billable telephone visit.    What phone number would you like to be contacted at? [phone]   How would you like to obtain your AVS? Nikolay  Originating Location (pt. Location): Home    Distant Location (provider location):  On-site    Assessment & Plan     (R60.0) Lower leg edema  (primary encounter diagnosis)  Comment:    Hard to determine what is causing your lower leg swelling without examining you in person.      Stop HCTZ until further notice.      Start more potent diuretic: Furosemide 20 mg once per day in the morning     Check the sodium intake, make sure that you are taking in no more than 2000 mg per day as best that you can.  Read Labels.     Continue all other medications at the same doses.  Contact your usual pharmacy if you need refills. \     Return to see me in the clinic in one week for a recheck.        Plan: furosemide (LASIX) 20 MG tablet                     BMI  Estimated body mass index is 29.99 kg/m  as calculated from the following:    Height as of 9/9/24: 1.702 m (5' 7\").    Weight as of 9/9/24: 86.9 kg (191 lb 8 oz).               Subjective   Elijah is a 84 year old, presenting for the following health issues:    Musculoskeletal Problem (Left ankle and foot swelling and pain)      10/28/2024    11:56 AM   Additional Questions   Roomed by Zayra OCAMPO CMA     Musculoskeletal Problem    History of Present Illness       Reason for visit:  Left shin and right side of foot  Symptom onset:  3-4 weeks ago  Symptoms include:  Edema and slight pain in foot  Symptom intensity:  Moderate  Symptom progression:  Staying the same  Had these symptoms before:  No  What makes it worse:  No  What makes it better:  No   He is taking medications regularly.    Last 2 months swelling in distal left foot and lower leg/lower shin    No injuries, no trauma  Foot almost normal in the morning upon awakening.     Right foot normal.     Taking Xarelto for atrial " fibrillation     Has never had this before.     Weight has remained stable and unchanged.     No orthopnea. No shortness of breath,         **I reviewed the information recorded in the patient's EPIC chart (including but not limited to medical history, surgical history, family history, problem list, medication list, and allergy list) and updated the information as indicated based on the patients reported information.             Review of Systems  Constitutional, HEENT, cardiovascular, pulmonary, gi and gu systems are negative, except as otherwise noted.      Objective           Vitals:  No vitals were obtained today due to virtual visit.    Physical Exam   General: Alert and no distress //Respiratory: No audible wheeze, cough, or shortness of breath // Psychiatric:  Appropriate affect, tone, and pace of words            Phone call duration: 16:30 minutes  Signed Electronically by: Igor Christina MD

## 2024-10-28 NOTE — PATIENT INSTRUCTIONS
Hard to determine what is causing your lower leg swelling without examining you in person.      Stop HCTZ until further notice.      Start more potent diuretic: Furosemide 20 mg once per day in the morning     Check the sodium intake, make sure that you are taking in no more than 2000 mg per day as best that you can.  Read Labels.     Continue all other medications at the same doses.  Contact your usual pharmacy if you need refills. \     Return to see me in the clinic in one week for a recheck.

## 2024-11-04 ENCOUNTER — THERAPY VISIT (OUTPATIENT)
Dept: PHYSICAL THERAPY | Facility: CLINIC | Age: 84
End: 2024-11-04
Payer: MEDICARE

## 2024-11-04 ENCOUNTER — OFFICE VISIT (OUTPATIENT)
Dept: INTERNAL MEDICINE | Facility: CLINIC | Age: 84
End: 2024-11-04
Payer: MEDICARE

## 2024-11-04 VITALS
TEMPERATURE: 98 F | RESPIRATION RATE: 17 BRPM | OXYGEN SATURATION: 96 % | DIASTOLIC BLOOD PRESSURE: 68 MMHG | WEIGHT: 192.5 LBS | BODY MASS INDEX: 30.21 KG/M2 | SYSTOLIC BLOOD PRESSURE: 122 MMHG | HEART RATE: 57 BPM | HEIGHT: 67 IN

## 2024-11-04 DIAGNOSIS — R60.0 BILATERAL LOWER EXTREMITY EDEMA: Primary | ICD-10-CM

## 2024-11-04 DIAGNOSIS — I77.9 BILATERAL CAROTID ARTERY DISEASE, UNSPECIFIED TYPE (H): ICD-10-CM

## 2024-11-04 DIAGNOSIS — I87.2 VENOUS STASIS DERMATITIS OF BOTH LOWER EXTREMITIES: ICD-10-CM

## 2024-11-04 DIAGNOSIS — M25.552 PAIN OF LEFT HIP: ICD-10-CM

## 2024-11-04 DIAGNOSIS — M54.42 CHRONIC LEFT-SIDED LOW BACK PAIN WITH LEFT-SIDED SCIATICA: Primary | ICD-10-CM

## 2024-11-04 DIAGNOSIS — I10 BENIGN ESSENTIAL HYPERTENSION: ICD-10-CM

## 2024-11-04 DIAGNOSIS — G89.29 CHRONIC LEFT-SIDED LOW BACK PAIN WITH LEFT-SIDED SCIATICA: Primary | ICD-10-CM

## 2024-11-04 DIAGNOSIS — R60.0 LOWER LEG EDEMA: ICD-10-CM

## 2024-11-04 DIAGNOSIS — I48.91 ATRIAL FIBRILLATION, UNSPECIFIED TYPE (H): ICD-10-CM

## 2024-11-04 PROBLEM — M54.40 CHRONIC LEFT-SIDED LOW BACK PAIN WITH SCIATICA: Status: RESOLVED | Noted: 2019-12-09 | Resolved: 2024-11-04

## 2024-11-04 LAB
ANION GAP SERPL CALCULATED.3IONS-SCNC: 11 MMOL/L (ref 7–15)
BUN SERPL-MCNC: 19.9 MG/DL (ref 8–23)
CALCIUM SERPL-MCNC: 9.7 MG/DL (ref 8.8–10.4)
CHLORIDE SERPL-SCNC: 104 MMOL/L (ref 98–107)
CREAT SERPL-MCNC: 1.03 MG/DL (ref 0.67–1.17)
EGFRCR SERPLBLD CKD-EPI 2021: 72 ML/MIN/1.73M2
GLUCOSE SERPL-MCNC: 99 MG/DL (ref 70–99)
HCO3 SERPL-SCNC: 26 MMOL/L (ref 22–29)
POTASSIUM SERPL-SCNC: 3.9 MMOL/L (ref 3.4–5.3)
SODIUM SERPL-SCNC: 141 MMOL/L (ref 135–145)

## 2024-11-04 PROCEDURE — 36415 COLL VENOUS BLD VENIPUNCTURE: CPT | Performed by: INTERNAL MEDICINE

## 2024-11-04 PROCEDURE — 99214 OFFICE O/P EST MOD 30 MIN: CPT | Performed by: INTERNAL MEDICINE

## 2024-11-04 PROCEDURE — 80048 BASIC METABOLIC PNL TOTAL CA: CPT | Performed by: INTERNAL MEDICINE

## 2024-11-04 PROCEDURE — 97110 THERAPEUTIC EXERCISES: CPT | Mod: GP | Performed by: PHYSICAL THERAPIST

## 2024-11-04 RX ORDER — TRIAMCINOLONE ACETONIDE 1 MG/G
CREAM TOPICAL
Qty: 45 G | Refills: 1 | Status: SHIPPED | OUTPATIENT
Start: 2024-11-04

## 2024-11-04 RX ORDER — FUROSEMIDE 20 MG/1
10 TABLET ORAL EVERY MORNING
Qty: 45 TABLET | Refills: 1 | Status: SHIPPED | OUTPATIENT
Start: 2024-11-04

## 2024-11-04 ASSESSMENT — PAIN SCALES - GENERAL: PAINLEVEL_OUTOF10: MILD PAIN (3)

## 2024-11-04 NOTE — PROGRESS NOTES
Dr. Christina,   Elijah is doing well and ready to continue on his own with his HEP. We are doing a re-cert to cover his final visit today that wasn't completed in the original timeframe. So this is a re-cert and a discharge note as well.  As of today he met all his goals.      [facility]                                                                                   OUTPATIENT PHYSICAL THERAPY    PLAN OF TREATMENT FOR OUTPATIENT REHABILITATION   Patient's Last Name, First Name, Reji Vanegas YOB: 1940   Provider's Name   [facility]   Medical Record No.  [id number]     Onset Date: 08/28/24  Start of Care Date: 09/03/24     Medical Diagnosis:  Chronic left hip pain  Chronic left-sided low back pain without sciatica      PT Treatment Diagnosis:  Chronic left hip pain  Chronic left-sided low back pain without sciatica Plan of Treatment  Frequency/Duration: 1 additional visit/ today only, for a total of 7 visits    Certification date from 10/30/24 to 11/04/24         See note for plan of treatment details and functional goals     Radha Arceo PT                         I CERTIFY THE NEED FOR THESE SERVICES FURNISHED UNDER        THIS PLAN OF TREATMENT AND WHILE UNDER MY CARE     (Physician attestation of this document indicates review and certification of the therapy plan).              Referring Provider:  Igor Christina    Initial Assessment  See Epic Evaluation- Start of Care Date: 09/03/24            DISCHARGE  Reason for Discharge: Patient has met all goals.    Equipment Issued: theraband    Discharge Plan: Patient to continue home program.    Referring Provider:  Igor Christina       11/04/24 0500   Appointment Info   Signing clinician's name / credentials Radha Arceo PT   Total/Authorized Visits 8   Visits Used 7   Medical Diagnosis Chronic left hip pain  Chronic left-sided low back pain without sciatica   PT  Tx Diagnosis Chronic left hip pain  Chronic left-sided low back pain without sciatica   Other pertinent information 12' late   Progress Note/Certification   Start of Care Date 09/03/24   Onset of illness/injury or Date of Surgery 08/28/24   Therapy Frequency 1 additional visit   Predicted Duration today only, for a total of 7 visits   Certification date from 10/30/24   Certification date to 11/04/24   Progress Note Completed Date 09/03/24       Present No   GOALS   PT Goals 2   PT Goal 1   Goal Description Pt will be able to sleep 75% of the night.   Rationale to maximize safety and independence with performance of ADLs and functional tasks;to maximize safety and independence within the home;to maximize safety and independence within the community   Goal Progress met   Target Date 11/12/24   Date Met 11/04/24   PT Goal 2   Goal Description Pt will be able to tolerate walking for 45 minutes   Rationale to maximize safety and independence with performance of ADLs and functional tasks;to maximize safety and independence within the home;to maximize safety and independence within the community   Goal Progress goal met   Target Date 11/12/24   Date Met 10/14/24   Subjective Report   Subjective Report Significantly better. No pain with walking. Notes increased flexibility with SHAWN. HEP goes well and he feels ready to continue on his own.   Objective Measure 1   Objective Measure LAROM   Details flex=hands to mid lower leg, ext=10%, RSG=max loss, LSG=max loss   Therapeutic Procedure/Exercise   Therapeutic Procedures: strength, endurance, ROM, flexibility minutes (56019) 30   Ther Proc 1 nu step   Ther Proc 1 - Details no time   PTRx Ther Proc 1 Standing Extension at Counter Supported   PTRx Ther Proc 1 - Details x10 4-5x/day   PTRx Ther Proc 2 Bridging #1   PTRx Ther Proc 2 - Details x30 1x/day    PTRx Ther Proc 3 Supine Abdominal Exercise #7A (Arm Extension with Legs at 90/90)   PTRx Ther Proc 3 -  "Details legs only 45\"x2 1x.day   PTRx Ther Proc 4 Standing Hip Abduction   PTRx Ther Proc 4 - Details x15 B 1x/day incr to 20   PTRx Ther Proc 5 Shoulder Theraband Rows   PTRx Ther Proc 5 - Details GTB x 20 1x/day   PTRx Ther Proc 6 Shoulder Theraband Low Row/Pulldown   PTRx Ther Proc 6 - Details GTB x20 1x/day   PTRx Ther Proc 7 Ankle Active Range of Motion Dorsiflexion and Plantarflexion   PTRx Ther Proc 7 - Details HEP for elevating legs 10-15' several times per day   Skilled Intervention vc, vsc, mc for form   Patient Response/Progress appropriate fatigue   PTRx Ther Proc 8 Sit to Stand   PTRx Ther Proc 8 - Details x10 1x/day work up to 2 sets of 10   PTRx Ther Proc 9 Stepdown Backward   PTRx Ther Proc 9 - Details 6\"x10 B had some knee pain clinic only   Therapeutic Activity   Ther Act 1 logroll   Ther Act 1 - Details HEP   Education   Learner/Method Patient;Listening;Demonstration;Pictures/Video   Plan   Updates to plan of care d/c to HEP   Total Session Time   Timed Code Treatment Minutes 30   Total Treatment Time (sum of timed and untimed services) 30       "

## 2024-11-04 NOTE — PATIENT INSTRUCTIONS
"       Lower extremity edema:  No evidence for cardiac cause for this.     Keep sodium content strictly below 2000 mg per day as best that you can.   \"wherever sodium goes, water follows\"    Try to keep as active as possible.  The veins of the lower legs rely on the muscles to do the pumping for the veins.  If you are not walking, then you are not pumping    Keep the feet elevated periodically throughout the day.  .     Trial of compression stockings.  Place the compression stockings onto each lower leg as you are getting out of bed or within a few minutes of arising out of bed because they will be very difficult to put on after you have been up for even 10 minutes.       Diuretics will help the edema up to a point, most of the time lower extremity edema is not an issue of excessive water in the body per se, it is more of a mechanical issue of the veins of the lower legs.       --reduce furosemide to 109 mg ( 1/2 of 20 mg tablet) once per day in the morning.       Referral to Vascular clinic for evaluation on the veins of the lower legs.       Use triamcinolone steroid cream onto the fronts of the lower legs to help relieve the stasis dermatitis. Apply sparingly once or twice per day as needed to affected area until the skin is better, then stop; REPEAT AS NEEDED      Continue all other medications at the same doses.  Contact your usual pharmacy if you need refills.   "

## 2024-11-04 NOTE — PROGRESS NOTES
"  Assessment & Plan     (R60.0) Bilateral lower extremity edema  (primary encounter diagnosis)  Comment:    Lower extremity edema:  No evidence for cardiac cause for this.     Keep sodium content strictly below 2000 mg per day as best that you can.   \"wherever sodium goes, water follows\"    Try to keep as active as possible.  The veins of the lower legs rely on the muscles to do the pumping for the veins.  If you are not walking, then you are not pumping    Keep the feet elevated periodically throughout the day.  .     Trial of compression stockings.  Place the compression stockings onto each lower leg as you are getting out of bed or within a few minutes of arising out of bed because they will be very difficult to put on after you have been up for even 10 minutes.     Recheck basic metabolic panel today to ensure no worsening kidney function     Diuretics will help the edema up to a point, most of the time lower extremity edema is not an issue of excessive water in the body per se, it is more of a mechanical issue of the veins of the lower legs.       --reduce furosemide to 109 mg ( 1/2 of 20 mg tablet) once per day in the morning.       Referral to Vascular clinic for evaluation on the veins of the lower legs.       Use triamcinolone steroid cream onto the fronts of the lower legs to help relieve the stasis dermatitis. Apply sparingly once or twice per day as needed to affected area until the skin is better, then stop; REPEAT AS NEEDED      Continue all other medications at the same doses.    Plan: Basic metabolic panel, Compression         Sleeve/Stocking Order for DME - ONLY FOR DME,         Vascular Surgery Referral            (I87.2) Venous stasis dermatitis of both lower extremities  Comment: As above.  Manage edema more aggressively.  Plan: Basic metabolic panel, Compression         Sleeve/Stocking Order for DME - ONLY FOR DME,         Vascular Surgery Referral, triamcinolone         (KENALOG) 0.1 % external " "cream            (I10) Benign essential hypertension  Comment: This condition is currently controlled on the current medical regimen.  Continue current therapy.   Plan:     (I48.91) Atrial fibrillation, unspecified type (H)  Comment: This condition is currently controlled on the current medical regimen.  Continue current therapy.   Plan:     (I77.9) Bilateral carotid artery disease, unspecified type (H)  Comment: This condition is currently controlled on the current medical regimen.  Continue current therapy.   Plan:     (R60.0) Lower leg edema  Comment:   Plan: furosemide (LASIX) 20 MG tablet                     BMI  Estimated body mass index is 30.15 kg/m  as calculated from the following:    Height as of this encounter: 1.702 m (5' 7\").    Weight as of this encounter: 87.3 kg (192 lb 8 oz).             Roberto Nava is a 84 year old, presenting for the following health issues:  RECHECK (Follow up leg and foot swelling and pain)        11/4/2024     9:57 AM   Additional Questions   Roomed by Zayra OCAMPO CMA     History of Present Illness       Reason for visit:  Left shin and right side of foot  Symptom onset:  3-4 weeks ago  Symptoms include:  Edema and slight pain in foot  Symptom intensity:  Moderate  Symptom progression:  Staying the same  Had these symptoms before:  No  What makes it worse:  No  What makes it better:  No   He is taking medications regularly.     Virtual visit last week for worsening lower extremity edema in both lower legs and feet, left greater than right.  Swelling was the point that he had difficulties putting on his shoes.    Lower legs and feet are much improved getting out of bed first thing in morning then slowly fluid leukemia as a day goes on.  We discussed dietary sodium reduction, he remove multiple sources of sodium in his diet including pistachios and sunflower seeds.  Change his diuretic from hydrochlorothiazide to furosemide 20 mg.  He reports a nice improvement in the lower " "extremity edema, but still present.    No CHF symptoms.  Weight remains stable.    Wt Readings from Last 10 Encounters:   11/04/24 87.3 kg (192 lb 8 oz)   09/09/24 86.9 kg (191 lb 8 oz)   01/03/24 87 kg (191 lb 12.8 oz)   09/05/23 86.4 kg (190 lb 6.4 oz)   12/15/22 92.1 kg (203 lb)   10/21/22 88.9 kg (196 lb)   09/07/22 89.8 kg (198 lb)   09/07/22 89.8 kg (198 lb)   09/04/22 89.4 kg (197 lb)   06/29/22 89.4 kg (197 lb)        2.) hypertension: Well-controlled.    3.) atrial fibrillation: Stable.    **I reviewed the information recorded in the patient's EPIC chart (including but not limited to medical history, surgical history, family history, problem list, medication list, and allergy list) and updated the information as indicated based on the patients reported information.         Review of Systems  Constitutional, HEENT, cardiovascular, pulmonary, gi and gu systems are negative, except as otherwise noted.      Objective    /68   Pulse 57   Temp 98  F (36.7  C) (Oral)   Resp 17   Ht 1.702 m (5' 7\")   Wt 87.3 kg (192 lb 8 oz)   SpO2 96%   BMI 30.15 kg/m    Body mass index is 30.15 kg/m .  Physical Exam   GENERAL alert and no distress  EYES:  Normal sclera,conjunctiva, EOMI  HENT: oral and posterior pharynx without lesions or erythema, facies symmetric  NECK: Neck supple. No LAD, without thyroidmegaly.  RESP: Clear to ausculation bilaterally without wheezes or crackles. Normal BS in all fields.  CV: Irregular rhythm ( consistent with known atrial fibrillation), regular rate; normal S1S2 without murmurs, rubs or gallops   LYMPH: no cervical lymph adenopathy appreciated  MS: extremities- no gross deformities of the visible extremities noted,   EXT:  1-2+ bilateral  lower extremity edema in the pretibial and pedal areas.  Stasis dermatitis on both lower extremities.  Pedal pulses are easily palpable in both feet.  PSYCH: Alert and oriented times 3; speech- coherent  SKIN:  No obvious significant skin " lesions on visible portions of face     No results found for any visits on 11/04/24.         Signed Electronically by: Igor Christina MD

## 2024-11-05 ENCOUNTER — TELEPHONE (OUTPATIENT)
Dept: INTERNAL MEDICINE | Facility: CLINIC | Age: 84
End: 2024-11-05
Payer: MEDICARE

## 2024-11-05 NOTE — TELEPHONE ENCOUNTER
Patient contacts clinic to verify that he is to discontinue his hydrochlorothiazide. Writer confirmed that patient is to discontinue hydrochlorothiazide, as furosemide was prescribed in its place.

## 2024-12-04 ENCOUNTER — TELEPHONE (OUTPATIENT)
Dept: INTERNAL MEDICINE | Facility: CLINIC | Age: 84
End: 2024-12-04

## 2024-12-04 DIAGNOSIS — H69.92 ETD (EUSTACHIAN TUBE DYSFUNCTION), LEFT: Primary | ICD-10-CM

## 2024-12-04 NOTE — TELEPHONE ENCOUNTER
Pt called the clinic asking for a referral for ENT. Pt stated he has water in his left ear and has had it in there for 4-6 weeks.     Routing to PCP to review and advise.     Please call pt back with PCPs recommendations.

## 2024-12-04 NOTE — TELEPHONE ENCOUNTER
I entered a referral for him to see ENT.  He can make an appointment there at his convenience.     [facility]    [address]   [address] [address]   Phone: [phone]   Fax: [phone]       Close encounter when done.

## 2024-12-09 ENCOUNTER — ALLIED HEALTH/NURSE VISIT (OUTPATIENT)
Dept: INTERNAL MEDICINE | Facility: CLINIC | Age: 84
End: 2024-12-09
Payer: MEDICARE

## 2024-12-09 DIAGNOSIS — Z23 NEED FOR VACCINATION: Primary | ICD-10-CM

## 2024-12-09 PROCEDURE — 90677 PCV20 VACCINE IM: CPT

## 2024-12-09 PROCEDURE — 99207 PR NO CHARGE NURSE ONLY: CPT

## 2024-12-09 PROCEDURE — G0009 ADMIN PNEUMOCOCCAL VACCINE: HCPCS

## 2025-01-04 ENCOUNTER — TELEPHONE (OUTPATIENT)
Dept: INTERNAL MEDICINE | Facility: CLINIC | Age: 85
End: 2025-01-04
Payer: MEDICARE

## 2025-01-04 DIAGNOSIS — M25.552 HIP PAIN, LEFT: Primary | ICD-10-CM

## 2025-01-04 NOTE — TELEPHONE ENCOUNTER
Order/Referral Request    Who is requesting: Pt    Orders being requested: Referral to PT    Reason service is needed/diagnosis: left outside thigh    When are orders needed by: ASAP    Has this been discussed with Provider: No    Does patient have a preference on a Group/Provider/Facility? Molly Arceo at [facility] or any [place] PT providers there in [facility] / other [facility] with sooner avail    Does patient have an appointment scheduled?: No    Where to send orders: Place orders within Epic    Could we send this information to you in [facility] or would you prefer to receive a phone call?:   Patient would prefer a phone call   Okay to leave a detailed message?: Yes at Cell number on file:    Telephone Information:   Mobile [phone]

## 2025-01-10 ASSESSMENT — ACTIVITIES OF DAILY LIVING (ADL)
HOS_ADL_HIGHEST_POTENTIAL_SCORE: 64
GETTING_INTO_AND_OUT_OF_AN_AVERAGE_CAR: SLIGHT DIFFICULTY
ADL_HIGHEST_POTENTIAL_SCORE: 68
ADL_SCORE(%): 0
HEAVY_WORK: MODERATE DIFFICULTY
WALKING_UP_STEEP_HILLS: MODERATE DIFFICULTY
WALKING_APPROXIMATELY_10_MINUTES: MODERATE DIFFICULTY
WALKING_INITIALLY: NO DIFFICULTY AT ALL
GOING DOWN 1 FLIGHT OF STAIRS: NO DIFFICULTY AT ALL
WALKING_DOWN_STEEP_HILLS: NO DIFFICULTY AT ALL
SITTING FOR 15 MINUTES: NO DIFFICULTY AT ALL
HOW_WOULD_YOU_RATE_YOUR_CURRENT_LEVEL_OF_FUNCTION_DURING_YOUR_USUAL_ACTIVITIES_OF_DAILY_LIVING_FROM_0_TO_100_WITH_100_BEING_YOUR_LEVEL_OF_FUNCTION_PRIOR_TO_YOUR_HIP_PROBLEM_AND_0_BEING_THE_INABILITY_TO_PERFORM_ANY_OF_YOUR_USUAL_DAILY_ACTIVITIES?: 100
SITTING_FOR_15_MINUTES: NO DIFFICULTY AT ALL
SWINGING_OBJECTS_LIKE_A_GOLF_CLUB: NO DIFFICULTY AT ALL
SPORTS_TOTAL_ITEM_SCORE: 0
ROLLING OVER IN BED: NO DIFFICULTY AT ALL
SPORTS_SCORE(%): 0
STEPPING_UP_AND_DOWN_CURBS: NO DIFFICULTY AT ALL
WALKING_15_MINUTES_OR_GREATER: MODERATE DIFFICULTY
GOING UP 1 FLIGHT OF STAIRS: NO DIFFICULTY AT ALL
GOING_DOWN_1_FLIGHT_OF_STAIRS: NO DIFFICULTY AT ALL
LIGHT_TO_MODERATE_WORK: MODERATE DIFFICULTY
GETTING_INTO_AND_OUT_OF_A_BATHTUB: NO DIFFICULTY AT ALL
HOW_WOULD_YOU_RATE_YOUR_CURRENT_LEVEL_OF_FUNCTION?: NEARLY NORMAL
ABILITY_TO_PERFORM_ACTIVITY_WITH_YOUR_NORMAL_TECHNIQUE: SLIGHT DIFFICULTY
WALKING_DOWN_STEEP_HILLS: NO DIFFICULTY AT ALL
LANDING: SLIGHT DIFFICULTY
HEAVY_WORK: MODERATE DIFFICULTY
ADL_COUNT: 17
HOW_WOULD_YOU_RATE_YOUR_CURRENT_LEVEL_OF_FUNCTION_DURING_YOUR_SPORTS_RELATED_ACTIVITIES_FROM_0_TO_100_WITH_100_BEING_YOUR_LEVEL_OF_FUNCTION_PRIOR_TO_YOUR_HIP_PROBLEM_AND_0_BEING_THE_INABILITY_TO_PERFORM_ANY_OF_YOUR_USUAL_DAILY_ACTIVITIES?: 100
ROLLING_OVER_IN_BED: NO DIFFICULTY AT ALL
HOS_ADL_SCORE(%): 76.56
TWISTING/PIVOTING_ON_INVOLVED_LEG: MODERATE DIFFICULTY
GETTING INTO AND OUT OF AN AVERAGE CAR: SLIGHT DIFFICULTY
LIGHT_TO_MODERATE_WORK: MODERATE DIFFICULTY
ADL_TOTAL_ITEM_SCORE: 0
STANDING_FOR_15_MINUTES: NO DIFFICULTY AT ALL
PUTTING_ON_SOCKS_AND_SHOES: NO DIFFICULTY AT ALL
HOW_WOULD_YOU_RATE_YOUR_CURRENT_LEVEL_OF_FUNCTION_DURING_YOUR_USUAL_ACTIVITIES_OF_DAILY_LIVING_FROM_0_TO_100_WITH_100_BEING_YOUR_LEVEL_OF_FUNCTION_PRIOR_TO_YOUR_HIP_PROBLEM_AND_0_BEING_THE_INABILITY_TO_PERFORM_ANY_OF_YOUR_USUAL_DAILY_ACTIVITIES?: 100
GETTING_INTO_AND_OUT_OF_A_BATHTUB: NO DIFFICULTY AT ALL
SPORTS_COUNT: 9
WALKING_UP_STEEP_HILLS: MODERATE DIFFICULTY
WALKING_FOR_APPROXIMATELY_10_MINUTES: MODERATE DIFFICULTY
LOW_IMPACT_ACTIVITIES_LIKE_FAST_WALKING: MODERATE DIFFICULTY
DEEP SQUATTING: MODERATE DIFFICULTY
PUTTING ON SOCKS AND SHOES: NO DIFFICULTY AT ALL
STEPPING UP AND DOWN CURBS: NO DIFFICULTY AT ALL
STANDING FOR 15 MINUTES: NO DIFFICULTY AT ALL
GOING_UP_1_FLIGHT_OF_STAIRS: NO DIFFICULTY AT ALL
WALKING_INITIALLY: NO DIFFICULTY AT ALL
RUNNING_ONE_MILE: UNABLE TO DO
TWISTING/PIVOTING ON INVOLVED LEG: MODERATE DIFFICULTY
DEEP_SQUATTING: MODERATE DIFFICULTY
HOS_ADL_ITEM_SCORE_TOTAL: 49
CUTTING/LATERAL_MOVEMENTS: SLIGHT DIFFICULTY
PLEASE_INDICATE_YOR_PRIMARY_REASON_FOR_REFERRAL_TO_THERAPY:: HIP
JUMPING: SLIGHT DIFFICULTY
WALKING_15_MINUTES_OR_GREATER: MODERATE DIFFICULTY
SPORTS_HIGHEST_POTENTIAL_SCORE: 36
STARTING_AND_STOPPING_QUICKLY: NO DIFFICULTY AT ALL

## 2025-01-14 ENCOUNTER — THERAPY VISIT (OUTPATIENT)
Dept: PHYSICAL THERAPY | Facility: CLINIC | Age: 85
End: 2025-01-14
Attending: INTERNAL MEDICINE
Payer: MEDICARE

## 2025-01-14 DIAGNOSIS — M25.551 PAIN IN JOINT INVOLVING RIGHT PELVIC REGION AND THIGH: Primary | ICD-10-CM

## 2025-01-14 PROCEDURE — 97161 PT EVAL LOW COMPLEX 20 MIN: CPT | Mod: GP | Performed by: PHYSICAL THERAPIST

## 2025-01-14 PROCEDURE — 97110 THERAPEUTIC EXERCISES: CPT | Mod: GP | Performed by: PHYSICAL THERAPIST

## 2025-01-14 NOTE — PROGRESS NOTES
PHYSICAL THERAPY EVALUATION  Type of Visit: Evaluation       Fall Risk Screen:  Fall screen completed by: PT  Have you fallen 2 or more times in the past year?: No  Have you fallen and had an injury in the past year?: No  Is patient a fall risk?: No    Subjective  Pt. complains of left thigh pain that has been present for greater than 1 month.  Mechanism/History of injury/symptoms: unknown cause however he suspects it started after prolonged standing and going up steps.   Patient s chief complaints: left thigh pain.  Symptoms are exacerbated by prolonged standing and going up steps.  Symptoms are relieved by rest.   Current function restrictions:  prolonged standing and going up steps.  Previous functional status as reported by patient: unlimited.          Presenting condition or subjective complaint: walk more (left thigh) reach for objects on the floor while sitting (L. hip)  Date of onset: 12/14/24    Relevant medical history: Concussions; Hearing problems; High blood pressure   Dates & types of surgery:      Prior diagnostic imaging/testing results: MRI; CT scan; X-ray     Prior therapy history for the same diagnosis, illness or injury: No      Prior Level of Function  Transfers: Independent  Ambulation: Independent  ADL: Independent  IADL:     Living Environment  Social support: With a significant other or spouse   Type of home: House; Multi-level; Basement   Stairs to enter the home: No       Ramp: No   Stairs inside the home: Yes 18 Is there a railing: Yes     Help at home: None  Equipment owned: Crutches     Employment: No    Hobbies/Interests: wood carving, shooting skeet    Patient goals for therapy: walk and reach for things on the floor    Pain assessment: Pain present 4/10     Objective   Lumbar range of motion: Flexion 75% with endrange tightness, extension 50% with endrange tightness, right and left sidebending 50% with endrange tightness  Left buttock and thigh symptoms reduced with repeated  extension in standing and slump mobility  Positive slump screening today  Type left greater than right hamstring and glutes  No palpable tenderness evident  Left hip abduction and extension strength 4 out of 5  Right hip abduction and extension strength 4+ out of 5  Festinating gait evident with decreased stride length bilaterally      Assessment & Plan   CLINICAL IMPRESSIONS  Medical Diagnosis: left thigh pain    Treatment Diagnosis: left thigh pain   Impression/Assessment: Patient is a 84 year old male with left thigh complaints.  The following significant findings have been identified: Pain, Decreased ROM/flexibility, Decreased strength, Impaired muscle performance, and Decreased activity tolerance. These impairments interfere with their ability to perform self care tasks, work tasks, recreational activities, household mobility, and community mobility as compared to previous level of function.     Clinical Decision Making (Complexity):  Clinical Presentation: Stable/Uncomplicated  Clinical Presentation Rationale: based on medical and personal factors listed in PT evaluation  Clinical Decision Making (Complexity): Low complexity    PLAN OF CARE  Treatment Interventions:  Interventions: Neuromuscular Re-education, Therapeutic Activity, Therapeutic Exercise    Long Term Goals     PT Goal 1  Goal Description: Pt will be able to tolerate walking for 30 minutes  Rationale: to maximize safety and independence with performance of ADLs and functional tasks;to maximize safety and independence within the home;to maximize safety and independence within the community  Target Date: 03/11/25      Frequency of Treatment: 1 x week  Duration of Treatment: 8 week    Recommended Referrals to Other Professionals:   Education Assessment:   Learner/Method: Patient;Listening;Demonstration;Pictures/Video  Education Comments: Pt educated on plan of care    Risks and benefits of evaluation/treatment have been explained.    Patient/Family/caregiver agrees with Plan of Care.     Evaluation Time:     PT Eval, Low Complexity Minutes (36014): 15       Signing Clinician: JOSE Garza [facility]                                                                                   OUTPATIENT PHYSICAL THERAPY      PLAN OF TREATMENT FOR OUTPATIENT REHABILITATION   Patient's Last Name, First Name, Reji Vanegas YOB: 1940   Provider's Name   [facility]   Medical Record No.  [id number]     Onset Date: 12/14/24  Start of Care Date: 01/14/25     Medical Diagnosis:  left thigh pain      PT Treatment Diagnosis:  left thigh pain Plan of Treatment  Frequency/Duration: 1 x week/ 8 week    Certification date from 01/14/25 to 03/11/25         See note for plan of treatment details and functional goals     Michael Ann, PT                         I CERTIFY THE NEED FOR THESE SERVICES FURNISHED UNDER        THIS PLAN OF TREATMENT AND WHILE UNDER MY CARE     (Physician attestation of this document indicates review and certification of the therapy plan).              Referring Provider:  Igor Christina    Initial Assessment  See Epic Evaluation- Start of Care Date: 01/14/25

## 2025-01-21 ENCOUNTER — THERAPY VISIT (OUTPATIENT)
Dept: PHYSICAL THERAPY | Facility: CLINIC | Age: 85
End: 2025-01-21
Payer: MEDICARE

## 2025-01-21 DIAGNOSIS — M25.551 PAIN IN JOINT INVOLVING RIGHT PELVIC REGION AND THIGH: Primary | ICD-10-CM

## 2025-01-21 PROCEDURE — 97110 THERAPEUTIC EXERCISES: CPT | Mod: GP | Performed by: PHYSICAL THERAPIST

## 2025-01-28 ENCOUNTER — THERAPY VISIT (OUTPATIENT)
Dept: PHYSICAL THERAPY | Facility: CLINIC | Age: 85
End: 2025-01-28
Payer: MEDICARE

## 2025-01-28 DIAGNOSIS — M25.551 PAIN IN JOINT INVOLVING RIGHT PELVIC REGION AND THIGH: Primary | ICD-10-CM

## 2025-01-28 PROCEDURE — 97110 THERAPEUTIC EXERCISES: CPT | Mod: GP | Performed by: PHYSICAL THERAPIST

## 2025-02-24 NOTE — TELEPHONE ENCOUNTER
Routing refill request to provider for review/approval because:  Labs not current:  CR clearance    Eveline BRIAN RN, BSN, PHN           Argneis Cordova(Attending)

## 2025-03-03 ENCOUNTER — TELEPHONE (OUTPATIENT)
Dept: SCHEDULING | Facility: CLINIC | Age: 85
End: 2025-03-03
Payer: MEDICARE

## 2025-03-03 NOTE — TELEPHONE ENCOUNTER
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 Unable to provide which specialty this referral would be for.    Reason service is needed/diagnosis: States that the area is prone to bleeding if agitated.    When are orders needed by: Asap    Has this been discussed with Provider: No    Does patient have a preference on a Group/Provider/Facility? [facility]    Does patient have an appointment scheduled?: No    Where to send orders: Place orders within Epic    [facility] we [facility] this information [facility] you [facility] or would you prefer to receive a phone call?:   Patient would prefer a phone call   Okay to leave a detailed message?: Yes at Cell number on file:    Telephone Information:   Mobile [phone]

## 2025-03-03 NOTE — TELEPHONE ENCOUNTER
I need to see the area in question before I know where to send him.   He appears to have an appointment with me on 3/19/25, I can check the area out at that time and then send him to the right spot.   If he feels that he needs seomthing done more urgently before his appointment, then I may need to do a video visit, otherwise we will just see him at his appointment.     Close encounter when done.

## 2025-03-04 ENCOUNTER — MYC MEDICAL ADVICE (OUTPATIENT)
Dept: INTERNAL MEDICINE | Facility: CLINIC | Age: 85
End: 2025-03-04
Payer: MEDICARE

## 2025-03-13 ENCOUNTER — NURSE TRIAGE (OUTPATIENT)
Dept: INTERNAL MEDICINE | Facility: CLINIC | Age: 85
End: 2025-03-13

## 2025-03-13 NOTE — TELEPHONE ENCOUNTER
Called and spoke to the patient. Patient is agreeable to go to the ER. Patient states he does have transportation and will proceed to [facility]     Tiki Valentine RN

## 2025-03-13 NOTE — TELEPHONE ENCOUNTER
Triage RN reviewed schedule request in note below; There are no appointments available at [facility] this afternoon.     Dr. Christina is out of the office, routing to  provider pool. Please seen triage note below advise.     Please route provider message back to nurse triage.

## 2025-03-13 NOTE — TELEPHONE ENCOUNTER
Nurse Triage SBAR    Is this a 2nd Level Triage? YES, LICENSED PRACTITIONER REVIEW IS REQUIRED    Situation:   Pt calling with excruciating L Hip pain.   Onset gradual; now intense and impairing ability to get up from a chair.   Pt request appt with PCP for evaluation and Tx but also for pain relief.     Background:   States it's similar to Pain in joint, pelvic region and thigh listed under Medical Hx (2014).     Assessment:   Denies falls or any other known trauma to the area.   States pain gets better with walking, but overall it's excruciating.   Pain does not radiate.   Denies chest pain and/or dyspnea.     Protocol Recommended Disposition:   Go To Office Now    Recommendation:   Writer pointed out to pt there is a high change PCP's office will not have any available appts today and advised pt to report to . Pt refused disposition and requested writer to try PCP's office.   Please follow up with patient and assist in scheduling appointment today. If no appointment available, please advise patient per PCP's disposition. Patient is aware a member of his Care Team will call  him back to assist further.        Routed to provider and Appropriate Nurse.     Care advice given.   Red flag symptoms reviewed with patient in detail.   Instructed pt to call back if onset of new or worsening symptoms.   Pt verbalized understanding of the provided information.       Does the patient meet one of the following criteria for ADS visit consideration? 16+ years old, with an [facility] PCP     TIP  Providers, please consider if this condition is appropriate for management at one of our Acute and Diagnostic Services sites.     If patient is a good candidate, please use dotphrase <dot>triageresponse and select Refer to ADS to document.    Reason for Disposition   SEVERE pain (e.g., excruciating, unable to do any normal activities)    Additional Information   Negative: Looks like a broken bone or dislocated joint (e.g., crooked or  deformed)   Negative: Sounds like a life-threatening emergency to the triager   Negative: Followed a hip injury   Negative: Leg pain is main symptom   Negative: Back pain radiating (shooting) into hip   Negative: Fever and red area (or area very tender to touch)   Negative: Patient sounds very sick or weak to the triager   Negative: SEVERE pain (e.g., excruciating, unable to do any normal activities) and fever   Negative: Can't stand (bear weight) or walk    Protocols used: Hip Pain-A-OH

## 2025-03-17 ENCOUNTER — THERAPY VISIT (OUTPATIENT)
Dept: PHYSICAL THERAPY | Facility: CLINIC | Age: 85
End: 2025-03-17
Payer: MEDICARE

## 2025-03-17 DIAGNOSIS — M25.552 PAIN IN JOINT INVOLVING LEFT PELVIC REGION AND THIGH: Primary | ICD-10-CM

## 2025-03-17 PROCEDURE — 97110 THERAPEUTIC EXERCISES: CPT | Mod: GP | Performed by: PHYSICAL THERAPIST

## 2025-03-17 PROCEDURE — 97530 THERAPEUTIC ACTIVITIES: CPT | Mod: GP | Performed by: PHYSICAL THERAPIST

## 2025-03-17 NOTE — PROGRESS NOTES
Patient was unable to complete the 8 visits in the timeframe predicted and returns to PT today. New plan is to continue the remaining visits over the next 5 weeks.      [facility]                                                                                   OUTPATIENT PHYSICAL THERAPY    PLAN OF TREATMENT FOR OUTPATIENT REHABILITATION   Patient's Last Name, First Name, Reji Vanegas YOB: 1940   Provider's Name   [facility]   Medical Record No.  [id number]     Onset Date: 12/14/24  Start of Care Date: 01/14/25     Medical Diagnosis:  left thigh pain      PT Treatment Diagnosis:  left thigh pain Plan of Treatment  Frequency/Duration: 1x/week/ 5 weeks (total of 8 visits)    Certification date from 03/12/25 to 04/21/25         See note for plan of treatment details and functional goals     Radha Arceo PT                         I CERTIFY THE NEED FOR THESE SERVICES FURNISHED UNDER        THIS PLAN OF TREATMENT AND WHILE UNDER MY CARE     (Physician attestation of this document indicates review and certification of the therapy plan).              Referring Provider:  Igor Christina    Initial Assessment  See Epic Evaluation- Start of Care Date: 01/14/25            PLAN  Continue therapy per current plan of care.    Beginning/End Dates of Progress Note Reporting Period:  01/14/25 to 03/17/2025    Referring Provider:  Igor Christina         03/17/25 0500   Appointment Info   Signing clinician's name / credentials Radha Arceo PT   Total/Authorized Visits 8   Visits Used 4   Medical Diagnosis left thigh pain   PT Tx Diagnosis left thigh pain   Progress Note/Certification   Start of Care Date 01/14/25   Onset of illness/injury or Date of Surgery 12/14/24   Therapy Frequency 1x/week   Predicted Duration 5 weeks (total of 8 visits)   Certification date from 03/12/25   Certification date to 04/21/25   Progress Note  "Completed Date 01/14/25   PT Goal 1   Goal Description Pt will be able to tolerate walking for 30 minutes   Rationale to maximize safety and independence with performance of ADLs and functional tasks;to maximize safety and independence within the home;to maximize safety and independence within the community   Goal Progress Tolerating walking for 20 to 30 minutes with mild L thigh pain   Target Date 03/11/25   Subjective Report   Subjective Report In February had been having worsened L thigh pain he thinks from his exercises. PT spoke to him over the phone on 2-21-25 and advised to do SHAWN only. SInce doing this his thigh pain is much better. He can walk 20-30 minutes with mild L thigh pain. He does non-reciprocal gait on stairs d/t fear of aggravatoing it but would like to return to reciprocal gait on stairs. He is reporting mild L LBP with transfers sit to stand.   Objective Measures   Objective Measures Objective Measure 1;Objective Measure 2   Objective Measure 1   Objective Measure LAROM   Details flex=hands to mid lower leg (mild L LBP), ext=just past neutral   Objective Measure 2   Objective Measure L hip MMT   Details ext=5-/5 (L LBP)   Treatment Interventions (PT)   Interventions Therapeutic Procedure/Exercise;Therapeutic Activity;Neuromuscular Re-education;Manual Therapy   Therapeutic Procedure/Exercise   Therapeutic Procedures: strength, endurance, ROM, flexibility minutes (58768) 20   Ther Proc 1 Education   Ther Proc 1 - Details rationale for HEP, how to monitor effect and when to stop an exercise, answered all patient's questions   PTRx Ther Proc 1 Standing Extension at Counter Supported   PTRx Ther Proc 1 - Details 10 reps, P/NW/incr motion --cues to start gently and increase motion as able, ok to have some discomfort during as long as it resolves afterward; cont 3-5 x day   PTRx Ther Proc 2 Stepdown Backward   PTRx Ther Proc 2 - Details 4\" x 10 1-2x/day, stop if worse   PTRx Ther Proc 3 Wall Climb " "  PTRx Ther Proc 3 - Details ok to cont, 5\"x10 2-3x/day   Skilled Intervention vc, vsc, mc for correct form   Patient Response/Progress produced/NW/incr motion; no pain with 4\" step up   Therapeutic Activity   PTRx Ther Act 1 Body Mechanics - [place]   PTRx Ther Act 1 - Details instructed for daily bending activities   PTRx Ther Act 2 Posture Correction with Lumbar Roll   PTRx Ther Act 2 - Details Instructed in proper sitting; trial of early compliance roll not comfortable for low back. Focus on avoiding slouching with hips to back of chair   Therapeutic Activities: dynamic activities to improve functional performance minutes (07785) 8   Skilled Intervention vc, vsc for correct body mechanics   Patient Response/Progress pt demonstrates understanding   Education   Learner/Method Patient;Listening;Demonstration;Pictures/Video   Total Session Time   Timed Code Treatment Minutes 28   Total Treatment Time (sum of timed and untimed services) 28       "

## 2025-03-19 ENCOUNTER — VIRTUAL VISIT (OUTPATIENT)
Dept: INTERNAL MEDICINE | Facility: CLINIC | Age: 85
End: 2025-03-19
Payer: MEDICARE

## 2025-03-19 DIAGNOSIS — K42.9 UMBILICAL HERNIA WITHOUT OBSTRUCTION AND WITHOUT GANGRENE: Primary | ICD-10-CM

## 2025-03-19 DIAGNOSIS — I48.91 ATRIAL FIBRILLATION, UNSPECIFIED TYPE (H): ICD-10-CM

## 2025-03-19 DIAGNOSIS — R60.0 LOWER LEG EDEMA: ICD-10-CM

## 2025-03-19 DIAGNOSIS — I42.9 CARDIOMYOPATHY, UNSPECIFIED TYPE (H): ICD-10-CM

## 2025-03-19 DIAGNOSIS — I10 BENIGN ESSENTIAL HYPERTENSION: ICD-10-CM

## 2025-03-19 PROCEDURE — 98006 SYNCH AUDIO-VIDEO EST MOD 30: CPT | Performed by: INTERNAL MEDICINE

## 2025-03-19 NOTE — PATIENT INSTRUCTIONS
Umbilical hernia     Surgical consultation to evaluate your hernia and discuss repair options.     --[facility], [place] [phone]     Until the hernia is repaired, go to the Emergency Room immediately if you develop any worsening serious pain at the hernia site as these are signs and symptoms of strangulation/incarceration which is a medical emergency.  The hernia will be repaired urgently that day.       Return to see me 10-14 days before your planned surgery to perform the required pre-operative exam to clear you for the proposed surgery.  We will perform any necessary pre-operative testing and provide instructions on how to handle your medications for the surgery.

## 2025-03-19 NOTE — PROGRESS NOTES
"Elijah is a 84 year old who is being evaluated via a billable video visit.    What phone number would you like to be contacted at? [phone]  How would you like to obtain your AVS? Tatahart  Anyone else joining the video visti:  YES, wife  Originating Location (pt. Location): Home      Assessment & Plan     (K42.9) Umbilical hernia without obstruction and without gangrene  (primary encounter diagnosis)  Comment: referral to surgery   Discussed the pathophysiology of hernias and their mechanical nature.   No evidence for incarceration at this time.   Normal bowel function, little pain.   Will refer to surgery for further evaluation and management, told the patient they most likely will require surgery.    Discussed signs and symptoms of hernia incarceration and told the patient to immediately go to the ER if the pain becomes severe.   Plan: Adult Gen Surg  Referral            (I10) Benign essential hypertension  Comment: This condition is currently controlled on the current medical regimen.  Continue current therapy.   Plan:     (I48.91) Atrial fibrillation, unspecified type (H)  Comment: This condition is currently controlled on the current medical regimen.  Continue current therapy.   Plan:     (R60.0) Lower leg edema  Comment: This condition is currently controlled on the current medical regimen.  Continue current therapy.   Plan:     (I42.9) Cardiomyopathy, unspecified type (H)  Comment: This condition is currently controlled on the current medical regimen.  Continue current therapy.   Will likely get an echocardiogram prior to surgery (he is due for one anyway)  Plan:              BMI  Estimated body mass index is 30.15 kg/m  as calculated from the following:    Height as of 11/4/24: 1.702 m (5' 7\").    Weight as of 11/4/24: 87.3 kg (192 lb 8 oz).             Subjective   Elijah is a 84 year old, presenting for the following health issues:  Follow Up (Hip pain )    History of Present Illness       Reason " "for visit:  Belly button: but I need Dr. Christina's phone number to send the picture.   He is taking medications regularly.          1.)  \"belly button\" lump for the past few years, getting slightly larger  Nontender, reducible.     2.)  chronci ongoing hip pain, seeing PT and doing exercsie as presribed.     3.)  Hypertension:  History of hypertension, on medication.  No reported side effects from medications.    Reviewed last 6 BP readings in chart:  BP Readings from Last 6 Encounters:   11/04/24 122/68   09/09/24 126/56   01/03/24 122/64   09/05/23 120/56   12/15/22 139/81   10/21/22 116/64     No active cardiac complaints or symptoms with exercise.     4.)  history of cardiomyopathy, no recent signs and symptoms of heawrt failure.   Lastd echocardiogram 2022.       **I reviewed the information recorded in the patient's EPIC chart (including but not limited to medical history, surgical history, family history, problem list, medication list, and allergy list) and updated the information as indicated based on the patients reported information.         Review of Systems  Constitutional, HEENT, cardiovascular, pulmonary, gi and gu systems are negative, except as otherwise noted.      Objective           Vitals:  No vitals were obtained today due to virtual visit.                            Objective        Vitals:  No vitals were obtained today due to virtual visit.     Physical Exam   GENERAL: alert and no distress  EYES: Eyes grossly normal to inspection.  No discharge or erythema, or obvious scleral/conjunctival abnormalities.  RESP: No audible wheeze, cough, or visible cyanosis.    SKIN: Visible skin clear. No significant rash, abnormal pigmentation or lesions.  NEURO: Cranial nerves grossly intact.  Mentation and speech appropriate for age.  PSYCH: Appropriate affect, tone, and pace of words  ABD:  medium sized umbilical hernia, reducible, nonerythematous              Video-Visit Details     Type of service:  Video " Visit         Joined the call at 3/19/2025, 12:02 PM  Left the call at 3/19/2025,  12:18 PM  You were on the call for 16 minutes 23 seconds .     Originating Location (pt. Location): Home     Distant Location (provider location):  On-site  Platform used for Video Visit: Doximity    The longitudinal plan of care for the diagnosis(es)/condition(s) as documented were addressed during this visit. Due to the added complexity in care, I will continue to support Elijah in the subsequent management and with ongoing continuity of care.      Signed Electronically by: Igor Christina MD

## 2025-03-31 ENCOUNTER — THERAPY VISIT (OUTPATIENT)
Dept: PHYSICAL THERAPY | Facility: CLINIC | Age: 85
End: 2025-03-31
Payer: MEDICARE

## 2025-03-31 ENCOUNTER — TELEPHONE (OUTPATIENT)
Dept: INTERNAL MEDICINE | Facility: CLINIC | Age: 85
End: 2025-03-31

## 2025-03-31 DIAGNOSIS — M25.552 PAIN IN JOINT INVOLVING LEFT PELVIC REGION AND THIGH: Primary | ICD-10-CM

## 2025-03-31 PROCEDURE — 97110 THERAPEUTIC EXERCISES: CPT | Mod: GP | Performed by: PHYSICAL THERAPIST

## 2025-03-31 NOTE — TELEPHONE ENCOUNTER
Schedule a virtual visit to discuss other anticoagulation options.   OK to schedule a virtual visit in the next 2 weeks.  Use any open virtual spot, or other open same day or next day spot.   Video virtual visit preferred, telephone as last resort.     Close encounter when done.

## 2025-03-31 NOTE — TELEPHONE ENCOUNTER
Dr. Christina, please advise.    Pt calling to report that his Xarelto 20mg has become too expensive to sustain (approx. $230/mo), and is interested in more cost-effective alternatives. Triage encouraged pt to reachout to his insurance for more details about what alternatives they would cover better.    Pt confirmed he has about 2wk worth of Xarelto on-hand.    Lara Leon RN

## 2025-04-12 DIAGNOSIS — I48.91 ATRIAL FIBRILLATION, UNSPECIFIED TYPE (H): ICD-10-CM

## 2025-04-14 ENCOUNTER — APPOINTMENT (OUTPATIENT)
Dept: CT IMAGING | Facility: CLINIC | Age: 85
DRG: 354 | End: 2025-04-14
Attending: STUDENT IN AN ORGANIZED HEALTH CARE EDUCATION/TRAINING PROGRAM
Payer: MEDICARE

## 2025-04-14 ENCOUNTER — ANESTHESIA EVENT (OUTPATIENT)
Dept: SURGERY | Facility: CLINIC | Age: 85
DRG: 354 | End: 2025-04-14
Payer: MEDICARE

## 2025-04-14 ENCOUNTER — ANESTHESIA (OUTPATIENT)
Dept: SURGERY | Facility: CLINIC | Age: 85
DRG: 354 | End: 2025-04-14
Payer: MEDICARE

## 2025-04-14 ENCOUNTER — NURSE TRIAGE (OUTPATIENT)
Dept: INTERNAL MEDICINE | Facility: CLINIC | Age: 85
End: 2025-04-14

## 2025-04-14 ENCOUNTER — HOSPITAL ENCOUNTER (INPATIENT)
Facility: CLINIC | Age: 85
LOS: 2 days | Discharge: HOME | DRG: 354 | End: 2025-04-16
Attending: STUDENT IN AN ORGANIZED HEALTH CARE EDUCATION/TRAINING PROGRAM | Admitting: STUDENT IN AN ORGANIZED HEALTH CARE EDUCATION/TRAINING PROGRAM
Payer: MEDICARE

## 2025-04-14 DIAGNOSIS — K56.609 SMALL BOWEL OBSTRUCTION (H): ICD-10-CM

## 2025-04-14 DIAGNOSIS — K46.0 INCARCERATED HERNIA: ICD-10-CM

## 2025-04-14 LAB
ABO + RH BLD: NORMAL
ANION GAP SERPL CALCULATED.3IONS-SCNC: 14 MMOL/L (ref 7–15)
BASOPHILS # BLD AUTO: 0 10E3/UL (ref 0–0.2)
BASOPHILS NFR BLD AUTO: 0 %
BLD GP AB SCN SERPL QL: NEGATIVE
BUN SERPL-MCNC: 18.5 MG/DL (ref 8–23)
CALCIUM SERPL-MCNC: 9.8 MG/DL (ref 8.8–10.4)
CHLORIDE SERPL-SCNC: 104 MMOL/L (ref 98–107)
CREAT BLD-MCNC: 0.9 MG/DL (ref 0.7–1.2)
CREAT SERPL-MCNC: 0.87 MG/DL (ref 0.67–1.17)
EGFRCR SERPLBLD CKD-EPI 2021: 86 ML/MIN/1.73M2
EGFRCR SERPLBLD CKD-EPI 2021: >60 ML/MIN/1.73M2
EOSINOPHIL # BLD AUTO: 0 10E3/UL (ref 0–0.7)
EOSINOPHIL NFR BLD AUTO: 0 %
ERYTHROCYTE [DISTWIDTH] IN BLOOD BY AUTOMATED COUNT: 14.8 % (ref 10–15)
GLUCOSE SERPL-MCNC: 122 MG/DL (ref 70–99)
HCO3 SERPL-SCNC: 22 MMOL/L (ref 22–29)
HCT VFR BLD AUTO: 45.8 % (ref 40–53)
HGB BLD-MCNC: 15.2 G/DL (ref 13.3–17.7)
IMM GRANULOCYTES # BLD: 0.1 10E3/UL
IMM GRANULOCYTES NFR BLD: 0 %
LACTATE SERPL-SCNC: 1 MMOL/L (ref 0.7–2)
LYMPHOCYTES # BLD AUTO: 0.8 10E3/UL (ref 0.8–5.3)
LYMPHOCYTES NFR BLD AUTO: 7 %
MCH RBC QN AUTO: 30.8 PG (ref 26.5–33)
MCHC RBC AUTO-ENTMCNC: 33.2 G/DL (ref 31.5–36.5)
MCV RBC AUTO: 93 FL (ref 78–100)
MONOCYTES # BLD AUTO: 0.8 10E3/UL (ref 0–1.3)
MONOCYTES NFR BLD AUTO: 7 %
NEUTROPHILS # BLD AUTO: 10.7 10E3/UL (ref 1.6–8.3)
NEUTROPHILS NFR BLD AUTO: 86 %
NRBC # BLD AUTO: 0 10E3/UL
NRBC BLD AUTO-RTO: 0 /100
PLATELET # BLD AUTO: 174 10E3/UL (ref 150–450)
POTASSIUM SERPL-SCNC: 3.8 MMOL/L (ref 3.4–5.3)
RBC # BLD AUTO: 4.94 10E6/UL (ref 4.4–5.9)
SODIUM SERPL-SCNC: 140 MMOL/L (ref 135–145)
SPECIMEN EXP DATE BLD: NORMAL
WBC # BLD AUTO: 12.5 10E3/UL (ref 4–11)

## 2025-04-14 PROCEDURE — 360N000076 HC SURGERY LEVEL 3, PER MIN: Performed by: STUDENT IN AN ORGANIZED HEALTH CARE EDUCATION/TRAINING PROGRAM

## 2025-04-14 PROCEDURE — 82565 ASSAY OF CREATININE: CPT

## 2025-04-14 PROCEDURE — 0WJG4ZZ INSPECTION OF PERITONEAL CAVITY, PERCUTANEOUS ENDOSCOPIC APPROACH: ICD-10-PCS | Performed by: STUDENT IN AN ORGANIZED HEALTH CARE EDUCATION/TRAINING PROGRAM

## 2025-04-14 PROCEDURE — 36415 COLL VENOUS BLD VENIPUNCTURE: CPT | Performed by: STUDENT IN AN ORGANIZED HEALTH CARE EDUCATION/TRAINING PROGRAM

## 2025-04-14 PROCEDURE — 250N000009 HC RX 250: Performed by: STUDENT IN AN ORGANIZED HEALTH CARE EDUCATION/TRAINING PROGRAM

## 2025-04-14 PROCEDURE — 258N000003 HC RX IP 258 OP 636: Performed by: ANESTHESIOLOGY

## 2025-04-14 PROCEDURE — 82310 ASSAY OF CALCIUM: CPT | Performed by: STUDENT IN AN ORGANIZED HEALTH CARE EDUCATION/TRAINING PROGRAM

## 2025-04-14 PROCEDURE — 99223 1ST HOSP IP/OBS HIGH 75: CPT | Mod: 57 | Performed by: STUDENT IN AN ORGANIZED HEALTH CARE EDUCATION/TRAINING PROGRAM

## 2025-04-14 PROCEDURE — 250N000009 HC RX 250

## 2025-04-14 PROCEDURE — 49592 RPR AA HRN 1ST < 3 NCR/STRN: CPT | Performed by: STUDENT IN AN ORGANIZED HEALTH CARE EDUCATION/TRAINING PROGRAM

## 2025-04-14 PROCEDURE — 999N000141 HC STATISTIC PRE-PROCEDURE NURSING ASSESSMENT: Performed by: STUDENT IN AN ORGANIZED HEALTH CARE EDUCATION/TRAINING PROGRAM

## 2025-04-14 PROCEDURE — 250N000025 HC SEVOFLURANE, PER MIN: Performed by: STUDENT IN AN ORGANIZED HEALTH CARE EDUCATION/TRAINING PROGRAM

## 2025-04-14 PROCEDURE — 0WQF0ZZ REPAIR ABDOMINAL WALL, OPEN APPROACH: ICD-10-PCS | Performed by: STUDENT IN AN ORGANIZED HEALTH CARE EDUCATION/TRAINING PROGRAM

## 2025-04-14 PROCEDURE — 99285 EMERGENCY DEPT VISIT HI MDM: CPT | Mod: 25

## 2025-04-14 PROCEDURE — 86900 BLOOD TYPING SEROLOGIC ABO: CPT | Performed by: ANESTHESIOLOGY

## 2025-04-14 PROCEDURE — 250N000011 HC RX IP 250 OP 636: Performed by: STUDENT IN AN ORGANIZED HEALTH CARE EDUCATION/TRAINING PROGRAM

## 2025-04-14 PROCEDURE — 710N000009 HC RECOVERY PHASE 1, LEVEL 1, PER MIN: Performed by: STUDENT IN AN ORGANIZED HEALTH CARE EDUCATION/TRAINING PROGRAM

## 2025-04-14 PROCEDURE — 74177 CT ABD & PELVIS W/CONTRAST: CPT

## 2025-04-14 PROCEDURE — 83605 ASSAY OF LACTIC ACID: CPT | Performed by: STUDENT IN AN ORGANIZED HEALTH CARE EDUCATION/TRAINING PROGRAM

## 2025-04-14 PROCEDURE — 272N000001 HC OR GENERAL SUPPLY STERILE: Performed by: STUDENT IN AN ORGANIZED HEALTH CARE EDUCATION/TRAINING PROGRAM

## 2025-04-14 PROCEDURE — 120N000001 HC R&B MED SURG/OB

## 2025-04-14 PROCEDURE — 85004 AUTOMATED DIFF WBC COUNT: CPT | Performed by: STUDENT IN AN ORGANIZED HEALTH CARE EDUCATION/TRAINING PROGRAM

## 2025-04-14 PROCEDURE — 250N000011 HC RX IP 250 OP 636: Mod: JZ

## 2025-04-14 PROCEDURE — 96374 THER/PROPH/DIAG INJ IV PUSH: CPT | Mod: 59

## 2025-04-14 PROCEDURE — 370N000017 HC ANESTHESIA TECHNICAL FEE, PER MIN: Performed by: STUDENT IN AN ORGANIZED HEALTH CARE EDUCATION/TRAINING PROGRAM

## 2025-04-14 PROCEDURE — 36415 COLL VENOUS BLD VENIPUNCTURE: CPT | Performed by: ANESTHESIOLOGY

## 2025-04-14 PROCEDURE — 80048 BASIC METABOLIC PNL TOTAL CA: CPT | Performed by: STUDENT IN AN ORGANIZED HEALTH CARE EDUCATION/TRAINING PROGRAM

## 2025-04-14 PROCEDURE — 250N000011 HC RX IP 250 OP 636: Performed by: ANESTHESIOLOGY

## 2025-04-14 RX ORDER — ONDANSETRON 2 MG/ML
4 INJECTION INTRAMUSCULAR; INTRAVENOUS ONCE
Status: COMPLETED | OUTPATIENT
Start: 2025-04-14 | End: 2025-04-14

## 2025-04-14 RX ORDER — FENTANYL CITRATE 50 UG/ML
50 INJECTION, SOLUTION INTRAMUSCULAR; INTRAVENOUS EVERY 5 MIN PRN
Status: DISCONTINUED | OUTPATIENT
Start: 2025-04-14 | End: 2025-04-15 | Stop reason: HOSPADM

## 2025-04-14 RX ORDER — HYDROMORPHONE HCL IN WATER/PF 6 MG/30 ML
0.2 PATIENT CONTROLLED ANALGESIA SYRINGE INTRAVENOUS EVERY 5 MIN PRN
Status: DISCONTINUED | OUTPATIENT
Start: 2025-04-14 | End: 2025-04-15 | Stop reason: HOSPADM

## 2025-04-14 RX ORDER — FENTANYL CITRATE 50 UG/ML
INJECTION, SOLUTION INTRAMUSCULAR; INTRAVENOUS PRN
Status: DISCONTINUED | OUTPATIENT
Start: 2025-04-14 | End: 2025-04-15

## 2025-04-14 RX ORDER — AMIODARONE HYDROCHLORIDE 200 MG/1
TABLET ORAL
Qty: 90 TABLET | Refills: 0 | Status: SHIPPED | OUTPATIENT
Start: 2025-04-14

## 2025-04-14 RX ORDER — ONDANSETRON 4 MG/1
4 TABLET, ORALLY DISINTEGRATING ORAL EVERY 30 MIN PRN
Status: DISCONTINUED | OUTPATIENT
Start: 2025-04-14 | End: 2025-04-15 | Stop reason: HOSPADM

## 2025-04-14 RX ORDER — FENTANYL CITRATE 50 UG/ML
25 INJECTION, SOLUTION INTRAMUSCULAR; INTRAVENOUS EVERY 5 MIN PRN
Status: DISCONTINUED | OUTPATIENT
Start: 2025-04-14 | End: 2025-04-15 | Stop reason: HOSPADM

## 2025-04-14 RX ORDER — CEFAZOLIN SODIUM/WATER 2 G/20 ML
2 SYRINGE (ML) INTRAVENOUS SEE ADMIN INSTRUCTIONS
Status: DISCONTINUED | OUTPATIENT
Start: 2025-04-14 | End: 2025-04-15 | Stop reason: HOSPADM

## 2025-04-14 RX ORDER — LIDOCAINE 40 MG/G
CREAM TOPICAL
Status: DISCONTINUED | OUTPATIENT
Start: 2025-04-14 | End: 2025-04-15 | Stop reason: HOSPADM

## 2025-04-14 RX ORDER — SODIUM CHLORIDE, SODIUM LACTATE, POTASSIUM CHLORIDE, CALCIUM CHLORIDE 600; 310; 30; 20 MG/100ML; MG/100ML; MG/100ML; MG/100ML
INJECTION, SOLUTION INTRAVENOUS CONTINUOUS
Status: DISCONTINUED | OUTPATIENT
Start: 2025-04-14 | End: 2025-04-15 | Stop reason: HOSPADM

## 2025-04-14 RX ORDER — HYDROMORPHONE HCL IN WATER/PF 6 MG/30 ML
0.4 PATIENT CONTROLLED ANALGESIA SYRINGE INTRAVENOUS EVERY 5 MIN PRN
Status: DISCONTINUED | OUTPATIENT
Start: 2025-04-14 | End: 2025-04-15 | Stop reason: HOSPADM

## 2025-04-14 RX ORDER — ONDANSETRON 2 MG/ML
4 INJECTION INTRAMUSCULAR; INTRAVENOUS EVERY 30 MIN PRN
Status: DISCONTINUED | OUTPATIENT
Start: 2025-04-14 | End: 2025-04-15 | Stop reason: HOSPADM

## 2025-04-14 RX ORDER — CEFAZOLIN SODIUM/WATER 2 G/20 ML
2 SYRINGE (ML) INTRAVENOUS
Status: COMPLETED | OUTPATIENT
Start: 2025-04-14 | End: 2025-04-14

## 2025-04-14 RX ORDER — BUPIVACAINE HYDROCHLORIDE AND EPINEPHRINE 5; 5 MG/ML; UG/ML
INJECTION, SOLUTION PERINEURAL PRN
Status: DISCONTINUED | OUTPATIENT
Start: 2025-04-14 | End: 2025-04-15 | Stop reason: HOSPADM

## 2025-04-14 RX ORDER — LIDOCAINE HYDROCHLORIDE 20 MG/ML
INJECTION, SOLUTION INFILTRATION; PERINEURAL PRN
Status: DISCONTINUED | OUTPATIENT
Start: 2025-04-14 | End: 2025-04-15

## 2025-04-14 RX ORDER — DEXAMETHASONE SODIUM PHOSPHATE 4 MG/ML
INJECTION, SOLUTION INTRA-ARTICULAR; INTRALESIONAL; INTRAMUSCULAR; INTRAVENOUS; SOFT TISSUE PRN
Status: DISCONTINUED | OUTPATIENT
Start: 2025-04-14 | End: 2025-04-15

## 2025-04-14 RX ORDER — PROPOFOL 10 MG/ML
INJECTION, EMULSION INTRAVENOUS PRN
Status: DISCONTINUED | OUTPATIENT
Start: 2025-04-14 | End: 2025-04-15

## 2025-04-14 RX ORDER — ALBUTEROL SULFATE 0.83 MG/ML
2.5 SOLUTION RESPIRATORY (INHALATION) EVERY 4 HOURS PRN
Status: DISCONTINUED | OUTPATIENT
Start: 2025-04-14 | End: 2025-04-15 | Stop reason: HOSPADM

## 2025-04-14 RX ORDER — DEXAMETHASONE SODIUM PHOSPHATE 4 MG/ML
4 INJECTION, SOLUTION INTRA-ARTICULAR; INTRALESIONAL; INTRAMUSCULAR; INTRAVENOUS; SOFT TISSUE
Status: DISCONTINUED | OUTPATIENT
Start: 2025-04-14 | End: 2025-04-15 | Stop reason: HOSPADM

## 2025-04-14 RX ORDER — NALOXONE HYDROCHLORIDE 0.4 MG/ML
0.1 INJECTION, SOLUTION INTRAMUSCULAR; INTRAVENOUS; SUBCUTANEOUS
Status: DISCONTINUED | OUTPATIENT
Start: 2025-04-14 | End: 2025-04-15 | Stop reason: HOSPADM

## 2025-04-14 RX ORDER — IOPAMIDOL 755 MG/ML
500 INJECTION, SOLUTION INTRAVASCULAR ONCE
Status: COMPLETED | OUTPATIENT
Start: 2025-04-14 | End: 2025-04-14

## 2025-04-14 RX ORDER — MEPERIDINE HYDROCHLORIDE 25 MG/ML
12.5 INJECTION INTRAMUSCULAR; INTRAVENOUS; SUBCUTANEOUS EVERY 5 MIN PRN
Status: DISCONTINUED | OUTPATIENT
Start: 2025-04-14 | End: 2025-04-15 | Stop reason: HOSPADM

## 2025-04-14 RX ORDER — HYDRALAZINE HYDROCHLORIDE 20 MG/ML
2.5-5 INJECTION INTRAMUSCULAR; INTRAVENOUS EVERY 10 MIN PRN
Status: DISCONTINUED | OUTPATIENT
Start: 2025-04-14 | End: 2025-04-15 | Stop reason: HOSPADM

## 2025-04-14 RX ADMIN — SODIUM CHLORIDE 64 ML: 9 INJECTION, SOLUTION INTRAVENOUS at 17:09

## 2025-04-14 RX ADMIN — DEXAMETHASONE SODIUM PHOSPHATE 4 MG: 4 INJECTION, SOLUTION INTRA-ARTICULAR; INTRALESIONAL; INTRAMUSCULAR; INTRAVENOUS; SOFT TISSUE at 23:15

## 2025-04-14 RX ADMIN — PROPOFOL 100 MG: 10 INJECTION, EMULSION INTRAVENOUS at 23:15

## 2025-04-14 RX ADMIN — ROCURONIUM BROMIDE 50 MG: 50 INJECTION, SOLUTION INTRAVENOUS at 23:23

## 2025-04-14 RX ADMIN — FENTANYL CITRATE 100 MCG: 50 INJECTION INTRAMUSCULAR; INTRAVENOUS at 23:15

## 2025-04-14 RX ADMIN — PROPOFOL 50 MCG/KG/MIN: 10 INJECTION, EMULSION INTRAVENOUS at 23:31

## 2025-04-14 RX ADMIN — ONDANSETRON 4 MG: 2 INJECTION, SOLUTION INTRAMUSCULAR; INTRAVENOUS at 16:57

## 2025-04-14 RX ADMIN — LIDOCAINE HYDROCHLORIDE 50 MG: 20 INJECTION, SOLUTION INFILTRATION; PERINEURAL at 23:15

## 2025-04-14 RX ADMIN — ONDANSETRON 4 MG: 2 INJECTION, SOLUTION INTRAMUSCULAR; INTRAVENOUS at 22:43

## 2025-04-14 RX ADMIN — SODIUM CHLORIDE, SODIUM LACTATE, POTASSIUM CHLORIDE, AND CALCIUM CHLORIDE: .6; .31; .03; .02 INJECTION, SOLUTION INTRAVENOUS at 22:50

## 2025-04-14 RX ADMIN — Medication 100 MG: at 23:15

## 2025-04-14 RX ADMIN — IOPAMIDOL 94 ML: 755 INJECTION, SOLUTION INTRAVENOUS at 17:09

## 2025-04-14 RX ADMIN — Medication 2 G: at 23:02

## 2025-04-14 ASSESSMENT — ACTIVITIES OF DAILY LIVING (ADL)
ADLS_ACUITY_SCORE: 45
ADLS_ACUITY_SCORE: 44
ADLS_ACUITY_SCORE: 44
ADLS_ACUITY_SCORE: 45
ADLS_ACUITY_SCORE: 45

## 2025-04-14 ASSESSMENT — COLUMBIA-SUICIDE SEVERITY RATING SCALE - C-SSRS
1. IN THE PAST MONTH, HAVE YOU WISHED YOU WERE DEAD OR WISHED YOU COULD GO TO SLEEP AND NOT WAKE UP?: NO
6. HAVE YOU EVER DONE ANYTHING, STARTED TO DO ANYTHING, OR PREPARED TO DO ANYTHING TO END YOUR LIFE?: NO
2. HAVE YOU ACTUALLY HAD ANY THOUGHTS OF KILLING YOURSELF IN THE PAST MONTH?: NO

## 2025-04-14 ASSESSMENT — ENCOUNTER SYMPTOMS: DYSRHYTHMIAS: 1

## 2025-04-14 ASSESSMENT — LIFESTYLE VARIABLES: TOBACCO_USE: 1

## 2025-04-14 NOTE — ED TRIAGE NOTES
Patient reports hernia and abdominal pain.  ABCs intact, A&Ox4.     Triage Assessment (Adult)       Row Name 04/14/25 1330          Triage Assessment    Airway WDL WDL        Respiratory WDL    Respiratory WDL WDL        Skin Circulation/Temperature WDL    Skin Circulation/Temperature WDL WDL        Cardiac WDL    Cardiac WDL WDL        Peripheral/Neurovascular WDL    Peripheral Neurovascular WDL WDL        Cognitive/Neuro/Behavioral WDL    Cognitive/Neuro/Behavioral WDL WDL

## 2025-04-14 NOTE — ED PROVIDER NOTES
Emergency Department Note      History of Present Illness     Chief Complaint   Hernia      HPI   Reji Aguirre is a 84 year old male with a history of atrial fibrillation on Xarelto, hypertension, cardiomyopathy, CAD, diverticulosis, gout, and hyperlipidemia who presents to the ED today for evaluation of a hernia. The patient reports he's had a protruding umbilicus for his whole life, but this morning, it protruded more, developed a purple hue, and became very painful. He also states liquid came out of it this morning. At presentation, he states the pain and protrusion have decreased, but he reports he is unable to push it back in. He also reports he hasn't passed gas today. The patient denies any fever, nausea, or vomiting. He also denies any bowel issues. Denies any past abdominal surgeries. He states he last ate around 1200 today.     Independent Historian   None    Review of External Notes   I reviewed the patient's triage telephone note from today.     Past Medical History     Medical History and Problem List   Atrial fibrillation   Atrial flutter   Benign bladder tumor  Hypertension  Cardiomyopathy  CAD  Diverticulosis of colon  Gout  Hyperlipidemia  Left bundle branch block  Post concussion syndrome  Primary aldosteronism  SNHL    Medications   Alfuzosin  Allopurinol  Amiodarone  Amlodipine  Eplerenone  Finasteride  Xarelto  Crestor  Hydrochlorothiazide   Minocycline  Boostrix injection   Lasix     Surgical History   Left knee arthroplasty  Left shoulder arthroscopic rotator cuff repair  Cardioversion  Eye surgery  Toe surgery, left second toe  Resection of bladder tumor   Tonsillectomy   Meniscectomy     Physical Exam     Patient Vitals for the past 24 hrs:   BP Temp Temp src Pulse Resp SpO2 Height Weight   04/14/25 2135 (!) 158/76 98.2  F (36.8  C) Temporal 70 18 94 % -- --   04/14/25 1931 (!) 146/73 -- -- 79 -- -- -- --   04/14/25 1800 131/71 -- -- 73 -- 93 % -- --   04/14/25 1745 -- -- -- -- -- 94 %  "-- --   04/14/25 1730 133/67 -- -- 73 -- -- -- --   04/14/25 1700 (!) 144/75 -- -- 77 -- -- -- --   04/14/25 1645 -- -- -- -- -- 96 % -- --   04/14/25 1630 (!) 153/71 -- -- 69 16 96 % -- --   04/14/25 1331 (!) 151/81 98.6  F (37  C) Temporal 76 16 95 % 1.702 m (5' 7\") 84.8 kg (187 lb)     Physical Exam  GENERAL: Patient well-appearing  HEAD: Atraumatic.  NECK: No rigidity  CV: RRR, no murmurs, rubs or gallops  PULM: CTAB with good aeration; no retractions, rales, rhonchi, or wheezing  ABD: Soft.  Slightly protuberant umbilical hernia roughly 3 cm in diameter. There is a blue-purple hue. Mass is tender.  Surrounding abdomen is tender as well roughly a centimeter or 2 in all directions. The abdomen outside that is soft and nontender.  DERM: Skin warm and dry    Diagnostics     Lab Results   Labs Ordered and Resulted from Time of ED Arrival to Time of ED Departure   BASIC METABOLIC PANEL - Abnormal       Result Value    Sodium 140      Potassium 3.8      Chloride 104      Carbon Dioxide (CO2) 22      Anion Gap 14      Urea Nitrogen 18.5      Creatinine 0.87      GFR Estimate 86      Calcium 9.8      Glucose 122 (*)    CBC WITH PLATELETS AND DIFFERENTIAL - Abnormal    WBC Count 12.5 (*)     RBC Count 4.94      Hemoglobin 15.2      Hematocrit 45.8      MCV 93      MCH 30.8      MCHC 33.2      RDW 14.8      Platelet Count 174      % Neutrophils 86      % Lymphocytes 7      % Monocytes 7      % Eosinophils 0      % Basophils 0      % Immature Granulocytes 0      NRBCs per 100 WBC 0      Absolute Neutrophils 10.7 (*)     Absolute Lymphocytes 0.8      Absolute Monocytes 0.8      Absolute Eosinophils 0.0      Absolute Basophils 0.0      Absolute Immature Granulocytes 0.1      Absolute NRBCs 0.0     LACTIC ACID WHOLE BLOOD - Normal    Lactic Acid 1.0     ISTAT CREATININE POCT - Normal    Creatinine POCT 0.9      GFR, ESTIMATED POCT >60       Imaging   CT Abdomen Pelvis w Contrast   Final Result   IMPRESSION:       1.  " Mechanical small bowel obstruction with transition point where a short segment of the small bowel enters a periumbilical hernia. There is fluid within the hernia sac but no specific signs of bowel ischemia. Correlate with physical exam for possible    incarceration.        Independent Interpretation   None    ED Course      Medications Administered   Medications   ceFAZolin Sodium (ANCEF) injection 2 g (has no administration in time range)   ceFAZolin Sodium (ANCEF) injection 2 g (has no administration in time range)   lidocaine 1 % 0.1-1 mL (has no administration in time range)   lidocaine (LMX4) cream (has no administration in time range)   sodium chloride (PF) 0.9% PF flush 3 mL (has no administration in time range)   sodium chloride (PF) 0.9% PF flush 3 mL (has no administration in time range)   lactated ringers infusion (has no administration in time range)   ondansetron (ZOFRAN) injection 4 mg (4 mg Intravenous $Given 4/14/25 1657)   iopamidol (ISOVUE-370) solution 500 mL (94 mLs Intravenous $Given 4/14/25 1709)   sodium chloride 0.9 % bag for CT scan flush (64 mLs Intravenous $Given 4/14/25 1709)     Discussion of Management   General surgery, Dr Mcgee     ED Course   ED Course as of 04/14/25 2236 Mon Apr 14, 2025 1603 I obtained history and examined the patient as noted above.    1654 I rechecked and updated the patient.    1701 I spoke with Dr. Mcgee, general surgery, on the phone regarding the patient.    1959 I spoke with Dr. Mcgee on the phone again regarding the patient.        Additional Documentation  None    Medical Decision Making / Diagnosis        MDM   Reji Aguirre is a 84 year old male presenting with painful umbilical hernia.  Patient states its never reducible but today it was larger and more painful.  It has since decrease in size and pain from when it was at its max intensity.  On my exam, initially saw him in intake in a chair.  Thus, he had to be moved back to the main room  in a bed where we placed him in Trendelenburg and applied an ice pack.  The mass is not reducible and due to there being a purpleish/bluish discoloration, I do not want to test the hernia.  I did order labs including a lactate.  Minimal leukocytosis.  Lactate within normal limits.  Also ordered CT scan.  After patient got back to the room, he vomited once.  Promptly called general surgery.  They will assess after they finish a procedure in the OR.  Patient was reassessed multiple times.  He was given Zofran and did feel improved.  He had 1 additional episode of vomiting.  I reassessed the hernia multiple times and it has not changed in size or color.  CT scan showing small bowel obstruction and incarcerated hernia but not overt evidence of strangulation.  Patient does take Xarelto.  Dr. Mcgee kindly admitted patient with plan for operative intervention.    Disposition   The patient was admitted to the hospital.     Diagnosis     ICD-10-CM    1. Small bowel obstruction (H)  K56.609 Case Request: DIAGNOSTIC LAPAROSCOPY, REPAIR OF UMBILICAL HERNIA, POSSIBLE BOWEL RESECTION     Case Request: DIAGNOSTIC LAPAROSCOPY, REPAIR OF UMBILICAL HERNIA, POSSIBLE BOWEL RESECTION      2. Incarcerated hernia  K46.0 Case Request: DIAGNOSTIC LAPAROSCOPY, REPAIR OF UMBILICAL HERNIA, POSSIBLE BOWEL RESECTION     Case Request: DIAGNOSTIC LAPAROSCOPY, REPAIR OF UMBILICAL HERNIA, POSSIBLE BOWEL RESECTION           Discharge Medications   Current Discharge Medication List            Scribe Disclosure:  I, Ilsa Meek, am serving as a scribe at 4:14 PM on 4/14/2025 to document services personally performed by Honorio Lacy MD based on my observations and the provider's statements to me.        Honorio Lacy MD  04/14/25 4087

## 2025-04-14 NOTE — TELEPHONE ENCOUNTER
Sounds like an umbilical hernia causing troubles.     If the pain is not getting better and is remaining this bad he should probably go to the ER.      The fact that the pain remains severe when touched might mean that this hernia is incarcerated and might need to be dealt with urgently.

## 2025-04-14 NOTE — TELEPHONE ENCOUNTER
Relayed providers message to patient. Patient agrees and states he will go to [facility] ED now.

## 2025-04-14 NOTE — TELEPHONE ENCOUNTER
"Dr. Christina - please advise.    Nurse Triage SBAR    Is this a 2nd Level Triage? YES, LICENSED PRACTITIONER REVIEW IS REQUIRED    Situation: Pt calling to report sudden abdominal pain and swelling at naval    Background: Pt reports pain started this morning and been constant, no radiating or methods for relief.    Assessment: Pt's pain currently 5/10 at the site, and 8/10 when touched. Pt confirmed he had BM and void WNL today. Pt also reports swelling present and purplish coloring around his naval area.    Protocol Recommended Disposition:   Call ADS/Go to ED/UCC Now (Or To Office with PCP Approval)    Recommendation: Pt currently scheduled for [facility] 4/15, and Triage explained ADS and pt refusing until PCP reviews encounter.     Routed to provider    Does the patient meet one of the following criteria for ADS visit consideration? 16+ years old, with an [facility] PCP     TIP  Providers, please consider if this condition is appropriate for management at one of our Acute and Diagnostic Services sites.     If patient is a good candidate, please use dotphrase <dot>triageresponse and select Refer to ADS to document.    Lara Leon RN  ~~~~~~~~~~~~~~~~~~~~~~~~~    1. LOCATION: \"Where does it hurt?\"       Pain and swelling at naval  2. RADIATION: \"Does the pain shoot anywhere else?\" (e.g., chest, back)      Denies  3. ONSET: \"When did the pain begin?\" (Minutes, hours or days ago)       4/14 early morning  4. SUDDEN: \"Gradual or sudden onset?\"      Sudden   5. PATTERN \"Does the pain come and go, or is it constant?\"      Constant but worsens when touch  6. SEVERITY: \"How bad is the pain?\"  (e.g., Scale 1-10; mild, moderate, or severe)      5/10 when untouched, 8/10 on contact   7. RECURRENT SYMPTOM: \"Have you ever had this type of stomach pain before?\" If Yes, ask: \"When was the last time?\" and \"What happened that time?\"       Denies  8. CAUSE: \"What do you think is causing the stomach pain?\"      unknown  9. " "RELIEVING/AGGRAVATING FACTORS: \"What makes it better or worse?\" (e.g., antacids, bending or twisting motion, bowel movement)      No relief  10. OTHER SYMPTOMS: \"Do you have any other symptoms?\" (e.g., back pain, diarrhea, fever, urination pain, vomiting)        Discoloration at the navel - purple coloring present    Reason for Disposition   MILD TO MODERATE constant pain lasting > 2 hours, and age > 60 years   MILD TO MODERATE constant pain lasting > 2 hours    Additional Information   Negative: Passed out (e.g., fainted, lost consciousness, blacked out and was not responding)   Negative: Shock suspected (e.g., cold/pale/clammy skin, too weak to stand, low BP, rapid pulse)   Negative: Sounds like a life-threatening emergency to the triager   Negative: Followed an abdomen (stomach) injury   Negative: Chest pain   Negative: Pain is mainly in upper abdomen (if needed ask: 'is it mainly above the belly button?')   Negative: Abdomen bloating or swelling are main symptoms   Negative: SEVERE abdominal pain (e.g., excruciating)   Negative: Vomiting red blood or black (coffee ground) material   Negative: Blood in bowel movements  (Exception: Blood on surface of BM with constipation.)   Negative: Black or tarry bowel movements  (Exception: Chronic-unchanged black-grey BMs AND is taking iron pills or Pepto-Bismol.)   Negative: Unable to urinate (or only a few drops) and bladder feels very full   Negative: Pain in scrotum persists > 1 hour    Protocols used: Abdominal Pain - Male-A-OH    "

## 2025-04-14 NOTE — TELEPHONE ENCOUNTER
Reason for call:  Patient reporting a symptom    Symptom or request   large belly button    Duration (how long have symptoms been present  did not say    Have you been treated for this before?  No     Additional comments  pt has video visit 4/15/2025    Phone Number patient can be reached at:  Home number on file [phone] (home)    Best Time  any time    Can we leave a detailed message on this number:  YES    Call taken on 4/14/2025 at 9:24 AM by Laurita Carmona

## 2025-04-15 PROBLEM — H90.3 BILATERAL SENSORINEURAL HEARING LOSS: Status: ACTIVE | Noted: 2025-04-15

## 2025-04-15 LAB
GLUCOSE BLDC GLUCOMTR-MCNC: 130 MG/DL (ref 70–99)
GLUCOSE BLDC GLUCOMTR-MCNC: 144 MG/DL (ref 70–99)
GLUCOSE BLDC GLUCOMTR-MCNC: 164 MG/DL (ref 70–99)

## 2025-04-15 PROCEDURE — 99222 1ST HOSP IP/OBS MODERATE 55: CPT | Performed by: PHYSICIAN ASSISTANT

## 2025-04-15 PROCEDURE — 250N000013 HC RX MED GY IP 250 OP 250 PS 637: Performed by: STUDENT IN AN ORGANIZED HEALTH CARE EDUCATION/TRAINING PROGRAM

## 2025-04-15 PROCEDURE — 258N000003 HC RX IP 258 OP 636: Performed by: STUDENT IN AN ORGANIZED HEALTH CARE EDUCATION/TRAINING PROGRAM

## 2025-04-15 PROCEDURE — 120N000001 HC R&B MED SURG/OB

## 2025-04-15 PROCEDURE — 250N000013 HC RX MED GY IP 250 OP 250 PS 637: Performed by: PHYSICIAN ASSISTANT

## 2025-04-15 RX ORDER — ROSUVASTATIN CALCIUM 10 MG/1
10 TABLET, COATED ORAL DAILY
Status: DISCONTINUED | OUTPATIENT
Start: 2025-04-15 | End: 2025-04-16 | Stop reason: HOSPADM

## 2025-04-15 RX ORDER — LIDOCAINE 40 MG/G
CREAM TOPICAL
Status: DISCONTINUED | OUTPATIENT
Start: 2025-04-15 | End: 2025-04-16 | Stop reason: HOSPADM

## 2025-04-15 RX ORDER — ONDANSETRON 4 MG/1
4 TABLET, ORALLY DISINTEGRATING ORAL EVERY 6 HOURS PRN
Status: DISCONTINUED | OUTPATIENT
Start: 2025-04-15 | End: 2025-04-16 | Stop reason: HOSPADM

## 2025-04-15 RX ORDER — NALOXONE HYDROCHLORIDE 0.4 MG/ML
0.4 INJECTION, SOLUTION INTRAMUSCULAR; INTRAVENOUS; SUBCUTANEOUS
Status: DISCONTINUED | OUTPATIENT
Start: 2025-04-15 | End: 2025-04-16 | Stop reason: HOSPADM

## 2025-04-15 RX ORDER — HYDROMORPHONE HCL IN WATER/PF 6 MG/30 ML
0.2 PATIENT CONTROLLED ANALGESIA SYRINGE INTRAVENOUS
Status: DISCONTINUED | OUTPATIENT
Start: 2025-04-15 | End: 2025-04-16 | Stop reason: HOSPADM

## 2025-04-15 RX ORDER — AMOXICILLIN 250 MG
1 CAPSULE ORAL 2 TIMES DAILY PRN
Status: DISCONTINUED | OUTPATIENT
Start: 2025-04-15 | End: 2025-04-16 | Stop reason: HOSPADM

## 2025-04-15 RX ORDER — OXYCODONE HYDROCHLORIDE 5 MG/1
5 TABLET ORAL EVERY 4 HOURS PRN
Status: DISCONTINUED | OUTPATIENT
Start: 2025-04-15 | End: 2025-04-16 | Stop reason: HOSPADM

## 2025-04-15 RX ORDER — ONDANSETRON 2 MG/ML
4 INJECTION INTRAMUSCULAR; INTRAVENOUS EVERY 6 HOURS PRN
Status: DISCONTINUED | OUTPATIENT
Start: 2025-04-15 | End: 2025-04-16 | Stop reason: HOSPADM

## 2025-04-15 RX ORDER — ALLOPURINOL 300 MG/1
300 TABLET ORAL DAILY
Status: DISCONTINUED | OUTPATIENT
Start: 2025-04-15 | End: 2025-04-16 | Stop reason: HOSPADM

## 2025-04-15 RX ORDER — ACETAMINOPHEN 325 MG/1
650 TABLET ORAL EVERY 4 HOURS PRN
Status: DISCONTINUED | OUTPATIENT
Start: 2025-04-15 | End: 2025-04-16 | Stop reason: HOSPADM

## 2025-04-15 RX ORDER — FUROSEMIDE 20 MG/1
10 TABLET ORAL EVERY MORNING
Status: DISCONTINUED | OUTPATIENT
Start: 2025-04-15 | End: 2025-04-15

## 2025-04-15 RX ORDER — FINASTERIDE 5 MG/1
5 TABLET, FILM COATED ORAL DAILY
Status: DISCONTINUED | OUTPATIENT
Start: 2025-04-15 | End: 2025-04-16 | Stop reason: HOSPADM

## 2025-04-15 RX ORDER — ACETAMINOPHEN 650 MG/1
650 SUPPOSITORY RECTAL EVERY 4 HOURS PRN
Status: DISCONTINUED | OUTPATIENT
Start: 2025-04-15 | End: 2025-04-16 | Stop reason: HOSPADM

## 2025-04-15 RX ORDER — AMLODIPINE BESYLATE 5 MG/1
5 TABLET ORAL 2 TIMES DAILY
Status: DISCONTINUED | OUTPATIENT
Start: 2025-04-15 | End: 2025-04-16 | Stop reason: HOSPADM

## 2025-04-15 RX ORDER — HYDROMORPHONE HCL IN WATER/PF 6 MG/30 ML
0.4 PATIENT CONTROLLED ANALGESIA SYRINGE INTRAVENOUS
Status: DISCONTINUED | OUTPATIENT
Start: 2025-04-15 | End: 2025-04-16 | Stop reason: HOSPADM

## 2025-04-15 RX ORDER — NALOXONE HYDROCHLORIDE 0.4 MG/ML
0.2 INJECTION, SOLUTION INTRAMUSCULAR; INTRAVENOUS; SUBCUTANEOUS
Status: DISCONTINUED | OUTPATIENT
Start: 2025-04-15 | End: 2025-04-16 | Stop reason: HOSPADM

## 2025-04-15 RX ORDER — SODIUM CHLORIDE, SODIUM LACTATE, POTASSIUM CHLORIDE, CALCIUM CHLORIDE 600; 310; 30; 20 MG/100ML; MG/100ML; MG/100ML; MG/100ML
INJECTION, SOLUTION INTRAVENOUS CONTINUOUS
Status: DISCONTINUED | OUTPATIENT
Start: 2025-04-15 | End: 2025-04-16 | Stop reason: HOSPADM

## 2025-04-15 RX ORDER — ALFUZOSIN HYDROCHLORIDE 10 MG/1
10 TABLET, EXTENDED RELEASE ORAL DAILY
Status: DISCONTINUED | OUTPATIENT
Start: 2025-04-15 | End: 2025-04-16 | Stop reason: HOSPADM

## 2025-04-15 RX ORDER — EPLERENONE 25 MG/1
25 TABLET ORAL 2 TIMES DAILY
Status: DISCONTINUED | OUTPATIENT
Start: 2025-04-15 | End: 2025-04-16 | Stop reason: HOSPADM

## 2025-04-15 RX ORDER — AMOXICILLIN 250 MG
2 CAPSULE ORAL 2 TIMES DAILY PRN
Status: DISCONTINUED | OUTPATIENT
Start: 2025-04-15 | End: 2025-04-16 | Stop reason: HOSPADM

## 2025-04-15 RX ORDER — AMIODARONE HYDROCHLORIDE 200 MG/1
200 TABLET ORAL
Status: DISCONTINUED | OUTPATIENT
Start: 2025-04-15 | End: 2025-04-16 | Stop reason: HOSPADM

## 2025-04-15 RX ADMIN — ALFUZOSIN HYDROCHLORIDE 10 MG: 10 TABLET, EXTENDED RELEASE ORAL at 10:48

## 2025-04-15 RX ADMIN — SODIUM CHLORIDE, SODIUM LACTATE, POTASSIUM CHLORIDE, AND CALCIUM CHLORIDE: .6; .31; .03; .02 INJECTION, SOLUTION INTRAVENOUS at 05:01

## 2025-04-15 RX ADMIN — EPLERENONE 25 MG: 25 TABLET, FILM COATED ORAL at 10:45

## 2025-04-15 RX ADMIN — ACETAMINOPHEN 650 MG: 325 TABLET, FILM COATED ORAL at 17:04

## 2025-04-15 RX ADMIN — ROSUVASTATIN 10 MG: 10 TABLET, FILM COATED ORAL at 10:45

## 2025-04-15 RX ADMIN — ALLOPURINOL 300 MG: 300 TABLET ORAL at 10:45

## 2025-04-15 RX ADMIN — AMIODARONE HYDROCHLORIDE 200 MG: 200 TABLET ORAL at 10:47

## 2025-04-15 RX ADMIN — SODIUM CHLORIDE, SODIUM LACTATE, POTASSIUM CHLORIDE, AND CALCIUM CHLORIDE: .6; .31; .03; .02 INJECTION, SOLUTION INTRAVENOUS at 17:54

## 2025-04-15 RX ADMIN — AMLODIPINE BESYLATE 5 MG: 5 TABLET ORAL at 10:45

## 2025-04-15 RX ADMIN — AMLODIPINE BESYLATE 5 MG: 5 TABLET ORAL at 20:02

## 2025-04-15 RX ADMIN — EPLERENONE 25 MG: 25 TABLET, FILM COATED ORAL at 20:34

## 2025-04-15 RX ADMIN — FINASTERIDE 5 MG: 5 TABLET, FILM COATED ORAL at 10:45

## 2025-04-15 ASSESSMENT — ACTIVITIES OF DAILY LIVING (ADL)
ADLS_ACUITY_SCORE: 29
ADLS_ACUITY_SCORE: 29
ADLS_ACUITY_SCORE: 49
ADLS_ACUITY_SCORE: 49
ADLS_ACUITY_SCORE: 29
ADLS_ACUITY_SCORE: 29
ADLS_ACUITY_SCORE: 49
ADLS_ACUITY_SCORE: 29
ADLS_ACUITY_SCORE: 44
ADLS_ACUITY_SCORE: 29
ADLS_ACUITY_SCORE: 44
ADLS_ACUITY_SCORE: 29

## 2025-04-15 NOTE — ANESTHESIA PREPROCEDURE EVALUATION
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 @ [facility]    Benign essential hypertension 2004    Bradycardia     Cardiomyopathy (H)     tachycardia-induced -resolved    Carotid artery disease 02/10/2019    Extensive calcified atherosclerotic plaque in the internal carotid arteries seen on routine head CT    Diverticulosis of colon (without mention of hemorrhage) 2007    incidental diverticuli noted at colonoscopy    Gout, unspecified     Hyperlipidemia     LBBB (left bundle branch block)     Post concussion syndrome 2019    Primary aldosteronism     Rosacea     Special screening for malignant neoplasms, colon       Past Surgical History:   Procedure Laterality Date    ARTHROPLASTY KNEE Left 2016    Procedure: ARTHROPLASTY KNEE;  Surgeon: Derek Varma MD;  Location:  OR    ARTHROSCOPY SHOULDER ROTATOR CUFF REPAIR  2008    Left shoulder arthroscopic rotator cuff repair with acromioplasty    CARDIOVERSION  2016    COLONOSCOPY N/A 2019    Procedure: COLONOSCOPY;  Surgeon: Marcello Velasquez MD;  Location:  GI    EYE SURGERY  cataract 2012    ORTHOPEDIC SURGERY  2011    2nd toe Lt foot    OTHER SURGICAL HISTORY      resection of bladder tumors  @ [facility] NONSPECIFIC PROCEDURE  1999    left knee arthroscopy    [facility] COLONOSCOPY THRU STOMA W BIOPSY/CAUTERY TUMOR/POLYP/LESION  2007    normal colonoscopy except for incidental diverticuli      Allergies   Allergen Reactions    Amlodipine Swelling     edema    Ampicillin      rash    Metoprolol      Mild  fatigue with Toprol; mildly decreased exercise capacity    Spironolactone Nausea and Difficulty breathing    Vicodin [Hydrocodone-Acetaminophen] Nausea      Social History     Tobacco Use    Smoking status: Former     Current packs/day: 0.00     Types: Cigarettes     Start date: 10/5/1958     Quit date: 1963     Years since quittin.7    Smokeless tobacco: Never   Substance Use Topics    Alcohol use: Yes     Comment: One drink per day      Wt Readings from Last 1 Encounters:   25 84.8 kg (187 lb)        Anesthesia Evaluation   Pt has had prior anesthetic.     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 The visual ejection fraction is  estimated at 55-60%. There is normal left ventricular wall thickness.  The mean AoV pressure gradient is 8mmHg but there is no hemodynamically  significant valvular aortic stenosis.  The left atrium is mild to moderately dilated.  There is trace to mild mitral regurgitation.  Mild (35-45mmHg) pulmonary hypertension is present. The pulmonary pressures  are higher than noted on the prior report.    Stress Test:  Date: Results:    ECG Reviewed:  Date: Results:  Sinus  Rhythm with first degree A-V block   Left bundle branch block.   Cath:  Date: Results:      METS/Exercise Tolerance: >4 METS    Hematologic:       Musculoskeletal:       GI/Hepatic: Comment: Incarcerated hernia      Renal/Genitourinary:       Endo:       Psychiatric/Substance Use:        Infectious Disease:       Malignancy:       Other:            Physical Exam    Airway        Mallampati: III   TM distance: > 3 FB   Neck ROM: limited   Mouth opening: > 3 cm    Respiratory Devices and Support         Dental       (+) Minor Abnormalities - some fillings, tiny chips      Cardiovascular             Pulmonary                   OUTSIDE LABS:  CBC:   Lab Results   Component Value Date    WBC 12.5 (H) 04/14/2025    WBC 6.6 09/09/2024    HGB 15.2 04/14/2025    HGB 16.1 09/09/2024    HCT 45.8 04/14/2025    HCT 47.7 09/09/2024     04/14/2025     09/09/2024     BMP:   Lab Results   Component Value Date     04/14/2025     11/04/2024    POTASSIUM 3.8 04/14/2025    POTASSIUM 3.9 11/04/2024    CHLORIDE 104 04/14/2025    CHLORIDE 104 11/04/2024    CO2 22 04/14/2025    CO2 26 11/04/2024    BUN 18.5 04/14/2025    BUN 19.9 11/04/2024    CR 0.9 04/14/2025    CR 0.87 04/14/2025     (H) 04/14/2025    GLC 99 11/04/2024     COAGS:   Lab Results   Component Value Date    PTT 53 (H) 08/16/2017    INR 1.25 (H) 09/17/2020     POC:   Lab Results   Component Value Date     (H) 06/08/2016     HEPATIC:   Lab Results   Component Value Date    ALBUMIN 4.0 07/09/2022    PROTTOTAL 7.5 07/09/2022    ALT 37 09/09/2024    AST 41 09/09/2024    ALKPHOS 77 07/09/2022    BILITOTAL 0.8 07/09/2022     OTHER:   Lab Results   Component Value Date    LACT 1.0 04/14/2025    HE 9.8 04/14/2025    MAG 2.2 08/16/2017    TSH 0.74 09/07/2022       Anesthesia Plan    ASA Status:  3, emergent       Anesthesia Type: General.     - Airway: ETT   Induction: Intravenous, Propofol, RSI.   Maintenance: Balanced.   Techniques and Equipment:     - Airway: Video-Laryngoscope       Consents    Anesthesia Plan(s) and associated risks, benefits, and realistic alternatives discussed. Questions answered and patient/representative(s) expressed understanding.     - Discussed:     - Discussed with:  Patient         "    Postoperative Care    Pain management: IV analgesics.   PONV prophylaxis: Ondansetron (or other 5HT-3), Dexamethasone or Solumedrol     Comments:               Gumaro Martin MD    Clinically Significant Risk Factors Present on Admission                # Drug Induced Coagulation Defect: home medication list includes an anticoagulant medication    # Hypertension: Noted on problem list           # Overweight: Estimated body mass index is 29.29 kg/m  as calculated from the following:    Height as of this encounter: 1.702 m (5' 7\").    Weight as of this encounter: 84.8 kg (187 lb).                "

## 2025-04-15 NOTE — ANESTHESIA CARE TRANSFER NOTE
Patient: Reji Aguirre    Procedure: Procedure(s):  DIAGNOSTIC LAPAROSCOPY, REPAIR OF UMBILICAL HERNIA       Diagnosis: Small bowel obstruction (H) [K56.609]  Incarcerated hernia [K46.0]  Diagnosis Additional Information: No value filed.    Anesthesia Type:   General     Note:    Oropharynx: oropharynx clear of all foreign objects  Level of Consciousness: drowsy  Oxygen Supplementation: face mask  Level of Supplemental Oxygen (L/min / FiO2): 6  Independent Airway: airway patency satisfactory and stable  Dentition: dentition unchanged  Vital Signs Stable: post-procedure vital signs reviewed and stable  Report to RN Given: handoff report given  Patient transferred to: PACU    Handoff Report: Identifed the Patient, Identified the Reponsible Provider, Reviewed the pertinent medical history, Discussed the surgical course, Reviewed Intra-OP anesthesia mangement and issues during anesthesia, Set expectations for post-procedure period and Allowed opportunity for questions and acknowledgement of understanding      Vitals:  Vitals Value Taken Time   BP     Temp     Pulse     Resp     SpO2         Electronically Signed By: ERIKA Valenzuela CRNA  April 15, 2025  12:07 AM

## 2025-04-15 NOTE — ANESTHESIA PROCEDURE NOTES
Airway       Patient location during procedure: OR       Procedure Start/Stop Times: 4/14/2025 11:16 PM  Staff -        CRNA: Jackeline Thorne APRN CRNA       Performed By: CRNA  Consent for Airway        Urgency: elective  Indications and Patient Condition       Indications for airway management: cora-procedural       Induction type:intravenous       Mask difficulty assessment: 1 - vent by mask    Final Airway Details       Final airway type: endotracheal airway       Successful airway: ETT - single  Endotracheal Airway Details        ETT size (mm): 7.5       Cuffed: yes       Successful intubation technique: video laryngoscopy       VL Blade Size: Glidescope 4       Grade View of Cords: 1       Adjucts: stylet       Position: Right       Measured from: gums/teeth       Secured at (cm): 23       Bite block used: None    Post intubation assessment        Placement verified by: capnometry, equal breath sounds and chest rise        Number of attempts at approach: 1       Number of other approaches attempted: 0       Secured with: plastic tape       Ease of procedure: easy       Dentition: Unchanged    Medication(s) Administered   Medication Administration Time: 4/14/2025 11:16 PM

## 2025-04-15 NOTE — PLAN OF CARE
"A&Ox 4. New admit to floor this shift. Had a diagnostic lap w/ umbilical hernia repair. ABD dressings CDI. Ambulated to bathroom well standby assist and gb (ind. Baseline). Crosscover paged for clarification on tele orders - MD aware, no tele orders at this time. PACU report noted SR w/ BBB, 1*AVB. LR at 75ml/hr. BG checks d/t NPO status.   VSS 1L NC w/ capno monitoring. Attempted wean while sleeping, unsuccessful.   Pain: denied    Plan: NPO overnight, clear liquids during the day and advance as tolerated.           Temp: 99.3  F (37.4  C) Temp src: Oral BP: (!) 158/81 Pulse: 73   Resp: 16 SpO2: 93 % O2 Device: Nasal cannula Oxygen Delivery: 1.5 LPM     Goal Outcome Evaluation:      Plan of Care Reviewed With: patient    Overall Patient Progress: improvingOverall Patient Progress: improving    Outcome Evaluation: New admit to shift, lap w/ hernia repair around midnight      Problem: Adult Inpatient Plan of Care  Goal: Plan of Care Review  Description: The Plan of Care Review/Shift note should be completed every shift.  The Outcome Evaluation is a brief statement about your assessment that the patient is improving, declining, or no change.  This information will be displayed automatically on your shiftnote.  Outcome: Progressing  Flowsheets (Taken 4/15/2025 0422)  Outcome Evaluation: New admit to shift, lap w/ hernia repair around midnight  Plan of Care Reviewed With: patient  Overall Patient Progress: improving  Goal: Patient-Specific Goal (Individualized)  Description: You can add care plan individualizations to a care plan. Examples of Individualization might be:  \"Parent requests to be called daily at 9am for status\", \"I have a hard time hearing out of my right ear\", or \"Do not touch me to wake me up as it startlesme\".  Outcome: Progressing  Goal: Absence of Hospital-Acquired Illness or Injury  Outcome: Progressing  Intervention: Identify and Manage Fall Risk  Recent Flowsheet Documentation  Taken 4/15/2025 0406 " by Valdez, Shital, RN  Safety Promotion/Fall Prevention: safety round/check completed  Taken 4/15/2025 0348 by Shital Valdez RN  Safety Promotion/Fall Prevention: activity supervised  Taken 4/15/2025 0230 by Shital Valdez RN  Safety Promotion/Fall Prevention: safety round/check completed  Taken 4/15/2025 0132 by Shital Valdez RN  Safety Promotion/Fall Prevention: safety round/check completed  Intervention: Prevent Skin Injury  Recent Flowsheet Documentation  Taken 4/15/2025 0348 by Shital Valdez RN  Body Position: position changed independently  Taken 4/15/2025 0132 by Shital Valdez RN  Body Position:   supine, head elevated   position changed independently  Intervention: Prevent and Manage VTE (Venous Thromboembolism) Risk  Recent Flowsheet Documentation  Taken 4/15/2025 0132 by Shital Valdez RN  VTE Prevention/Management: SCDs on (sequential compression devices)  Intervention: Prevent Infection  Recent Flowsheet Documentation  Taken 4/15/2025 0132 by Shital Valdez RN  Infection Prevention:   rest/sleep promoted   single patient room provided  Goal: Optimal Comfort and Wellbeing  Outcome: Progressing  Goal: Readiness for Transition of Care  Outcome: Progressing     Problem: Delirium  Goal: Optimal Coping  Outcome: Progressing  Goal: Improved Behavioral Control  Outcome: Progressing  Goal: Improved Attention and Thought Clarity  Outcome: Progressing  Goal: Improved Sleep  Outcome: Progressing

## 2025-04-15 NOTE — ED NOTES
[facility]  ED Nurse Handoff Report    ED Chief complaint: Hernia  . ED Diagnosis:   Final diagnoses:   Small bowel obstruction (H)   Incarcerated hernia       Allergies:   Allergies   Allergen Reactions    Amlodipine Swelling     edema    Ampicillin      rash    Metoprolol      Mild fatigue with Toprol; mildly decreased exercise capacity    Spironolactone Nausea and Difficulty breathing    Vicodin [Hydrocodone-Acetaminophen] Nausea       Code Status: Full Code    Activity level - Baseline/Home:  independent.  Activity Level - Current:   independent.   Lift room needed: No.   Bariatric: No   Needed: No   Isolation:  incarcerated hernia .   Infection: Not Applicable  None  .     Respiratory status: Room air    Vital Signs (within 30 minutes):   Vitals:    04/14/25 1730 04/14/25 1745 04/14/25 1800 04/14/25 1931   BP: 133/67  131/71 (!) 146/73   Pulse: 73  73 79   Resp:       Temp:       TempSrc:       SpO2:  94% 93%    Weight:       Height:           Cardiac Rhythm:  ,      Pain level:    Patient confused: No.   Patient Falls Risk: nonskid shoes/slippers when out of bed.   Elimination Status: Has voided     Patient Report - Initial Complaint:  pt is here for discoloration in umbilical hernia purple in color. Some vomiting . Denies any pain. No fever.   Focused Assessment:  abd soft, except for hernia area. Pt is a/o x3 ambulates independent. Vomiting a few times. Then nausea resolves. Denies any pain     Abnormal Results:   Labs Ordered and Resulted from Time of ED Arrival to Time of ED Departure   BASIC METABOLIC PANEL - Abnormal       Result Value    Sodium 140      Potassium 3.8      Chloride 104      Carbon Dioxide (CO2) 22      Anion Gap 14      Urea Nitrogen 18.5      Creatinine 0.87      GFR Estimate 86      Calcium 9.8      Glucose 122 (*)    CBC WITH PLATELETS AND DIFFERENTIAL - Abnormal    WBC Count 12.5 (*)     RBC Count 4.94      Hemoglobin 15.2      Hematocrit 45.8       MCV 93      MCH 30.8      MCHC 33.2      RDW 14.8      Platelet Count 174      % Neutrophils 86      % Lymphocytes 7      % Monocytes 7      % Eosinophils 0      % Basophils 0      % Immature Granulocytes 0      NRBCs per 100 WBC 0      Absolute Neutrophils 10.7 (*)     Absolute Lymphocytes 0.8      Absolute Monocytes 0.8      Absolute Eosinophils 0.0      Absolute Basophils 0.0      Absolute Immature Granulocytes 0.1      Absolute NRBCs 0.0     LACTIC ACID WHOLE BLOOD - Normal    Lactic Acid 1.0     ISTAT CREATININE POCT - Normal    Creatinine POCT 0.9      GFR, ESTIMATED POCT >60          CT Abdomen Pelvis w Contrast   Final Result   IMPRESSION:       1.  Mechanical small bowel obstruction with transition point where a short segment of the small bowel enters a periumbilical hernia. There is fluid within the hernia sac but no specific signs of bowel ischemia. Correlate with physical exam for possible    incarceration.          Treatments provided:   See amr  Family Comments:  not at the bedside  OBS brochure/video discussed/provided to patient:  Yes  ED Medications:   Medications   ondansetron (ZOFRAN) injection 4 mg (4 mg Intravenous $Given 4/14/25 3913)   iopamidol (ISOVUE-370) solution 500 mL (94 mLs Intravenous $Given 4/14/25 1709)   sodium chloride 0.9 % bag for CT scan flush (64 mLs Intravenous $Given 4/14/25 1709)       Drips infusing:  No  For the majority of the shift this patient was Green.   Interventions performed were  see mar  .    Sepsis treatment initiated: No    Cares/treatment/interventions/medications to be completed following ED care:  see mar      ED Nurse Name: Noy Bolden RN  8:36 PM

## 2025-04-15 NOTE — PROGRESS NOTES
"[facility]   General Surgery Progress Note           Assessment and Plan:   Assessment:   Small bowel obstruction due to incarcerated hernia at umbilicus  Leukocytosis related to above  -1 Day Post-Op Diagnostic laparoscopy with reduction of umbilical hernia, no bowel resection needed, primary repair of 1.5cm umbilical hernia (suture closure)  -Postoperative hypoxia, supplemental oxygen; wean as able  -Afebrile      Plan:   -IV fluids: Lactated Ringer @75mL/hr  -Pain management: available, ice packs  -Prophylaxis: Pneumatic Compression Devices  -Plan to restart Xarelto: Wednesday at soonest  -Diet: Regular diet, intake as tolerated  -Advance activity as tolerated, encouraged OOB and ambulate 3-4 times a day  -IS hourly as able, encouraged  -Dispo: Await flatus.  Likely ok to discharge home tomorrow.  Agree with above.         Interval History:   Feeling comfortable, pain 2/10 at umbilical site.  Voiding, no flatus, tolerated regular diet for lunch.  Encouraged walking as able.  Ok to discharge once bowel function returns and tolerating diet.         Physical Exam:   Blood pressure 129/68, pulse 87, temperature 98.5  F (36.9  C), temperature source Oral, resp. rate 17, height 1.702 m (5' 7\"), weight 84.8 kg (187 lb), SpO2 98%.  I/O last 3 completed shifts:  In: 500 [I.V.:500]  Out: -     Abdomen:   soft, mildly distended, non-tender and normal bowel sounds   Incisions - bandages clean, dry, intact              Data:     Recent Labs   Lab 04/14/25  1634   WBC 12.5*   HGB 15.2   HCT 45.8   MCV 93        Recent Labs   Lab 04/15/25  0845 04/15/25  0201 04/15/25  0023 04/14/25  1640 04/14/25  1634   NA  --   --   --   --  140   POTASSIUM  --   --   --   --  3.8   CHLORIDE  --   --   --   --  104   CO2  --   --   --   --  22   ANIONGAP  --   --   --   --  14   * 164* 144*  --  122*   BUN  --   --   --   --  18.5   CR  --   --   --  0.9 0.87   GFRESTIMATED  --   --   --  >60 86   HE  --   --   " --   --  9.8       ABHI Rodriguez message, 8 am to 4 pm  After 4 pm, call [phone]

## 2025-04-15 NOTE — PHARMACY-ADMISSION MEDICATION HISTORY
Pharmacist Admission Medication History    Admission medication history is complete. The information provided in this note is only as accurate as the sources available at the time of the update.    Information Source(s): Patient, fill history via in-person    Pertinent Information:      Changes made to PTA medication list:  Added: None  Deleted: furosemide 10 mg; triamcinolone 0.1% external cream;  Changed: None    Allergies reviewed with patient and updates made in EHR: yes    Medication History Completed By: Pee Espino [facility] 4/15/2025 9:47 AM    PTA Med List   Medication Sig Last Dose/Taking    alfuzosin ER (UROXATRAL) 10 MG 24 hr tablet Take 10 mg by mouth daily. 4/14/2025 Morning    allopurinol (ZYLOPRIM) 300 MG tablet Take 1 tablet (300 mg) by mouth daily. 4/14/2025 Morning    amiodarone (PACERONE) 200 MG tablet TAKE 1 TABLET BY MOUTH ONCE DAILY MONDAY  THROUGH  FRIDAY,  SKIP  SATURDAY  AND  SUNDAY 4/14/2025 Morning    amLODIPine (NORVASC) 5 MG tablet Take 1 tablet (5 mg) by mouth 2 times daily. 4/14/2025 Morning    eplerenone (INSPRA) 25 MG tablet Take 1 tablet (25 mg) by mouth 2 times daily. 4/14/2025 Morning    finasteride (PROSCAR) 5 MG tablet Take 5 mg by mouth daily. 4/14/2025    rivaroxaban ANTICOAGULANT (XARELTO ANTICOAGULANT) 20 MG TABS tablet Take 1 tablet (20 mg) by mouth daily. 4/14/2025 at  6:00 AM    rosuvastatin (CRESTOR) 10 MG tablet Take 1 tablet by mouth once daily 4/13/2025 Evening    sulfacetamide sodium, Acne, 10 % lotion Apply topically 2 times daily. 4/14/2025 Morning

## 2025-04-15 NOTE — H&P
General Surgery H and P     Reji Aguirre MRN# [id number]   Age: 84 year old YOB: 1940     Date of Admission:  4/14/2025             Assessment and Plan:   Assessment/Plan:   #incarcerated umbilical hernia with small bowel obstruction   #A fib on Xarelto   #CAD     Mr Aguirre has a incarcerated umbilical hernia that contains bowel and is not reducible on exam. It is also causing a bowel obstruction. I have recommended urgent surgical intervention. I will start laparoscopically to evaluate the bowel with a plan for primary repair of this umbilical hernia. If the bowel is compromised I will have to perform a bowel resection. I explained other risks of surgery including bleeding, infection, injury to adjacent structures or organs, bowel resection, anastomotic leak, open conversion, recurrence, and risks of anesthesia. He understood and agreed to proceed.     He last took Xarelto this morning and we will continue to hold it.                Chief Complaint:   Painful umbilical hernia     History is obtained from the patient.         History of Present Illness:   Reji Aguirre is a 84 year old male who presented to the ED today with a painful umbilical hernia and abdominal pain. He has had this hernia for a while and it is a native hernia and he has never had abdominal surgery. Usually he can push the contents back in. However, today the hernia popped out and he could not push it back in. He has vomited several times and his abdomen is distended. His umbilicus is very painful. He is on Xarelto and last took this this morning.           Past Medical History:     Past Medical History:   Diagnosis Date    Atrial fibrillation, unspecified type (H) 05/19/2016    DCCV 2016    Atrial flutter (H)     Benign bladder tumor     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 @ [place]    Benign essential hypertension 02/02/2004    Bradycardia     Cardiomyopathy (H)     tachycardia-induced -resolved     Carotid artery disease 02/10/2019    Extensive calcified atherosclerotic plaque in the internal carotid arteries seen on routine head CT    Diverticulosis of colon (without mention of hemorrhage) 2007    incidental diverticuli noted at colonoscopy    Gout, unspecified     Hyperlipidemia     LBBB (left bundle branch block)     Post concussion syndrome 2019    Primary aldosteronism     Rosacea     Special screening for malignant neoplasms, colon              Past Surgical History:     Past Surgical History:   Procedure Laterality Date    ARTHROPLASTY KNEE Left 2016    Procedure: ARTHROPLASTY KNEE;  Surgeon: Derek Varma MD;  Location: [facility] OR    ARTHROSCOPY SHOULDER ROTATOR CUFF REPAIR  2008    Left shoulder arthroscopic rotator cuff repair with acromioplasty    CARDIOVERSION  2016    COLONOSCOPY N/A 2019    Procedure: COLONOSCOPY;  Surgeon: Marcello Velasquez MD;  Location:  GI    EYE SURGERY  cataract 2012    ORTHOPEDIC SURGERY  2011    2nd toe Lt foot    OTHER SURGICAL HISTORY      resection of bladder tumors 2017 @ [facility] NONSPECIFIC PROCEDURE  1999    left knee arthroscopy    [facility] COLONOSCOPY THRU STOMA W BIOPSY/CAUTERY TUMOR/POLYP/LESION  2007    normal colonoscopy except for incidental diverticuli             Social History:     Social History     Tobacco Use    Smoking status: Former     Current packs/day: 0.00     Types: Cigarettes     Start date: 10/5/1958     Quit date: 1963     Years since quittin.7    Smokeless tobacco: Never   Substance Use Topics    Alcohol use: Yes     Comment: One drink per day             Family History:     Family History   Problem Relation Age of Onset    Cerebrovascular Disease Father         d:89, dementia    Respiratory Mother         d:94    Diabetes Sister         b. 1938, IDDM since age 10    Diabetes Brother         b. 1943, type II DM          Allergies:     Allergies   Allergen Reactions  "   Amlodipine Swelling     edema    Ampicillin      rash    Metoprolol      Mild fatigue with Toprol; mildly decreased exercise capacity    Spironolactone Nausea and Difficulty breathing    Vicodin [Hydrocodone-Acetaminophen] Nausea             Medications:   Pta medications in epic reviewed          Review of Systems:   The 10 point review of systems is negative other than noted in the HPI.          Physical Exam:   BP (!) 146/73   Pulse 79   Temp 98.6  F (37  C) (Temporal)   Resp 16   Ht 1.702 m (5' 7\")   Wt 84.8 kg (187 lb)   SpO2 93%   BMI 29.29 kg/m    General - not in acute distress but appears uncomfortable, stoic   HEENT: no scleral icterus  Lungs: Clear to auscultation bilaterally  Heart: regular rate and rhythm  Abdomen: soft but significantly distended, at the umbilicus there is a incarcerated hernia that is very tender and cannot be reduced, the overlying skin has a blue-scott hue to it, no surgical scars   Extremities: chronic non-pitting bilateral lower extremity edema   Neurologic: nonfocal  Psychiatric: Mood and affect appropriate   Skin: Without lesions, rashes, or jaundice         Data:     WBC -   Lab Results   Component Value Date    WBC 12.5 (H) 04/14/2025       HgB -   Lab Results   Component Value Date    HGB 15.2 04/14/2025       Plt-   Lab Results   Component Value Date     04/14/2025       Liver Function Studies -   Recent Labs   Lab Test 09/09/24  1144 09/11/23  0953 07/09/22  1212   PROTTOTAL  --   --  7.5   ALBUMIN  --   --  4.0   BILITOTAL  --   --  0.8   ALKPHOS  --   --  77   AST 41  --  47*   ALT 37   < > 91*    < > = values in this interval not displayed.         Imaging:  All imaging studies reviewed by me.    Results for orders placed or performed during the hospital encounter of 04/14/25   CT Abdomen Pelvis w Contrast    Narrative    EXAM: CT ABDOMEN PELVIS W CONTRAST  LOCATION: [facility]  DATE: 4/14/2025    INDICATION: Periumbilical pain. " Concern for incarcerated vs strangulated hernia.  COMPARISON: Thoracic spine CT 9/17/2020.  TECHNIQUE: CT scan of the abdomen and pelvis was performed following injection of IV contrast. Multiplanar reformats were obtained. Dose reduction techniques were used.  CONTRAST: 94mL Isovue 370    FINDINGS:     LOWER CHEST: Bibasilar scarring and/or atelectasis. Trace fluid within the distal esophagus. Coronary arterial calcifications and/or stents.    HEPATOBILIARY: No calcified gallstones or biliary ductal dilation. No liver lesions.    PANCREAS: Normal.    SPLEEN: Scattered punctate calcified granulomas.    ADRENAL GLANDS: A 1.1 x 1.1 cm right adrenal nodule had attenuation values compatible with a benign adenoma on a previous thoracic spine CT (3/#56). Normal left adrenal gland.    KIDNEYS/BLADDER: Benign renal cysts and other too small to characterize subcentimeter renal hypodensities, which do not require follow-up. The largest cyst is a 9.2 cm cortical cyst arising from the left superior pole. No urinary calculi or   hydronephrosis. Normal bladder.    BOWEL: Mechanical small bowel obstruction, with transition point where a short segment of the small bowel enters a periumbilical hernia. There is fluid within the hernia sac, but no bowel wall thickening, abnormal bowel enhancement, or other signs of   bowel ischemia. Obstructed small bowel loops are dilated up to 3.9 cm and the stomach is also distended with fluid. Distal small bowel loops are decompressed. Pancolonic diverticulosis. No inflamed diverticuli.    LYMPH NODES: No enlarged lymph nodes.    VASCULATURE: Patent portal, splenic, and superior mesenteric veins. Mild atherosclerotic calcifications.    PELVIC ORGANS: Status post transurethral resection of the prostate.    MUSCULOSKELETAL: Periumbilical hernia as described above. Multilevel degenerative changes of the spine.      Impression    IMPRESSION:     1.  Mechanical small bowel obstruction with transition  point where a short segment of the small bowel enters a periumbilical hernia. There is fluid within the hernia sac but no specific signs of bowel ischemia. Correlate with physical exam for possible   incarceration.         Pedro Luis Mcgee MD

## 2025-04-15 NOTE — ANESTHESIA POSTPROCEDURE EVALUATION
Patient: Reji Aguirre    Procedure: Procedure(s):  DIAGNOSTIC LAPAROSCOPY, REPAIR OF UMBILICAL HERNIA       Anesthesia Type:  General    Note:  Disposition: Inpatient   Postop Pain Control: Uneventful            Sign Out: Well controlled pain   PONV: No   Neuro/Psych: Uneventful            Sign Out: Acceptable/Baseline neuro status   Airway/Respiratory: Uneventful            Sign Out: Acceptable/Baseline resp. status   CV/Hemodynamics: Uneventful            Sign Out: Acceptable CV status; No obvious hypovolemia; No obvious fluid overload   Other NRE: NONE   DID A NON-ROUTINE EVENT OCCUR? No           Last vitals:  Vitals Value Taken Time   /70 04/15/25 0035   Temp 98.42  F (36.9  C) 04/15/25 0038   Pulse 73 04/15/25 0038   Resp 12 04/15/25 0038   SpO2 91 % 04/15/25 0038   Vitals shown include unfiled device data.    Electronically Signed By: Gumaro Martin MD  April 15, 2025  12:40 AM

## 2025-04-15 NOTE — PLAN OF CARE
"A&Ox4. Reg diet. RA. SB Ax1. IV fluids running @75ml/hr. Lap sites dressed and assessed. BG Q6hrs. Surgery following. Plan to go home when medically ready.                 Goal Outcome Evaluation:      Plan of Care Reviewed With: patient    Overall Patient Progress: improvingOverall Patient Progress: improving    Outcome Evaluation: Reg diet. SB. No pain.      Problem: Adult Inpatient Plan of Care  Goal: Plan of Care Review  Description: The Plan of Care Review/Shift note should be completed every shift.  The Outcome Evaluation is a brief statement about your assessment that the patient is improving, declining, or no change.  This information will be displayed automatically on your shiftnote.  Outcome: Progressing  Flowsheets (Taken 4/15/2025 1400)  Outcome Evaluation: Reg diet. SB. No pain.  Plan of Care Reviewed With: patient  Overall Patient Progress: improving  Goal: Patient-Specific Goal (Individualized)  Description: You can add care plan individualizations to a care plan. Examples of Individualization might be:  \"Parent requests to be called daily at 9am for status\", \"I have a hard time hearing out of my right ear\", or \"Do not touch me to wake me up as it startlesme\".  Outcome: Progressing  Goal: Absence of Hospital-Acquired Illness or Injury  Outcome: Progressing  Intervention: Identify and Manage Fall Risk  Recent Flowsheet Documentation  Taken 4/15/2025 0745 by Cipriano Powers RN  Safety Promotion/Fall Prevention: safety round/check completed  Intervention: Prevent Skin Injury  Recent Flowsheet Documentation  Taken 4/15/2025 0745 by Cipriano Powers RN  Body Position: position changed independently  Intervention: Prevent and Manage VTE (Venous Thromboembolism) Risk  Recent Flowsheet Documentation  Taken 4/15/2025 0745 by Cipriano Powers RN  VTE Prevention/Management: SCDs on (sequential compression devices)  Intervention: Prevent Infection  Recent Flowsheet Documentation  Taken 4/15/2025 0745 by " Cipriano Powers RN  Infection Prevention:   hand hygiene promoted   rest/sleep promoted   single patient room provided  Goal: Optimal Comfort and Wellbeing  Outcome: Progressing  Intervention: Monitor Pain and Promote Comfort  Recent Flowsheet Documentation  Taken 4/15/2025 0950 by Cipriano Powers RN  Pain Management Interventions: repositioned  Goal: Readiness for Transition of Care  Outcome: Progressing     Problem: Delirium  Goal: Optimal Coping  Outcome: Progressing  Goal: Improved Behavioral Control  Outcome: Progressing  Intervention: Minimize Safety Risk  Recent Flowsheet Documentation  Taken 4/15/2025 0745 by Cipriano Powers RN  Enhanced Safety Measures: review medications for side effects with activity  Goal: Improved Attention and Thought Clarity  Outcome: Progressing  Goal: Improved Sleep  Outcome: Progressing

## 2025-04-15 NOTE — OP NOTE
[facility] General Surgery Operative Note    Pre-operative diagnosis: Incarcerated umbilical hernia with small bowel obstruction    Post-operative diagnosis: same   Procedure: 1.) diagnostic laparoscopy   2.) primary repair of incarcerated umbilical hernia with bowel obstruction - length of defect is 1.5 cm    Surgeon: Pedro Luis Mcgee MD   Assistant(s): NONE   Anesthesia: General and local anesthesia with 0.5% marcaine and epinephrine     Estimated blood loss:  Specimen: 2 cc  None                INDICATION FOR OPERATION: This is a 84 year old male who presented to the ED with a incarcerated umbilical hernia that contained bowel and could not be reduced at bedside. It was also causing a bowel obstruction. I recommended urgent operative intervention and the patient agreed to proceed after hearing the risks and benefits of surgery.     DESCRIPTION OF PROCEDURE:  The patient was taken to the operating room and placed on the table in supine position.  General endotracheal anesthesia was induced and the abdomen was prepped and draped in standard sterile fashion.      A 5 mm incision was made in the left upper quadrant at [place]'s point. Access into the abdomen was then achieved with with 5 mm 0 degree laparoscopic camera and the [employer] trochar. A pneumoperitoneum was established and the abdomen was surveyed for injury from our port placement and none was seen. The incarcerated small bowel contents were able to be reduced with gentle external manual pressure. An additional 5 mm port was placed in the left mid-abdomen. The bowel was locally run and it all appeared viable with no evidence of ischemia. The laparoscopic ports were then removed and were hemostatic and the abdomen was desufflated.     A small supraumbilical incision was then made. The umbilical stalk was dissected out and transected. The hernia sac and chronically incarcerated preperitoneal fat were dissected free and then transected at the level of  the defect with cautery. This was hemostatic. The defect which measured about 1.5 cm in length was then closed primarily with two 0-PDS figure-of-eight sutures. The umbilical skin was tacked down to the fascia with a 3-0 Vicryl suture. The skin incisions and port incision were closed with 4-0 Vicryl intracuticular suture. SteriStrips and sterile bandages were then applied.  The patient was then woken, extubated, and transported to the Postanesthesia Care Unit in satisfactory condition. Sponge and needle counts were correct on two counts at the conclusion of the procedure.     FINDINGS:   1.) ~ 1.5 cm umbilical hernia with a loop of incarcerated bowel, bowel was reduced and was viable. The hernia was repaired primarily.     Pedro Luis Mcgee MD

## 2025-04-15 NOTE — BRIEF OP NOTE
[facility]    Brief Operative Note    Pre-operative diagnosis: Small bowel obstruction (H) [K56.609]  Incarcerated hernia [K46.0]  Post-operative diagnosis Same as pre-operative diagnosis    Procedure: DIAGNOSTIC LAPAROSCOPY, REPAIR OF UMBILICAL HERNIA, N/A - Abdomen    Surgeon: Surgeons and Role:     * Pedro Luis Mcgee MD - Primary  Anesthesia: General   Estimated Blood Loss: Minimal    Drains: None  Specimens: * No specimens in log *  Findings:   ~1.5 cm umbilical hernia, bowel appeared viable .  Complications: None.  Implants: * No implants in log *      -NPO for tonight, can start CLD tomorrow and ADAT if doing ok   -resume Xarelto on Wednesday at the earliest

## 2025-04-15 NOTE — PROGRESS NOTES
New consult received for patient.  He has just come out of the operating room where he underwent diagnostic laparoscopy and repair of a inguinal hernia.  At this time we will have to request recheck of vitals in about an hour, does not seem to be in any abnormal rhythm hence not required to be on telemetry monitoring will reevaluate.  Full consult in the morning

## 2025-04-15 NOTE — ED NOTES
Pt denies nausea. Denies any pain , is requesting meds to sleep. Pt aware that surgery will be in to see him.

## 2025-04-16 VITALS
HEIGHT: 67 IN | RESPIRATION RATE: 19 BRPM | DIASTOLIC BLOOD PRESSURE: 94 MMHG | SYSTOLIC BLOOD PRESSURE: 149 MMHG | OXYGEN SATURATION: 93 % | BODY MASS INDEX: 29.35 KG/M2 | HEART RATE: 57 BPM | TEMPERATURE: 98.2 F | WEIGHT: 187 LBS

## 2025-04-16 PROCEDURE — 99232 SBSQ HOSP IP/OBS MODERATE 35: CPT | Performed by: HOSPITALIST

## 2025-04-16 PROCEDURE — 250N000013 HC RX MED GY IP 250 OP 250 PS 637: Performed by: PHYSICIAN ASSISTANT

## 2025-04-16 PROCEDURE — 250N000013 HC RX MED GY IP 250 OP 250 PS 637: Performed by: STUDENT IN AN ORGANIZED HEALTH CARE EDUCATION/TRAINING PROGRAM

## 2025-04-16 RX ADMIN — AMIODARONE HYDROCHLORIDE 200 MG: 200 TABLET ORAL at 10:54

## 2025-04-16 RX ADMIN — FINASTERIDE 5 MG: 5 TABLET, FILM COATED ORAL at 10:55

## 2025-04-16 RX ADMIN — EPLERENONE 25 MG: 25 TABLET, FILM COATED ORAL at 10:55

## 2025-04-16 RX ADMIN — ROSUVASTATIN 10 MG: 10 TABLET, FILM COATED ORAL at 10:55

## 2025-04-16 RX ADMIN — AMLODIPINE BESYLATE 5 MG: 5 TABLET ORAL at 10:55

## 2025-04-16 RX ADMIN — ACETAMINOPHEN 650 MG: 325 TABLET, FILM COATED ORAL at 06:59

## 2025-04-16 RX ADMIN — ALFUZOSIN HYDROCHLORIDE 10 MG: 10 TABLET, EXTENDED RELEASE ORAL at 10:54

## 2025-04-16 RX ADMIN — ALLOPURINOL 300 MG: 300 TABLET ORAL at 10:54

## 2025-04-16 ASSESSMENT — ACTIVITIES OF DAILY LIVING (ADL)
ADLS_ACUITY_SCORE: 29
ADLS_ACUITY_SCORE: 33
ADLS_ACUITY_SCORE: 29
ADLS_ACUITY_SCORE: 33
ADLS_ACUITY_SCORE: 29
ADLS_ACUITY_SCORE: 29
ADLS_ACUITY_SCORE: 33

## 2025-04-16 NOTE — DISCHARGE INSTRUCTIONS
"HOME CARE FOLLOWING UMBILICAL/VENTRAL HERNIA REPAIR  SHANNON Busby, ARIAN Vicente, Cruz, JOSEPHINE Lopez    Special instructions for Reji Aguirre:  --If no bowel movement yet by 4/17, start daily dose of miralax (over-the-counter).      DIET:  Start with liquids and gradually resume your regular diet as tolerated.  Increased fluid intake is recommended. While taking pain medications, consider use of a stool softener, increase your fiber in your diet, or add a fiber supplement (like Metamucil, Citrucel) to help prevent constipation - a possible side effect of pain medications.    NAUSEA:  If nauseated from the anesthetic/pain meds; rest in bed, get up cautiously with assistance, and drink clear liquids (juice, tea, broth).    ACTIVITY:  Light Activity -- you may immediately be up and about as tolerated.  Walking is encouraged, increase as tolerated.  Driving/Light Work-- when comfortable and off narcotic pain medications.  Strenuous Work/Activity -- limit lifting to 20 pounds for 6 weeks. (This is longer than normal due to NO mesh involved in repair)  Active Sports (running, biking, etc.) -- cautiously resume after 4 weeks.    INCISIONAL CARE:  If you have a dressing in place, keep clean and dry for 48 hours after surgery.  After this timeframe, you may replace the gauze daily if it becomes soiled.  You may remove the dressing and shower 48 hours after surgery.  Do not submerse incision in water for 1 week.  Sutures will absorb and need not be removed.  If present, leave the steri-strips (white paper tapes) in place for 14 days after surgery.  If present, leave Dermabond glue in place until it wears/flakes off.  Do not apply lotions, creams, or ointments to incisions.  Expect a variable amount of swelling/bruising/discoloration that may appear around or below the repair site.  Some numbness around the incision is common.  A lump/\"healing ridge\" under the incision is normal and will gradually " resolve over the following 1-2 months.    DISCOMFORT:  Intermittent use of ice packs to the hernia repair site may help during the first 1-3 weeks after surgery.  Expect gradual improvement.        FOLLOW-UP AFTER SURGERY:  -Our office will contact you approximately 2-3 weeks after surgery to check on your progress and answer any questions you may have.  If you are doing well, you will not need to return for an office appointment.  If any concerns are identified over the phone, we will help you make an appointment to see a provider.    -If you have not received a phone call, have any questions or concerns, or would like to be seen, please call us at [phone].  We are located at: [address], [address]; [place] [address]    -CONTACT US IF THE FOLLOWING DEVELOPS:   1. A fever that is above 101     2. Increased redness, warmth, drainage, bleeding, or swelling.   3. Pain that is not relieved by rest/ice and your prescription.   4.  Increasing pain after 48 hours.   5. Drainage that is thick, cloudy, yellow, green or white.   6. Any other questions or concerns.      FREQUENTLY ASKED QUESTIONS:    Q:  How should my incision look?    A:  Normally your incision will appear slightly swollen with light redness directly along the incision itself as it heals.  It may feel like a bump or ridge as the healing/scarring happens, and over time (3-4 months) this bump or ridge feeling should slowly go away.  In general, clear or pink watery drainage can be normal at first as your incision heals, but should decrease over time.    Q:  How do I know if my incision is infected?  A:  Look at your incision for signs of infection, like redness around the incision spreading to surrounding skin, or drainage of cloudy or foul-smelling drainage.  If you feel warm, check your temperature to see if you are running a fever.    **If any of these things occur, please notify the nurse at our office.  We may need you to come into the  office for an incision check.      Q:  How do I take care of my incision?  A:  If you have a dressing in place - Starting the day after surgery, replace the dressing 1-2 times a day until there is no further drainage from the incision.  At that time, a dressing is no longer needed.  Try to minimize tape on the skin if irritation is occurring at the tape sites.  If you have significant irritation from tape on the skin, please call the office to discuss other method of dressing your incision.    Small pieces of tape called  steri-strips  may be present directly overlying your incision; these may be removed 10 days after surgery unless otherwise specified by your surgeon.  If these tapes start to loosen at the ends, you may trim them back until they fall off or are removed.    A:  If you had  Dermabond  tissue glue used as a dressing (this causes your incision to look shiny with a clear covering over it) - This type of dressing wears off with time and does not require more dressings over the top unless it is draining around the glue as it wears off.  Do not apply ointments or lotions over the incisions until the glue has completely worn off.    Q:  There is a piece of tape or a sticky  lead  still on my skin.  Can I remove this?  A:  Sometimes the sticky  leads  used for monitoring during surgery or for evaluation in the emergency department are not all removed while you are in the hospital.  These sometimes have a tab or metal dot on them.  You can easily remove these on your own, like taking off a band-aid.  If there is a gel substance under the  lead , simply wipe/clean it off with a washcloth or paper towel.      Q:  What can I do to minimize constipation (very hard stools, or lack of stools)?  A:  Stay well hydrated.  Increase your dietary fiber intake or take a fiber supplement -with plenty of water.  Walk around frequently.  You may consider an over-the-counter stool-softener.  Your Pharmacist can assist you  with choosing one that is stocked at your pharmacy.  Constipation is also one of the most common side effects of pain medication.  If you are using pain medication, be pro-active and try to PREVENT problems with constipation by taking the steps above BEFORE constipation becomes a problem.    Q:  What do I do if I need more pain medications?  A:  Call the office to receive refills.  Be aware that certain pain meds cannot be called into a pharmacy and actually require a paper prescription.  A change may be made in your pain med as you progress thru your recovery period or if you have side effects to certain meds.    --Pain meds are NOT refilled after 5pm on weekdays, and NOT AT ALL on the weekends, so please look ahead to prevent problems.    Q:  Why am I having a hard time sleeping now that I am at home?  A:  Many medications you receive while you are in the hospital can impact your sleep for a number of days after your surgery/hospitalization.  Decreased level of activity and naps during the day may also make sleeping at night difficult.  Try to minimize day-time naps, and get up frequently during the day to walk around your home during your recovery time.  Sleep aides may be of some help, but are not recommended for long-term use.      Q:  I am having some back discomfort.  What should I do?  A:  This may be related to certain positioning that was required for your surgery, extended periods of time in bed, or other changes in your overall activity level.  You may try ice, heat, acetaminophen, or ibuprofen to treat this temporarily.  Note that many pain medications have acetaminophen in them and would state this on the prescription bottle.  Be sure not to exceed the maximum of 4000mg per day of acetaminophen.     **If the pain you are having does not resolve, is severe, or is a flare of back pain you have had on other occasions prior to surgery, please contact your primary physician for further recommendations or  for an appointment to be examined at their office.    Q:  Why am I having headaches?  A:  Headaches can be caused by many things:  caffeine withdrawal, use of pain meds, dehydration, high blood pressure, lack of sleep, over-activity/exhaustion, flare-up of usual migraine headaches.  If you feel this is related to muscle tension (a band-like feeling around the head, or a pressure at the low-back of the head) you may try ice or heat to this area.  You may need to drink more fluids (try electrolyte drink like Gatorade), rest, or take your usual migraine medications.   **If your headaches do not resolve, worsen, are accompanied by other symptoms, or if your blood pressure is high, please call your primary physician for recommendation and/or examination.    Q:  I am unable to urinate.  What do I do?  A:  A small percentage of people can have difficulty urinating initially after surgery.  This includes being able to urinate only a very small amount at a time and feeling discomfort or pressure in the very low abdomen.  This is called  urinary retention , and is actually an urgent situation.  Proceed to your nearest Emergency department for evaluation (not an Urgent Care Center).  Sometimes the bladder does not work correctly after certain medications you receive during surgery, or related to certain procedures.  You may need to have a catheter placed until your bladder recovers.  When planning to go to an Emergency department, it may help to call the ER to let them know you are coming in for this problem after a surgery.  This may help you get in quicker to be evaluated.  **If you have symptoms of a urinary tract infection, please contact your primary physician for the proper evaluation and treatment.        If you have other questions, please call the office Monday thru Friday between 8am and 4:30pm to discuss with the nurse or physician assistant.  [phone]    There is a surgeon ON CALL on weekday evenings and  over the weekend in case of urgent need only, and may be contacted at the same number.    If you are having an emergency, call 911 or proceed to your nearest emergency department.

## 2025-04-16 NOTE — PLAN OF CARE
"VSS ex HR sustaining in 40s. MD aware. Asymptomatic. Ambulated in hallway x2. Pt educated on using IS. LS clear, pt was on RA until he fell asleep. Then he desated to 84% on RA. Place on 1L NC. Abdominal dressing CDI. Tolerated diet without N/V and abdominal pain. Possible discharge tomorrow.    Goal Outcome Evaluation:      Plan of Care Reviewed With: patient    Overall Patient Progress: improvingOverall Patient Progress: improving    Outcome Evaluation: HR sustaining in 40s this evening. MD aware. Ambulated in hallway x2. Pt using IS.      Problem: Adult Inpatient Plan of Care  Goal: Plan of Care Review  Description: The Plan of Care Review/Shift note should be completed every shift.  The Outcome Evaluation is a brief statement about your assessment that the patient is improving, declining, or no change.  This information will be displayed automatically on your shiftnote.  Outcome: Progressing  Flowsheets (Taken 4/15/2025 2120)  Outcome Evaluation: HR sustaining in 40s this evening. MD aware. Ambulated in hallway x2. Pt using IS.  Plan of Care Reviewed With: patient  Overall Patient Progress: improving  Goal: Patient-Specific Goal (Individualized)  Description: You can add care plan individualizations to a care plan. Examples of Individualization might be:  \"Parent requests to be called daily at 9am for status\", \"I have a hard time hearing out of my right ear\", or \"Do not touch me to wake me up as it startlesme\".  Outcome: Progressing  Goal: Absence of Hospital-Acquired Illness or Injury  Outcome: Progressing  Intervention: Identify and Manage Fall Risk  Recent Flowsheet Documentation  Taken 4/15/2025 1656 by Shadia Magana, RN  Safety Promotion/Fall Prevention:   safety round/check completed   room organization consistent   patient and family education   nonskid shoes/slippers when out of bed   clutter free environment maintained   assistive device/personal items within reach   activity " supervised  Intervention: Prevent Skin Injury  Recent Flowsheet Documentation  Taken 4/15/2025 1656 by Shadia Magana RN  Body Position: position changed independently  Intervention: Prevent and Manage VTE (Venous Thromboembolism) Risk  Recent Flowsheet Documentation  Taken 4/15/2025 1656 by Shadia Magana RN  VTE Prevention/Management: SCDs on (sequential compression devices)  Goal: Optimal Comfort and Wellbeing  Outcome: Progressing  Intervention: Monitor Pain and Promote Comfort  Recent Flowsheet Documentation  Taken 4/15/2025 1654 by Shadia Magana RN  Pain Management Interventions:   medication (see MAR)   cold applied  Intervention: Provide Person-Centered Care  Recent Flowsheet Documentation  Taken 4/15/2025 1656 by Shadia Magana RN  Trust Relationship/Rapport:   care explained   choices provided   emotional support provided   questions encouraged   reassurance provided   thoughts/feelings acknowledged  Goal: Readiness for Transition of Care  Outcome: Progressing     Problem: Delirium  Goal: Optimal Coping  Outcome: Progressing  Intervention: Optimize Psychosocial Adjustment to Delirium  Recent Flowsheet Documentation  Taken 4/15/2025 1656 by Shadia Magana RN  Family/Support System Care: self-care encouraged  Goal: Improved Behavioral Control  Outcome: Progressing  Intervention: Minimize Safety Risk  Recent Flowsheet Documentation  Taken 4/15/2025 1656 by Shadia Magana RN  Enhanced Safety Measures:   pain management   patient/family teach back on injury risk   assistive devices when indicated  Trust Relationship/Rapport:   care explained   choices provided   emotional support provided   questions encouraged   reassurance provided   thoughts/feelings acknowledged  Goal: Improved Attention and Thought Clarity  Outcome: Progressing  Goal: Improved Sleep  Outcome: Progressing     Problem: Intestinal Obstruction  Goal: Optimal Bowel Function  Outcome: Progressing  Intervention: Promote Bowel  Function  Recent Flowsheet Documentation  Taken 4/15/2025 1656 by Shadia Magana RN  Body Position: position changed independently  Goal: Fluid and Electrolyte Balance  Outcome: Progressing  Goal: Absence of Infection Signs and Symptoms  Outcome: Progressing  Goal: Optimize Nutrition Status  Outcome: Progressing  Goal: Optimal Pain Control and Function  Outcome: Progressing  Intervention: Prevent or Manage Pain  Recent Flowsheet Documentation  Taken 4/15/2025 1654 by Shadia Magana RN  Pain Management Interventions:   medication (see MAR)   cold applied     Problem: Comorbidity Management  Goal: Blood Pressure in Desired Range  Outcome: Progressing  Intervention: Maintain Blood Pressure Management  Recent Flowsheet Documentation  Taken 4/15/2025 1656 by Shadia Magana RN  Medication Review/Management: medications reviewed

## 2025-04-16 NOTE — PROGRESS NOTES
"[facility]   General Surgery Progress Note           Assessment and Plan:   Assessment:   Small bowel obstruction due to incarcerated hernia at umbilicus  Leukocytosis related to above  -2 Days Post-Op Diagnostic laparoscopy with reduction of umbilical hernia, no bowel resection needed, primary repair of 1.5cm umbilical hernia (suture closure)  -Postoperative hypoxia, supplemental oxygen; wean as able  -Afebrile      Plan:   -IV fluids: Lactated Ringer @75mL/hr; wean as oral intake becomes sufficient  -Pain management: acetaminophen, ice packs  -Prophylaxis: Pneumatic Compression Devices  -OK to restart Xarelto  -Diet: Regular diet, intake as tolerated  -Advance activity as tolerated, encouraged OOB and ambulate 3-4 times a day  -IS hourly as able, encouraged  -Dispo: OK to DC today. DC Rx: none needed.  OK to start daily dosing of miralax tomorrow if no BM yet.  Additional OTC bowel program prn.  Surgery office PA will call in 2-3 weeks for postop check.  Discharge instructions were reviewed with the patient in detail.  All his questions/concerns were addressed.  He is aware that a printed copy of instructions will be given to him upon discharge.     Seen and agree. Discharging home today. Can resume xarelto today.     Pedro Luis Mcgee MD        Interval History:   Feeling quite well, passing flatus and tolerating regular diet.  Feels close to having BM.  Recommended small meals until +BM, also if no BM by tomorrow, ok to start daily miralax dose.  Reviewed postop instructions.         Physical Exam:   Blood pressure 136/61, pulse 53, temperature 98.2  F (36.8  C), temperature source Oral, resp. rate 19, height 1.702 m (5' 7\"), weight 84.8 kg (187 lb), SpO2 93%.  I/O last 3 completed shifts:  In: 180 [I.V.:180]  Out: -     Abdomen:   soft, non-distended, minimally tender at umbilical site and normal bowel sounds   Incisions - steri-strips intact, bandage removed from umbilical site, mild bruising, no " drainage.          Data:     Recent Labs   Lab 04/14/25  1634   WBC 12.5*   HGB 15.2   HCT 45.8   MCV 93        Recent Labs   Lab 04/15/25  0845 04/15/25  0201 04/15/25  0023 04/14/25  1640 04/14/25  1634   NA  --   --   --   --  140   POTASSIUM  --   --   --   --  3.8   CHLORIDE  --   --   --   --  104   CO2  --   --   --   --  22   ANIONGAP  --   --   --   --  14   * 164* 144*  --  122*   BUN  --   --   --   --  18.5   CR  --   --   --  0.9 0.87   GFRESTIMATED  --   --   --  >60 86   HE  --   --   --   --  9.8       ABHI Rodriguez message, 8 am to 4 pm  After 4 pm, call [phone]

## 2025-04-16 NOTE — PLAN OF CARE
"  A&Ox4. Reg diet. RA. SB Ax1. Lap sites dressed and assessed. No IV. Surgery following. Discharge today.                       Goal Outcome Evaluation:      Plan of Care Reviewed With: patient    Overall Patient Progress: improvingOverall Patient Progress: improving    Outcome Evaluation: HR in 50s/60s. No IV. Ready for discharge.      Problem: Adult Inpatient Plan of Care  Goal: Plan of Care Review  Description: The Plan of Care Review/Shift note should be completed every shift.  The Outcome Evaluation is a brief statement about your assessment that the patient is improving, declining, or no change.  This information will be displayed automatically on your shiftnote.  Outcome: Progressing  Flowsheets (Taken 4/16/2025 1146)  Outcome Evaluation: HR in 50s/60s. No IV. Ready for discharge.  Plan of Care Reviewed With: patient  Overall Patient Progress: improving  Goal: Patient-Specific Goal (Individualized)  Description: You can add care plan individualizations to a care plan. Examples of Individualization might be:  \"Parent requests to be called daily at 9am for status\", \"I have a hard time hearing out of my right ear\", or \"Do not touch me to wake me up as it startlesme\".  Outcome: Progressing  Goal: Absence of Hospital-Acquired Illness or Injury  Outcome: Progressing  Intervention: Identify and Manage Fall Risk  Recent Flowsheet Documentation  Taken 4/16/2025 1112 by Cipriano Powers RN  Safety Promotion/Fall Prevention:   safety round/check completed   nonskid shoes/slippers when out of bed   activity supervised   assistive device/personal items within reach  Intervention: Prevent Skin Injury  Recent Flowsheet Documentation  Taken 4/16/2025 1112 by Cipriano Powers RN  Body Position: position changed independently  Taken 4/16/2025 1041 by Cipriano Powers RN  Body Position: position changed independently  Taken 4/16/2025 0929 by Cipriano Powers RN  Body Position: position changed " independently  Intervention: Prevent and Manage VTE (Venous Thromboembolism) Risk  Recent Flowsheet Documentation  Taken 4/16/2025 1112 by Cipriano Powers RN  VTE Prevention/Management: SCDs on (sequential compression devices)  Intervention: Prevent Infection  Recent Flowsheet Documentation  Taken 4/16/2025 1112 by Cipriano Powers RN  Infection Prevention:   hand hygiene promoted   rest/sleep promoted   single patient room provided  Goal: Optimal Comfort and Wellbeing  Outcome: Progressing  Goal: Readiness for Transition of Care  Outcome: Progressing     Problem: Delirium  Goal: Optimal Coping  Outcome: Progressing  Goal: Improved Behavioral Control  Outcome: Progressing  Intervention: Minimize Safety Risk  Recent Flowsheet Documentation  Taken 4/16/2025 1112 by Cipriano Powers RN  Enhanced Safety Measures: review medications for side effects with activity  Goal: Improved Attention and Thought Clarity  Outcome: Progressing  Goal: Improved Sleep  Outcome: Progressing     Problem: Intestinal Obstruction  Goal: Optimal Bowel Function  Outcome: Progressing  Intervention: Promote Bowel Function  Recent Flowsheet Documentation  Taken 4/16/2025 1112 by Cipriano Powers RN  Body Position: position changed independently  Head of Bed (HOB) Positioning: HOB at 30-45 degrees  Positioning/Transfer Devices:   pillows   in use  Chair: Shifted weight  Taken 4/16/2025 1041 by Cipriano Powers RN  Body Position: position changed independently  Head of Bed (HOB) Positioning: HOB at 30 degrees  Positioning/Transfer Devices:   pillows   in use  Chair: Shifted weight  Taken 4/16/2025 0929 by Cipriano Powers RN  Body Position: position changed independently  Head of Bed (HOB) Positioning: HOB at 30 degrees  Positioning/Transfer Devices:   pillows   in use  Chair: Shifted weight  Goal: Fluid and Electrolyte Balance  Outcome: Progressing  Goal: Absence of Infection Signs and Symptoms  Outcome: Progressing  Goal: Optimize  Nutrition Status  Outcome: Progressing  Goal: Optimal Pain Control and Function  Outcome: Progressing     Problem: Comorbidity Management  Goal: Blood Pressure in Desired Range  Outcome: Progressing  Intervention: Maintain Blood Pressure Management  Recent Flowsheet Documentation  Taken 4/16/2025 1112 by Cipriano Powers RN  Medication Review/Management: medications reviewed

## 2025-04-16 NOTE — PROVIDER NOTIFICATION
Crosscover paged d/t loss of both peripheral accesses. AC was occluded, and wrist was profusely leaking.     MD gave the okay to stop IVF (LR 75ml/hr) and noted that pt is okay without an PIV for now. Pt possibly going home today.

## 2025-04-16 NOTE — PROGRESS NOTES
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 He was seen by surgery in the ED and taken directly to the OR for hernia reduction and hernia repair. Bowel was viable after reduction so no resection needed.   Pt is feeling well after surgery, denies pain or nausea.      SBO due to incarcerated umbilical hernia  S/p laparoscopic hernia reduction and repair  Presented with abd pain, nausea and vomiting. Sx now resolved post op. Currently NPO, bowel sounds active.  - continued management per surgery. Likely ok to start advancing diet today  - pain control and supportive cares  - IVFs per surgery  - resume home Xarelto when ok'd by surgery     Persistent afib  Tachycardia induced cardiomyopathy with normalization of EF  EF down to 30-35% in 2016, EF improved to 55-60% with rate control. Managed with amiodarone and anticoagulated on Xarelto  - Xarelto held for surgery, resume today by surgery plan  - resume amiodarone     HTN  Felt related to primary hyperaldosteronism. Previously on hydrochlorothiazide but this was stopped in favor of lasix due to LE edema. Per med rec, Lasix has been stopped, unclear why.  - resume home amlodipine and eplerenone  HLP  - resume home statin  Gout  - resume home allopurinol   BPH  - resume home finasteride and alfuzosin   LE edema  Was recently switched to lasix with some improvement, but does not appear to still be on this.           Diet: Regular Diet Adult    DVT Prophylaxis: DOAC  [place] Catheter: Not present  Lines: None     Cardiac Monitoring: None  Code Status: Full Code      Clinically Significant Risk Factors                   # Hypertension: Noted  "on problem list            # Overweight: Estimated body mass index is 29.29 kg/m  as calculated from the following:    Height as of this encounter: 1.702 m (5' 7\").    Weight as of this encounter: 84.8 kg (187 lb)., PRESENT ON ADMISSION            Social Drivers of Health    Tobacco Use: Medium Risk (4/15/2025)    Patient History     Smoking Tobacco Use: Former     Smokeless Tobacco Use: Never   Physical Activity: Insufficiently Active (9/4/2024)    Exercise Vital Sign     Days of Exercise per Week: 3 days     Minutes of Exercise per Session: 40 min   Social Connections: Unknown (9/4/2024)    Social Connection and Isolation Panel [NHANES]     Frequency of Social Gatherings with Friends and Family: Three times a week          Disposition Plan     Medically Ready for Discharge: Anticipated Tomorrow             Ji Hernandez MD  Hospitalist Service  [facility]  Securely message with [employer] (more info)  Text page via [employer] Paging/Directory   ______________________________________________________________________    Interval History   He feels better. Pain in surgical site with pressure. Passing gas down, not BM yet. Eating, no N&V    Physical Exam   Vital Signs: Temp: 97.6  F (36.4  C) Temp src: Oral BP: 122/69 Pulse: (!) 42   Resp: 18 SpO2: 93 % O2 Device: None (Room air) Oxygen Delivery: 1 LPM  Weight: 187 lbs 0 oz    GEN:  Alert, oriented x 3, appears comfortable, NAD.  HEENT:  Normocephalic/atraumatic, no scleral icterus, no nasal discharge, mouth moist.  CV:  IRIR w/CVR, no murmur or JVD.     LUNGS:  Clear to auscultation bilaterally without rales/rhonchi/wheezing/retractions.  Symmetric chest rise on inhalation noted.  ABD:  Active bowel sounds, soft,  non-distended.  Not bleeding on surgical site.  EXT:  No edema or cyanosis.  No joint synovitis noted.  SKIN:  Dry to touch, no exanthems noted in the visualized areas.       Medical Decision Making       38 MINUTES SPENT BY ME on the date " of service doing chart review, history, exam, documentation & further activities per the note.      Data         Imaging results reviewed over the past 24 hrs:   No results found for this or any previous visit (from the past 24 hours).

## 2025-04-16 NOTE — PLAN OF CARE
"A&Ox 4, pleasant and can make needs known. HR still sustaining in the low 40's overnight while sleeping per finger pulse ox. HR in the 60's while awake and talking with RN. Both PIVs lost this shift - MD okayed to stop IVF and gave the okay to have no PIV for now. ABD dressings CDI.   VSS 1L NC except low HR.   Pain: end of shift c/o pain near hernia repair site. Tylenol given and ice applied.     Plan: possible discharge to home today.       Temp: 97.6  F (36.4  C) Temp src: Oral BP: 122/69 Pulse: (!) 42   Resp: 18 SpO2: 93 % O2 Device: Nasal cannula Oxygen Delivery: 1 LPM     Goal Outcome Evaluation:      Plan of Care Reviewed With: patient    Overall Patient Progress: no changeOverall Patient Progress: no change    Outcome Evaluation: HR still sustaining in the 40's while sleeping. IVF stopped      Problem: Adult Inpatient Plan of Care  Goal: Plan of Care Review  Description: The Plan of Care Review/Shift note should be completed every shift.  The Outcome Evaluation is a brief statement about your assessment that the patient is improving, declining, or no change.  This information will be displayed automatically on your shiftnote.  Outcome: Progressing  Flowsheets (Taken 4/16/2025 0600)  Outcome Evaluation: HR still sustaining in the 40's while sleeping. IVF stopped  Plan of Care Reviewed With: patient  Overall Patient Progress: no change  Goal: Patient-Specific Goal (Individualized)  Description: You can add care plan individualizations to a care plan. Examples of Individualization might be:  \"Parent requests to be called daily at 9am for status\", \"I have a hard time hearing out of my right ear\", or \"Do not touch me to wake me up as it startlesme\".  Outcome: Progressing  Goal: Absence of Hospital-Acquired Illness or Injury  Outcome: Progressing  Intervention: Identify and Manage Fall Risk  Recent Flowsheet Documentation  Taken 4/16/2025 8590 by Shital Valdez RN  Safety Promotion/Fall Prevention: safety " round/check completed  Taken 4/16/2025 0446 by Shital Valdez RN  Safety Promotion/Fall Prevention: safety round/check completed  Taken 4/16/2025 0340 by Shital Valdez RN  Safety Promotion/Fall Prevention: safety round/check completed  Taken 4/16/2025 0231 by Shital Valdez RN  Safety Promotion/Fall Prevention: safety round/check completed  Taken 4/16/2025 0039 by Shital Valdez RN  Safety Promotion/Fall Prevention: safety round/check completed  Intervention: Prevent Skin Injury  Recent Flowsheet Documentation  Taken 4/16/2025 0524 by Shital Valdez RN  Body Position: position changed independently  Intervention: Prevent and Manage VTE (Venous Thromboembolism) Risk  Recent Flowsheet Documentation  Taken 4/16/2025 0039 by Shital Valdez RN  VTE Prevention/Management: SCDs on (sequential compression devices)  Intervention: Prevent Infection  Recent Flowsheet Documentation  Taken 4/16/2025 0039 by Shital Valdez RN  Infection Prevention:   equipment surfaces disinfected   single patient room provided   rest/sleep promoted  Goal: Optimal Comfort and Wellbeing  Outcome: Progressing  Goal: Readiness for Transition of Care  Outcome: Progressing     Problem: Delirium  Goal: Optimal Coping  Outcome: Progressing  Goal: Improved Behavioral Control  Outcome: Progressing  Goal: Improved Attention and Thought Clarity  Outcome: Progressing  Goal: Improved Sleep  Outcome: Progressing     Problem: Intestinal Obstruction  Goal: Optimal Bowel Function  Outcome: Progressing  Intervention: Promote Bowel Function  Recent Flowsheet Documentation  Taken 4/16/2025 0524 by Shital Valdez RN  Body Position: position changed independently  Goal: Fluid and Electrolyte Balance  Outcome: Progressing  Goal: Absence of Infection Signs and Symptoms  Outcome: Progressing  Goal: Optimize Nutrition Status  Outcome: Progressing  Goal: Optimal Pain Control and Function  Outcome: Progressing     Problem: Comorbidity Management  Goal: Blood Pressure in  Desired Range  Outcome: Progressing

## 2025-04-17 ENCOUNTER — PATIENT OUTREACH (OUTPATIENT)
Dept: INTERNAL MEDICINE | Facility: CLINIC | Age: 85
End: 2025-04-17
Payer: MEDICARE

## 2025-04-17 NOTE — TELEPHONE ENCOUNTER
Transitions of Care Outreach  Chief Complaint   Patient presents with    Hospital F/U       Most Recent Admission Date: 4/14/2025   Most Recent Admission Diagnosis: Small bowel obstruction (H) - K56.609  Incarcerated hernia - K46.0     Most Recent Discharge Date: 4/16/2025   Most Recent Discharge Diagnosis: Small bowel obstruction (H) - K56.609  Incarcerated hernia - K46.0     Transitions of Care Assessment    Discharge Assessment  How are you doing now that you are home?: 100% happy  How are your symptoms? (Red Flag symptoms escalate to triage hotline per guidelines): Improved  Do you know how to contact your clinic care team if you have future questions or changes to your health status? : Yes  Does the patient have their discharge instructions? : Yes  Does the patient have questions regarding their discharge instructions? : Yes (see comment)  Were you started on any new medications or were there changes to any of your previous medications? : Yes  Does the patient have all of their medications?: Yes  Do you have questions regarding any of your medications? : No  Do you have all of your needed medical supplies or equipment (DME)?  (i.e. oxygen tank, CPAP, cane, etc.): Yes    Follow up Plan          No future appointments.    Outpatient Plan as outlined on AVS reviewed with patient.    For any urgent concerns, please contact our 24 hour nurse triage line: [phone] ([phone])       Betty Aguilar RN

## 2025-04-21 ENCOUNTER — NURSE TRIAGE (OUTPATIENT)
Dept: INTERNAL MEDICINE | Facility: CLINIC | Age: 85
End: 2025-04-21
Payer: MEDICARE

## 2025-04-21 NOTE — TELEPHONE ENCOUNTER
Right shoulder pain. Wanting referral to physical therapy. Needs referral per PT . Referral pended for review.     Molly Schofield RN            Reason for Disposition   Patient wants to be seen    Additional Information   Negative: Shock suspected (e.g., cold/pale/clammy skin, too weak to stand, low BP, rapid pulse)   Negative: Similar pain previously and it was from 'heart attack'   Negative: Similar pain previously and it was from 'angina' and not relieved by nitroglycerin   Negative: Sounds like a life-threatening emergency to the triager   Negative: Chest pain   Negative: Followed an injury to shoulder   Negative: Difficulty breathing or unusual sweating (e.g., sweating without exertion)   Negative: Pain lasting > 5 minutes and pain also present in chest  (Exception: Pain is clearly made worse by movement.)   Negative: Age > 40 and no obvious cause and pain even when not moving the arm  (Exception: Pain is clearly made worse by moving arm or bending neck.)   Negative: Red area or streak and fever   Negative: Swollen joint and fever   Negative: Entire arm is swollen   Negative: Patient sounds very sick or weak to the triager   Negative: SEVERE pain (e.g., excruciating, unable to do any normal activities)   Negative: Shoulder pains with exertion (e.g., walking) and pain goes away on resting and not present now   Negative: Painful rash with multiple small blisters grouped together (i.e., dermatomal distribution or 'band' or 'stripe')   Negative: Looks like a boil, infected sore, deep ulcer or other infected rash (spreading redness, pus)   Negative: Localized rash is very painful (no fever)   Negative: Weakness (i.e., loss of strength) in hand or fingers  (Exception: Not truly weak; hand feels weak because of pain.)   Negative: Numbness (i.e., loss of sensation) in hand or fingers   Negative: Unable to use arm at all and because of shoulder pain or stiffness    Answer Assessment - Initial Assessment  "Questions  1. ONSET: \"When did the pain start?\"      1-2 weeks  2. LOCATION: \"Where is the pain located?\"      Right shoulder   3. PAIN: \"How bad is the pain?\" (Scale 1-10; or mild, moderate, severe)      4 at worse. Happens twice per day, otherwise pain free  4. WORK OR EXERCISE: \"Has there been any recent work or exercise that involved this part of the body?\"      none  5. CAUSE: \"What do you think is causing the shoulder pain?\"      unknown  6. OTHER SYMPTOMS: \"Do you have any other symptoms?\" (e.g., neck pain, swelling, rash, fever, numbness, weakness)      none  7. PREGNANCY: \"Is there any chance you are pregnant?\" \"When was your last menstrual period?\"      NA    Protocols used: Shoulder Pain-A-OH    "

## 2025-04-21 NOTE — TELEPHONE ENCOUNTER
If its just 1-2 weeks pain - rec'd general rom/stretching etc for 2 wks before proceeding to formal physical therapy

## 2025-04-22 NOTE — DISCHARGE SUMMARY
[facility]    Discharge Summary  Surgery    Date of Admission:  4/14/2025  Date of Discharge:  4/16/2025  2:00 PM  Discharging Provider:  Dr. Mcgee  Discharge Summary Note completed by: Natalie Steiner PA-C on 4/22/2025  Date of Service: The patient was personally seen by Discharging Providers on the day of discharge.    Discharge Diagnoses   Active Problems:    Small bowel obstruction (H)    Incarcerated hernia  Small bowel obstruction due to incarcerated hernia at umbilicus  Leukocytosis related to above  Postoperative hypoxia; resolved prior to release    Procedure/Surgery Information   Diagnostic laparoscopy with reduction of umbilical hernia, no bowel resection needed, primary repair of 1.5cm umbilical hernia (suture closure)    Surgeons and Role:     * Pedro Luis Mcgee MD - Primary     Specimens: * No specimens in log *     History of Present Illness   Reji Aguirre is a 84 year old male who presented to the ED with a incarcerated umbilical hernia that contained bowel and could not be reduced at bedside. It was also causing a bowel obstruction. I recommended urgent operative intervention and the patient agreed to proceed after hearing the risks and benefits of surgery.     Hospital Course   Reji Aguirre was admitted on 4/14/2025.  The following problems were addressed during his hospitalization:  Patient Active Problem List   Diagnosis    Benign essential hypertension    Gout    Rosacea    Diverticulosis of large intestine    Other postprocedural status(V45.89)    Rotator cuff (capsule) sprain    Pain in joint, shoulder region    CARDIOVASCULAR SCREENING; LDL GOAL LESS THAN 130    Peroneal tendonitis    Longstanding persistent atrial fibrillation (H)    Cardiomyopathy (H)    Knee joint replacement status    Left knee pain    Status post total left knee replacement    Anemia due to blood loss, acute    Hematuria    Carotid artery disease    Post concussion syndrome    LBBB  (left bundle branch block)    Atrial flutter (H)    Bradycardia    Hyperlipidemia LDL goal <100    Primary aldosteronism    Hypertension    Benign secondary hypertension due to primary aldosteronism    Small bowel obstruction (H)    Incarcerated hernia    Bilateral sensorineural hearing loss     Clara-operative antibiotic therapy included: Ancef.  Post-operative pain control: was via IV until able to tolerate PO intake and transitioned to PO pain meds.    Remarkable hospital course events: Able to tolerate diet soon after surgery, passed flatus and minimal pain.  Able to discharge home on POD#2.   Please see Hospitalist notes for further details if needed.    Reji met all criteria for release on 4/16/2025  2:00 PM.  He was afebrile, tolerating diet, pain controlled on PO meds, ambulating well, and had return of bowel function.    Medications discontinued or adjusted during this hospitalization: see discharge med list below.    Antibiotics prescribed at discharge: None prescribed     Imaging study follow up needs:   -No studies require specific follow-up    Discharge Instructions and Follow-Up:  Discharge diet: Low residue   Discharge activity: No heavy lifting, pushing, pulling for 6 weeks due to NON-mesh repair of hernia   Discharge follow-up: Follow up call with surgery office PA in 2-3 weeks   Wound/Incision care: Daily dressing changes  Ice to area for comfort  May get incision wet in shower but do not soak or scrub  Steri-strips off in 12 days       Discharge Disposition   Discharged to home   Condition at discharge: Stable    Pending Results   Unresulted Labs Ordered in the Past 30 Days of this Admission       No orders found from 3/15/2025 to 4/15/2025.            Primary Care Physician   Igor Christina    Consultations This Hospital Stay   HOSPITALIST IP CONSULT  NURSING TO CONSULT FOR [facility] CARE IP    Discharge Orders   No discharge procedures on file.    Discharge Medications   Discharge  Medication List as of 4/16/2025  1:15 PM        CONTINUE these medications which have NOT CHANGED    Details   alfuzosin ER (UROXATRAL) 10 MG 24 hr tablet Take 10 mg by mouth daily., Historical      allopurinol (ZYLOPRIM) 300 MG tablet Take 1 tablet (300 mg) by mouth daily., Disp-90 tablet, R-3, E-PrescribePROFILE FOR FUTURE REFILLS UNTIL PATIENT CALLS FOR REFILLS      amiodarone (PACERONE) 200 MG tablet TAKE 1 TABLET BY MOUTH ONCE DAILY MONDAY  THROUGH  FRIDAY,  SKIP  SATURDAY  AND  SUNDAY, Disp-90 tablet, R-0, E-Prescribe      amLODIPine (NORVASC) 5 MG tablet Take 1 tablet (5 mg) by mouth 2 times daily., Disp-180 tablet, R-3, E-PrescribePROFILE FOR FUTURE REFILLS UNTIL PATIENT CALLS FOR REFILLS      eplerenone (INSPRA) 25 MG tablet Take 1 tablet (25 mg) by mouth 2 times daily., Disp-180 tablet, R-3, E-PrescribePROFILE FOR FUTURE REFILLS UNTIL PATIENT CALLS FOR REFILLS      finasteride (PROSCAR) 5 MG tablet Take 5 mg by mouth daily., Historical      rivaroxaban ANTICOAGULANT (XARELTO ANTICOAGULANT) 20 MG TABS tablet Take 1 tablet (20 mg) by mouth daily., Disp-90 tablet, R-3, E-PrescribePROFILE FOR FUTURE REFILLS UNTIL PATIENT CALLS FOR REFILLS      rosuvastatin (CRESTOR) 10 MG tablet Take 1 tablet by mouth once daily, Disp-90 tablet, R-3, E-Prescribe      sulfacetamide sodium, Acne, 10 % lotion Apply topically 2 times daily.Historical      ASPIRIN NOT PRESCRIBED (INTENTIONAL) Reason ASA was Not Prescribed: Current anticoagulant therapy (warfarin/enoxaparin)           Allergies   Allergies   Allergen Reactions    Amlodipine Swelling     edema    Ampicillin      rash    Metoprolol      Mild fatigue with Toprol; mildly decreased exercise capacity    Spironolactone Nausea and Difficulty breathing    Vicodin [Hydrocodone-Acetaminophen] Nausea     Data   Most Recent 3 CBC's:  Recent Labs   Lab Test 04/14/25  1634 09/09/24  1144 07/09/22  1212   WBC 12.5* 6.6 7.5   HGB 15.2 16.1 17.0   MCV 93 93 93    185 182       Most Recent 3 BMP's:  Recent Labs   Lab Test 04/15/25  0845 04/15/25  0201 04/15/25  0023 04/14/25  1640 04/14/25  1634 11/04/24  1103 09/09/24  1144   NA  --   --   --   --  140 141 140   POTASSIUM  --   --   --   --  3.8 3.9 4.1   CHLORIDE  --   --   --   --  104 104 102   CO2  --   --   --   --  22 26 24   BUN  --   --   --   --  18.5 19.9 21.2   CR  --   --   --  0.9 0.87 1.03 0.96   ANIONGAP  --   --   --   --  14 11 14   HE  --   --   --   --  9.8 9.7 9.5   * 164* 144*  --  122* 99 94       Results for orders placed or performed during the hospital encounter of 04/14/25   CT Abdomen Pelvis w Contrast    Narrative    EXAM: CT ABDOMEN PELVIS W CONTRAST  LOCATION: [facility]  DATE: 4/14/2025    INDICATION: Periumbilical pain. Concern for incarcerated vs strangulated hernia.  COMPARISON: Thoracic spine CT 9/17/2020.  TECHNIQUE: CT scan of the abdomen and pelvis was performed following injection of IV contrast. Multiplanar reformats were obtained. Dose reduction techniques were used.  CONTRAST: 94mL Isovue 370    FINDINGS:     LOWER CHEST: Bibasilar scarring and/or atelectasis. Trace fluid within the distal esophagus. Coronary arterial calcifications and/or stents.    HEPATOBILIARY: No calcified gallstones or biliary ductal dilation. No liver lesions.    PANCREAS: Normal.    SPLEEN: Scattered punctate calcified granulomas.    ADRENAL GLANDS: A 1.1 x 1.1 cm right adrenal nodule had attenuation values compatible with a benign adenoma on a previous thoracic spine CT (3/#56). Normal left adrenal gland.    KIDNEYS/BLADDER: Benign renal cysts and other too small to characterize subcentimeter renal hypodensities, which do not require follow-up. The largest cyst is a 9.2 cm cortical cyst arising from the left superior pole. No urinary calculi or   hydronephrosis. Normal bladder.    BOWEL: Mechanical small bowel obstruction, with transition point where a short segment of the small bowel  enters a periumbilical hernia. There is fluid within the hernia sac, but no bowel wall thickening, abnormal bowel enhancement, or other signs of   bowel ischemia. Obstructed small bowel loops are dilated up to 3.9 cm and the stomach is also distended with fluid. Distal small bowel loops are decompressed. Pancolonic diverticulosis. No inflamed diverticuli.    LYMPH NODES: No enlarged lymph nodes.    VASCULATURE: Patent portal, splenic, and superior mesenteric veins. Mild atherosclerotic calcifications.    PELVIC ORGANS: Status post transurethral resection of the prostate.    MUSCULOSKELETAL: Periumbilical hernia as described above. Multilevel degenerative changes of the spine.      Impression    IMPRESSION:     1.  Mechanical small bowel obstruction with transition point where a short segment of the small bowel enters a periumbilical hernia. There is fluid within the hernia sac but no specific signs of bowel ischemia. Correlate with physical exam for possible   incarceration.      Natalie Steiner PA-C

## 2025-04-22 NOTE — TELEPHONE ENCOUNTER
Patient was called with this message, per patient's request also sent in a [place] [employer] message.

## 2025-04-23 ENCOUNTER — TRANSFERRED RECORDS (OUTPATIENT)
Dept: HEALTH INFORMATION MANAGEMENT | Facility: CLINIC | Age: 85
End: 2025-04-23
Payer: MEDICARE

## 2025-05-15 ENCOUNTER — THERAPY VISIT (OUTPATIENT)
Dept: PHYSICAL THERAPY | Facility: CLINIC | Age: 85
End: 2025-05-15
Payer: MEDICARE

## 2025-05-15 DIAGNOSIS — M25.511 ACUTE PAIN OF RIGHT SHOULDER: Primary | ICD-10-CM

## 2025-05-15 ASSESSMENT — ACTIVITIES OF DAILY LIVING (ADL)
ADL_HIGHEST_POTENTIAL_SCORE: 68
STARTING_AND_STOPPING_QUICKLY: NO DIFFICULTY AT ALL
ADL_SCORE(%): 0
GOING DOWN 1 FLIGHT OF STAIRS: NO DIFFICULTY AT ALL
HOW_WOULD_YOU_RATE_YOUR_CURRENT_LEVEL_OF_FUNCTION_DURING_YOUR_USUAL_ACTIVITIES_OF_DAILY_LIVING_FROM_0_TO_100_WITH_100_BEING_YOUR_LEVEL_OF_FUNCTION_PRIOR_TO_YOUR_HIP_PROBLEM_AND_0_BEING_THE_INABILITY_TO_PERFORM_ANY_OF_YOUR_USUAL_DAILY_ACTIVITIES?: 0
PUTTING_ON_SOCKS_AND_SHOES: NO DIFFICULTY AT ALL
SPORTS_COUNT: 8
HOS_ADL_ITEM_SCORE_TOTAL: 43
GOING UP 1 FLIGHT OF STAIRS: SLIGHT DIFFICULTY
PUSHING_WITH_THE_INVOLVED_ARM: 0
HOS_ADL_HIGHEST_POTENTIAL_SCORE: 48
SPORTS_SCORE(%): INCOMPLETE
STANDING FOR 15 MINUTES: NO DIFFICULTY AT ALL
PUTTING_ON_YOUR_PANTS: 0
WALKING_DOWN_STEEP_HILLS: NO DIFFICULTY AT ALL
CUTTING/LATERAL_MOVEMENTS: NO DIFFICULTY AT ALL
WALKING_APPROXIMATELY_10_MINUTES: MODERATE DIFFICULTY
PUTTING_ON_A_SHIRT_THAT_BUTTONS_DOWN_THE_FRONT: 2
RUNNING_ONE_MILE: MODERATE DIFFICULTY
LIGHT_TO_MODERATE_WORK: NO DIFFICULTY AT ALL
PUTTING ON SOCKS AND SHOES: NO DIFFICULTY AT ALL
TWISTING/PIVOTING_ON_INVOLVED_LEG: NO DIFFICULTY AT ALL
WALKING_DOWN_STEEP_HILLS: NO DIFFICULTY AT ALL
HEAVY_WORK: NO DIFFICULTY AT ALL
HEAVY_WORK: NO DIFFICULTY AT ALL
PLACING_AN_OBJECT_ON_A_HIGH_SHELF: 4
STANDING_FOR_15_MINUTES: NO DIFFICULTY AT ALL
HOW_WOULD_YOU_RATE_YOUR_CURRENT_LEVEL_OF_FUNCTION?: ABNORMAL
SPORTS_HIGHEST_POTENTIAL_SCORE: 32
WASHING_YOUR_HAIR?: 4
GETTING_INTO_AND_OUT_OF_A_BATHTUB: NO DIFFICULTY AT ALL
CARRYING_A_HEAVY_OBJECT_OF_10_POUNDS: 0
STEPPING_UP_AND_DOWN_CURBS: NO DIFFICULTY AT ALL
GOING_UP_1_FLIGHT_OF_STAIRS: SLIGHT DIFFICULTY
WHEN_LYING_ON_THE_INVOLVED_SIDE: 1
SPORTS_TOTAL_ITEM_SCORE: INCOMPLETE
ADL_COUNT: 17
GETTING_INTO_AND_OUT_OF_A_BATHTUB: NO DIFFICULTY AT ALL
GETTING INTO AND OUT OF AN AVERAGE CAR: NO DIFFICULTY AT ALL
TOUCHING_THE_BACK_OF_YOUR_NECK: 1
TWISTING/PIVOTING ON INVOLVED LEG: NO DIFFICULTY AT ALL
WALKING_FOR_APPROXIMATELY_10_MINUTES: MODERATE DIFFICULTY
GETTING_INTO_AND_OUT_OF_AN_AVERAGE_CAR: NO DIFFICULTY AT ALL
STEPPING UP AND DOWN CURBS: NO DIFFICULTY AT ALL
ROLLING_OVER_IN_BED: MODERATE DIFFICULTY
AT_ITS_WORST?: 5
PLEASE_INDICATE_YOR_PRIMARY_REASON_FOR_REFERRAL_TO_THERAPY:: SHOULDER
ROLLING OVER IN BED: MODERATE DIFFICULTY
LANDING: NO DIFFICULTY AT ALL
LIGHT_TO_MODERATE_WORK: NO DIFFICULTY AT ALL
ABILITY_TO_PERFORM_ACTIVITY_WITH_YOUR_NORMAL_TECHNIQUE: NO DIFFICULTY AT ALL
SITTING_FOR_15_MINUTES: NO DIFFICULTY AT ALL
SITTING FOR 15 MINUTES: NO DIFFICULTY AT ALL
REACHING_FOR_SOMETHING_ON_A_HIGH_SHELF: 5
PLEASE_INDICATE_YOR_PRIMARY_REASON_FOR_REFERRAL_TO_THERAPY:: HIP
REMOVING_SOMETHING_FROM_YOUR_BACK_POCKET: 0
GOING_DOWN_1_FLIGHT_OF_STAIRS: NO DIFFICULTY AT ALL
HOW_WOULD_YOU_RATE_YOUR_CURRENT_LEVEL_OF_FUNCTION_DURING_YOUR_USUAL_ACTIVITIES_OF_DAILY_LIVING_FROM_0_TO_100_WITH_100_BEING_YOUR_LEVEL_OF_FUNCTION_PRIOR_TO_YOUR_HIP_PROBLEM_AND_0_BEING_THE_INABILITY_TO_PERFORM_ANY_OF_YOUR_USUAL_DAILY_ACTIVITIES?: 0
ADL_TOTAL_ITEM_SCORE: 0

## 2025-05-15 NOTE — PROGRESS NOTES
PHYSICAL THERAPY EVALUATION  Type of Visit: Evaluation       Fall Risk Screen:  Have you fallen 2 or more times in the past year?: No  Have you fallen and had an injury in the past year?: No    Subjective   Onset of R lateral shoulder pain about a month ago for unknown reasons. MD recommended stretching on his own for a couple weeks before beginning formal PT. Worse reaching all directions/dressing upper body, turning steering wheel. Better with rest and stretching.        Presenting condition or subjective complaint: shoulder  Date of onset: 04/15/25    Relevant medical history: Concussions   Dates & types of surgery: none    Prior diagnostic imaging/testing results: CT scan     Prior therapy history for the same diagnosis, illness or injury: Yes ?    Prior Level of Function  Transfers: Independent  Ambulation: Independent  ADL: Independent  IADL:     Living Environment  Social support: With a significant other or spouse   Type of home: House   Stairs to enter the home: Yes 2 Is there a railing: Yes     Ramp: No   Stairs inside the home: Yes 18 Is there a railing: Yes     Help at home: None  Equipment owned:       Employment: Not Applicable    Hobbies/Interests: crosswAppSlingr, reading, television    Patient goals for therapy: move without pain    Pain assessment: See objective evaluation for additional pain details     Objective   SHOULDER EVALUATION  PAIN: Pain Level at Rest: 0/10  Pain Level with Use: 3/10  Pain Location: right lateral shoulder  Pain Quality: Dull  Pain Frequency: intermittent  Pain is Worst: daytime  Pain is Exacerbated By: Reaching all directions/dressing upper body, turning steering wheel  Pain is Relieved By: rest and stretching  Pain Progression: Improved    POSTURE: Sitting Posture: Rounded shoulders, Forward head, Lordosis decreased    ROM:   (Degrees) Left AROM Left PROM Right AROM  Right PROM   Shoulder Flexion 115 141 101 128   Shoulder Extension       Shoulder Abduction 105 131 92 117    Shoulder Adduction       Shoulder Internal Rotation T12 60 L5 60   Shoulder External Rotation  80  80   Shoulder Horizontal Abduction       Shoulder Horizontal Adduction       Shoulder Flexion ER       Shoulder Flexion IR       Elbow Extension       Elbow Flexion       Pain:   End feel:     STRENGTH:   Pain: - none + mild ++ moderate +++ severe  Strength Scale: 0-5/5 Left Right   Shoulder Flexion 5 5, + (mild)   Shoulder Extension     Shoulder Abduction 5 5-, + (mild)   Shoulder Adduction     Shoulder Internal Rotation 5 5   Shoulder External Rotation 5- 4+, + (mild)   Shoulder Horizontal Abduction     Shoulder Horizontal Adduction     Elbow Flexion 5 5   Elbow Extension 5 5   Mid Trap     Lower Trap     Rhomboid     Serratus Anterior       JOINT MOBILITY: WNL  CERVICAL SCREEN: flex=75%, ext=50%, RR=50%, LR=66%, RSB=25%, LSB=25%    Assessment & Plan   CLINICAL IMPRESSIONS  Medical Diagnosis: R shoulder pain per PT    Treatment Diagnosis: R shoulder pain   Impression/Assessment: Patient is a 84 year old male with R shoulder complaints.  The following significant findings have been identified: Pain, Decreased ROM/flexibility, Decreased strength, Decreased activity tolerance, and Impaired posture. These impairments interfere with their ability to perform self care tasks, recreational activities, household chores, driving , household mobility, and community mobility as compared to previous level of function.     Clinical Decision Making (Complexity):  Clinical Presentation: Stable/Uncomplicated  Clinical Presentation Rationale: based on medical and personal factors listed in PT evaluation  Clinical Decision Making (Complexity): Low complexity    PLAN OF CARE  Treatment Interventions:  Interventions: Manual Therapy, Neuromuscular Re-education, Therapeutic Activity, Therapeutic Exercise, Self-Care/Home Management    Long Term Goals     PT Goal 1  Goal Identifier: Driving  Goal Description: Pt able to turn steering wheel  with 0/10 pain.  Rationale: to maximize safety and independence with transportation  Target Date: 06/15/25  PT Goal 2  Goal Identifier: Reaching  Goal Description: Patient able to reach flex=115 with 0/10 pain.  Rationale: to maximize safety and independence with performance of ADLs and functional tasks;to maximize safety and independence with self cares  Target Date: 07/15/25      Frequency of Treatment: 2x/month  Duration of Treatment: 2 months    Recommended Referrals to Other Professionals:   Education Assessment:   Learner/Method: Patient;No Barriers to Learning    Risks and benefits of evaluation/treatment have been explained.   Patient/Family/caregiver agrees with Plan of Care.     Evaluation Time:     PT Eval, Low Complexity Minutes (30975): 15       Signing Clinician: JOSE [facility]                                                                                   OUTPATIENT PHYSICAL THERAPY      PLAN OF TREATMENT FOR OUTPATIENT REHABILITATION   Patient's Last Name, First Name, MEDHATReji Esparza YOB: 1940   Provider's Name   [facility]   Medical Record No.  [id number]     Onset Date: 04/15/25  Start of Care Date: 05/15/25     Medical Diagnosis:  R shoulder pain per PT      PT Treatment Diagnosis:  R shoulder pain Plan of Treatment  Frequency/Duration: 2x/month/ 2 months    Certification date from 05/15/25 to 07/15/25         See note for plan of treatment details and functional goals     Radha Arceo PT                         I CERTIFY THE NEED FOR THESE SERVICES FURNISHED UNDER        THIS PLAN OF TREATMENT AND WHILE UNDER MY CARE     (Physician attestation of this document indicates review and certification of the therapy plan).              Referring Provider:  Referred Self    Initial Assessment  See Epic Evaluation- Start of Care Date: 05/15/25

## 2025-05-19 ENCOUNTER — OFFICE VISIT (OUTPATIENT)
Dept: INTERNAL MEDICINE | Facility: CLINIC | Age: 85
End: 2025-05-19
Payer: MEDICARE

## 2025-05-19 ENCOUNTER — TELEPHONE (OUTPATIENT)
Dept: INTERNAL MEDICINE | Facility: CLINIC | Age: 85
End: 2025-05-19

## 2025-05-19 VITALS
BODY MASS INDEX: 29.55 KG/M2 | DIASTOLIC BLOOD PRESSURE: 64 MMHG | SYSTOLIC BLOOD PRESSURE: 136 MMHG | WEIGHT: 188.3 LBS | TEMPERATURE: 98.3 F | HEART RATE: 55 BPM | OXYGEN SATURATION: 95 % | HEIGHT: 67 IN

## 2025-05-19 DIAGNOSIS — I77.9 BILATERAL CAROTID ARTERY DISEASE, UNSPECIFIED TYPE: ICD-10-CM

## 2025-05-19 DIAGNOSIS — M10.9 GOUT, UNSPECIFIED CAUSE, UNSPECIFIED CHRONICITY, UNSPECIFIED SITE: ICD-10-CM

## 2025-05-19 DIAGNOSIS — I10 BENIGN ESSENTIAL HYPERTENSION: ICD-10-CM

## 2025-05-19 DIAGNOSIS — I48.91 ATRIAL FIBRILLATION, UNSPECIFIED TYPE (H): ICD-10-CM

## 2025-05-19 DIAGNOSIS — M25.511 RIGHT SHOULDER PAIN, UNSPECIFIED CHRONICITY: Primary | ICD-10-CM

## 2025-05-19 PROCEDURE — 1126F AMNT PAIN NOTED NONE PRSNT: CPT | Performed by: INTERNAL MEDICINE

## 2025-05-19 PROCEDURE — G2211 COMPLEX E/M VISIT ADD ON: HCPCS | Performed by: INTERNAL MEDICINE

## 2025-05-19 PROCEDURE — 3078F DIAST BP <80 MM HG: CPT | Performed by: INTERNAL MEDICINE

## 2025-05-19 PROCEDURE — 3075F SYST BP GE 130 - 139MM HG: CPT | Performed by: INTERNAL MEDICINE

## 2025-05-19 PROCEDURE — 99213 OFFICE O/P EST LOW 20 MIN: CPT | Performed by: INTERNAL MEDICINE

## 2025-05-19 ASSESSMENT — PAIN SCALES - GENERAL: PAINLEVEL_OUTOF10: NO PAIN (0)

## 2025-05-19 NOTE — PROGRESS NOTES
"  Assessment & Plan     (M10.9) Gout, unspecified cause, unspecified chronicity, unspecified site  Comment:   Plan: Comprehensive metabolic panel, CBC with         platelets, Lipid panel reflex to direct LDL         Fasting, Uric acid            (I48.91) Atrial fibrillation, unspecified type (H)  Comment:   Plan: Comprehensive metabolic panel, CBC with         platelets, Lipid panel reflex to direct LDL         Fasting, DISCONTINUED: rivaroxaban         ANTICOAGULANT (XARELTO) 20 MG TABS tablet            (I10) Benign essential hypertension  Comment:   Plan: Comprehensive metabolic panel, CBC with         platelets, Lipid panel reflex to direct LDL         Fasting            (I77.9) Bilateral carotid artery disease, unspecified type  Comment:   Plan: Comprehensive metabolic panel, CBC with         platelets, Lipid panel reflex to direct LDL         Fasting                No further follow up needed for the recent abdominal surgery, you are healing well.      No swimming or soaking in water for 3 months after surgery to ensure proper healing.  OK to bathe and shower as normal.         Stop Xarelto for at least 2 days before the upcoming MOHS surgery to reduce bleeding.      Continue all other medications at the same doses.  Contact your usual pharmacy if you need refills.        Return to see me in September 2025 for routine Annual Medicare Wellness Exam, schedule a follow up visit sooner if needed for anything else.  Please get fasting labs done at the [facility] or any other [facility] lab 1-2 days before this appointment (schedule a \"lab appointment\")   Eat nothing for at least 8 hours prior to having these labs drawn.  Use [employer] or Call [phone] to schedule the appointment with me and lab appointment.         Xarelto is very expensive for him, especially on a fixed income.  He states he would much prefer to continue on Xarelto rather than changing to Warfarin.  ASked him to look into " "possibly getting medications from \"off-shore pharmacies\" (usually [place]) those medications that you would have to pay cash for.  These Off shore pharmacies have access to generic versions of many brand name medications that are not yet available in the [place] due to the stricter pharmaceutical rules here (which benefit the pharmaceutical companies and not the patient).  Their prices can be significantly cheaper for these medications and will work just as well.  These pharmacies will not accept insurance coverage from [place] insurers.    See the examples of some off [facility] pharmac[place] listed below and check out their web site for more details.  If you decide to get medication from them, you will need to set up an account and receive a written prescription that you will send to them.  We cannot send prescriptions to off shore pharmacies.      Let me know what you decide.          MED REC REQUIRED  Post Medication Reconciliation Status: discharge medications reconciled, continue medications without change  BMI  Estimated body mass index is 29.49 kg/m  as calculated from the following:    Height as of this encounter: 1.702 m (5' 7\").    Weight as of this encounter: 85.4 kg (188 lb 4.8 oz).             Roberto Nava is a 84 year old, presenting for the following health issues:   Follow Up        5/19/2025     3:15 PM   Additional Questions   Roomed by Zayra OCAMPO CMA     [facility]        Hospital Follow-up Visit:    Hospital/Nursing Home/ Rehab Facility: [facility]  Most Recent Admission Date: 4/14/2025   Most Recent Admission Diagnosis: Small bowel obstruction (H) - K56.609  Incarcerated hernia - K46.0     Most Recent Discharge Date: 4/16/2025   Most Recent Discharge Diagnosis: Small bowel obstruction (H) - K56.609  Incarcerated hernia - K46.0   Was the patient in the ICU or did the patient experience delirium during hospitalization?  Patient unsure  Problems taking medications regularly:  " "None  Medication changes since discharge: see discharge summary  Problems adhering to non-medication therapy:  None    Summary of hospitalization:  [facility] discharge summary reviewed  Diagnostic Tests/Treatments reviewed.  Follow up needed: none  Other Healthcare Providers Involved in Patient s Care:         None  Update since discharge: improved.         Plan of care communicated with patient    Since home from hospital, they report that they are feeling better.   Energy level and stamina are slowly improving.    Appetite and intake are improving.   No fevers, no chills  No shortness of breath.   No abdominal pain.   BMs back to normal.    They have not returned to prior routine and activities.      2.)  Hypertension:  History of hypertension, on medication.  No reported side effects from medications.    Reviewed last 6 BP readings in chart:  BP Readings from Last 6 Encounters:   05/19/25 136/64   04/16/25 (!) 149/94   11/04/24 122/68   09/09/24 126/56   01/03/24 122/64   09/05/23 120/56     No active cardiac complaints or symptoms with exercise.     3.)  History of gout.    Last gout flare up was .   Has been doing well on Allopurinol with no reported side effects.  Lab results including uric acid have been reviewed.  The patient has been trying to maintain decent hydration.                   **I reviewed the information recorded in the patient's EPIC chart (including but not limited to medical history, surgical history, family history, problem list, medication list, and allergy list) and updated the information as indicated based on the patients reported information.         Review of Systems  Constitutional, HEENT, cardiovascular, pulmonary, gi and gu systems are negative, except as otherwise noted.      Objective    /64   Pulse 55   Temp 98.3  F (36.8  C) (Temporal)   Ht 1.702 m (5' 7\")   Wt 85.4 kg (188 lb 4.8 oz)   SpO2 95%   BMI 29.49 kg/m    Body mass index is 29.49 " kg/m .  Physical Exam   Physical Exam  Vitals and nursing note reviewed.   Constitutional:       Comments: Moves normally, alert, no apparent distress  HENT:      Head: Normocephalic and atraumatic.      Nose: Nose normal.   Eyes:      Extraocular Movements: Extraocular movements intact.   Cardiovascular:      Rate and Rhythm: Normal rate and regular rhythm.      Heart sounds: Normal heart sounds.   Pulmonary:      Effort: Pulmonary effort is normal.      Breath sounds: Normal breath sounds.   Abdominal:      General: There is no distension.      Palpations: There is no mass.      Tenderness: No abdominal tenderness, no distention.     Bowel sounds: normal  Musculoskeletal:         General: Normal range of motion, moves into the room normal, moves in and out of chair normally.    Skin:     General: Skin is warm and dry.     ABD:  soft, nontedner, surgical wound healing well, no erythema , no umbilical hernia.   Neurological:      General: No focal deficit present.      Mental Status: Alert, responds to questions, Speech coherent  Psychiatric:         Mood and Affect: Mood normal.               The longitudinal plan of care for the diagnosis(es)/condition(s) as documented were addressed during this visit. Due to the added complexity in care, I will continue to support Elijah in the subsequent management and with ongoing continuity of care.      Signed Electronically by: Igor Christina MD

## 2025-05-19 NOTE — PATIENT INSTRUCTIONS
"   No further follow up needed for the recent abdominal surgery, you are healing well.      No swimming or soaking in water for 3 months after surgery to ensure proper healing.  OK to bathe and shower as normal.         Stop Xarelto for at least 2 days before the upcoming MOHS surgery to reduce bleeding.      Continue all other medications at the same doses.  Contact your usual pharmacy if you need refills.        Return to see me in September 2025 for routine Annual Medicare Wellness Exam, schedule a follow up visit sooner if needed for anything else.  Please get fasting labs done at the [facility] or any other [facility] Lab lab 1-2 days before this appointment (schedule a \"lab appointment\")   Eat nothing for at least 8 hours prior to having these labs drawn.  Use [employer] or Call [phone] to schedule the appointment with me and lab appointment.          Look into possibly getting medications from \"off-shore pharmacies\" (usually [place]) those medications that you would have to pay cash for.  These Off shore pharmacies have access to generic versions of many brand name medications that are not yet available in the [place] due to the stricter pharmaceutical rules here (which benefit the pharmaceutical companies and not the patient).  Their prices can be significantly cheaper for these medications and will work just as well.  These pharmacies will not accept insurance coverage from [place] insurers.    See the examples of some off [facility] [place] listed below and check out their web site for more details.  If you decide to get medication from them, you will need to set up an account and receive a written prescription that you will send to them.  We cannot send prescriptions to off shore pharmacies.      Let me know what you decide.      Examples of off-shore pharmacies:    [facility] [place] [facility]  [email or web address]  [phone] (phone)  [phone] (fax)     [facility] [place] " Prescription Referral Service  [email or web address]  phone [phone]  fax [phone]    [facility]  [email or web address]  Phone: [phone]  Fax: [phone]    [facility]  [email or web address]  [phone] (phone)  [phone] (fax)

## 2025-05-19 NOTE — TELEPHONE ENCOUNTER
----- Message from Radha Arceo sent at 5/14/2025  1:52 PM CDT -----  Regarding: PT orders for Right shoulder pain  Hello Elijah Donohue was complaining of R shoulder pain. Dr. Wilkerson advised waiting 2 weeks before beginning formal PT. He had some general shoulder stretches to work on. The pain has continued and he scheduled a PT visit for tomorrow. We will evaluate and treat him. He needs MD orders entered for R shoulder pain if you concur. Let me know if you have any questions.Thanks so much, Molly Arceo, PT

## 2025-05-21 ENCOUNTER — TELEPHONE (OUTPATIENT)
Dept: INTERNAL MEDICINE | Facility: CLINIC | Age: 85
End: 2025-05-21
Payer: MEDICARE

## 2025-05-21 DIAGNOSIS — I48.91 ATRIAL FIBRILLATION, UNSPECIFIED TYPE (H): ICD-10-CM

## 2025-05-21 NOTE — TELEPHONE ENCOUNTER
Pt requesting to send prescriptions to [facility] pharmacy.    If provider approves,please fax orders to :    [phone]

## 2025-05-27 NOTE — TELEPHONE ENCOUNTER
Patient requesting RX be sent to:  [facility] [place] [facility]  [email or web address]  [phone] (phone)  [phone] (fax)     RX written and sent on patient's behalf

## 2025-06-05 ENCOUNTER — THERAPY VISIT (OUTPATIENT)
Dept: PHYSICAL THERAPY | Facility: CLINIC | Age: 85
End: 2025-06-05
Payer: MEDICARE

## 2025-06-05 DIAGNOSIS — M25.511 ACUTE PAIN OF RIGHT SHOULDER: Primary | ICD-10-CM

## 2025-06-11 NOTE — NURSING NOTE
"Chief Complaint   Patient presents with     Musculoskeletal Problem     R hip pain, occurs after skiing       Initial /50  Pulse (!) 40  Temp 98  F (36.7  C) (Oral)  Wt 194 lb 8 oz (88.2 kg)  SpO2 96%  BMI 29.57 kg/m2 Estimated body mass index is 29.57 kg/(m^2) as calculated from the following:    Height as of 2/13/17: 5' 8\" (1.727 m).    Weight as of this encounter: 194 lb 8 oz (88.2 kg).  Medication Reconciliation: complete   Amy Todd CMA      " English

## 2025-06-19 ENCOUNTER — THERAPY VISIT (OUTPATIENT)
Dept: PHYSICAL THERAPY | Facility: CLINIC | Age: 85
End: 2025-06-19
Payer: MEDICARE

## 2025-06-19 DIAGNOSIS — M25.511 ACUTE PAIN OF RIGHT SHOULDER: Primary | ICD-10-CM

## 2025-06-19 NOTE — PROGRESS NOTES
06/19/25 0500   Appointment Info   Signing clinician's name / credentials Baljeet Ann PT   Total/Authorized Visits Plan 4   Visits Used 3   Medical Diagnosis R shoulder pain per PT   PT Tx Diagnosis R shoulder pain   Progress Note/Certification   Start of Care Date 05/15/25   Onset of illness/injury or Date of Surgery 04/15/25   Therapy Frequency 2x/month   Predicted Duration 2 months   Certification date from 05/15/25   Certification date to 07/15/25   Progress Note Due Date 07/13/25   Progress Note Completed Date 06/19/25   GOALS   PT Goals 2   PT Goal 1   Goal Identifier Driving   Goal Description Pt able to turn steering wheel with 0/10 pain.   Rationale to maximize safety and independence with transportation   Goal Progress met   Target Date 06/15/25   PT Goal 2   Goal Identifier Reaching   Goal Description Patient able to reach flex=115 with 0/10 pain.   Rationale to maximize safety and independence with performance of ADLs and functional tasks;to maximize safety and independence with self cares   Goal Progress met   Target Date 07/15/25   Subjective Report   Subjective Report Patient reports feeling much better.  He reports the home exercises have helped him improve his range of motion in his right shoulder.  He feels comfortable continuing independently with his home exercise program   Objective Measures   Objective Measures Objective Measure 1   Objective Measure 1   Objective Measure R shoulder AROM   Details flex=138, abd=110, ext/IR=L1   Treatment Interventions (PT)   Interventions Therapeutic Procedure/Exercise;Therapeutic Activity   Therapeutic Procedure/Exercise   Therapeutic Procedures Ther Proc 2   Ther Proc 1 Education   Ther Proc 1 - Details Established POC, discussed amara/phys of pain, HEP frequency and activity modifications.   PTRx Ther Proc 1 Shoulder Theraband Low Row/Pulldown   PTRx Ther Proc 1 - Details 1-2 sets 15-30 reps 1 x day   PTRx Ther Proc 2 Wand Shoulder Flexion Supine   PTRx  "Ther Proc 2 - Details 5\"x10--shoulder became sore with a hold do reps only 1-2x/day   PTRx Ther Proc 3 Wand Shoulder Abduction Standing   PTRx Ther Proc 3 - Details x10 2x/day   PTRx Ther Proc 4 Wand Shoulder Internal Rotation   PTRx Ther Proc 4 - Details x10 2x/day   PTRx Ther Proc 5 Scapular Retraction/Depression   PTRx Ther Proc 5 - Details x10 2x/day   PTRx Ther Proc 6 Shoulder Scapular Retraction with Tubing   PTRx Ther Proc 6 - Details YTB x 15 incr to 30 1x/day   Skilled Intervention Cues for form and control throughout   Patient Response/Progress good, modified wand to reps only d/t soreness   Education   Learner/Method Patient;No Barriers to Learning   Plan   Home program PTRX HEP --print   Plan for next session re-assess; progress as able       PLAN  Continue therapy per current plan of care.    Beginning/End Dates of Progress Note Reporting Period:  06/19/25 to 06/19/2025    Referring Provider:  Referred Self    "

## 2025-07-03 ENCOUNTER — TRANSFERRED RECORDS (OUTPATIENT)
Dept: HEALTH INFORMATION MANAGEMENT | Facility: CLINIC | Age: 85
End: 2025-07-03
Payer: MEDICARE

## 2025-07-07 DIAGNOSIS — I48.91 ATRIAL FIBRILLATION, UNSPECIFIED TYPE (H): ICD-10-CM

## 2025-07-07 RX ORDER — AMIODARONE HYDROCHLORIDE 200 MG/1
TABLET ORAL
Qty: 90 TABLET | Refills: 0 | Status: SHIPPED | OUTPATIENT
Start: 2025-07-07

## 2025-08-12 ENCOUNTER — PATIENT OUTREACH (OUTPATIENT)
Dept: CARE COORDINATION | Facility: CLINIC | Age: 85
End: 2025-08-12
Payer: MEDICARE

## 2025-09-01 ASSESSMENT — ACTIVITIES OF DAILY LIVING (ADL)
ADL_HIGHEST_POTENTIAL_SCORE: 68
GOING_UP_1_FLIGHT_OF_STAIRS: MODERATE DIFFICULTY
CARRYING_A_HEAVY_OBJECT_OF_10_POUNDS: 3
PUTTING_ON_YOUR_PANTS: 0
TOUCHING_THE_BACK_OF_YOUR_NECK: 0
PLEASE_INDICATE_YOR_PRIMARY_REASON_FOR_REFERRAL_TO_THERAPY:: HIP
REMOVING_SOMETHING_FROM_YOUR_BACK_POCKET: 0
GOING_DOWN_1_FLIGHT_OF_STAIRS: SLIGHT DIFFICULTY
PUSHING_WITH_THE_INVOLVED_ARM: 0
SPORTS_TOTAL_ITEM_SCORE: 0
LIGHT_TO_MODERATE_WORK: SLIGHT DIFFICULTY
WALKING_DOWN_STEEP_HILLS: SLIGHT DIFFICULTY
WHEN_LYING_ON_THE_INVOLVED_SIDE: 1
PUTTING ON SOCKS AND SHOES: SLIGHT DIFFICULTY
GETTING INTO AND OUT OF AN AVERAGE CAR: MODERATE DIFFICULTY
HOW_WOULD_YOU_RATE_YOUR_CURRENT_LEVEL_OF_FUNCTION?: ABNORMAL
HOS_ADL_HIGHEST_POTENTIAL_SCORE: 52
HOW_WOULD_YOU_RATE_YOUR_CURRENT_LEVEL_OF_FUNCTION_DURING_YOUR_SPORTS_RELATED_ACTIVITIES_FROM_0_TO_100_WITH_100_BEING_YOUR_LEVEL_OF_FUNCTION_PRIOR_TO_YOUR_HIP_PROBLEM_AND_0_BEING_THE_INABILITY_TO_PERFORM_ANY_OF_YOUR_USUAL_DAILY_ACTIVITIES?: 0
HOW_WOULD_YOU_RATE_YOUR_CURRENT_LEVEL_OF_FUNCTION_DURING_YOUR_USUAL_ACTIVITIES_OF_DAILY_LIVING_FROM_0_TO_100_WITH_100_BEING_YOUR_LEVEL_OF_FUNCTION_PRIOR_TO_YOUR_HIP_PROBLEM_AND_0_BEING_THE_INABILITY_TO_PERFORM_ANY_OF_YOUR_USUAL_DAILY_ACTIVITIES?: 50
ADL_SCORE(%): 0
PLACING_AN_OBJECT_ON_A_HIGH_SHELF: 3
GETTING_INTO_AND_OUT_OF_AN_AVERAGE_CAR: MODERATE DIFFICULTY
REACHING_FOR_SOMETHING_ON_A_HIGH_SHELF: 0
WALKING_INITIALLY: SLIGHT DIFFICULTY
HOW_WOULD_YOU_RATE_YOUR_CURRENT_LEVEL_OF_FUNCTION_DURING_YOUR_USUAL_ACTIVITIES_OF_DAILY_LIVING_FROM_0_TO_100_WITH_100_BEING_YOUR_LEVEL_OF_FUNCTION_PRIOR_TO_YOUR_HIP_PROBLEM_AND_0_BEING_THE_INABILITY_TO_PERFORM_ANY_OF_YOUR_USUAL_DAILY_ACTIVITIES?: 50
STEPPING UP AND DOWN CURBS: SLIGHT DIFFICULTY
PUTTING_ON_SOCKS_AND_SHOES: SLIGHT DIFFICULTY
WALKING_UP_STEEP_HILLS: SLIGHT DIFFICULTY
GOING DOWN 1 FLIGHT OF STAIRS: SLIGHT DIFFICULTY
ABILITY_TO_PERFORM_ACTIVITY_WITH_YOUR_NORMAL_TECHNIQUE: MODERATE DIFFICULTY
GETTING_INTO_AND_OUT_OF_A_BATHTUB: SLIGHT DIFFICULTY
SITTING_FOR_15_MINUTES: NO DIFFICULTY AT ALL
PUTTING_ON_A_SHIRT_THAT_BUTTONS_DOWN_THE_FRONT: 0
STEPPING_UP_AND_DOWN_CURBS: SLIGHT DIFFICULTY
DEEP_SQUATTING: MODERATE DIFFICULTY
PUTTING_ON_AN_UNDERSHIRT_OR_A_PULLOVER_SWEATER: 1
GOING UP 1 FLIGHT OF STAIRS: MODERATE DIFFICULTY
PLEASE_INDICATE_YOR_PRIMARY_REASON_FOR_REFERRAL_TO_THERAPY:: SHOULDER
ROLLING_OVER_IN_BED: SLIGHT DIFFICULTY
SPORTS_COUNT: 9
CUTTING/LATERAL_MOVEMENTS: SLIGHT DIFFICULTY
TWISTING/PIVOTING_ON_INVOLVED_LEG: MODERATE DIFFICULTY
LIGHT_TO_MODERATE_WORK: SLIGHT DIFFICULTY
TWISTING/PIVOTING ON INVOLVED LEG: MODERATE DIFFICULTY
WALKING_DOWN_STEEP_HILLS: SLIGHT DIFFICULTY
LOW_IMPACT_ACTIVITIES_LIKE_FAST_WALKING: SLIGHT DIFFICULTY
HOS_ADL_ITEM_SCORE_TOTAL: 35
WASHING_YOUR_BACK: 0
DEEP SQUATTING: MODERATE DIFFICULTY
AT_ITS_WORST?: 3
STANDING_FOR_15_MINUTES: SLIGHT DIFFICULTY
ROLLING OVER IN BED: SLIGHT DIFFICULTY
GETTING_INTO_AND_OUT_OF_A_BATHTUB: SLIGHT DIFFICULTY
SITTING FOR 15 MINUTES: NO DIFFICULTY AT ALL
SPORTS_HIGHEST_POTENTIAL_SCORE: 36
STANDING FOR 15 MINUTES: SLIGHT DIFFICULTY
WALKING_UP_STEEP_HILLS: SLIGHT DIFFICULTY
SPORTS_SCORE(%): 0
ADL_COUNT: 17
WASHING_YOUR_HAIR?: 1
WALKING_INITIALLY: SLIGHT DIFFICULTY
ADL_TOTAL_ITEM_SCORE: 0

## 2025-09-02 ENCOUNTER — OFFICE VISIT (OUTPATIENT)
Dept: INTERNAL MEDICINE | Facility: CLINIC | Age: 85
End: 2025-09-02
Payer: MEDICARE

## 2025-09-02 VITALS
HEIGHT: 67 IN | WEIGHT: 194.2 LBS | TEMPERATURE: 97.4 F | RESPIRATION RATE: 16 BRPM | SYSTOLIC BLOOD PRESSURE: 132 MMHG | HEART RATE: 62 BPM | OXYGEN SATURATION: 98 % | DIASTOLIC BLOOD PRESSURE: 72 MMHG | BODY MASS INDEX: 30.48 KG/M2

## 2025-09-02 DIAGNOSIS — I48.91 ATRIAL FIBRILLATION, UNSPECIFIED TYPE (H): ICD-10-CM

## 2025-09-02 DIAGNOSIS — I77.9 BILATERAL CAROTID ARTERY DISEASE, UNSPECIFIED TYPE: ICD-10-CM

## 2025-09-02 DIAGNOSIS — G89.29 CHRONIC RIGHT SHOULDER PAIN: ICD-10-CM

## 2025-09-02 DIAGNOSIS — M10.9 GOUT, UNSPECIFIED CAUSE, UNSPECIFIED CHRONICITY, UNSPECIFIED SITE: ICD-10-CM

## 2025-09-02 DIAGNOSIS — M25.511 CHRONIC RIGHT SHOULDER PAIN: ICD-10-CM

## 2025-09-02 DIAGNOSIS — Z00.00 MEDICARE ANNUAL WELLNESS VISIT, SUBSEQUENT: Primary | ICD-10-CM

## 2025-09-02 DIAGNOSIS — R41.3 SHORT-TERM MEMORY LOSS: ICD-10-CM

## 2025-09-02 DIAGNOSIS — Z13.29 SCREENING FOR THYROID DISORDER: ICD-10-CM

## 2025-09-02 DIAGNOSIS — E78.5 HYPERLIPIDEMIA LDL GOAL <100: ICD-10-CM

## 2025-09-02 DIAGNOSIS — I10 BENIGN ESSENTIAL HYPERTENSION: ICD-10-CM

## 2025-09-02 LAB
ALT SERPL W P-5'-P-CCNC: 23 U/L (ref 0–70)
ANION GAP SERPL CALCULATED.3IONS-SCNC: 11 MMOL/L (ref 7–15)
BUN SERPL-MCNC: 18.8 MG/DL (ref 8–23)
CALCIUM SERPL-MCNC: 9.8 MG/DL (ref 8.8–10.4)
CHLORIDE SERPL-SCNC: 106 MMOL/L (ref 98–107)
CHOLEST SERPL-MCNC: 137 MG/DL
CREAT SERPL-MCNC: 1 MG/DL (ref 0.67–1.17)
EGFRCR SERPLBLD CKD-EPI 2021: 74 ML/MIN/1.73M2
ERYTHROCYTE [DISTWIDTH] IN BLOOD BY AUTOMATED COUNT: 15 % (ref 10–15)
FASTING STATUS PATIENT QL REPORTED: YES
FASTING STATUS PATIENT QL REPORTED: YES
GLUCOSE SERPL-MCNC: 112 MG/DL (ref 70–99)
HCO3 SERPL-SCNC: 24 MMOL/L (ref 22–29)
HCT VFR BLD AUTO: 43.8 % (ref 40–53)
HDLC SERPL-MCNC: 89 MG/DL
HGB BLD-MCNC: 15 G/DL (ref 13.3–17.7)
LDLC SERPL CALC-MCNC: 39 MG/DL
MCH RBC QN AUTO: 31.1 PG (ref 26.5–33)
MCHC RBC AUTO-ENTMCNC: 34.2 G/DL (ref 31.5–36.5)
MCV RBC AUTO: 90.7 FL (ref 78–100)
NONHDLC SERPL-MCNC: 48 MG/DL
PLATELET # BLD AUTO: 166 10E3/UL (ref 150–450)
POTASSIUM SERPL-SCNC: 4 MMOL/L (ref 3.4–5.3)
RBC # BLD AUTO: 4.83 10E6/UL (ref 4.4–5.9)
SODIUM SERPL-SCNC: 141 MMOL/L (ref 135–145)
TRIGL SERPL-MCNC: 44 MG/DL
TSH SERPL DL<=0.005 MIU/L-ACNC: 1.14 UIU/ML (ref 0.3–4.2)
URATE SERPL-MCNC: 3.6 MG/DL (ref 3.4–7)
VIT B12 SERPL-MCNC: 313 PG/ML (ref 232–1245)
WBC # BLD AUTO: 5.55 10E3/UL (ref 4–11)

## 2025-09-02 PROCEDURE — 80061 LIPID PANEL: CPT | Performed by: INTERNAL MEDICINE

## 2025-09-02 PROCEDURE — 82607 VITAMIN B-12: CPT | Performed by: INTERNAL MEDICINE

## 2025-09-02 PROCEDURE — 80048 BASIC METABOLIC PNL TOTAL CA: CPT | Performed by: INTERNAL MEDICINE

## 2025-09-02 PROCEDURE — 84460 ALANINE AMINO (ALT) (SGPT): CPT | Performed by: INTERNAL MEDICINE

## 2025-09-02 PROCEDURE — 84443 ASSAY THYROID STIM HORMONE: CPT | Performed by: INTERNAL MEDICINE

## 2025-09-02 PROCEDURE — 84550 ASSAY OF BLOOD/URIC ACID: CPT | Performed by: INTERNAL MEDICINE

## 2025-09-02 PROCEDURE — 36415 COLL VENOUS BLD VENIPUNCTURE: CPT | Performed by: INTERNAL MEDICINE

## 2025-09-02 PROCEDURE — 85027 COMPLETE CBC AUTOMATED: CPT | Performed by: INTERNAL MEDICINE

## 2025-09-02 RX ORDER — AMLODIPINE BESYLATE 5 MG/1
5 TABLET ORAL EVERY EVENING
Qty: 90 TABLET | Refills: 3 | Status: SHIPPED | OUTPATIENT
Start: 2025-09-02

## 2025-09-02 RX ORDER — HYDROCHLOROTHIAZIDE 25 MG/1
25 TABLET ORAL EVERY MORNING
COMMUNITY

## 2025-09-04 ENCOUNTER — THERAPY VISIT (OUTPATIENT)
Dept: PHYSICAL THERAPY | Facility: CLINIC | Age: 85
End: 2025-09-04
Payer: MEDICARE

## 2025-09-04 DIAGNOSIS — M25.551 PAIN OF RIGHT HIP: Primary | ICD-10-CM

## 2025-09-04 PROBLEM — M25.559 HIP PAIN: Status: ACTIVE | Noted: 2021-01-06

## (undated) DEVICE — GLOVE PROTEXIS BLUE W/NEU-THERA 8.0  2D73EB80

## (undated) DEVICE — GLOVE PROTEXIS W/NEU-THERA 7.5  2D73TE75

## (undated) DEVICE — LINEN POUCH DBL 5427

## (undated) DEVICE — LINEN HALF SHEET 5512

## (undated) DEVICE — ENDO TROCAR FIRST ENTRY KII FIOS Z-THRD 05X100MM CTF03

## (undated) DEVICE — SU VICRYL 4-0 PS-1 27" UND J935H

## (undated) DEVICE — ESU GROUND PAD ADULT W/CORD E7507

## (undated) DEVICE — SU VICRYL+ 3-0 27IN SH UND VCP416H

## (undated) DEVICE — LINEN FULL SHEET 5511

## (undated) DEVICE — SU PDS II 2-0 SH 27" Z317H

## (undated) DEVICE — Device

## (undated) DEVICE — PREP CHLORAPREP 26ML TINTED HI-LITE ORANGE 930815

## (undated) DEVICE — KIT ENDO TURNOVER/PROCEDURE W/CLEAN A SCOPE LINERS 103888

## (undated) DEVICE — BAG CLEAR TRASH 1.3M 39X33" P4040C

## (undated) DEVICE — LINEN TOWEL PACK X5 5464

## (undated) DEVICE — ESU CORD MONOPOLAR 10'  E0510

## (undated) DEVICE — ESU PENCIL W/HOLSTER E2350H

## (undated) RX ORDER — FENTANYL CITRATE 50 UG/ML
INJECTION, SOLUTION INTRAMUSCULAR; INTRAVENOUS
Status: DISPENSED
Start: 2025-04-14

## (undated) RX ORDER — ONDANSETRON 2 MG/ML
INJECTION INTRAMUSCULAR; INTRAVENOUS
Status: DISPENSED
Start: 2025-04-14

## (undated) RX ORDER — CEFAZOLIN SODIUM/WATER 2 G/20 ML
SYRINGE (ML) INTRAVENOUS
Status: DISPENSED
Start: 2025-04-14

## (undated) RX ORDER — FENTANYL CITRATE 50 UG/ML
INJECTION, SOLUTION INTRAMUSCULAR; INTRAVENOUS
Status: DISPENSED
Start: 2019-05-01